# Patient Record
Sex: MALE | Race: WHITE | NOT HISPANIC OR LATINO | Employment: OTHER | URBAN - METROPOLITAN AREA
[De-identification: names, ages, dates, MRNs, and addresses within clinical notes are randomized per-mention and may not be internally consistent; named-entity substitution may affect disease eponyms.]

---

## 2022-01-01 ENCOUNTER — HOSPITAL ENCOUNTER (OUTPATIENT)
Dept: CT IMAGING | Facility: HOSPITAL | Age: 72
Discharge: HOME | End: 2022-06-23
Admitting: UROLOGY

## 2022-01-01 ENCOUNTER — NURSE NAVIGATOR (OUTPATIENT)
Dept: ONCOLOGY | Facility: CLINIC | Age: 72
End: 2022-01-01

## 2022-01-01 ENCOUNTER — CONSULT (OUTPATIENT)
Dept: ONCOLOGY | Facility: CLINIC | Age: 72
End: 2022-01-01

## 2022-01-01 ENCOUNTER — APPOINTMENT (OUTPATIENT)
Dept: GENERAL RADIOLOGY | Facility: HOSPITAL | Age: 72
End: 2022-01-01

## 2022-01-01 ENCOUNTER — PROCEDURE VISIT (OUTPATIENT)
Dept: UROLOGY | Facility: CLINIC | Age: 72
End: 2022-01-01

## 2022-01-01 ENCOUNTER — APPOINTMENT (OUTPATIENT)
Dept: ONCOLOGY | Facility: HOSPITAL | Age: 72
End: 2022-01-01

## 2022-01-01 ENCOUNTER — OUTSIDE FACILITY SERVICE (OUTPATIENT)
Dept: GASTROENTEROLOGY | Facility: CLINIC | Age: 72
End: 2022-01-01

## 2022-01-01 ENCOUNTER — APPOINTMENT (OUTPATIENT)
Dept: NEPHROLOGY | Facility: HOSPITAL | Age: 72
End: 2022-01-01

## 2022-01-01 ENCOUNTER — TELEPHONE (OUTPATIENT)
Dept: INTERNAL MEDICINE | Facility: CLINIC | Age: 72
End: 2022-01-01

## 2022-01-01 ENCOUNTER — OFFICE VISIT (OUTPATIENT)
Dept: INTERNAL MEDICINE | Facility: CLINIC | Age: 72
End: 2022-01-01

## 2022-01-01 ENCOUNTER — APPOINTMENT (OUTPATIENT)
Dept: CARDIOLOGY | Facility: HOSPITAL | Age: 72
End: 2022-01-01

## 2022-01-01 ENCOUNTER — APPOINTMENT (OUTPATIENT)
Dept: LAB | Facility: HOSPITAL | Age: 72
End: 2022-01-01

## 2022-01-01 ENCOUNTER — TRANSITIONAL CARE MANAGEMENT TELEPHONE ENCOUNTER (OUTPATIENT)
Dept: CALL CENTER | Facility: HOSPITAL | Age: 72
End: 2022-01-01

## 2022-01-01 ENCOUNTER — HOSPITAL ENCOUNTER (OUTPATIENT)
Dept: ONCOLOGY | Facility: HOSPITAL | Age: 72
Setting detail: INFUSION SERIES
Discharge: HOME | End: 2022-07-07

## 2022-01-01 ENCOUNTER — APPOINTMENT (OUTPATIENT)
Dept: CT IMAGING | Facility: HOSPITAL | Age: 72
End: 2022-01-01

## 2022-01-01 ENCOUNTER — OFFICE VISIT (OUTPATIENT)
Dept: CARDIAC SURGERY | Facility: CLINIC | Age: 72
End: 2022-01-01

## 2022-01-01 ENCOUNTER — EDUCATION (OUTPATIENT)
Dept: ONCOLOGY | Facility: HOSPITAL | Age: 72
End: 2022-01-01

## 2022-01-01 ENCOUNTER — NURSE TRIAGE (OUTPATIENT)
Dept: CALL CENTER | Facility: HOSPITAL | Age: 72
End: 2022-01-01

## 2022-01-01 ENCOUNTER — OFFICE VISIT (OUTPATIENT)
Dept: PULMONOLOGY | Facility: CLINIC | Age: 72
End: 2022-01-01

## 2022-01-01 ENCOUNTER — ANESTHESIA (OUTPATIENT)
Dept: GASTROENTEROLOGY | Facility: HOSPITAL | Age: 72
End: 2022-01-01

## 2022-01-01 ENCOUNTER — HOSPITAL ENCOUNTER (INPATIENT)
Facility: HOSPITAL | Age: 72
LOS: 7 days | Discharge: HOSPICE | End: 2022-10-27
Attending: EMERGENCY MEDICINE | Admitting: INTERNAL MEDICINE

## 2022-01-01 ENCOUNTER — PRE-ADMISSION TESTING (OUTPATIENT)
Dept: PREADMISSION TESTING | Facility: HOSPITAL | Age: 72
End: 2022-01-01

## 2022-01-01 ENCOUNTER — TELEPHONE (OUTPATIENT)
Dept: ONCOLOGY | Facility: CLINIC | Age: 72
End: 2022-01-01

## 2022-01-01 ENCOUNTER — TELEPHONE (OUTPATIENT)
Dept: GASTROENTEROLOGY | Facility: CLINIC | Age: 72
End: 2022-01-01

## 2022-01-01 ENCOUNTER — HOSPITAL ENCOUNTER (OUTPATIENT)
Dept: ONCOLOGY | Facility: HOSPITAL | Age: 72
Setting detail: INFUSION SERIES
Discharge: HOME | End: 2022-07-06

## 2022-01-01 ENCOUNTER — OFFICE VISIT (OUTPATIENT)
Dept: UROLOGY | Facility: CLINIC | Age: 72
End: 2022-01-01

## 2022-01-01 ENCOUNTER — READMISSION MANAGEMENT (OUTPATIENT)
Dept: CALL CENTER | Facility: HOSPITAL | Age: 72
End: 2022-01-01

## 2022-01-01 ENCOUNTER — HOSPITAL ENCOUNTER (EMERGENCY)
Facility: HOSPITAL | Age: 72
Discharge: LEFT BEFORE DISCHARGE | End: 2022-10-02
Attending: EMERGENCY MEDICINE | Admitting: EMERGENCY MEDICINE

## 2022-01-01 ENCOUNTER — HOSPITAL ENCOUNTER (OUTPATIENT)
Dept: ONCOLOGY | Facility: HOSPITAL | Age: 72
Discharge: HOME | End: 2022-07-06

## 2022-01-01 ENCOUNTER — HOSPITAL ENCOUNTER (OUTPATIENT)
Dept: RADIATION ONCOLOGY | Facility: HOSPITAL | Age: 72
Setting detail: RADIATION/ONCOLOGY SERIES
Discharge: HOME | End: 2022-09-22

## 2022-01-01 ENCOUNTER — HOSPITAL ENCOUNTER (OUTPATIENT)
Dept: PET IMAGING | Facility: HOSPITAL | Age: 72
Discharge: HOME | End: 2022-06-24

## 2022-01-01 ENCOUNTER — DOCUMENTATION (OUTPATIENT)
Dept: PULMONOLOGY | Facility: CLINIC | Age: 72
End: 2022-01-01

## 2022-01-01 ENCOUNTER — HOSPITAL ENCOUNTER (INPATIENT)
Facility: HOSPITAL | Age: 72
LOS: 28 days | Discharge: HOME | End: 2022-08-09
Attending: EMERGENCY MEDICINE | Admitting: HOSPITALIST

## 2022-01-01 ENCOUNTER — LAB (OUTPATIENT)
Dept: LAB | Facility: HOSPITAL | Age: 72
End: 2022-01-01

## 2022-01-01 ENCOUNTER — HOSPITAL ENCOUNTER (OUTPATIENT)
Dept: MRI IMAGING | Facility: HOSPITAL | Age: 72
Discharge: HOME | End: 2022-06-28
Admitting: INTERNAL MEDICINE

## 2022-01-01 ENCOUNTER — HOSPITAL ENCOUNTER (OUTPATIENT)
Dept: CT IMAGING | Facility: HOSPITAL | Age: 72
Discharge: HOME | End: 2022-05-31
Admitting: STUDENT IN AN ORGANIZED HEALTH CARE EDUCATION/TRAINING PROGRAM

## 2022-01-01 ENCOUNTER — PREP FOR SURGERY (OUTPATIENT)
Dept: OTHER | Facility: HOSPITAL | Age: 72
End: 2022-01-01

## 2022-01-01 ENCOUNTER — HOSPITAL ENCOUNTER (OUTPATIENT)
Facility: HOSPITAL | Age: 72
Setting detail: HOSPITAL OUTPATIENT SURGERY
Discharge: HOME | End: 2022-06-15
Attending: INTERNAL MEDICINE | Admitting: INTERNAL MEDICINE

## 2022-01-01 ENCOUNTER — ANESTHESIA EVENT (OUTPATIENT)
Dept: GASTROENTEROLOGY | Facility: HOSPITAL | Age: 72
End: 2022-01-01

## 2022-01-01 ENCOUNTER — TELEPHONE (OUTPATIENT)
Dept: CARDIAC SURGERY | Facility: CLINIC | Age: 72
End: 2022-01-01

## 2022-01-01 ENCOUNTER — OFFICE VISIT (OUTPATIENT)
Dept: ONCOLOGY | Facility: CLINIC | Age: 72
End: 2022-01-01

## 2022-01-01 ENCOUNTER — DOCUMENTATION (OUTPATIENT)
Dept: NUTRITION | Facility: HOSPITAL | Age: 72
End: 2022-01-01

## 2022-01-01 ENCOUNTER — TELEPHONE (OUTPATIENT)
Dept: UROLOGY | Facility: CLINIC | Age: 72
End: 2022-01-01

## 2022-01-01 ENCOUNTER — LAB REQUISITION (OUTPATIENT)
Dept: LAB | Facility: HOSPITAL | Age: 72
End: 2022-01-01

## 2022-01-01 ENCOUNTER — HOSPITAL ENCOUNTER (OUTPATIENT)
Dept: PET IMAGING | Facility: HOSPITAL | Age: 72
Discharge: HOME | End: 2022-09-06

## 2022-01-01 ENCOUNTER — TELEPHONE (OUTPATIENT)
Dept: PULMONOLOGY | Facility: CLINIC | Age: 72
End: 2022-01-01

## 2022-01-01 ENCOUNTER — DOCUMENTATION (OUTPATIENT)
Dept: ONCOLOGY | Facility: CLINIC | Age: 72
End: 2022-01-01

## 2022-01-01 ENCOUNTER — PATIENT ROUNDING (BHMG ONLY) (OUTPATIENT)
Dept: ONCOLOGY | Facility: CLINIC | Age: 72
End: 2022-01-01

## 2022-01-01 ENCOUNTER — PATIENT OUTREACH (OUTPATIENT)
Dept: CASE MANAGEMENT | Facility: OTHER | Age: 72
End: 2022-01-01

## 2022-01-01 VITALS
TEMPERATURE: 96.8 F | HEIGHT: 67 IN | OXYGEN SATURATION: 97 % | BODY MASS INDEX: 26.4 KG/M2 | WEIGHT: 168.2 LBS | DIASTOLIC BLOOD PRESSURE: 94 MMHG | HEART RATE: 94 BPM | RESPIRATION RATE: 18 BRPM | SYSTOLIC BLOOD PRESSURE: 167 MMHG

## 2022-01-01 VITALS
SYSTOLIC BLOOD PRESSURE: 136 MMHG | TEMPERATURE: 97.5 F | WEIGHT: 155 LBS | RESPIRATION RATE: 18 BRPM | BODY MASS INDEX: 23.49 KG/M2 | HEIGHT: 68 IN | OXYGEN SATURATION: 91 % | DIASTOLIC BLOOD PRESSURE: 60 MMHG | HEART RATE: 86 BPM

## 2022-01-01 VITALS
HEART RATE: 104 BPM | DIASTOLIC BLOOD PRESSURE: 73 MMHG | OXYGEN SATURATION: 92 % | TEMPERATURE: 97.7 F | WEIGHT: 155 LBS | RESPIRATION RATE: 20 BRPM | HEIGHT: 67 IN | SYSTOLIC BLOOD PRESSURE: 142 MMHG | BODY MASS INDEX: 24.33 KG/M2

## 2022-01-01 VITALS
SYSTOLIC BLOOD PRESSURE: 132 MMHG | WEIGHT: 154 LBS | BODY MASS INDEX: 22.81 KG/M2 | TEMPERATURE: 97.8 F | DIASTOLIC BLOOD PRESSURE: 62 MMHG | HEIGHT: 69 IN | OXYGEN SATURATION: 95 % | HEART RATE: 76 BPM

## 2022-01-01 VITALS
HEART RATE: 100 BPM | HEIGHT: 67 IN | TEMPERATURE: 98.4 F | SYSTOLIC BLOOD PRESSURE: 136 MMHG | BODY MASS INDEX: 24.17 KG/M2 | WEIGHT: 154 LBS | DIASTOLIC BLOOD PRESSURE: 80 MMHG

## 2022-01-01 VITALS — BODY MASS INDEX: 23.64 KG/M2 | HEIGHT: 68 IN | WEIGHT: 156 LBS

## 2022-01-01 VITALS
OXYGEN SATURATION: 93 % | DIASTOLIC BLOOD PRESSURE: 70 MMHG | TEMPERATURE: 99.5 F | HEIGHT: 67 IN | SYSTOLIC BLOOD PRESSURE: 140 MMHG | BODY MASS INDEX: 24.33 KG/M2 | HEART RATE: 97 BPM | RESPIRATION RATE: 18 BRPM | WEIGHT: 155 LBS

## 2022-01-01 VITALS
OXYGEN SATURATION: 94 % | TEMPERATURE: 97 F | HEIGHT: 68 IN | BODY MASS INDEX: 23.72 KG/M2 | HEART RATE: 80 BPM | WEIGHT: 156.53 LBS | DIASTOLIC BLOOD PRESSURE: 78 MMHG | SYSTOLIC BLOOD PRESSURE: 146 MMHG | RESPIRATION RATE: 18 BRPM

## 2022-01-01 VITALS
DIASTOLIC BLOOD PRESSURE: 80 MMHG | SYSTOLIC BLOOD PRESSURE: 140 MMHG | HEIGHT: 64 IN | TEMPERATURE: 97.7 F | BODY MASS INDEX: 25.78 KG/M2 | WEIGHT: 151 LBS | OXYGEN SATURATION: 96 % | HEART RATE: 90 BPM

## 2022-01-01 VITALS
HEIGHT: 68 IN | SYSTOLIC BLOOD PRESSURE: 128 MMHG | BODY MASS INDEX: 22.19 KG/M2 | WEIGHT: 146.4 LBS | DIASTOLIC BLOOD PRESSURE: 62 MMHG | HEART RATE: 72 BPM | TEMPERATURE: 98 F

## 2022-01-01 VITALS
BODY MASS INDEX: 24.33 KG/M2 | SYSTOLIC BLOOD PRESSURE: 154 MMHG | OXYGEN SATURATION: 93 % | TEMPERATURE: 98.6 F | HEIGHT: 67 IN | DIASTOLIC BLOOD PRESSURE: 82 MMHG | RESPIRATION RATE: 24 BRPM | HEART RATE: 98 BPM | WEIGHT: 155 LBS

## 2022-01-01 VITALS
SYSTOLIC BLOOD PRESSURE: 110 MMHG | BODY MASS INDEX: 25.46 KG/M2 | TEMPERATURE: 98.2 F | WEIGHT: 162.2 LBS | HEART RATE: 88 BPM | DIASTOLIC BLOOD PRESSURE: 70 MMHG | HEIGHT: 67 IN

## 2022-01-01 VITALS
BODY MASS INDEX: 24.05 KG/M2 | WEIGHT: 153.25 LBS | DIASTOLIC BLOOD PRESSURE: 70 MMHG | TEMPERATURE: 98 F | SYSTOLIC BLOOD PRESSURE: 148 MMHG | OXYGEN SATURATION: 95 % | HEIGHT: 67 IN | HEART RATE: 105 BPM | RESPIRATION RATE: 22 BRPM

## 2022-01-01 VITALS
DIASTOLIC BLOOD PRESSURE: 57 MMHG | RESPIRATION RATE: 18 BRPM | BODY MASS INDEX: 23.49 KG/M2 | HEIGHT: 68 IN | HEART RATE: 91 BPM | OXYGEN SATURATION: 93 % | SYSTOLIC BLOOD PRESSURE: 121 MMHG | WEIGHT: 155 LBS | TEMPERATURE: 97.3 F

## 2022-01-01 VITALS
SYSTOLIC BLOOD PRESSURE: 106 MMHG | HEIGHT: 69 IN | TEMPERATURE: 97.8 F | BODY MASS INDEX: 23.08 KG/M2 | HEART RATE: 64 BPM | DIASTOLIC BLOOD PRESSURE: 60 MMHG | WEIGHT: 155.8 LBS

## 2022-01-01 VITALS — WEIGHT: 155 LBS | BODY MASS INDEX: 22.96 KG/M2 | HEIGHT: 69 IN

## 2022-01-01 VITALS
RESPIRATION RATE: 18 BRPM | HEIGHT: 67 IN | SYSTOLIC BLOOD PRESSURE: 149 MMHG | DIASTOLIC BLOOD PRESSURE: 83 MMHG | HEART RATE: 102 BPM | WEIGHT: 152.7 LBS | BODY MASS INDEX: 23.97 KG/M2 | OXYGEN SATURATION: 91 % | TEMPERATURE: 98.3 F

## 2022-01-01 VITALS — BODY MASS INDEX: 27.14 KG/M2 | WEIGHT: 159 LBS | HEIGHT: 64 IN

## 2022-01-01 VITALS
SYSTOLIC BLOOD PRESSURE: 152 MMHG | HEART RATE: 81 BPM | WEIGHT: 157 LBS | TEMPERATURE: 97.9 F | RESPIRATION RATE: 16 BRPM | DIASTOLIC BLOOD PRESSURE: 68 MMHG | BODY MASS INDEX: 23.79 KG/M2 | HEIGHT: 68 IN

## 2022-01-01 VITALS — BODY MASS INDEX: 23.81 KG/M2 | HEIGHT: 68 IN | WEIGHT: 157.08 LBS

## 2022-01-01 DIAGNOSIS — E78.2 MIXED HYPERLIPIDEMIA: Chronic | ICD-10-CM

## 2022-01-01 DIAGNOSIS — Z86.79 HISTORY OF CORONARY ARTERY DISEASE: ICD-10-CM

## 2022-01-01 DIAGNOSIS — C67.9 MALIGNANT NEOPLASM OF URINARY BLADDER, UNSPECIFIED SITE: Chronic | ICD-10-CM

## 2022-01-01 DIAGNOSIS — C67.9 MALIGNANT NEOPLASM OF URINARY BLADDER, UNSPECIFIED SITE: Primary | Chronic | ICD-10-CM

## 2022-01-01 DIAGNOSIS — R30.9 PAINFUL URINATION: ICD-10-CM

## 2022-01-01 DIAGNOSIS — J90 BILATERAL PLEURAL EFFUSION: ICD-10-CM

## 2022-01-01 DIAGNOSIS — R60.0 LOWER EXTREMITY EDEMA: ICD-10-CM

## 2022-01-01 DIAGNOSIS — C34.92 SQUAMOUS CELL LUNG CANCER, LEFT: ICD-10-CM

## 2022-01-01 DIAGNOSIS — Z86.39 HISTORY OF HYPERLIPIDEMIA: ICD-10-CM

## 2022-01-01 DIAGNOSIS — C34.12 CANCER OF UPPER LOBE OF LEFT LUNG: Primary | ICD-10-CM

## 2022-01-01 DIAGNOSIS — C34.92 SQUAMOUS CELL LUNG CANCER, LEFT: Primary | ICD-10-CM

## 2022-01-01 DIAGNOSIS — K21.9 GASTROESOPHAGEAL REFLUX DISEASE WITHOUT ESOPHAGITIS: ICD-10-CM

## 2022-01-01 DIAGNOSIS — H10.9 CONJUNCTIVITIS OF LEFT EYE, UNSPECIFIED CONJUNCTIVITIS TYPE: ICD-10-CM

## 2022-01-01 DIAGNOSIS — D12.2 BENIGN NEOPLASM OF ASCENDING COLON: ICD-10-CM

## 2022-01-01 DIAGNOSIS — Z51.81 ENCOUNTER FOR THERAPEUTIC DRUG MONITORING: ICD-10-CM

## 2022-01-01 DIAGNOSIS — Z12.11 SCREEN FOR COLON CANCER: ICD-10-CM

## 2022-01-01 DIAGNOSIS — J96.01 ACUTE RESPIRATORY FAILURE WITH HYPOXIA: ICD-10-CM

## 2022-01-01 DIAGNOSIS — K56.7 ILEUS: ICD-10-CM

## 2022-01-01 DIAGNOSIS — Z01.818 PRE-OP TESTING: Primary | ICD-10-CM

## 2022-01-01 DIAGNOSIS — J84.114 ACUTE INTERSTITIAL PNEUMONITIS: ICD-10-CM

## 2022-01-01 DIAGNOSIS — K21.9 GASTROESOPHAGEAL REFLUX DISEASE WITHOUT ESOPHAGITIS: Chronic | ICD-10-CM

## 2022-01-01 DIAGNOSIS — J96.01 ACUTE RESPIRATORY FAILURE WITH HYPOXIA AND HYPERCAPNIA: Primary | ICD-10-CM

## 2022-01-01 DIAGNOSIS — E53.8 VITAMIN B12 DEFICIENCY: Primary | ICD-10-CM

## 2022-01-01 DIAGNOSIS — R91.8 LUNG MASS: Primary | ICD-10-CM

## 2022-01-01 DIAGNOSIS — C34.92 PRIMARY LUNG CANCER, LEFT: ICD-10-CM

## 2022-01-01 DIAGNOSIS — D72.829 LEUKOCYTOSIS, UNSPECIFIED TYPE: ICD-10-CM

## 2022-01-01 DIAGNOSIS — R91.8 MASS OF UPPER LOBE OF LEFT LUNG: ICD-10-CM

## 2022-01-01 DIAGNOSIS — J96.10 CHRONIC RESPIRATORY FAILURE, UNSPECIFIED WHETHER WITH HYPOXIA OR HYPERCAPNIA: ICD-10-CM

## 2022-01-01 DIAGNOSIS — J44.9 CHRONIC OBSTRUCTIVE PULMONARY DISEASE, UNSPECIFIED COPD TYPE: Chronic | ICD-10-CM

## 2022-01-01 DIAGNOSIS — R59.0 MEDIASTINAL ADENOPATHY: ICD-10-CM

## 2022-01-01 DIAGNOSIS — J81.0 ACUTE PULMONARY EDEMA: Primary | ICD-10-CM

## 2022-01-01 DIAGNOSIS — J96.02 ACUTE RESPIRATORY FAILURE WITH HYPOXIA AND HYPERCAPNIA: Primary | ICD-10-CM

## 2022-01-01 DIAGNOSIS — L40.9 PSORIASIS: ICD-10-CM

## 2022-01-01 DIAGNOSIS — J90 RECURRENT LEFT PLEURAL EFFUSION: Primary | ICD-10-CM

## 2022-01-01 DIAGNOSIS — N17.9 AKI (ACUTE KIDNEY INJURY): Primary | ICD-10-CM

## 2022-01-01 DIAGNOSIS — F17.210 NICOTINE DEPENDENCE, CIGARETTES, UNCOMPLICATED: ICD-10-CM

## 2022-01-01 DIAGNOSIS — R91.8 MASS OF UPPER LOBE OF LEFT LUNG: Primary | ICD-10-CM

## 2022-01-01 DIAGNOSIS — Z12.5 SCREENING PSA (PROSTATE SPECIFIC ANTIGEN): ICD-10-CM

## 2022-01-01 DIAGNOSIS — C34.12 CANCER OF UPPER LOBE OF LEFT LUNG: ICD-10-CM

## 2022-01-01 DIAGNOSIS — Z13.21 ENCOUNTER FOR VITAMIN DEFICIENCY SCREENING: ICD-10-CM

## 2022-01-01 DIAGNOSIS — Z86.010 PERSONAL HISTORY OF COLONIC POLYPS: ICD-10-CM

## 2022-01-01 DIAGNOSIS — R91.8 LEFT LOWER LOBE PULMONARY INFILTRATE: ICD-10-CM

## 2022-01-01 DIAGNOSIS — Z12.2 ENCOUNTER FOR SCREENING FOR LUNG CANCER: ICD-10-CM

## 2022-01-01 DIAGNOSIS — N28.89 RIGHT KIDNEY MASS: ICD-10-CM

## 2022-01-01 DIAGNOSIS — K64.8 OTHER HEMORRHOIDS: ICD-10-CM

## 2022-01-01 DIAGNOSIS — F41.9 ANXIETY DISORDER, UNSPECIFIED TYPE: ICD-10-CM

## 2022-01-01 DIAGNOSIS — J44.1 COPD WITH ACUTE EXACERBATION: ICD-10-CM

## 2022-01-01 DIAGNOSIS — G62.9 NEUROPATHY: ICD-10-CM

## 2022-01-01 DIAGNOSIS — Z00.00 ENCOUNTER FOR SUBSEQUENT ANNUAL WELLNESS VISIT (AWV) IN MEDICARE PATIENT: Primary | ICD-10-CM

## 2022-01-01 DIAGNOSIS — J96.21 ACUTE ON CHRONIC RESPIRATORY FAILURE WITH HYPOXIA: ICD-10-CM

## 2022-01-01 DIAGNOSIS — G62.9 NEUROPATHY: Primary | ICD-10-CM

## 2022-01-01 DIAGNOSIS — I25.810 CORONARY ARTERY DISEASE INVOLVING CORONARY BYPASS GRAFT OF NATIVE HEART WITHOUT ANGINA PECTORIS: ICD-10-CM

## 2022-01-01 DIAGNOSIS — Z86.79 HISTORY OF HYPERTENSION: ICD-10-CM

## 2022-01-01 DIAGNOSIS — I10 ESSENTIAL HYPERTENSION: Chronic | ICD-10-CM

## 2022-01-01 DIAGNOSIS — Z85.118 HISTORY OF LUNG CANCER: ICD-10-CM

## 2022-01-01 DIAGNOSIS — J44.9 CHRONIC OBSTRUCTIVE PULMONARY DISEASE, UNSPECIFIED COPD TYPE: ICD-10-CM

## 2022-01-01 DIAGNOSIS — Z87.2 HISTORY OF PSORIASIS: ICD-10-CM

## 2022-01-01 DIAGNOSIS — C34.12 MALIGNANT NEOPLASM OF UPPER LOBE, LEFT BRONCHUS OR LUNG: ICD-10-CM

## 2022-01-01 DIAGNOSIS — D12.5 BENIGN NEOPLASM OF SIGMOID COLON: ICD-10-CM

## 2022-01-01 DIAGNOSIS — R59.0 PARATRACHEAL LYMPHADENOPATHY: ICD-10-CM

## 2022-01-01 DIAGNOSIS — D12.4 BENIGN NEOPLASM OF DESCENDING COLON: ICD-10-CM

## 2022-01-01 DIAGNOSIS — R39.9 LOWER URINARY TRACT SYMPTOMS (LUTS): ICD-10-CM

## 2022-01-01 DIAGNOSIS — F17.200 TOBACCO DEPENDENCE: ICD-10-CM

## 2022-01-01 DIAGNOSIS — C67.9 MALIGNANT NEOPLASM OF URINARY BLADDER, UNSPECIFIED SITE: Primary | ICD-10-CM

## 2022-01-01 DIAGNOSIS — R39.9 LOWER URINARY TRACT SYMPTOMS (LUTS): Primary | ICD-10-CM

## 2022-01-01 DIAGNOSIS — R11.2 NAUSEA, VOMITING, AND DIARRHEA: ICD-10-CM

## 2022-01-01 DIAGNOSIS — R06.02 SHORTNESS OF BREATH: ICD-10-CM

## 2022-01-01 DIAGNOSIS — E53.8 VITAMIN B12 DEFICIENCY: Chronic | ICD-10-CM

## 2022-01-01 DIAGNOSIS — D12.2 ADENOMATOUS POLYP OF ASCENDING COLON: ICD-10-CM

## 2022-01-01 DIAGNOSIS — F17.210 CIGARETTE SMOKER: ICD-10-CM

## 2022-01-01 DIAGNOSIS — I73.9 PAD (PERIPHERAL ARTERY DISEASE): ICD-10-CM

## 2022-01-01 DIAGNOSIS — C34.92 PRIMARY LUNG CANCER, LEFT: Primary | ICD-10-CM

## 2022-01-01 DIAGNOSIS — I73.9 PAD (PERIPHERAL ARTERY DISEASE): Chronic | ICD-10-CM

## 2022-01-01 DIAGNOSIS — Z12.11 ENCOUNTER FOR SCREENING FOR MALIGNANT NEOPLASM OF COLON: ICD-10-CM

## 2022-01-01 DIAGNOSIS — I25.810 CORONARY ARTERY DISEASE INVOLVING CORONARY BYPASS GRAFT OF NATIVE HEART WITHOUT ANGINA PECTORIS: Chronic | ICD-10-CM

## 2022-01-01 DIAGNOSIS — J44.1 COPD EXACERBATION: ICD-10-CM

## 2022-01-01 DIAGNOSIS — Z11.59 NEED FOR HEPATITIS C SCREENING TEST: ICD-10-CM

## 2022-01-01 DIAGNOSIS — R06.2 WHEEZING: ICD-10-CM

## 2022-01-01 DIAGNOSIS — C34.12 MALIGNANT NEOPLASM OF UPPER LOBE OF LEFT LUNG: ICD-10-CM

## 2022-01-01 DIAGNOSIS — J96.10 CHRONIC RESPIRATORY FAILURE, UNSPECIFIED WHETHER WITH HYPOXIA OR HYPERCAPNIA: Chronic | ICD-10-CM

## 2022-01-01 DIAGNOSIS — K20.90 ESOPHAGITIS: ICD-10-CM

## 2022-01-01 DIAGNOSIS — I73.9 PAD (PERIPHERAL ARTERY DISEASE): Primary | ICD-10-CM

## 2022-01-01 DIAGNOSIS — R19.7 NAUSEA, VOMITING, AND DIARRHEA: ICD-10-CM

## 2022-01-01 DIAGNOSIS — F17.200 CURRENT SMOKER: ICD-10-CM

## 2022-01-01 DIAGNOSIS — J44.9 CHRONIC OBSTRUCTIVE PULMONARY DISEASE, UNSPECIFIED COPD TYPE: Primary | ICD-10-CM

## 2022-01-01 DIAGNOSIS — Z86.010 HISTORY OF COLON POLYPS: ICD-10-CM

## 2022-01-01 DIAGNOSIS — D12.0 BENIGN NEOPLASM OF CECUM: ICD-10-CM

## 2022-01-01 DIAGNOSIS — Z12.11 SCREENING FOR COLON CANCER: Primary | ICD-10-CM

## 2022-01-01 DIAGNOSIS — I50.9 ACUTE ON CHRONIC CONGESTIVE HEART FAILURE, UNSPECIFIED HEART FAILURE TYPE: ICD-10-CM

## 2022-01-01 DIAGNOSIS — J43.2 CENTRILOBULAR EMPHYSEMA: ICD-10-CM

## 2022-01-01 DIAGNOSIS — Z23 NEED FOR INFLUENZA VACCINATION: ICD-10-CM

## 2022-01-01 DIAGNOSIS — C67.9 MALIGNANT NEOPLASM OF URINARY BLADDER, UNSPECIFIED SITE: ICD-10-CM

## 2022-01-01 LAB
ABO GROUP BLD: NORMAL
ALBUMIN SERPL-MCNC: 2.6 G/DL (ref 3.5–5.2)
ALBUMIN SERPL-MCNC: 2.6 G/DL (ref 3.5–5.2)
ALBUMIN SERPL-MCNC: 2.7 G/DL (ref 3.5–5.2)
ALBUMIN SERPL-MCNC: 2.7 G/DL (ref 3.5–5.2)
ALBUMIN SERPL-MCNC: 2.8 G/DL (ref 3.5–5.2)
ALBUMIN SERPL-MCNC: 2.8 G/DL (ref 3.5–5.2)
ALBUMIN SERPL-MCNC: 2.9 G/DL (ref 3.5–5.2)
ALBUMIN SERPL-MCNC: 3 G/DL (ref 3.5–5.2)
ALBUMIN SERPL-MCNC: 3 G/DL (ref 3.5–5.2)
ALBUMIN SERPL-MCNC: 3.1 G/DL (ref 3.5–5.2)
ALBUMIN SERPL-MCNC: 3.1 G/DL (ref 3.5–5.2)
ALBUMIN SERPL-MCNC: 3.3 G/DL (ref 3.5–5.2)
ALBUMIN SERPL-MCNC: 3.4 G/DL (ref 3.5–5.2)
ALBUMIN SERPL-MCNC: 3.5 G/DL (ref 3.5–5.2)
ALBUMIN SERPL-MCNC: 3.6 G/DL (ref 3.5–5.2)
ALBUMIN SERPL-MCNC: 3.6 G/DL (ref 3.5–5.2)
ALBUMIN SERPL-MCNC: 3.8 G/DL (ref 3.5–5.2)
ALBUMIN SERPL-MCNC: 4.7 G/DL (ref 3.5–5.2)
ALBUMIN/GLOB SERPL: 0.8 G/DL
ALBUMIN/GLOB SERPL: 0.9 G/DL
ALBUMIN/GLOB SERPL: 1 G/DL
ALBUMIN/GLOB SERPL: 1.1 G/DL
ALBUMIN/GLOB SERPL: 1.3 G/DL
ALBUMIN/GLOB SERPL: 1.3 G/DL
ALBUMIN/GLOB SERPL: 1.4 G/DL
ALBUMIN/GLOB SERPL: 1.5 G/DL
ALP SERPL-CCNC: 107 U/L (ref 39–117)
ALP SERPL-CCNC: 113 U/L (ref 39–117)
ALP SERPL-CCNC: 114 U/L (ref 39–117)
ALP SERPL-CCNC: 117 U/L (ref 39–117)
ALP SERPL-CCNC: 129 U/L (ref 39–117)
ALP SERPL-CCNC: 132 U/L (ref 39–117)
ALP SERPL-CCNC: 132 U/L (ref 39–117)
ALP SERPL-CCNC: 133 U/L (ref 39–117)
ALP SERPL-CCNC: 140 U/L (ref 39–117)
ALP SERPL-CCNC: 144 U/L (ref 39–117)
ALP SERPL-CCNC: 146 U/L (ref 39–117)
ALP SERPL-CCNC: 157 U/L (ref 39–117)
ALP SERPL-CCNC: 161 U/L (ref 39–117)
ALP SERPL-CCNC: 45 U/L (ref 39–117)
ALP SERPL-CCNC: 76 U/L (ref 39–117)
ALP SERPL-CCNC: 89 U/L (ref 39–117)
ALT SERPL W P-5'-P-CCNC: 12 U/L (ref 1–41)
ALT SERPL W P-5'-P-CCNC: 12 U/L (ref 1–41)
ALT SERPL W P-5'-P-CCNC: 13 U/L (ref 1–41)
ALT SERPL W P-5'-P-CCNC: 13 U/L (ref 1–41)
ALT SERPL W P-5'-P-CCNC: 15 U/L (ref 1–41)
ALT SERPL W P-5'-P-CCNC: 19 U/L (ref 1–41)
ALT SERPL W P-5'-P-CCNC: 19 U/L (ref 1–41)
ALT SERPL W P-5'-P-CCNC: 22 U/L (ref 1–41)
ALT SERPL W P-5'-P-CCNC: 22 U/L (ref 1–41)
ALT SERPL W P-5'-P-CCNC: 23 U/L (ref 1–41)
ALT SERPL W P-5'-P-CCNC: 23 U/L (ref 1–41)
ALT SERPL W P-5'-P-CCNC: 24 U/L (ref 1–41)
ALT SERPL W P-5'-P-CCNC: 32 U/L (ref 1–41)
ALT SERPL W P-5'-P-CCNC: 59 U/L (ref 1–41)
ALT SERPL W P-5'-P-CCNC: 9 U/L (ref 1–41)
ALT SERPL W P-5'-P-CCNC: 9 U/L (ref 1–41)
ANION GAP SERPL CALCULATED.3IONS-SCNC: 10 MMOL/L (ref 5–15)
ANION GAP SERPL CALCULATED.3IONS-SCNC: 11 MMOL/L (ref 5–15)
ANION GAP SERPL CALCULATED.3IONS-SCNC: 12 MMOL/L (ref 5–15)
ANION GAP SERPL CALCULATED.3IONS-SCNC: 13 MMOL/L (ref 5–15)
ANION GAP SERPL CALCULATED.3IONS-SCNC: 13.4 MMOL/L (ref 5–15)
ANION GAP SERPL CALCULATED.3IONS-SCNC: 13.6 MMOL/L (ref 5–15)
ANION GAP SERPL CALCULATED.3IONS-SCNC: 14 MMOL/L (ref 5–15)
ANION GAP SERPL CALCULATED.3IONS-SCNC: 15 MMOL/L (ref 5–15)
ANION GAP SERPL CALCULATED.3IONS-SCNC: 15 MMOL/L (ref 5–15)
ANION GAP SERPL CALCULATED.3IONS-SCNC: 17 MMOL/L (ref 5–15)
ANION GAP SERPL CALCULATED.3IONS-SCNC: 18 MMOL/L (ref 5–15)
ANION GAP SERPL CALCULATED.3IONS-SCNC: 18 MMOL/L (ref 5–15)
ANION GAP SERPL CALCULATED.3IONS-SCNC: 20 MMOL/L (ref 5–15)
ANION GAP SERPL CALCULATED.3IONS-SCNC: 20 MMOL/L (ref 5–15)
ANION GAP SERPL CALCULATED.3IONS-SCNC: 23 MMOL/L (ref 5–15)
ANION GAP SERPL CALCULATED.3IONS-SCNC: 6 MMOL/L (ref 5–15)
ANION GAP SERPL CALCULATED.3IONS-SCNC: 6 MMOL/L (ref 5–15)
ANION GAP SERPL CALCULATED.3IONS-SCNC: 7 MMOL/L (ref 5–15)
ANION GAP SERPL CALCULATED.3IONS-SCNC: 8 MMOL/L (ref 5–15)
ANION GAP SERPL CALCULATED.3IONS-SCNC: 9 MMOL/L (ref 5–15)
APTT PPP: 30.7 SECONDS (ref 22–39)
ARTERIAL PATENCY WRIST A: ABNORMAL
AST SERPL-CCNC: 10 U/L (ref 1–40)
AST SERPL-CCNC: 11 U/L (ref 1–40)
AST SERPL-CCNC: 13 U/L (ref 1–40)
AST SERPL-CCNC: 16 U/L (ref 1–40)
AST SERPL-CCNC: 16 U/L (ref 1–40)
AST SERPL-CCNC: 17 U/L (ref 1–40)
AST SERPL-CCNC: 17 U/L (ref 1–40)
AST SERPL-CCNC: 18 U/L (ref 1–40)
AST SERPL-CCNC: 18 U/L (ref 1–40)
AST SERPL-CCNC: 19 U/L (ref 1–40)
AST SERPL-CCNC: 20 U/L (ref 1–40)
AST SERPL-CCNC: 30 U/L (ref 1–40)
AST SERPL-CCNC: 37 U/L (ref 1–40)
AST SERPL-CCNC: 38 U/L (ref 1–40)
AST SERPL-CCNC: 38 U/L (ref 1–40)
AST SERPL-CCNC: 9 U/L (ref 1–40)
ATMOSPHERIC PRESS: ABNORMAL MM[HG]
B PARAPERT DNA SPEC QL NAA+PROBE: NOT DETECTED
B PARAPERT DNA SPEC QL NAA+PROBE: NOT DETECTED
B PERT DNA SPEC QL NAA+PROBE: NOT DETECTED
B PERT DNA SPEC QL NAA+PROBE: NOT DETECTED
BACTERIA SPEC AEROBE CULT: ABNORMAL
BACTERIA SPEC AEROBE CULT: ABNORMAL
BACTERIA SPEC AEROBE CULT: NORMAL
BACTERIA SPEC RESP CULT: NORMAL
BACTERIA UR QL AUTO: ABNORMAL /HPF
BASE EXCESS BLDA CALC-SCNC: -1.2 MMOL/L (ref 0–2)
BASE EXCESS BLDA CALC-SCNC: -10.7 MMOL/L (ref 0–2)
BASE EXCESS BLDA CALC-SCNC: 8.4 MMOL/L (ref 0–2)
BASE EXCESS BLDA CALC-SCNC: 9.8 MMOL/L (ref 0–2)
BASOPHILS # BLD AUTO: 0 10*3/MM3 (ref 0–0.2)
BASOPHILS # BLD AUTO: 0.01 10*3/MM3 (ref 0–0.2)
BASOPHILS # BLD AUTO: 0.02 10*3/MM3 (ref 0–0.2)
BASOPHILS # BLD AUTO: 0.03 10*3/MM3 (ref 0–0.2)
BASOPHILS # BLD AUTO: 0.04 10*3/MM3 (ref 0–0.2)
BASOPHILS # BLD AUTO: 0.05 10*3/MM3 (ref 0–0.2)
BASOPHILS # BLD AUTO: 0.05 10*3/MM3 (ref 0–0.2)
BASOPHILS # BLD AUTO: 0.06 10*3/MM3 (ref 0–0.2)
BASOPHILS # BLD AUTO: 0.07 10*3/MM3 (ref 0–0.2)
BASOPHILS # BLD AUTO: 0.08 10*3/MM3 (ref 0–0.2)
BASOPHILS # BLD AUTO: 0.09 10*3/MM3 (ref 0–0.2)
BASOPHILS # BLD MANUAL: 0 10*3/MM3 (ref 0–0.2)
BASOPHILS NFR BLD AUTO: 0 % (ref 0–1.5)
BASOPHILS NFR BLD AUTO: 0.1 % (ref 0–1.5)
BASOPHILS NFR BLD AUTO: 0.2 % (ref 0–1.5)
BASOPHILS NFR BLD AUTO: 0.3 % (ref 0–1.5)
BASOPHILS NFR BLD AUTO: 0.5 % (ref 0–1.5)
BASOPHILS NFR BLD AUTO: 0.6 % (ref 0–1.5)
BASOPHILS NFR BLD AUTO: 0.6 % (ref 0–1.5)
BASOPHILS NFR BLD AUTO: 0.9 % (ref 0–1.5)
BASOPHILS NFR BLD AUTO: 0.9 % (ref 0–1.5)
BASOPHILS NFR BLD AUTO: 1 % (ref 0–1.5)
BASOPHILS NFR BLD MANUAL: 0 % (ref 0–1.5)
BDY SITE: ABNORMAL
BH BB BLOOD EXPIRATION DATE: NORMAL
BH BB BLOOD TYPE BARCODE: 5100
BH BB DISPENSE STATUS: NORMAL
BH BB PRODUCT CODE: NORMAL
BH BB UNIT NUMBER: NORMAL
BH CV ECHO MEAS - AO MAX PG: 5.6 MMHG
BH CV ECHO MEAS - AO MAX PG: 7.5 MMHG
BH CV ECHO MEAS - AO MEAN PG: 3.1 MMHG
BH CV ECHO MEAS - AO MEAN PG: 4.2 MMHG
BH CV ECHO MEAS - AO ROOT DIAM: 2.9 CM
BH CV ECHO MEAS - AO ROOT DIAM: 3.4 CM
BH CV ECHO MEAS - AO V2 MAX: 118.5 CM/SEC
BH CV ECHO MEAS - AO V2 MAX: 137.1 CM/SEC
BH CV ECHO MEAS - AO V2 VTI: 22.5 CM
BH CV ECHO MEAS - AO V2 VTI: 27.8 CM
BH CV ECHO MEAS - AVA(I,D): 2.7 CM2
BH CV ECHO MEAS - EDV(CUBED): 41.9 ML
BH CV ECHO MEAS - EDV(CUBED): 78.4 ML
BH CV ECHO MEAS - EDV(MOD-SP2): 85.4 ML
BH CV ECHO MEAS - EDV(MOD-SP4): 68.9 ML
BH CV ECHO MEAS - EDV(MOD-SP4): 88.4 ML
BH CV ECHO MEAS - EF(MOD-BP): 58 %
BH CV ECHO MEAS - EF(MOD-SP2): 50.5 %
BH CV ECHO MEAS - EF(MOD-SP4): 45 %
BH CV ECHO MEAS - EF(MOD-SP4): 58.8 %
BH CV ECHO MEAS - ESV(CUBED): 10.8 ML
BH CV ECHO MEAS - ESV(CUBED): 48.7 ML
BH CV ECHO MEAS - ESV(MOD-SP2): 42.3 ML
BH CV ECHO MEAS - ESV(MOD-SP4): 36.4 ML
BH CV ECHO MEAS - ESV(MOD-SP4): 37.9 ML
BH CV ECHO MEAS - FS: 14.7 %
BH CV ECHO MEAS - FS: 36.4 %
BH CV ECHO MEAS - IVS/LVPW: 0.94 CM
BH CV ECHO MEAS - IVS/LVPW: 1.02 CM
BH CV ECHO MEAS - IVSD: 1.31 CM
BH CV ECHO MEAS - IVSD: 1.46 CM
BH CV ECHO MEAS - LA DIMENSION: 3.8 CM
BH CV ECHO MEAS - LA DIMENSION: 4.3 CM
BH CV ECHO MEAS - LAT PEAK E' VEL: 8.7 CM/SEC
BH CV ECHO MEAS - LV MASS(C)D: 194 GRAMS
BH CV ECHO MEAS - LV MASS(C)D: 205.8 GRAMS
BH CV ECHO MEAS - LV MAX PG: 3.1 MMHG
BH CV ECHO MEAS - LV MEAN PG: 1.54 MMHG
BH CV ECHO MEAS - LV V1 MAX: 88.4 CM/SEC
BH CV ECHO MEAS - LV V1 VTI: 16.4 CM
BH CV ECHO MEAS - LVIDD: 3.5 CM
BH CV ECHO MEAS - LVIDD: 4.3 CM
BH CV ECHO MEAS - LVIDS: 2.21 CM
BH CV ECHO MEAS - LVIDS: 3.7 CM
BH CV ECHO MEAS - LVOT AREA: 3.3 CM2
BH CV ECHO MEAS - LVOT AREA: 3.6 CM2
BH CV ECHO MEAS - LVOT DIAM: 2.06 CM
BH CV ECHO MEAS - LVOT DIAM: 2.15 CM
BH CV ECHO MEAS - LVPWD: 1.28 CM
BH CV ECHO MEAS - LVPWD: 1.56 CM
BH CV ECHO MEAS - MED PEAK E' VEL: 7.1 CM/SEC
BH CV ECHO MEAS - MR MAX PG: 55.8 MMHG
BH CV ECHO MEAS - MR MAX VEL: 373.4 CM/SEC
BH CV ECHO MEAS - MV DEC SLOPE: 1071 CM/SEC2
BH CV ECHO MEAS - MV DEC TIME: 0.18 MSEC
BH CV ECHO MEAS - MV E MAX VEL: 102 CM/SEC
BH CV ECHO MEAS - MV MAX PG: 6.6 MMHG
BH CV ECHO MEAS - MV MEAN PG: 1.74 MMHG
BH CV ECHO MEAS - MV P1/2T: 37.9 MSEC
BH CV ECHO MEAS - MV V2 VTI: 28.1 CM
BH CV ECHO MEAS - MVA(P1/2T): 5.8 CM2
BH CV ECHO MEAS - PA ACC TIME: 0.12 SEC
BH CV ECHO MEAS - PA PR(ACCEL): 26.7 MMHG
BH CV ECHO MEAS - PA V2 MAX: 88.2 CM/SEC
BH CV ECHO MEAS - PI END-D VEL: 101.8 CM/SEC
BH CV ECHO MEAS - RAP SYSTOLE: 8 MMHG
BH CV ECHO MEAS - RAP SYSTOLE: 8 MMHG
BH CV ECHO MEAS - RVSP: 33 MMHG
BH CV ECHO MEAS - RVSP: 49 MMHG
BH CV ECHO MEAS - SV(LVOT): 59.8 ML
BH CV ECHO MEAS - SV(MOD-SP2): 43.1 ML
BH CV ECHO MEAS - SV(MOD-SP4): 31 ML
BH CV ECHO MEAS - SV(MOD-SP4): 52 ML
BH CV ECHO MEAS - TAPSE (>1.6): 1.17 CM
BH CV ECHO MEAS - TR MAX PG: 24.8 MMHG
BH CV ECHO MEAS - TR MAX PG: 41 MMHG
BH CV ECHO MEAS - TR MAX VEL: 248.9 CM/SEC
BH CV ECHO MEAS - TR MAX VEL: 290.7 CM/SEC
BH CV ECHO MEASUREMENTS AVERAGE E/E' RATIO: 12.91
BH CV XLRA - RV BASE: 4.4 CM
BH CV XLRA - RV LENGTH: 7.9 CM
BH CV XLRA - RV MID: 3.4 CM
BH CV XLRA - TDI S': 5.7 CM/SEC
BH CV XLRA - TDI S': 7.7 CM/SEC
BILIRUB SERPL-MCNC: 0.2 MG/DL (ref 0–1.2)
BILIRUB SERPL-MCNC: 0.2 MG/DL (ref 0–1.2)
BILIRUB SERPL-MCNC: 0.3 MG/DL (ref 0–1.2)
BILIRUB SERPL-MCNC: 0.4 MG/DL (ref 0–1.2)
BILIRUB SERPL-MCNC: 0.4 MG/DL (ref 0–1.2)
BILIRUB SERPL-MCNC: 0.5 MG/DL (ref 0–1.2)
BILIRUB SERPL-MCNC: 0.5 MG/DL (ref 0–1.2)
BILIRUB SERPL-MCNC: 0.7 MG/DL (ref 0–1.2)
BILIRUB SERPL-MCNC: 0.7 MG/DL (ref 0–1.2)
BILIRUB SERPL-MCNC: 0.9 MG/DL (ref 0–1.2)
BILIRUB SERPL-MCNC: 1 MG/DL (ref 0–1.2)
BILIRUB UR QL STRIP: NEGATIVE
BLD GP AB SCN SERPL QL: NEGATIVE
BLD GP AB SCN SERPL QL: NEGATIVE
BODY TEMPERATURE: 37 C
BUN SERPL-MCNC: 103 MG/DL (ref 8–23)
BUN SERPL-MCNC: 103 MG/DL (ref 8–23)
BUN SERPL-MCNC: 13 MG/DL (ref 8–23)
BUN SERPL-MCNC: 15 MG/DL (ref 8–23)
BUN SERPL-MCNC: 16 MG/DL (ref 8–23)
BUN SERPL-MCNC: 21 MG/DL (ref 8–23)
BUN SERPL-MCNC: 23 MG/DL (ref 8–23)
BUN SERPL-MCNC: 25 MG/DL (ref 8–23)
BUN SERPL-MCNC: 26 MG/DL (ref 8–23)
BUN SERPL-MCNC: 28 MG/DL (ref 8–23)
BUN SERPL-MCNC: 31 MG/DL (ref 8–23)
BUN SERPL-MCNC: 32 MG/DL (ref 8–23)
BUN SERPL-MCNC: 35 MG/DL (ref 8–23)
BUN SERPL-MCNC: 35 MG/DL (ref 8–23)
BUN SERPL-MCNC: 37 MG/DL (ref 8–23)
BUN SERPL-MCNC: 39 MG/DL (ref 8–23)
BUN SERPL-MCNC: 40 MG/DL (ref 8–23)
BUN SERPL-MCNC: 41 MG/DL (ref 8–23)
BUN SERPL-MCNC: 42 MG/DL (ref 8–23)
BUN SERPL-MCNC: 43 MG/DL (ref 8–23)
BUN SERPL-MCNC: 44 MG/DL (ref 8–23)
BUN SERPL-MCNC: 45 MG/DL (ref 8–23)
BUN SERPL-MCNC: 46 MG/DL (ref 8–23)
BUN SERPL-MCNC: 46 MG/DL (ref 8–23)
BUN SERPL-MCNC: 52 MG/DL (ref 8–23)
BUN SERPL-MCNC: 53 MG/DL (ref 8–23)
BUN SERPL-MCNC: 55 MG/DL (ref 8–23)
BUN SERPL-MCNC: 56 MG/DL (ref 8–23)
BUN SERPL-MCNC: 57 MG/DL (ref 8–23)
BUN SERPL-MCNC: 60 MG/DL (ref 8–23)
BUN SERPL-MCNC: 60 MG/DL (ref 8–23)
BUN SERPL-MCNC: 62 MG/DL (ref 8–23)
BUN SERPL-MCNC: 63 MG/DL (ref 8–23)
BUN SERPL-MCNC: 65 MG/DL (ref 8–23)
BUN SERPL-MCNC: 67 MG/DL (ref 8–23)
BUN SERPL-MCNC: 67 MG/DL (ref 8–23)
BUN SERPL-MCNC: 68 MG/DL (ref 8–23)
BUN SERPL-MCNC: 70 MG/DL (ref 8–23)
BUN SERPL-MCNC: 73 MG/DL (ref 8–23)
BUN SERPL-MCNC: 76 MG/DL (ref 8–23)
BUN SERPL-MCNC: 76 MG/DL (ref 8–23)
BUN SERPL-MCNC: 80 MG/DL (ref 8–23)
BUN SERPL-MCNC: 82 MG/DL (ref 8–23)
BUN SERPL-MCNC: 96 MG/DL (ref 8–23)
BUN/CREAT SERPL: 11.1 (ref 7–25)
BUN/CREAT SERPL: 11.1 (ref 7–25)
BUN/CREAT SERPL: 11.2 (ref 7–25)
BUN/CREAT SERPL: 11.2 (ref 7–25)
BUN/CREAT SERPL: 12.9 (ref 7–25)
BUN/CREAT SERPL: 13.1 (ref 7–25)
BUN/CREAT SERPL: 13.3 (ref 7–25)
BUN/CREAT SERPL: 13.3 (ref 7–25)
BUN/CREAT SERPL: 13.6 (ref 7–25)
BUN/CREAT SERPL: 13.7 (ref 7–25)
BUN/CREAT SERPL: 13.9 (ref 7–25)
BUN/CREAT SERPL: 14.3 (ref 7–25)
BUN/CREAT SERPL: 14.4 (ref 7–25)
BUN/CREAT SERPL: 14.4 (ref 7–25)
BUN/CREAT SERPL: 14.6 (ref 7–25)
BUN/CREAT SERPL: 14.7 (ref 7–25)
BUN/CREAT SERPL: 15.4 (ref 7–25)
BUN/CREAT SERPL: 16 (ref 7–25)
BUN/CREAT SERPL: 16.1 (ref 7–25)
BUN/CREAT SERPL: 16.6 (ref 7–25)
BUN/CREAT SERPL: 16.6 (ref 7–25)
BUN/CREAT SERPL: 16.7 (ref 7–25)
BUN/CREAT SERPL: 16.9 (ref 7–25)
BUN/CREAT SERPL: 17.3 (ref 7–25)
BUN/CREAT SERPL: 17.5 (ref 7–25)
BUN/CREAT SERPL: 18.2 (ref 7–25)
BUN/CREAT SERPL: 18.7 (ref 7–25)
BUN/CREAT SERPL: 19.2 (ref 7–25)
BUN/CREAT SERPL: 19.5 (ref 7–25)
BUN/CREAT SERPL: 20.4 (ref 7–25)
BUN/CREAT SERPL: 20.5 (ref 7–25)
BUN/CREAT SERPL: 21 (ref 7–25)
BUN/CREAT SERPL: 21.1 (ref 7–25)
BUN/CREAT SERPL: 22.7 (ref 7–25)
BUN/CREAT SERPL: 24.6 (ref 7–25)
BUN/CREAT SERPL: 26 (ref 7–25)
BUN/CREAT SERPL: 26.3 (ref 7–25)
BUN/CREAT SERPL: 29.1 (ref 7–25)
BUN/CREAT SERPL: 33.9 (ref 7–25)
BUN/CREAT SERPL: 37.2 (ref 7–25)
BUN/CREAT SERPL: 9.7 (ref 7–25)
BURR CELLS BLD QL SMEAR: ABNORMAL
BURR CELLS BLD QL SMEAR: ABNORMAL
C DIFF TOX GENS STL QL NAA+PROBE: NOT DETECTED
C PNEUM DNA NPH QL NAA+NON-PROBE: NOT DETECTED
C PNEUM DNA NPH QL NAA+NON-PROBE: NOT DETECTED
C3 SERPL-MCNC: 88 MG/DL (ref 82–167)
C4 SERPL-MCNC: 16 MG/DL (ref 14–44)
CA-I SERPL ISE-MCNC: 1.12 MMOL/L (ref 1.12–1.32)
CA-I SERPL ISE-MCNC: 1.19 MMOL/L (ref 1.12–1.32)
CALCIUM SPEC-SCNC: 6.7 MG/DL (ref 8.6–10.5)
CALCIUM SPEC-SCNC: 6.7 MG/DL (ref 8.6–10.5)
CALCIUM SPEC-SCNC: 7.1 MG/DL (ref 8.6–10.5)
CALCIUM SPEC-SCNC: 7.2 MG/DL (ref 8.6–10.5)
CALCIUM SPEC-SCNC: 7.2 MG/DL (ref 8.6–10.5)
CALCIUM SPEC-SCNC: 7.3 MG/DL (ref 8.6–10.5)
CALCIUM SPEC-SCNC: 7.6 MG/DL (ref 8.6–10.5)
CALCIUM SPEC-SCNC: 7.7 MG/DL (ref 8.6–10.5)
CALCIUM SPEC-SCNC: 7.7 MG/DL (ref 8.6–10.5)
CALCIUM SPEC-SCNC: 7.8 MG/DL (ref 8.6–10.5)
CALCIUM SPEC-SCNC: 7.8 MG/DL (ref 8.6–10.5)
CALCIUM SPEC-SCNC: 7.9 MG/DL (ref 8.6–10.5)
CALCIUM SPEC-SCNC: 8 MG/DL (ref 8.6–10.5)
CALCIUM SPEC-SCNC: 8.1 MG/DL (ref 8.6–10.5)
CALCIUM SPEC-SCNC: 8.2 MG/DL (ref 8.6–10.5)
CALCIUM SPEC-SCNC: 8.3 MG/DL (ref 8.6–10.5)
CALCIUM SPEC-SCNC: 8.4 MG/DL (ref 8.6–10.5)
CALCIUM SPEC-SCNC: 8.5 MG/DL (ref 8.6–10.5)
CALCIUM SPEC-SCNC: 8.6 MG/DL (ref 8.6–10.5)
CALCIUM SPEC-SCNC: 8.7 MG/DL (ref 8.6–10.5)
CALCIUM SPEC-SCNC: 8.9 MG/DL (ref 8.6–10.5)
CALCIUM SPEC-SCNC: 9.6 MG/DL (ref 8.6–10.5)
CHLORIDE SERPL-SCNC: 100 MMOL/L (ref 98–107)
CHLORIDE SERPL-SCNC: 101 MMOL/L (ref 98–107)
CHLORIDE SERPL-SCNC: 102 MMOL/L (ref 98–107)
CHLORIDE SERPL-SCNC: 103 MMOL/L (ref 98–107)
CHLORIDE SERPL-SCNC: 103 MMOL/L (ref 98–107)
CHLORIDE SERPL-SCNC: 104 MMOL/L (ref 98–107)
CHLORIDE SERPL-SCNC: 104 MMOL/L (ref 98–107)
CHLORIDE SERPL-SCNC: 91 MMOL/L (ref 98–107)
CHLORIDE SERPL-SCNC: 92 MMOL/L (ref 98–107)
CHLORIDE SERPL-SCNC: 93 MMOL/L (ref 98–107)
CHLORIDE SERPL-SCNC: 93 MMOL/L (ref 98–107)
CHLORIDE SERPL-SCNC: 94 MMOL/L (ref 98–107)
CHLORIDE SERPL-SCNC: 95 MMOL/L (ref 98–107)
CHLORIDE SERPL-SCNC: 96 MMOL/L (ref 98–107)
CHLORIDE SERPL-SCNC: 97 MMOL/L (ref 98–107)
CHLORIDE SERPL-SCNC: 98 MMOL/L (ref 98–107)
CHLORIDE SERPL-SCNC: 99 MMOL/L (ref 98–107)
CHOLEST SERPL-MCNC: 158 MG/DL (ref 0–200)
CHOLEST SERPL-MCNC: 53 MG/DL (ref 0–200)
CHOLEST SERPL-MCNC: 63 MG/DL (ref 0–200)
CHOLEST SERPL-MCNC: 75 MG/DL (ref 0–200)
CK SERPL-CCNC: 43 U/L (ref 20–200)
CLARITY UR: ABNORMAL
CO2 BLDA-SCNC: 17.3 MMOL/L (ref 22–33)
CO2 BLDA-SCNC: 27 MMOL/L (ref 22–33)
CO2 BLDA-SCNC: 38.9 MMOL/L (ref 22–33)
CO2 BLDA-SCNC: 39.5 MMOL/L (ref 22–33)
CO2 SERPL-SCNC: 16 MMOL/L (ref 22–29)
CO2 SERPL-SCNC: 17 MMOL/L (ref 22–29)
CO2 SERPL-SCNC: 17 MMOL/L (ref 22–29)
CO2 SERPL-SCNC: 18 MMOL/L (ref 22–29)
CO2 SERPL-SCNC: 19 MMOL/L (ref 22–29)
CO2 SERPL-SCNC: 19 MMOL/L (ref 22–29)
CO2 SERPL-SCNC: 20 MMOL/L (ref 22–29)
CO2 SERPL-SCNC: 21 MMOL/L (ref 22–29)
CO2 SERPL-SCNC: 22 MMOL/L (ref 22–29)
CO2 SERPL-SCNC: 22 MMOL/L (ref 22–29)
CO2 SERPL-SCNC: 23 MMOL/L (ref 22–29)
CO2 SERPL-SCNC: 24 MMOL/L (ref 22–29)
CO2 SERPL-SCNC: 25 MMOL/L (ref 22–29)
CO2 SERPL-SCNC: 26 MMOL/L (ref 22–29)
CO2 SERPL-SCNC: 26 MMOL/L (ref 22–29)
CO2 SERPL-SCNC: 26.4 MMOL/L (ref 22–29)
CO2 SERPL-SCNC: 26.6 MMOL/L (ref 22–29)
CO2 SERPL-SCNC: 27 MMOL/L (ref 22–29)
CO2 SERPL-SCNC: 29 MMOL/L (ref 22–29)
CO2 SERPL-SCNC: 30 MMOL/L (ref 22–29)
CO2 SERPL-SCNC: 31 MMOL/L (ref 22–29)
CO2 SERPL-SCNC: 32 MMOL/L (ref 22–29)
CO2 SERPL-SCNC: 32 MMOL/L (ref 22–29)
CO2 SERPL-SCNC: 33 MMOL/L (ref 22–29)
CO2 SERPL-SCNC: 34 MMOL/L (ref 22–29)
CO2 SERPL-SCNC: 34 MMOL/L (ref 22–29)
COHGB MFR BLD: 0.7 % (ref 0–2)
COHGB MFR BLD: 0.8 % (ref 0–2)
COHGB MFR BLD: 1 % (ref 0–2)
COHGB MFR BLD: 3.2 % (ref 0–2)
COLOR UR: ABNORMAL
COLOR UR: YELLOW
COLOR UR: YELLOW
CORTIS SERPL-MCNC: 16.84 MCG/DL
CREAT SERPL-MCNC: 0.93 MG/DL (ref 0.76–1.27)
CREAT SERPL-MCNC: 0.94 MG/DL (ref 0.76–1.27)
CREAT SERPL-MCNC: 0.98 MG/DL (ref 0.76–1.27)
CREAT SERPL-MCNC: 1.11 MG/DL (ref 0.76–1.27)
CREAT SERPL-MCNC: 1.15 MG/DL (ref 0.76–1.27)
CREAT SERPL-MCNC: 1.2 MG/DL (ref 0.76–1.27)
CREAT SERPL-MCNC: 1.22 MG/DL (ref 0.76–1.27)
CREAT SERPL-MCNC: 1.23 MG/DL (ref 0.76–1.27)
CREAT SERPL-MCNC: 1.26 MG/DL (ref 0.76–1.27)
CREAT SERPL-MCNC: 1.26 MG/DL (ref 0.76–1.27)
CREAT SERPL-MCNC: 1.41 MG/DL (ref 0.76–1.27)
CREAT SERPL-MCNC: 1.6 MG/DL (ref 0.76–1.27)
CREAT SERPL-MCNC: 1.76 MG/DL (ref 0.76–1.27)
CREAT SERPL-MCNC: 2.26 MG/DL (ref 0.76–1.27)
CREAT SERPL-MCNC: 2.35 MG/DL (ref 0.76–1.27)
CREAT SERPL-MCNC: 2.5 MG/DL (ref 0.76–1.27)
CREAT SERPL-MCNC: 2.91 MG/DL (ref 0.76–1.27)
CREAT SERPL-MCNC: 3.09 MG/DL (ref 0.76–1.27)
CREAT SERPL-MCNC: 3.1 MG/DL (ref 0.76–1.27)
CREAT SERPL-MCNC: 3.15 MG/DL (ref 0.76–1.27)
CREAT SERPL-MCNC: 3.23 MG/DL (ref 0.76–1.27)
CREAT SERPL-MCNC: 3.31 MG/DL (ref 0.76–1.27)
CREAT SERPL-MCNC: 3.33 MG/DL (ref 0.76–1.27)
CREAT SERPL-MCNC: 3.43 MG/DL (ref 0.76–1.27)
CREAT SERPL-MCNC: 3.48 MG/DL (ref 0.76–1.27)
CREAT SERPL-MCNC: 3.58 MG/DL (ref 0.76–1.27)
CREAT SERPL-MCNC: 3.59 MG/DL (ref 0.76–1.27)
CREAT SERPL-MCNC: 3.62 MG/DL (ref 0.76–1.27)
CREAT SERPL-MCNC: 3.79 MG/DL (ref 0.76–1.27)
CREAT SERPL-MCNC: 4.07 MG/DL (ref 0.76–1.27)
CREAT SERPL-MCNC: 4.08 MG/DL (ref 0.76–1.27)
CREAT SERPL-MCNC: 4.1 MG/DL (ref 0.76–1.27)
CREAT SERPL-MCNC: 4.19 MG/DL (ref 0.76–1.27)
CREAT SERPL-MCNC: 4.22 MG/DL (ref 0.76–1.27)
CREAT SERPL-MCNC: 4.41 MG/DL (ref 0.76–1.27)
CREAT SERPL-MCNC: 4.59 MG/DL (ref 0.76–1.27)
CREAT SERPL-MCNC: 4.74 MG/DL (ref 0.76–1.27)
CREAT SERPL-MCNC: 5.1 MG/DL (ref 0.76–1.27)
CREAT SERPL-MCNC: 5.14 MG/DL (ref 0.76–1.27)
CREAT SERPL-MCNC: 5.34 MG/DL (ref 0.76–1.27)
CREAT SERPL-MCNC: 5.67 MG/DL (ref 0.76–1.27)
CREAT SERPL-MCNC: 5.82 MG/DL (ref 0.76–1.27)
CREAT SERPL-MCNC: 7.42 MG/DL (ref 0.76–1.27)
CREAT SERPL-MCNC: 7.56 MG/DL (ref 0.76–1.27)
CREAT UR-MCNC: 160.3 MG/DL
CROSSMATCH INTERPRETATION: NORMAL
CRP SERPL-MCNC: 0.6 MG/DL (ref 0–0.5)
CRP SERPL-MCNC: 10.18 MG/DL (ref 0–0.5)
CRP SERPL-MCNC: 4.23 MG/DL (ref 0–0.5)
CYTO UR: NORMAL
CYTOLOGIST CVX/VAG CYTO: NORMAL
D DIMER PPP FEU-MCNC: 3.14 MCGFEU/ML (ref 0.01–0.5)
D-LACTATE SERPL-SCNC: 0.7 MMOL/L (ref 0.5–2)
D-LACTATE SERPL-SCNC: 0.9 MMOL/L (ref 0.5–2)
D-LACTATE SERPL-SCNC: 1 MMOL/L (ref 0.5–2)
D-LACTATE SERPL-SCNC: 1.1 MMOL/L (ref 0.5–2)
D-LACTATE SERPL-SCNC: 1.3 MMOL/L (ref 0.5–2)
D-LACTATE SERPL-SCNC: 1.4 MMOL/L (ref 0.5–2)
D-LACTATE SERPL-SCNC: 1.5 MMOL/L (ref 0.5–2)
D-LACTATE SERPL-SCNC: 1.9 MMOL/L (ref 0.5–2)
D-LACTATE SERPL-SCNC: 2.1 MMOL/L (ref 0.5–2)
D-LACTATE SERPL-SCNC: 2.2 MMOL/L (ref 0.5–2)
DEPRECATED RDW RBC AUTO: 42.5 FL (ref 37–54)
DEPRECATED RDW RBC AUTO: 45.9 FL (ref 37–54)
DEPRECATED RDW RBC AUTO: 46.6 FL (ref 37–54)
DEPRECATED RDW RBC AUTO: 46.9 FL (ref 37–54)
DEPRECATED RDW RBC AUTO: 47.3 FL (ref 37–54)
DEPRECATED RDW RBC AUTO: 47.5 FL (ref 37–54)
DEPRECATED RDW RBC AUTO: 47.5 FL (ref 37–54)
DEPRECATED RDW RBC AUTO: 47.7 FL (ref 37–54)
DEPRECATED RDW RBC AUTO: 47.8 FL (ref 37–54)
DEPRECATED RDW RBC AUTO: 48.3 FL (ref 37–54)
DEPRECATED RDW RBC AUTO: 48.4 FL (ref 37–54)
DEPRECATED RDW RBC AUTO: 48.5 FL (ref 37–54)
DEPRECATED RDW RBC AUTO: 48.5 FL (ref 37–54)
DEPRECATED RDW RBC AUTO: 48.7 FL (ref 37–54)
DEPRECATED RDW RBC AUTO: 48.8 FL (ref 37–54)
DEPRECATED RDW RBC AUTO: 49.3 FL (ref 37–54)
DEPRECATED RDW RBC AUTO: 49.4 FL (ref 37–54)
DEPRECATED RDW RBC AUTO: 49.6 FL (ref 37–54)
DEPRECATED RDW RBC AUTO: 49.6 FL (ref 37–54)
DEPRECATED RDW RBC AUTO: 49.9 FL (ref 37–54)
DEPRECATED RDW RBC AUTO: 50.1 FL (ref 37–54)
DEPRECATED RDW RBC AUTO: 50.6 FL (ref 37–54)
DEPRECATED RDW RBC AUTO: 50.7 FL (ref 37–54)
DEPRECATED RDW RBC AUTO: 50.8 FL (ref 37–54)
DEPRECATED RDW RBC AUTO: 51 FL (ref 37–54)
DEPRECATED RDW RBC AUTO: 51.1 FL (ref 37–54)
DEPRECATED RDW RBC AUTO: 51.3 FL (ref 37–54)
DEPRECATED RDW RBC AUTO: 51.4 FL (ref 37–54)
DEPRECATED RDW RBC AUTO: 51.8 FL (ref 37–54)
DEPRECATED RDW RBC AUTO: 52.8 FL (ref 37–54)
DEPRECATED RDW RBC AUTO: 53 FL (ref 37–54)
DEPRECATED RDW RBC AUTO: 53.3 FL (ref 37–54)
DEPRECATED RDW RBC AUTO: 53.5 FL (ref 37–54)
DEPRECATED RDW RBC AUTO: 54.4 FL (ref 37–54)
DEPRECATED RDW RBC AUTO: 54.4 FL (ref 37–54)
DEPRECATED RDW RBC AUTO: 54.7 FL (ref 37–54)
DEPRECATED RDW RBC AUTO: 55.3 FL (ref 37–54)
DEPRECATED RDW RBC AUTO: 55.6 FL (ref 37–54)
DEPRECATED RDW RBC AUTO: 56.9 FL (ref 37–54)
DEPRECATED RDW RBC AUTO: 57 FL (ref 37–54)
DEPRECATED RDW RBC AUTO: 57 FL (ref 37–54)
DEPRECATED RDW RBC AUTO: 57.7 FL (ref 37–54)
EGFRCR SERPLBLD CKD-EPI 2021: 10 ML/MIN/1.73
EGFRCR SERPLBLD CKD-EPI 2021: 10.8 ML/MIN/1.73
EGFRCR SERPLBLD CKD-EPI 2021: 11.4 ML/MIN/1.73
EGFRCR SERPLBLD CKD-EPI 2021: 12.4 ML/MIN/1.73
EGFRCR SERPLBLD CKD-EPI 2021: 12.9 ML/MIN/1.73
EGFRCR SERPLBLD CKD-EPI 2021: 13.6 ML/MIN/1.73
EGFRCR SERPLBLD CKD-EPI 2021: 14.3 ML/MIN/1.73
EGFRCR SERPLBLD CKD-EPI 2021: 14.4 ML/MIN/1.73
EGFRCR SERPLBLD CKD-EPI 2021: 14.8 ML/MIN/1.73
EGFRCR SERPLBLD CKD-EPI 2021: 14.9 ML/MIN/1.73
EGFRCR SERPLBLD CKD-EPI 2021: 14.9 ML/MIN/1.73
EGFRCR SERPLBLD CKD-EPI 2021: 16.3 ML/MIN/1.73
EGFRCR SERPLBLD CKD-EPI 2021: 17.2 ML/MIN/1.73
EGFRCR SERPLBLD CKD-EPI 2021: 17.4 ML/MIN/1.73
EGFRCR SERPLBLD CKD-EPI 2021: 17.4 ML/MIN/1.73
EGFRCR SERPLBLD CKD-EPI 2021: 18 ML/MIN/1.73
EGFRCR SERPLBLD CKD-EPI 2021: 18.3 ML/MIN/1.73
EGFRCR SERPLBLD CKD-EPI 2021: 19 ML/MIN/1.73
EGFRCR SERPLBLD CKD-EPI 2021: 19.1 ML/MIN/1.73
EGFRCR SERPLBLD CKD-EPI 2021: 19.7 ML/MIN/1.73
EGFRCR SERPLBLD CKD-EPI 2021: 20.3 ML/MIN/1.73
EGFRCR SERPLBLD CKD-EPI 2021: 20.7 ML/MIN/1.73
EGFRCR SERPLBLD CKD-EPI 2021: 20.8 ML/MIN/1.73
EGFRCR SERPLBLD CKD-EPI 2021: 22.3 ML/MIN/1.73
EGFRCR SERPLBLD CKD-EPI 2021: 26.8 ML/MIN/1.73
EGFRCR SERPLBLD CKD-EPI 2021: 28.9 ML/MIN/1.73
EGFRCR SERPLBLD CKD-EPI 2021: 30.2 ML/MIN/1.73
EGFRCR SERPLBLD CKD-EPI 2021: 40.8 ML/MIN/1.73
EGFRCR SERPLBLD CKD-EPI 2021: 45.8 ML/MIN/1.73
EGFRCR SERPLBLD CKD-EPI 2021: 53.3 ML/MIN/1.73
EGFRCR SERPLBLD CKD-EPI 2021: 61 ML/MIN/1.73
EGFRCR SERPLBLD CKD-EPI 2021: 61.4 ML/MIN/1.73
EGFRCR SERPLBLD CKD-EPI 2021: 62.8 ML/MIN/1.73
EGFRCR SERPLBLD CKD-EPI 2021: 63.4 ML/MIN/1.73
EGFRCR SERPLBLD CKD-EPI 2021: 64.7 ML/MIN/1.73
EGFRCR SERPLBLD CKD-EPI 2021: 68 ML/MIN/1.73
EGFRCR SERPLBLD CKD-EPI 2021: 7.1 ML/MIN/1.73
EGFRCR SERPLBLD CKD-EPI 2021: 7.3 ML/MIN/1.73
EGFRCR SERPLBLD CKD-EPI 2021: 71 ML/MIN/1.73
EGFRCR SERPLBLD CKD-EPI 2021: 82.4 ML/MIN/1.73
EGFRCR SERPLBLD CKD-EPI 2021: 86.7 ML/MIN/1.73
EGFRCR SERPLBLD CKD-EPI 2021: 87.8 ML/MIN/1.73
EGFRCR SERPLBLD CKD-EPI 2021: 9.7 ML/MIN/1.73
EOSINOPHIL # BLD AUTO: 0 10*3/MM3 (ref 0–0.4)
EOSINOPHIL # BLD AUTO: 0.01 10*3/MM3 (ref 0–0.4)
EOSINOPHIL # BLD AUTO: 0.02 10*3/MM3 (ref 0–0.4)
EOSINOPHIL # BLD AUTO: 0.02 10*3/MM3 (ref 0–0.4)
EOSINOPHIL # BLD AUTO: 0.03 10*3/MM3 (ref 0–0.4)
EOSINOPHIL # BLD AUTO: 0.04 10*3/MM3 (ref 0–0.4)
EOSINOPHIL # BLD AUTO: 0.04 10*3/MM3 (ref 0–0.4)
EOSINOPHIL # BLD AUTO: 0.15 10*3/MM3 (ref 0–0.4)
EOSINOPHIL # BLD AUTO: 0.2 10*3/MM3 (ref 0–0.4)
EOSINOPHIL # BLD AUTO: 0.24 10*3/MM3 (ref 0–0.4)
EOSINOPHIL # BLD AUTO: 0.25 10*3/MM3 (ref 0–0.4)
EOSINOPHIL # BLD MANUAL: 0 10*3/MM3 (ref 0–0.4)
EOSINOPHIL # BLD MANUAL: 0.01 10*3/MM3 (ref 0–0.4)
EOSINOPHIL # BLD MANUAL: 0.02 10*3/MM3 (ref 0–0.4)
EOSINOPHIL # BLD MANUAL: 0.04 10*3/MM3 (ref 0–0.4)
EOSINOPHIL NFR BLD AUTO: 0 % (ref 0.3–6.2)
EOSINOPHIL NFR BLD AUTO: 0.1 % (ref 0.3–6.2)
EOSINOPHIL NFR BLD AUTO: 0.2 % (ref 0.3–6.2)
EOSINOPHIL NFR BLD AUTO: 0.3 % (ref 0.3–6.2)
EOSINOPHIL NFR BLD AUTO: 0.4 % (ref 0.3–6.2)
EOSINOPHIL NFR BLD AUTO: 1.5 % (ref 0.3–6.2)
EOSINOPHIL NFR BLD AUTO: 1.5 % (ref 0.3–6.2)
EOSINOPHIL NFR BLD AUTO: 1.7 % (ref 0.3–6.2)
EOSINOPHIL NFR BLD AUTO: 2.1 % (ref 0.3–6.2)
EOSINOPHIL NFR BLD AUTO: 2.5 % (ref 0.3–6.2)
EOSINOPHIL NFR BLD MANUAL: 0 % (ref 0.3–6.2)
EOSINOPHIL NFR BLD MANUAL: 1 % (ref 0.3–6.2)
EOSINOPHIL NFR BLD MANUAL: 1 % (ref 0.3–6.2)
EOSINOPHIL NFR BLD MANUAL: 2 % (ref 0.3–6.2)
EOSINOPHIL SPEC QL MICRO: 0 % EOS/100 CELLS (ref 0–0)
EPAP: 0
ERYTHROCYTE [DISTWIDTH] IN BLOOD BY AUTOMATED COUNT: 14.6 % (ref 12.3–15.4)
ERYTHROCYTE [DISTWIDTH] IN BLOOD BY AUTOMATED COUNT: 14.8 % (ref 12.3–15.4)
ERYTHROCYTE [DISTWIDTH] IN BLOOD BY AUTOMATED COUNT: 15.4 % (ref 12.3–15.4)
ERYTHROCYTE [DISTWIDTH] IN BLOOD BY AUTOMATED COUNT: 15.5 % (ref 12.3–15.4)
ERYTHROCYTE [DISTWIDTH] IN BLOOD BY AUTOMATED COUNT: 15.6 % (ref 12.3–15.4)
ERYTHROCYTE [DISTWIDTH] IN BLOOD BY AUTOMATED COUNT: 15.7 % (ref 12.3–15.4)
ERYTHROCYTE [DISTWIDTH] IN BLOOD BY AUTOMATED COUNT: 15.7 % (ref 12.3–15.4)
ERYTHROCYTE [DISTWIDTH] IN BLOOD BY AUTOMATED COUNT: 15.8 % (ref 12.3–15.4)
ERYTHROCYTE [DISTWIDTH] IN BLOOD BY AUTOMATED COUNT: 15.8 % (ref 12.3–15.4)
ERYTHROCYTE [DISTWIDTH] IN BLOOD BY AUTOMATED COUNT: 16 % (ref 12.3–15.4)
ERYTHROCYTE [DISTWIDTH] IN BLOOD BY AUTOMATED COUNT: 16 % (ref 12.3–15.4)
ERYTHROCYTE [DISTWIDTH] IN BLOOD BY AUTOMATED COUNT: 16.1 % (ref 12.3–15.4)
ERYTHROCYTE [DISTWIDTH] IN BLOOD BY AUTOMATED COUNT: 16.2 % (ref 12.3–15.4)
ERYTHROCYTE [DISTWIDTH] IN BLOOD BY AUTOMATED COUNT: 16.3 % (ref 12.3–15.4)
ERYTHROCYTE [DISTWIDTH] IN BLOOD BY AUTOMATED COUNT: 16.4 % (ref 12.3–15.4)
ERYTHROCYTE [DISTWIDTH] IN BLOOD BY AUTOMATED COUNT: 16.5 % (ref 12.3–15.4)
ERYTHROCYTE [DISTWIDTH] IN BLOOD BY AUTOMATED COUNT: 16.5 % (ref 12.3–15.4)
ERYTHROCYTE [DISTWIDTH] IN BLOOD BY AUTOMATED COUNT: 16.6 % (ref 12.3–15.4)
ERYTHROCYTE [DISTWIDTH] IN BLOOD BY AUTOMATED COUNT: 16.6 % (ref 12.3–15.4)
ERYTHROCYTE [DISTWIDTH] IN BLOOD BY AUTOMATED COUNT: 16.7 % (ref 12.3–15.4)
ERYTHROCYTE [DISTWIDTH] IN BLOOD BY AUTOMATED COUNT: 16.7 % (ref 12.3–15.4)
ERYTHROCYTE [DISTWIDTH] IN BLOOD BY AUTOMATED COUNT: 16.8 % (ref 12.3–15.4)
ERYTHROCYTE [DISTWIDTH] IN BLOOD BY AUTOMATED COUNT: 16.8 % (ref 12.3–15.4)
ERYTHROCYTE [DISTWIDTH] IN BLOOD BY AUTOMATED COUNT: 17.2 % (ref 12.3–15.4)
ERYTHROCYTE [DISTWIDTH] IN BLOOD BY AUTOMATED COUNT: 17.3 % (ref 12.3–15.4)
ERYTHROCYTE [DISTWIDTH] IN BLOOD BY AUTOMATED COUNT: 17.5 % (ref 12.3–15.4)
ERYTHROCYTE [DISTWIDTH] IN BLOOD BY AUTOMATED COUNT: 17.6 % (ref 12.3–15.4)
ERYTHROCYTE [DISTWIDTH] IN BLOOD BY AUTOMATED COUNT: 17.6 % (ref 12.3–15.4)
ERYTHROCYTE [DISTWIDTH] IN BLOOD BY AUTOMATED COUNT: 17.7 % (ref 12.3–15.4)
ERYTHROCYTE [DISTWIDTH] IN BLOOD BY AUTOMATED COUNT: 17.8 % (ref 12.3–15.4)
ERYTHROCYTE [DISTWIDTH] IN BLOOD BY AUTOMATED COUNT: 17.9 % (ref 12.3–15.4)
ERYTHROCYTE [DISTWIDTH] IN BLOOD BY AUTOMATED COUNT: 18 % (ref 12.3–15.4)
ERYTHROCYTE [DISTWIDTH] IN BLOOD BY AUTOMATED COUNT: 18.1 % (ref 12.3–15.4)
ERYTHROCYTE [DISTWIDTH] IN BLOOD BY AUTOMATED COUNT: 18.1 % (ref 12.3–15.4)
ERYTHROCYTE [DISTWIDTH] IN BLOOD BY AUTOMATED COUNT: 18.2 % (ref 12.3–15.4)
FINE GRAN CASTS URNS QL MICRO: ABNORMAL /LPF
FLUAV RNA RESP QL NAA+PROBE: NOT DETECTED
FLUAV SUBTYP SPEC NAA+PROBE: NOT DETECTED
FLUBV RNA ISLT QL NAA+PROBE: NOT DETECTED
FLUBV RNA RESP QL NAA+PROBE: NOT DETECTED
FUNGUS WND CULT: NORMAL
GIE STN SPEC: NORMAL
GLOBULIN UR ELPH-MCNC: 2.3 GM/DL
GLOBULIN UR ELPH-MCNC: 2.3 GM/DL
GLOBULIN UR ELPH-MCNC: 2.4 GM/DL
GLOBULIN UR ELPH-MCNC: 2.4 GM/DL
GLOBULIN UR ELPH-MCNC: 2.6 GM/DL
GLOBULIN UR ELPH-MCNC: 2.7 GM/DL
GLOBULIN UR ELPH-MCNC: 3.1 GM/DL
GLOBULIN UR ELPH-MCNC: 3.2 GM/DL
GLOBULIN UR ELPH-MCNC: 3.3 GM/DL
GLOBULIN UR ELPH-MCNC: 3.5 GM/DL
GLOBULIN UR ELPH-MCNC: 3.5 GM/DL
GLOBULIN UR ELPH-MCNC: 3.6 GM/DL
GLOBULIN UR ELPH-MCNC: 4.7 GM/DL
GLUCOSE BLDC GLUCOMTR-MCNC: 101 MG/DL (ref 70–130)
GLUCOSE BLDC GLUCOMTR-MCNC: 103 MG/DL (ref 70–130)
GLUCOSE BLDC GLUCOMTR-MCNC: 104 MG/DL (ref 70–130)
GLUCOSE BLDC GLUCOMTR-MCNC: 104 MG/DL (ref 70–130)
GLUCOSE BLDC GLUCOMTR-MCNC: 109 MG/DL (ref 70–130)
GLUCOSE BLDC GLUCOMTR-MCNC: 110 MG/DL (ref 70–130)
GLUCOSE BLDC GLUCOMTR-MCNC: 111 MG/DL (ref 70–130)
GLUCOSE BLDC GLUCOMTR-MCNC: 113 MG/DL (ref 70–130)
GLUCOSE BLDC GLUCOMTR-MCNC: 117 MG/DL (ref 70–130)
GLUCOSE BLDC GLUCOMTR-MCNC: 118 MG/DL (ref 70–130)
GLUCOSE BLDC GLUCOMTR-MCNC: 122 MG/DL (ref 70–130)
GLUCOSE BLDC GLUCOMTR-MCNC: 122 MG/DL (ref 70–130)
GLUCOSE BLDC GLUCOMTR-MCNC: 123 MG/DL (ref 70–130)
GLUCOSE BLDC GLUCOMTR-MCNC: 124 MG/DL (ref 70–130)
GLUCOSE BLDC GLUCOMTR-MCNC: 127 MG/DL (ref 70–130)
GLUCOSE BLDC GLUCOMTR-MCNC: 127 MG/DL (ref 70–130)
GLUCOSE BLDC GLUCOMTR-MCNC: 132 MG/DL (ref 70–130)
GLUCOSE BLDC GLUCOMTR-MCNC: 134 MG/DL (ref 70–130)
GLUCOSE BLDC GLUCOMTR-MCNC: 149 MG/DL (ref 70–130)
GLUCOSE BLDC GLUCOMTR-MCNC: 152 MG/DL (ref 70–130)
GLUCOSE BLDC GLUCOMTR-MCNC: 154 MG/DL (ref 70–130)
GLUCOSE BLDC GLUCOMTR-MCNC: 155 MG/DL (ref 70–130)
GLUCOSE BLDC GLUCOMTR-MCNC: 155 MG/DL (ref 70–130)
GLUCOSE BLDC GLUCOMTR-MCNC: 156 MG/DL (ref 70–130)
GLUCOSE BLDC GLUCOMTR-MCNC: 160 MG/DL (ref 70–130)
GLUCOSE BLDC GLUCOMTR-MCNC: 161 MG/DL (ref 70–130)
GLUCOSE BLDC GLUCOMTR-MCNC: 181 MG/DL (ref 70–130)
GLUCOSE BLDC GLUCOMTR-MCNC: 182 MG/DL (ref 70–130)
GLUCOSE BLDC GLUCOMTR-MCNC: 184 MG/DL (ref 70–130)
GLUCOSE BLDC GLUCOMTR-MCNC: 185 MG/DL (ref 70–130)
GLUCOSE BLDC GLUCOMTR-MCNC: 191 MG/DL (ref 70–130)
GLUCOSE BLDC GLUCOMTR-MCNC: 196 MG/DL (ref 70–130)
GLUCOSE BLDC GLUCOMTR-MCNC: 197 MG/DL (ref 70–130)
GLUCOSE BLDC GLUCOMTR-MCNC: 197 MG/DL (ref 70–130)
GLUCOSE BLDC GLUCOMTR-MCNC: 200 MG/DL (ref 70–130)
GLUCOSE BLDC GLUCOMTR-MCNC: 206 MG/DL (ref 70–130)
GLUCOSE BLDC GLUCOMTR-MCNC: 208 MG/DL (ref 70–130)
GLUCOSE BLDC GLUCOMTR-MCNC: 209 MG/DL (ref 70–130)
GLUCOSE BLDC GLUCOMTR-MCNC: 210 MG/DL (ref 70–130)
GLUCOSE BLDC GLUCOMTR-MCNC: 212 MG/DL (ref 70–130)
GLUCOSE BLDC GLUCOMTR-MCNC: 221 MG/DL (ref 70–130)
GLUCOSE BLDC GLUCOMTR-MCNC: 225 MG/DL (ref 70–130)
GLUCOSE BLDC GLUCOMTR-MCNC: 238 MG/DL (ref 70–130)
GLUCOSE BLDC GLUCOMTR-MCNC: 249 MG/DL (ref 70–130)
GLUCOSE BLDC GLUCOMTR-MCNC: 80 MG/DL (ref 70–130)
GLUCOSE BLDC GLUCOMTR-MCNC: 80 MG/DL (ref 70–130)
GLUCOSE BLDC GLUCOMTR-MCNC: 81 MG/DL (ref 70–130)
GLUCOSE BLDC GLUCOMTR-MCNC: 81 MG/DL (ref 70–130)
GLUCOSE BLDC GLUCOMTR-MCNC: 86 MG/DL (ref 70–130)
GLUCOSE BLDC GLUCOMTR-MCNC: 94 MG/DL (ref 70–130)
GLUCOSE BLDC GLUCOMTR-MCNC: 96 MG/DL (ref 70–130)
GLUCOSE BLDC GLUCOMTR-MCNC: 98 MG/DL (ref 70–130)
GLUCOSE SERPL-MCNC: 100 MG/DL (ref 65–99)
GLUCOSE SERPL-MCNC: 105 MG/DL (ref 65–99)
GLUCOSE SERPL-MCNC: 107 MG/DL (ref 65–99)
GLUCOSE SERPL-MCNC: 108 MG/DL (ref 65–99)
GLUCOSE SERPL-MCNC: 109 MG/DL (ref 65–99)
GLUCOSE SERPL-MCNC: 110 MG/DL (ref 65–99)
GLUCOSE SERPL-MCNC: 111 MG/DL (ref 65–99)
GLUCOSE SERPL-MCNC: 112 MG/DL (ref 65–99)
GLUCOSE SERPL-MCNC: 115 MG/DL (ref 65–99)
GLUCOSE SERPL-MCNC: 115 MG/DL (ref 65–99)
GLUCOSE SERPL-MCNC: 116 MG/DL (ref 65–99)
GLUCOSE SERPL-MCNC: 117 MG/DL (ref 65–99)
GLUCOSE SERPL-MCNC: 119 MG/DL (ref 65–99)
GLUCOSE SERPL-MCNC: 120 MG/DL (ref 65–99)
GLUCOSE SERPL-MCNC: 121 MG/DL (ref 65–99)
GLUCOSE SERPL-MCNC: 123 MG/DL (ref 65–99)
GLUCOSE SERPL-MCNC: 127 MG/DL (ref 65–99)
GLUCOSE SERPL-MCNC: 130 MG/DL (ref 65–99)
GLUCOSE SERPL-MCNC: 133 MG/DL (ref 65–99)
GLUCOSE SERPL-MCNC: 134 MG/DL (ref 65–99)
GLUCOSE SERPL-MCNC: 137 MG/DL (ref 65–99)
GLUCOSE SERPL-MCNC: 141 MG/DL (ref 65–99)
GLUCOSE SERPL-MCNC: 146 MG/DL (ref 65–99)
GLUCOSE SERPL-MCNC: 152 MG/DL (ref 65–99)
GLUCOSE SERPL-MCNC: 169 MG/DL (ref 65–99)
GLUCOSE SERPL-MCNC: 181 MG/DL (ref 65–99)
GLUCOSE SERPL-MCNC: 185 MG/DL (ref 65–99)
GLUCOSE SERPL-MCNC: 216 MG/DL (ref 65–99)
GLUCOSE SERPL-MCNC: 217 MG/DL (ref 65–99)
GLUCOSE SERPL-MCNC: 219 MG/DL (ref 65–99)
GLUCOSE SERPL-MCNC: 235 MG/DL (ref 65–99)
GLUCOSE SERPL-MCNC: 75 MG/DL (ref 65–99)
GLUCOSE SERPL-MCNC: 75 MG/DL (ref 65–99)
GLUCOSE SERPL-MCNC: 77 MG/DL (ref 65–99)
GLUCOSE SERPL-MCNC: 78 MG/DL (ref 65–99)
GLUCOSE SERPL-MCNC: 79 MG/DL (ref 65–99)
GLUCOSE SERPL-MCNC: 83 MG/DL (ref 65–99)
GLUCOSE SERPL-MCNC: 83 MG/DL (ref 65–99)
GLUCOSE SERPL-MCNC: 84 MG/DL (ref 65–99)
GLUCOSE SERPL-MCNC: 93 MG/DL (ref 65–99)
GLUCOSE SERPL-MCNC: 93 MG/DL (ref 65–99)
GLUCOSE SERPL-MCNC: 97 MG/DL (ref 65–99)
GLUCOSE UR STRIP-MCNC: ABNORMAL MG/DL
GLUCOSE UR STRIP-MCNC: NEGATIVE MG/DL
GLUCOSE UR STRIP-MCNC: NEGATIVE MG/DL
GRAM STN SPEC: NORMAL
GRAN CASTS URNS QL MICRO: ABNORMAL /LPF
HADV DNA SPEC NAA+PROBE: NOT DETECTED
HADV DNA SPEC NAA+PROBE: NOT DETECTED
HAV IGM SERPL QL IA: NORMAL
HBA1C MFR BLD: 5.8 % (ref 4.8–5.6)
HBV CORE IGM SERPL QL IA: NORMAL
HBV SURFACE AG SERPL QL IA: NORMAL
HCO3 BLDA-SCNC: 16.1 MMOL/L (ref 20–26)
HCO3 BLDA-SCNC: 25.5 MMOL/L (ref 20–26)
HCO3 BLDA-SCNC: 36.9 MMOL/L (ref 20–26)
HCO3 BLDA-SCNC: 37.1 MMOL/L (ref 20–26)
HCOV 229E RNA SPEC QL NAA+PROBE: NOT DETECTED
HCOV 229E RNA SPEC QL NAA+PROBE: NOT DETECTED
HCOV HKU1 RNA SPEC QL NAA+PROBE: NOT DETECTED
HCOV HKU1 RNA SPEC QL NAA+PROBE: NOT DETECTED
HCOV NL63 RNA SPEC QL NAA+PROBE: NOT DETECTED
HCOV NL63 RNA SPEC QL NAA+PROBE: NOT DETECTED
HCOV OC43 RNA SPEC QL NAA+PROBE: NOT DETECTED
HCOV OC43 RNA SPEC QL NAA+PROBE: NOT DETECTED
HCT VFR BLD AUTO: 19.2 % (ref 37.5–51)
HCT VFR BLD AUTO: 19.5 % (ref 37.5–51)
HCT VFR BLD AUTO: 20.9 % (ref 37.5–51)
HCT VFR BLD AUTO: 21 % (ref 37.5–51)
HCT VFR BLD AUTO: 21.1 % (ref 37.5–51)
HCT VFR BLD AUTO: 21.1 % (ref 37.5–51)
HCT VFR BLD AUTO: 21.3 % (ref 37.5–51)
HCT VFR BLD AUTO: 21.7 % (ref 37.5–51)
HCT VFR BLD AUTO: 21.9 % (ref 37.5–51)
HCT VFR BLD AUTO: 22.4 % (ref 37.5–51)
HCT VFR BLD AUTO: 22.8 % (ref 37.5–51)
HCT VFR BLD AUTO: 23.1 % (ref 37.5–51)
HCT VFR BLD AUTO: 23.8 % (ref 37.5–51)
HCT VFR BLD AUTO: 24.1 % (ref 37.5–51)
HCT VFR BLD AUTO: 24.2 % (ref 37.5–51)
HCT VFR BLD AUTO: 25.2 % (ref 37.5–51)
HCT VFR BLD AUTO: 25.2 % (ref 37.5–51)
HCT VFR BLD AUTO: 25.3 % (ref 37.5–51)
HCT VFR BLD AUTO: 25.4 % (ref 37.5–51)
HCT VFR BLD AUTO: 25.6 % (ref 37.5–51)
HCT VFR BLD AUTO: 25.6 % (ref 37.5–51)
HCT VFR BLD AUTO: 25.8 % (ref 37.5–51)
HCT VFR BLD AUTO: 26 % (ref 37.5–51)
HCT VFR BLD AUTO: 26 % (ref 37.5–51)
HCT VFR BLD AUTO: 26.2 % (ref 37.5–51)
HCT VFR BLD AUTO: 26.3 % (ref 37.5–51)
HCT VFR BLD AUTO: 26.5 % (ref 37.5–51)
HCT VFR BLD AUTO: 26.7 % (ref 37.5–51)
HCT VFR BLD AUTO: 27 % (ref 37.5–51)
HCT VFR BLD AUTO: 27.4 % (ref 37.5–51)
HCT VFR BLD AUTO: 27.4 % (ref 37.5–51)
HCT VFR BLD AUTO: 27.5 % (ref 37.5–51)
HCT VFR BLD AUTO: 27.5 % (ref 37.5–51)
HCT VFR BLD AUTO: 27.6 % (ref 37.5–51)
HCT VFR BLD AUTO: 28.3 % (ref 37.5–51)
HCT VFR BLD AUTO: 28.3 % (ref 37.5–51)
HCT VFR BLD AUTO: 29.7 % (ref 37.5–51)
HCT VFR BLD AUTO: 30.3 % (ref 37.5–51)
HCT VFR BLD AUTO: 31.2 % (ref 37.5–51)
HCT VFR BLD AUTO: 31.9 % (ref 37.5–51)
HCT VFR BLD AUTO: 32.3 % (ref 37.5–51)
HCT VFR BLD AUTO: 33.2 % (ref 37.5–51)
HCT VFR BLD AUTO: 33.6 % (ref 37.5–51)
HCT VFR BLD AUTO: 34.6 % (ref 37.5–51)
HCT VFR BLD AUTO: 34.7 % (ref 37.5–51)
HCT VFR BLD AUTO: 36.9 % (ref 37.5–51)
HCT VFR BLD AUTO: 38 % (ref 37.5–51)
HCT VFR BLD AUTO: 38.8 % (ref 37.5–51)
HCT VFR BLD AUTO: 48.3 % (ref 37.5–51)
HCT VFR BLD CALC: 24.3 % (ref 38–51)
HCT VFR BLD CALC: 25.6 % (ref 38–51)
HCT VFR BLD CALC: 31 % (ref 38–51)
HCT VFR BLD CALC: 32.6 % (ref 38–51)
HCV AB SER DONR QL: NORMAL
HCV AB SER DONR QL: NORMAL
HDLC SERPL-MCNC: 51 MG/DL (ref 40–60)
HGB BLD-MCNC: 10 G/DL (ref 13–17.7)
HGB BLD-MCNC: 10.1 G/DL (ref 13–17.7)
HGB BLD-MCNC: 10.3 G/DL (ref 13–17.7)
HGB BLD-MCNC: 10.9 G/DL (ref 13–17.7)
HGB BLD-MCNC: 11.1 G/DL (ref 13–17.7)
HGB BLD-MCNC: 11.6 G/DL (ref 13–17.7)
HGB BLD-MCNC: 12.9 G/DL (ref 13–17.7)
HGB BLD-MCNC: 13 G/DL (ref 13–17.7)
HGB BLD-MCNC: 15.4 G/DL (ref 13–17.7)
HGB BLD-MCNC: 6.6 G/DL (ref 13–17.7)
HGB BLD-MCNC: 6.7 G/DL (ref 13–17.7)
HGB BLD-MCNC: 7 G/DL (ref 13–17.7)
HGB BLD-MCNC: 7.1 G/DL (ref 13–17.7)
HGB BLD-MCNC: 7.2 G/DL (ref 13–17.7)
HGB BLD-MCNC: 7.2 G/DL (ref 13–17.7)
HGB BLD-MCNC: 7.3 G/DL (ref 13–17.7)
HGB BLD-MCNC: 7.6 G/DL (ref 13–17.7)
HGB BLD-MCNC: 7.7 G/DL (ref 13–17.7)
HGB BLD-MCNC: 7.7 G/DL (ref 13–17.7)
HGB BLD-MCNC: 8 G/DL (ref 13–17.7)
HGB BLD-MCNC: 8.2 G/DL (ref 13–17.7)
HGB BLD-MCNC: 8.2 G/DL (ref 13–17.7)
HGB BLD-MCNC: 8.3 G/DL (ref 13–17.7)
HGB BLD-MCNC: 8.5 G/DL (ref 13–17.7)
HGB BLD-MCNC: 8.6 G/DL (ref 13–17.7)
HGB BLD-MCNC: 8.7 G/DL (ref 13–17.7)
HGB BLD-MCNC: 8.8 G/DL (ref 13–17.7)
HGB BLD-MCNC: 8.9 G/DL (ref 13–17.7)
HGB BLD-MCNC: 9 G/DL (ref 13–17.7)
HGB BLD-MCNC: 9.1 G/DL (ref 13–17.7)
HGB BLD-MCNC: 9.2 G/DL (ref 13–17.7)
HGB BLD-MCNC: 9.2 G/DL (ref 13–17.7)
HGB BLD-MCNC: 9.3 G/DL (ref 13–17.7)
HGB BLD-MCNC: 9.6 G/DL (ref 13–17.7)
HGB BLD-MCNC: 9.6 G/DL (ref 13–17.7)
HGB BLD-MCNC: 9.7 G/DL (ref 13–17.7)
HGB BLD-MCNC: 9.8 G/DL (ref 13–17.7)
HGB BLD-MCNC: 9.8 G/DL (ref 13–17.7)
HGB BLD-MCNC: 9.9 G/DL (ref 13–17.7)
HGB BLDA-MCNC: 10.1 G/DL (ref 13.5–17.5)
HGB BLDA-MCNC: 10.6 G/DL (ref 13.5–17.5)
HGB BLDA-MCNC: 7.9 G/DL (ref 13.5–17.5)
HGB BLDA-MCNC: 8.4 G/DL (ref 13.5–17.5)
HGB UR QL STRIP.AUTO: ABNORMAL
HGB UR QL STRIP.AUTO: NEGATIVE
HGB UR QL STRIP.AUTO: NEGATIVE
HMPV RNA NPH QL NAA+NON-PROBE: NOT DETECTED
HMPV RNA NPH QL NAA+NON-PROBE: NOT DETECTED
HOLD SPECIMEN: NORMAL
HPIV1 RNA ISLT QL NAA+PROBE: NOT DETECTED
HPIV1 RNA ISLT QL NAA+PROBE: NOT DETECTED
HPIV2 RNA SPEC QL NAA+PROBE: NOT DETECTED
HPIV2 RNA SPEC QL NAA+PROBE: NOT DETECTED
HPIV3 RNA NPH QL NAA+PROBE: NOT DETECTED
HPIV3 RNA NPH QL NAA+PROBE: NOT DETECTED
HPIV4 P GENE NPH QL NAA+PROBE: NOT DETECTED
HPIV4 P GENE NPH QL NAA+PROBE: NOT DETECTED
HYALINE CASTS UR QL AUTO: ABNORMAL /LPF
HYPOCHROMIA BLD QL: ABNORMAL
HYPOCHROMIA BLD QL: NORMAL
IMM GRANULOCYTES # BLD AUTO: 0 10*3/MM3 (ref 0–0.05)
IMM GRANULOCYTES # BLD AUTO: 0 10*3/MM3 (ref 0–0.05)
IMM GRANULOCYTES # BLD AUTO: 0.03 10*3/MM3 (ref 0–0.05)
IMM GRANULOCYTES # BLD AUTO: 0.04 10*3/MM3 (ref 0–0.05)
IMM GRANULOCYTES # BLD AUTO: 0.05 10*3/MM3 (ref 0–0.05)
IMM GRANULOCYTES # BLD AUTO: 0.06 10*3/MM3 (ref 0–0.05)
IMM GRANULOCYTES # BLD AUTO: 0.07 10*3/MM3 (ref 0–0.05)
IMM GRANULOCYTES # BLD AUTO: 0.08 10*3/MM3 (ref 0–0.05)
IMM GRANULOCYTES # BLD AUTO: 0.09 10*3/MM3 (ref 0–0.05)
IMM GRANULOCYTES # BLD AUTO: 0.1 10*3/MM3 (ref 0–0.05)
IMM GRANULOCYTES # BLD AUTO: 0.11 10*3/MM3 (ref 0–0.05)
IMM GRANULOCYTES # BLD AUTO: 0.11 10*3/MM3 (ref 0–0.05)
IMM GRANULOCYTES # BLD AUTO: 0.12 10*3/MM3 (ref 0–0.05)
IMM GRANULOCYTES # BLD AUTO: 0.13 10*3/MM3 (ref 0–0.05)
IMM GRANULOCYTES # BLD AUTO: 0.13 10*3/MM3 (ref 0–0.05)
IMM GRANULOCYTES # BLD AUTO: 0.15 10*3/MM3 (ref 0–0.05)
IMM GRANULOCYTES # BLD AUTO: 0.16 10*3/MM3 (ref 0–0.05)
IMM GRANULOCYTES # BLD AUTO: 0.18 10*3/MM3 (ref 0–0.05)
IMM GRANULOCYTES # BLD AUTO: 0.2 10*3/MM3 (ref 0–0.05)
IMM GRANULOCYTES # BLD AUTO: 0.22 10*3/MM3 (ref 0–0.05)
IMM GRANULOCYTES # BLD AUTO: 0.31 10*3/MM3 (ref 0–0.05)
IMM GRANULOCYTES # BLD AUTO: 0.33 10*3/MM3 (ref 0–0.05)
IMM GRANULOCYTES # BLD AUTO: 0.45 10*3/MM3 (ref 0–0.05)
IMM GRANULOCYTES # BLD AUTO: 0.46 10*3/MM3 (ref 0–0.05)
IMM GRANULOCYTES # BLD AUTO: 0.59 10*3/MM3 (ref 0–0.05)
IMM GRANULOCYTES # BLD AUTO: 0.7 10*3/MM3 (ref 0–0.05)
IMM GRANULOCYTES # BLD AUTO: 0.79 10*3/MM3 (ref 0–0.05)
IMM GRANULOCYTES # BLD AUTO: 1.47 10*3/MM3 (ref 0–0.05)
IMM GRANULOCYTES NFR BLD AUTO: 0 % (ref 0–0.5)
IMM GRANULOCYTES NFR BLD AUTO: 0 % (ref 0–0.5)
IMM GRANULOCYTES NFR BLD AUTO: 0.4 % (ref 0–0.5)
IMM GRANULOCYTES NFR BLD AUTO: 0.5 % (ref 0–0.5)
IMM GRANULOCYTES NFR BLD AUTO: 0.5 % (ref 0–0.5)
IMM GRANULOCYTES NFR BLD AUTO: 0.7 % (ref 0–0.5)
IMM GRANULOCYTES NFR BLD AUTO: 0.7 % (ref 0–0.5)
IMM GRANULOCYTES NFR BLD AUTO: 0.8 % (ref 0–0.5)
IMM GRANULOCYTES NFR BLD AUTO: 0.8 % (ref 0–0.5)
IMM GRANULOCYTES NFR BLD AUTO: 0.9 % (ref 0–0.5)
IMM GRANULOCYTES NFR BLD AUTO: 1 % (ref 0–0.5)
IMM GRANULOCYTES NFR BLD AUTO: 1.1 % (ref 0–0.5)
IMM GRANULOCYTES NFR BLD AUTO: 1.2 % (ref 0–0.5)
IMM GRANULOCYTES NFR BLD AUTO: 1.2 % (ref 0–0.5)
IMM GRANULOCYTES NFR BLD AUTO: 1.3 % (ref 0–0.5)
IMM GRANULOCYTES NFR BLD AUTO: 1.6 % (ref 0–0.5)
IMM GRANULOCYTES NFR BLD AUTO: 1.6 % (ref 0–0.5)
IMM GRANULOCYTES NFR BLD AUTO: 10.3 % (ref 0–0.5)
IMM GRANULOCYTES NFR BLD AUTO: 2.4 % (ref 0–0.5)
IMM GRANULOCYTES NFR BLD AUTO: 2.5 % (ref 0–0.5)
IMM GRANULOCYTES NFR BLD AUTO: 3.9 % (ref 0–0.5)
IMM GRANULOCYTES NFR BLD AUTO: 4.1 % (ref 0–0.5)
IMM GRANULOCYTES NFR BLD AUTO: 5.1 % (ref 0–0.5)
IMM GRANULOCYTES NFR BLD AUTO: 5.3 % (ref 0–0.5)
IMM GRANULOCYTES NFR BLD AUTO: 5.8 % (ref 0–0.5)
IMM GRANULOCYTES NFR BLD AUTO: 6.5 % (ref 0–0.5)
INHALED O2 CONCENTRATION: 100 %
INHALED O2 CONCENTRATION: 38 %
INHALED O2 CONCENTRATION: 40 %
INHALED O2 CONCENTRATION: 60 %
INR PPP: 0.92 (ref 0.84–1.13)
INR PPP: 0.95 (ref 0.84–1.13)
IPAP: 0
KETONES UR QL STRIP: ABNORMAL
KETONES UR QL STRIP: NEGATIVE
KETONES UR QL STRIP: NEGATIVE
L PNEUMO1 AG UR QL IA: NEGATIVE
LAB AP CASE REPORT: NORMAL
LAB AP CLINICAL INFORMATION: NORMAL
LAB AP DIAGNOSIS COMMENT: NORMAL
LAB AP DIAGNOSIS COMMENT: NORMAL
LAB AP SPECIAL STAINS: NORMAL
LDH SERPL-CCNC: 137 U/L (ref 135–225)
LDH SERPL-CCNC: 220 U/L (ref 135–225)
LDLC SERPL CALC-MCNC: 77 MG/DL (ref 0–100)
LDLC/HDLC SERPL: 1.4 {RATIO}
LEFT ATRIUM VOLUME INDEX: 37.8 ML/M2
LEUKOCYTE ESTERASE UR QL STRIP.AUTO: ABNORMAL
LEUKOCYTE ESTERASE UR QL STRIP.AUTO: ABNORMAL
LEUKOCYTE ESTERASE UR QL STRIP.AUTO: NEGATIVE
LIPASE SERPL-CCNC: 6 U/L (ref 13–60)
LYMPHOCYTES # BLD AUTO: 0.36 10*3/MM3 (ref 0.7–3.1)
LYMPHOCYTES # BLD AUTO: 0.44 10*3/MM3 (ref 0.7–3.1)
LYMPHOCYTES # BLD AUTO: 0.51 10*3/MM3 (ref 0.7–3.1)
LYMPHOCYTES # BLD AUTO: 0.63 10*3/MM3 (ref 0.7–3.1)
LYMPHOCYTES # BLD AUTO: 0.68 10*3/MM3 (ref 0.7–3.1)
LYMPHOCYTES # BLD AUTO: 0.73 10*3/MM3 (ref 0.7–3.1)
LYMPHOCYTES # BLD AUTO: 0.76 10*3/MM3 (ref 0.7–3.1)
LYMPHOCYTES # BLD AUTO: 0.92 10*3/MM3 (ref 0.7–3.1)
LYMPHOCYTES # BLD AUTO: 0.98 10*3/MM3 (ref 0.7–3.1)
LYMPHOCYTES # BLD AUTO: 1.2 10*3/MM3 (ref 0.7–3.1)
LYMPHOCYTES # BLD AUTO: 1.24 10*3/MM3 (ref 0.7–3.1)
LYMPHOCYTES # BLD AUTO: 1.26 10*3/MM3 (ref 0.7–3.1)
LYMPHOCYTES # BLD AUTO: 1.35 10*3/MM3 (ref 0.7–3.1)
LYMPHOCYTES # BLD AUTO: 1.38 10*3/MM3 (ref 0.7–3.1)
LYMPHOCYTES # BLD AUTO: 1.4 10*3/MM3 (ref 0.7–3.1)
LYMPHOCYTES # BLD AUTO: 1.45 10*3/MM3 (ref 0.7–3.1)
LYMPHOCYTES # BLD AUTO: 1.46 10*3/MM3 (ref 0.7–3.1)
LYMPHOCYTES # BLD AUTO: 1.49 10*3/MM3 (ref 0.7–3.1)
LYMPHOCYTES # BLD AUTO: 1.6 10*3/MM3 (ref 0.7–3.1)
LYMPHOCYTES # BLD AUTO: 1.6 10*3/MM3 (ref 0.7–3.1)
LYMPHOCYTES # BLD AUTO: 1.7 10*3/MM3 (ref 0.7–3.1)
LYMPHOCYTES # BLD AUTO: 1.77 10*3/MM3 (ref 0.7–3.1)
LYMPHOCYTES # BLD AUTO: 1.8 10*3/MM3 (ref 0.7–3.1)
LYMPHOCYTES # BLD AUTO: 1.84 10*3/MM3 (ref 0.7–3.1)
LYMPHOCYTES # BLD AUTO: 1.92 10*3/MM3 (ref 0.7–3.1)
LYMPHOCYTES # BLD AUTO: 2.08 10*3/MM3 (ref 0.7–3.1)
LYMPHOCYTES # BLD AUTO: 2.12 10*3/MM3 (ref 0.7–3.1)
LYMPHOCYTES # BLD AUTO: 2.34 10*3/MM3 (ref 0.7–3.1)
LYMPHOCYTES # BLD AUTO: 2.35 10*3/MM3 (ref 0.7–3.1)
LYMPHOCYTES # BLD AUTO: 2.42 10*3/MM3 (ref 0.7–3.1)
LYMPHOCYTES # BLD AUTO: 2.76 10*3/MM3 (ref 0.7–3.1)
LYMPHOCYTES # BLD AUTO: 2.89 10*3/MM3 (ref 0.7–3.1)
LYMPHOCYTES # BLD MANUAL: 0.44 10*3/MM3 (ref 0.7–3.1)
LYMPHOCYTES # BLD MANUAL: 0.46 10*3/MM3 (ref 0.7–3.1)
LYMPHOCYTES # BLD MANUAL: 0.74 10*3/MM3 (ref 0.7–3.1)
LYMPHOCYTES # BLD MANUAL: 1.23 10*3/MM3 (ref 0.7–3.1)
LYMPHOCYTES NFR BLD AUTO: 10 % (ref 19.6–45.3)
LYMPHOCYTES NFR BLD AUTO: 10.1 % (ref 19.6–45.3)
LYMPHOCYTES NFR BLD AUTO: 10.8 % (ref 19.6–45.3)
LYMPHOCYTES NFR BLD AUTO: 11.4 % (ref 19.6–45.3)
LYMPHOCYTES NFR BLD AUTO: 12.2 % (ref 19.6–45.3)
LYMPHOCYTES NFR BLD AUTO: 13.1 % (ref 19.6–45.3)
LYMPHOCYTES NFR BLD AUTO: 14.5 % (ref 19.6–45.3)
LYMPHOCYTES NFR BLD AUTO: 14.7 % (ref 19.6–45.3)
LYMPHOCYTES NFR BLD AUTO: 14.9 % (ref 19.6–45.3)
LYMPHOCYTES NFR BLD AUTO: 15.9 % (ref 19.6–45.3)
LYMPHOCYTES NFR BLD AUTO: 16 % (ref 19.6–45.3)
LYMPHOCYTES NFR BLD AUTO: 16.7 % (ref 19.6–45.3)
LYMPHOCYTES NFR BLD AUTO: 17.1 % (ref 19.6–45.3)
LYMPHOCYTES NFR BLD AUTO: 17.3 % (ref 19.6–45.3)
LYMPHOCYTES NFR BLD AUTO: 18.9 % (ref 19.6–45.3)
LYMPHOCYTES NFR BLD AUTO: 19.1 % (ref 19.6–45.3)
LYMPHOCYTES NFR BLD AUTO: 19.3 % (ref 19.6–45.3)
LYMPHOCYTES NFR BLD AUTO: 19.8 % (ref 19.6–45.3)
LYMPHOCYTES NFR BLD AUTO: 19.9 % (ref 19.6–45.3)
LYMPHOCYTES NFR BLD AUTO: 19.9 % (ref 19.6–45.3)
LYMPHOCYTES NFR BLD AUTO: 20 % (ref 19.6–45.3)
LYMPHOCYTES NFR BLD AUTO: 28.9 % (ref 19.6–45.3)
LYMPHOCYTES NFR BLD AUTO: 29.5 % (ref 19.6–45.3)
LYMPHOCYTES NFR BLD AUTO: 38.4 % (ref 19.6–45.3)
LYMPHOCYTES NFR BLD AUTO: 4.6 % (ref 19.6–45.3)
LYMPHOCYTES NFR BLD AUTO: 4.9 % (ref 19.6–45.3)
LYMPHOCYTES NFR BLD AUTO: 43.4 % (ref 19.6–45.3)
LYMPHOCYTES NFR BLD AUTO: 6 % (ref 19.6–45.3)
LYMPHOCYTES NFR BLD AUTO: 8.3 % (ref 19.6–45.3)
LYMPHOCYTES NFR BLD AUTO: 9 % (ref 19.6–45.3)
LYMPHOCYTES NFR BLD AUTO: 9.6 % (ref 19.6–45.3)
LYMPHOCYTES NFR BLD AUTO: 9.6 % (ref 19.6–45.3)
LYMPHOCYTES NFR BLD MANUAL: 15 % (ref 5–12)
LYMPHOCYTES NFR BLD MANUAL: 3 % (ref 5–12)
LYMPHOCYTES NFR BLD MANUAL: 4 % (ref 5–12)
LYMPHOCYTES NFR BLD MANUAL: 5 % (ref 5–12)
Lab: ABNORMAL
M PNEUMO IGG SER IA-ACNC: NOT DETECTED
M PNEUMO IGG SER IA-ACNC: NOT DETECTED
MAGNESIUM SERPL-MCNC: 1.8 MG/DL (ref 1.6–2.4)
MAGNESIUM SERPL-MCNC: 1.9 MG/DL (ref 1.6–2.4)
MAGNESIUM SERPL-MCNC: 2 MG/DL (ref 1.6–2.4)
MAGNESIUM SERPL-MCNC: 2.1 MG/DL (ref 1.6–2.4)
MAGNESIUM SERPL-MCNC: 2.1 MG/DL (ref 1.6–2.4)
MAGNESIUM SERPL-MCNC: 2.3 MG/DL (ref 1.6–2.4)
MAGNESIUM SERPL-MCNC: 2.3 MG/DL (ref 1.6–2.4)
MAXIMAL PREDICTED HEART RATE: 149 BPM
MAXIMAL PREDICTED HEART RATE: 149 BPM
MCH RBC QN AUTO: 24.8 PG (ref 26.6–33)
MCH RBC QN AUTO: 25.1 PG (ref 26.6–33)
MCH RBC QN AUTO: 25.3 PG (ref 26.6–33)
MCH RBC QN AUTO: 25.6 PG (ref 26.6–33)
MCH RBC QN AUTO: 26 PG (ref 26.6–33)
MCH RBC QN AUTO: 26.7 PG (ref 26.6–33)
MCH RBC QN AUTO: 27.1 PG (ref 26.6–33)
MCH RBC QN AUTO: 27.8 PG (ref 26.6–33)
MCH RBC QN AUTO: 27.8 PG (ref 26.6–33)
MCH RBC QN AUTO: 27.9 PG (ref 26.6–33)
MCH RBC QN AUTO: 28 PG (ref 26.6–33)
MCH RBC QN AUTO: 28.1 PG (ref 26.6–33)
MCH RBC QN AUTO: 28.2 PG (ref 26.6–33)
MCH RBC QN AUTO: 28.2 PG (ref 26.6–33)
MCH RBC QN AUTO: 28.3 PG (ref 26.6–33)
MCH RBC QN AUTO: 28.3 PG (ref 26.6–33)
MCH RBC QN AUTO: 28.4 PG (ref 26.6–33)
MCH RBC QN AUTO: 28.5 PG (ref 26.6–33)
MCH RBC QN AUTO: 28.6 PG (ref 26.6–33)
MCH RBC QN AUTO: 28.7 PG (ref 26.6–33)
MCH RBC QN AUTO: 28.8 PG (ref 26.6–33)
MCH RBC QN AUTO: 29.1 PG (ref 26.6–33)
MCH RBC QN AUTO: 29.2 PG (ref 26.6–33)
MCH RBC QN AUTO: 29.2 PG (ref 26.6–33)
MCH RBC QN AUTO: 29.3 PG (ref 26.6–33)
MCHC RBC AUTO-ENTMCNC: 28.9 G/DL (ref 31.5–35.7)
MCHC RBC AUTO-ENTMCNC: 29.2 G/DL (ref 31.5–35.7)
MCHC RBC AUTO-ENTMCNC: 30.1 G/DL (ref 31.5–35.7)
MCHC RBC AUTO-ENTMCNC: 30.3 G/DL (ref 31.5–35.7)
MCHC RBC AUTO-ENTMCNC: 31 G/DL (ref 31.5–35.7)
MCHC RBC AUTO-ENTMCNC: 31.4 G/DL (ref 31.5–35.7)
MCHC RBC AUTO-ENTMCNC: 31.9 G/DL (ref 31.5–35.7)
MCHC RBC AUTO-ENTMCNC: 32.4 G/DL (ref 31.5–35.7)
MCHC RBC AUTO-ENTMCNC: 32.5 G/DL (ref 31.5–35.7)
MCHC RBC AUTO-ENTMCNC: 32.7 G/DL (ref 31.5–35.7)
MCHC RBC AUTO-ENTMCNC: 32.9 G/DL (ref 31.5–35.7)
MCHC RBC AUTO-ENTMCNC: 33 G/DL (ref 31.5–35.7)
MCHC RBC AUTO-ENTMCNC: 33 G/DL (ref 31.5–35.7)
MCHC RBC AUTO-ENTMCNC: 33.1 G/DL (ref 31.5–35.7)
MCHC RBC AUTO-ENTMCNC: 33.2 G/DL (ref 31.5–35.7)
MCHC RBC AUTO-ENTMCNC: 33.3 G/DL (ref 31.5–35.7)
MCHC RBC AUTO-ENTMCNC: 33.5 G/DL (ref 31.5–35.7)
MCHC RBC AUTO-ENTMCNC: 33.5 G/DL (ref 31.5–35.7)
MCHC RBC AUTO-ENTMCNC: 33.6 G/DL (ref 31.5–35.7)
MCHC RBC AUTO-ENTMCNC: 33.7 G/DL (ref 31.5–35.7)
MCHC RBC AUTO-ENTMCNC: 33.8 G/DL (ref 31.5–35.7)
MCHC RBC AUTO-ENTMCNC: 33.9 G/DL (ref 31.5–35.7)
MCHC RBC AUTO-ENTMCNC: 33.9 G/DL (ref 31.5–35.7)
MCHC RBC AUTO-ENTMCNC: 34 G/DL (ref 31.5–35.7)
MCHC RBC AUTO-ENTMCNC: 34 G/DL (ref 31.5–35.7)
MCHC RBC AUTO-ENTMCNC: 34.1 G/DL (ref 31.5–35.7)
MCHC RBC AUTO-ENTMCNC: 34.2 G/DL (ref 31.5–35.7)
MCHC RBC AUTO-ENTMCNC: 34.3 G/DL (ref 31.5–35.7)
MCHC RBC AUTO-ENTMCNC: 34.3 G/DL (ref 31.5–35.7)
MCHC RBC AUTO-ENTMCNC: 34.4 G/DL (ref 31.5–35.7)
MCHC RBC AUTO-ENTMCNC: 34.4 G/DL (ref 31.5–35.7)
MCHC RBC AUTO-ENTMCNC: 34.6 G/DL (ref 31.5–35.7)
MCHC RBC AUTO-ENTMCNC: 35.2 G/DL (ref 31.5–35.7)
MCHC RBC AUTO-ENTMCNC: 36.2 G/DL (ref 31.5–35.7)
MCV RBC AUTO: 79.5 FL (ref 79–97)
MCV RBC AUTO: 81.2 FL (ref 79–97)
MCV RBC AUTO: 81.7 FL (ref 79–97)
MCV RBC AUTO: 82 FL (ref 79–97)
MCV RBC AUTO: 82.4 FL (ref 79–97)
MCV RBC AUTO: 82.6 FL (ref 79–97)
MCV RBC AUTO: 82.7 FL (ref 79–97)
MCV RBC AUTO: 82.8 FL (ref 79–97)
MCV RBC AUTO: 83.1 FL (ref 79–97)
MCV RBC AUTO: 83.1 FL (ref 79–97)
MCV RBC AUTO: 83.2 FL (ref 79–97)
MCV RBC AUTO: 83.2 FL (ref 79–97)
MCV RBC AUTO: 83.3 FL (ref 79–97)
MCV RBC AUTO: 83.5 FL (ref 79–97)
MCV RBC AUTO: 83.6 FL (ref 79–97)
MCV RBC AUTO: 83.7 FL (ref 79–97)
MCV RBC AUTO: 83.8 FL (ref 79–97)
MCV RBC AUTO: 83.8 FL (ref 79–97)
MCV RBC AUTO: 83.9 FL (ref 79–97)
MCV RBC AUTO: 84.2 FL (ref 79–97)
MCV RBC AUTO: 84.4 FL (ref 79–97)
MCV RBC AUTO: 84.5 FL (ref 79–97)
MCV RBC AUTO: 84.6 FL (ref 79–97)
MCV RBC AUTO: 84.9 FL (ref 79–97)
MCV RBC AUTO: 84.9 FL (ref 79–97)
MCV RBC AUTO: 85.4 FL (ref 79–97)
MCV RBC AUTO: 85.4 FL (ref 79–97)
MCV RBC AUTO: 85.6 FL (ref 79–97)
MCV RBC AUTO: 85.8 FL (ref 79–97)
MCV RBC AUTO: 86.1 FL (ref 79–97)
MCV RBC AUTO: 86.5 FL (ref 79–97)
MCV RBC AUTO: 86.8 FL (ref 79–97)
MCV RBC AUTO: 86.8 FL (ref 79–97)
MCV RBC AUTO: 87 FL (ref 79–97)
MCV RBC AUTO: 87.4 FL (ref 79–97)
MCV RBC AUTO: 87.5 FL (ref 79–97)
MCV RBC AUTO: 88.2 FL (ref 79–97)
MCV RBC AUTO: 88.5 FL (ref 79–97)
MCV RBC AUTO: 88.9 FL (ref 79–97)
MCV RBC AUTO: 90.2 FL (ref 79–97)
METAMYELOCYTES NFR BLD MANUAL: 1 % (ref 0–0)
METAMYELOCYTES NFR BLD MANUAL: 2 % (ref 0–0)
METHGB BLD QL: 0.4 % (ref 0–1.5)
METHGB BLD QL: 0.5 % (ref 0–1.5)
METHGB BLD QL: 0.7 % (ref 0–1.5)
METHGB BLD QL: 0.9 % (ref 0–1.5)
MODALITY: ABNORMAL
MONOCYTES # BLD AUTO: 0.16 10*3/MM3 (ref 0.1–0.9)
MONOCYTES # BLD AUTO: 0.21 10*3/MM3 (ref 0.1–0.9)
MONOCYTES # BLD AUTO: 0.28 10*3/MM3 (ref 0.1–0.9)
MONOCYTES # BLD AUTO: 0.39 10*3/MM3 (ref 0.1–0.9)
MONOCYTES # BLD AUTO: 0.51 10*3/MM3 (ref 0.1–0.9)
MONOCYTES # BLD AUTO: 0.66 10*3/MM3 (ref 0.1–0.9)
MONOCYTES # BLD AUTO: 0.72 10*3/MM3 (ref 0.1–0.9)
MONOCYTES # BLD AUTO: 0.79 10*3/MM3 (ref 0.1–0.9)
MONOCYTES # BLD AUTO: 0.82 10*3/MM3 (ref 0.1–0.9)
MONOCYTES # BLD AUTO: 0.84 10*3/MM3 (ref 0.1–0.9)
MONOCYTES # BLD AUTO: 0.92 10*3/MM3 (ref 0.1–0.9)
MONOCYTES # BLD AUTO: 0.93 10*3/MM3 (ref 0.1–0.9)
MONOCYTES # BLD AUTO: 0.97 10*3/MM3 (ref 0.1–0.9)
MONOCYTES # BLD AUTO: 0.99 10*3/MM3 (ref 0.1–0.9)
MONOCYTES # BLD AUTO: 1.05 10*3/MM3 (ref 0.1–0.9)
MONOCYTES # BLD AUTO: 1.14 10*3/MM3 (ref 0.1–0.9)
MONOCYTES # BLD AUTO: 1.22 10*3/MM3 (ref 0.1–0.9)
MONOCYTES # BLD AUTO: 1.27 10*3/MM3 (ref 0.1–0.9)
MONOCYTES # BLD AUTO: 1.28 10*3/MM3 (ref 0.1–0.9)
MONOCYTES # BLD AUTO: 1.28 10*3/MM3 (ref 0.1–0.9)
MONOCYTES # BLD AUTO: 1.53 10*3/MM3 (ref 0.1–0.9)
MONOCYTES # BLD AUTO: 1.55 10*3/MM3 (ref 0.1–0.9)
MONOCYTES # BLD AUTO: 1.62 10*3/MM3 (ref 0.1–0.9)
MONOCYTES # BLD AUTO: 1.64 10*3/MM3 (ref 0.1–0.9)
MONOCYTES # BLD AUTO: 1.66 10*3/MM3 (ref 0.1–0.9)
MONOCYTES # BLD AUTO: 1.71 10*3/MM3 (ref 0.1–0.9)
MONOCYTES # BLD AUTO: 1.72 10*3/MM3 (ref 0.1–0.9)
MONOCYTES # BLD AUTO: 1.76 10*3/MM3 (ref 0.1–0.9)
MONOCYTES # BLD AUTO: 1.88 10*3/MM3 (ref 0.1–0.9)
MONOCYTES # BLD AUTO: 2.09 10*3/MM3 (ref 0.1–0.9)
MONOCYTES # BLD AUTO: 2.19 10*3/MM3 (ref 0.1–0.9)
MONOCYTES # BLD AUTO: 2.2 10*3/MM3 (ref 0.1–0.9)
MONOCYTES # BLD: 0.02 10*3/MM3 (ref 0.1–0.9)
MONOCYTES # BLD: 0.04 10*3/MM3 (ref 0.1–0.9)
MONOCYTES # BLD: 0.41 10*3/MM3 (ref 0.1–0.9)
MONOCYTES # BLD: 0.55 10*3/MM3 (ref 0.1–0.9)
MONOCYTES NFR BLD AUTO: 10 % (ref 5–12)
MONOCYTES NFR BLD AUTO: 10.3 % (ref 5–12)
MONOCYTES NFR BLD AUTO: 11.4 % (ref 5–12)
MONOCYTES NFR BLD AUTO: 12 % (ref 5–12)
MONOCYTES NFR BLD AUTO: 12.3 % (ref 5–12)
MONOCYTES NFR BLD AUTO: 13.2 % (ref 5–12)
MONOCYTES NFR BLD AUTO: 13.7 % (ref 5–12)
MONOCYTES NFR BLD AUTO: 14 % (ref 5–12)
MONOCYTES NFR BLD AUTO: 14.8 % (ref 5–12)
MONOCYTES NFR BLD AUTO: 15.2 % (ref 5–12)
MONOCYTES NFR BLD AUTO: 15.7 % (ref 5–12)
MONOCYTES NFR BLD AUTO: 16.2 % (ref 5–12)
MONOCYTES NFR BLD AUTO: 17.3 % (ref 5–12)
MONOCYTES NFR BLD AUTO: 17.3 % (ref 5–12)
MONOCYTES NFR BLD AUTO: 18.1 % (ref 5–12)
MONOCYTES NFR BLD AUTO: 18.1 % (ref 5–12)
MONOCYTES NFR BLD AUTO: 18.5 % (ref 5–12)
MONOCYTES NFR BLD AUTO: 19.7 % (ref 5–12)
MONOCYTES NFR BLD AUTO: 2.5 % (ref 5–12)
MONOCYTES NFR BLD AUTO: 21.5 % (ref 5–12)
MONOCYTES NFR BLD AUTO: 3.3 % (ref 5–12)
MONOCYTES NFR BLD AUTO: 33.7 % (ref 5–12)
MONOCYTES NFR BLD AUTO: 5.2 % (ref 5–12)
MONOCYTES NFR BLD AUTO: 5.3 % (ref 5–12)
MONOCYTES NFR BLD AUTO: 6.4 % (ref 5–12)
MONOCYTES NFR BLD AUTO: 6.5 % (ref 5–12)
MONOCYTES NFR BLD AUTO: 7.7 % (ref 5–12)
MONOCYTES NFR BLD AUTO: 7.9 % (ref 5–12)
MONOCYTES NFR BLD AUTO: 7.9 % (ref 5–12)
MONOCYTES NFR BLD AUTO: 8.4 % (ref 5–12)
MONOCYTES NFR BLD AUTO: 9.2 % (ref 5–12)
MONOCYTES NFR BLD AUTO: 9.6 % (ref 5–12)
MRSA DNA SPEC QL NAA+PROBE: NEGATIVE
MRSA DNA SPEC QL NAA+PROBE: NEGATIVE
MYCOBACTERIUM SPEC CULT: NORMAL
MYELOCYTES NFR BLD MANUAL: 1 % (ref 0–0)
MYELOCYTES NFR BLD MANUAL: 3 % (ref 0–0)
NEUTROPHILS # BLD AUTO: 0.16 10*3/MM3 (ref 1.7–7)
NEUTROPHILS # BLD AUTO: 0.21 10*3/MM3 (ref 1.7–7)
NEUTROPHILS # BLD AUTO: 2.36 10*3/MM3 (ref 1.7–7)
NEUTROPHILS # BLD AUTO: 8.42 10*3/MM3 (ref 1.7–7)
NEUTROPHILS NFR BLD AUTO: 0.19 10*3/MM3 (ref 1.7–7)
NEUTROPHILS NFR BLD AUTO: 0.78 10*3/MM3 (ref 1.7–7)
NEUTROPHILS NFR BLD AUTO: 10.32 10*3/MM3 (ref 1.7–7)
NEUTROPHILS NFR BLD AUTO: 10.45 10*3/MM3 (ref 1.7–7)
NEUTROPHILS NFR BLD AUTO: 10.6 10*3/MM3 (ref 1.7–7)
NEUTROPHILS NFR BLD AUTO: 11.73 10*3/MM3 (ref 1.7–7)
NEUTROPHILS NFR BLD AUTO: 17.78 10*3/MM3 (ref 1.7–7)
NEUTROPHILS NFR BLD AUTO: 2.09 10*3/MM3 (ref 1.7–7)
NEUTROPHILS NFR BLD AUTO: 22.9 % (ref 42.7–76)
NEUTROPHILS NFR BLD AUTO: 39.4 % (ref 42.7–76)
NEUTROPHILS NFR BLD AUTO: 4.98 10*3/MM3 (ref 1.7–7)
NEUTROPHILS NFR BLD AUTO: 5.32 10*3/MM3 (ref 1.7–7)
NEUTROPHILS NFR BLD AUTO: 5.32 10*3/MM3 (ref 1.7–7)
NEUTROPHILS NFR BLD AUTO: 5.35 10*3/MM3 (ref 1.7–7)
NEUTROPHILS NFR BLD AUTO: 5.69 10*3/MM3 (ref 1.7–7)
NEUTROPHILS NFR BLD AUTO: 5.73 10*3/MM3 (ref 1.7–7)
NEUTROPHILS NFR BLD AUTO: 5.91 10*3/MM3 (ref 1.7–7)
NEUTROPHILS NFR BLD AUTO: 53.3 % (ref 42.7–76)
NEUTROPHILS NFR BLD AUTO: 56.9 % (ref 42.7–76)
NEUTROPHILS NFR BLD AUTO: 57 % (ref 42.7–76)
NEUTROPHILS NFR BLD AUTO: 57.2 % (ref 42.7–76)
NEUTROPHILS NFR BLD AUTO: 6.32 10*3/MM3 (ref 1.7–7)
NEUTROPHILS NFR BLD AUTO: 6.34 10*3/MM3 (ref 1.7–7)
NEUTROPHILS NFR BLD AUTO: 6.55 10*3/MM3 (ref 1.7–7)
NEUTROPHILS NFR BLD AUTO: 6.75 10*3/MM3 (ref 1.7–7)
NEUTROPHILS NFR BLD AUTO: 6.83 10*3/MM3 (ref 1.7–7)
NEUTROPHILS NFR BLD AUTO: 60.8 % (ref 42.7–76)
NEUTROPHILS NFR BLD AUTO: 60.8 % (ref 42.7–76)
NEUTROPHILS NFR BLD AUTO: 60.9 % (ref 42.7–76)
NEUTROPHILS NFR BLD AUTO: 62 % (ref 42.7–76)
NEUTROPHILS NFR BLD AUTO: 65.2 % (ref 42.7–76)
NEUTROPHILS NFR BLD AUTO: 66 % (ref 42.7–76)
NEUTROPHILS NFR BLD AUTO: 66.3 % (ref 42.7–76)
NEUTROPHILS NFR BLD AUTO: 67.7 % (ref 42.7–76)
NEUTROPHILS NFR BLD AUTO: 68 % (ref 42.7–76)
NEUTROPHILS NFR BLD AUTO: 68 % (ref 42.7–76)
NEUTROPHILS NFR BLD AUTO: 69.4 % (ref 42.7–76)
NEUTROPHILS NFR BLD AUTO: 69.9 % (ref 42.7–76)
NEUTROPHILS NFR BLD AUTO: 7.22 10*3/MM3 (ref 1.7–7)
NEUTROPHILS NFR BLD AUTO: 7.3 10*3/MM3 (ref 1.7–7)
NEUTROPHILS NFR BLD AUTO: 7.38 10*3/MM3 (ref 1.7–7)
NEUTROPHILS NFR BLD AUTO: 7.39 10*3/MM3 (ref 1.7–7)
NEUTROPHILS NFR BLD AUTO: 7.73 10*3/MM3 (ref 1.7–7)
NEUTROPHILS NFR BLD AUTO: 71.5 % (ref 42.7–76)
NEUTROPHILS NFR BLD AUTO: 71.7 % (ref 42.7–76)
NEUTROPHILS NFR BLD AUTO: 73 % (ref 42.7–76)
NEUTROPHILS NFR BLD AUTO: 73.1 % (ref 42.7–76)
NEUTROPHILS NFR BLD AUTO: 76.5 % (ref 42.7–76)
NEUTROPHILS NFR BLD AUTO: 77.2 % (ref 42.7–76)
NEUTROPHILS NFR BLD AUTO: 78.5 % (ref 42.7–76)
NEUTROPHILS NFR BLD AUTO: 8.01 10*3/MM3 (ref 1.7–7)
NEUTROPHILS NFR BLD AUTO: 8.03 10*3/MM3 (ref 1.7–7)
NEUTROPHILS NFR BLD AUTO: 8.46 10*3/MM3 (ref 1.7–7)
NEUTROPHILS NFR BLD AUTO: 8.8 10*3/MM3 (ref 1.7–7)
NEUTROPHILS NFR BLD AUTO: 8.93 10*3/MM3 (ref 1.7–7)
NEUTROPHILS NFR BLD AUTO: 80.4 % (ref 42.7–76)
NEUTROPHILS NFR BLD AUTO: 80.7 % (ref 42.7–76)
NEUTROPHILS NFR BLD AUTO: 84 % (ref 42.7–76)
NEUTROPHILS NFR BLD AUTO: 84.8 % (ref 42.7–76)
NEUTROPHILS NFR BLD AUTO: 85.2 % (ref 42.7–76)
NEUTROPHILS NFR BLD AUTO: 88.6 % (ref 42.7–76)
NEUTROPHILS NFR BLD AUTO: 88.8 % (ref 42.7–76)
NEUTROPHILS NFR BLD AUTO: 9.54 10*3/MM3 (ref 1.7–7)
NEUTROPHILS NFR BLD AUTO: 9.76 10*3/MM3 (ref 1.7–7)
NEUTROPHILS NFR BLD MANUAL: 17 % (ref 42.7–76)
NEUTROPHILS NFR BLD MANUAL: 22 % (ref 42.7–76)
NEUTROPHILS NFR BLD MANUAL: 32 % (ref 42.7–76)
NEUTROPHILS NFR BLD MANUAL: 77 % (ref 42.7–76)
NEUTS BAND NFR BLD MANUAL: 11 % (ref 0–5)
NEUTS BAND NFR BLD MANUAL: 3 % (ref 0–5)
NEUTS BAND NFR BLD MANUAL: 32 % (ref 0–5)
NEUTS BAND NFR BLD MANUAL: 5 % (ref 0–5)
NIGHT BLUE STAIN TISS: NORMAL
NITRITE UR QL STRIP: NEGATIVE
NOTE: ABNORMAL
NOTIFIED BY: ABNORMAL
NOTIFIED WHO: ABNORMAL
NRBC BLD AUTO-RTO: 0 /100 WBC (ref 0–0.2)
NRBC BLD AUTO-RTO: 0.1 /100 WBC (ref 0–0.2)
NRBC BLD AUTO-RTO: 0.2 /100 WBC (ref 0–0.2)
NRBC BLD AUTO-RTO: 0.2 /100 WBC (ref 0–0.2)
NRBC BLD AUTO-RTO: 0.3 /100 WBC (ref 0–0.2)
NRBC SPEC MANUAL: 0 /100 WBC (ref 0–0.2)
NT-PROBNP SERPL-MCNC: 1215 PG/ML (ref 0–900)
NT-PROBNP SERPL-MCNC: 1964 PG/ML (ref 0–900)
NT-PROBNP SERPL-MCNC: 3700 PG/ML (ref 0–900)
OXYHGB MFR BLDV: 89.2 % (ref 94–99)
OXYHGB MFR BLDV: 89.9 % (ref 94–99)
OXYHGB MFR BLDV: 92.8 % (ref 94–99)
OXYHGB MFR BLDV: 97.3 % (ref 94–99)
PATH INTERP BLD-IMP: NORMAL
PATH REPORT.ADDENDUM SPEC: NORMAL
PATH REPORT.FINAL DX SPEC: NORMAL
PATH REPORT.GROSS SPEC: NORMAL
PAW @ PEAK INSP FLOW SETTING VENT: 0 CMH2O
PCO2 BLDA: 38.9 MM HG (ref 35–45)
PCO2 BLDA: 51.9 MM HG (ref 35–45)
PCO2 BLDA: 63.5 MM HG (ref 35–45)
PCO2 BLDA: 77.9 MM HG (ref 35–45)
PCO2 TEMP ADJ BLD: 38.9 MM HG (ref 35–48)
PCO2 TEMP ADJ BLD: 51.9 MM HG (ref 35–48)
PCO2 TEMP ADJ BLD: 63.5 MM HG (ref 35–48)
PCO2 TEMP ADJ BLD: 77.9 MM HG (ref 35–48)
PH BLDA: 7.23 PH UNITS (ref 7.35–7.45)
PH BLDA: 7.29 PH UNITS (ref 7.35–7.45)
PH BLDA: 7.3 PH UNITS (ref 7.35–7.45)
PH BLDA: 7.37 PH UNITS (ref 7.35–7.45)
PH UR STRIP.AUTO: 5.5 [PH] (ref 5–8)
PH UR STRIP.AUTO: <=5 [PH] (ref 5–8)
PH UR STRIP.AUTO: <=5 [PH] (ref 5–8)
PH, TEMP CORRECTED: 7.23 PH UNITS
PH, TEMP CORRECTED: 7.29 PH UNITS
PH, TEMP CORRECTED: 7.3 PH UNITS
PH, TEMP CORRECTED: 7.37 PH UNITS
PHOSPHATE SERPL-MCNC: 2.3 MG/DL (ref 2.5–4.5)
PHOSPHATE SERPL-MCNC: 2.5 MG/DL (ref 2.5–4.5)
PHOSPHATE SERPL-MCNC: 2.8 MG/DL (ref 2.5–4.5)
PHOSPHATE SERPL-MCNC: 2.8 MG/DL (ref 2.5–4.5)
PHOSPHATE SERPL-MCNC: 3 MG/DL (ref 2.5–4.5)
PHOSPHATE SERPL-MCNC: 3.5 MG/DL (ref 2.5–4.5)
PHOSPHATE SERPL-MCNC: 3.8 MG/DL (ref 2.5–4.5)
PHOSPHATE SERPL-MCNC: 4 MG/DL (ref 2.5–4.5)
PHOSPHATE SERPL-MCNC: 4.5 MG/DL (ref 2.5–4.5)
PHOSPHATE SERPL-MCNC: 4.5 MG/DL (ref 2.5–4.5)
PHOSPHATE SERPL-MCNC: 4.9 MG/DL (ref 2.5–4.5)
PHOSPHATE SERPL-MCNC: 6.2 MG/DL (ref 2.5–4.5)
PHOSPHATE SERPL-MCNC: 7 MG/DL (ref 2.5–4.5)
PLAT MORPH BLD: NORMAL
PLATELET # BLD AUTO: 102 10*3/MM3 (ref 140–450)
PLATELET # BLD AUTO: 102 10*3/MM3 (ref 140–450)
PLATELET # BLD AUTO: 124 10*3/MM3 (ref 140–450)
PLATELET # BLD AUTO: 135 10*3/MM3 (ref 140–450)
PLATELET # BLD AUTO: 135 10*3/MM3 (ref 140–450)
PLATELET # BLD AUTO: 140 10*3/MM3 (ref 140–450)
PLATELET # BLD AUTO: 142 10*3/MM3 (ref 140–450)
PLATELET # BLD AUTO: 147 10*3/MM3 (ref 140–450)
PLATELET # BLD AUTO: 148 10*3/MM3 (ref 140–450)
PLATELET # BLD AUTO: 151 10*3/MM3 (ref 140–450)
PLATELET # BLD AUTO: 176 10*3/MM3 (ref 140–450)
PLATELET # BLD AUTO: 177 10*3/MM3 (ref 140–450)
PLATELET # BLD AUTO: 182 10*3/MM3 (ref 140–450)
PLATELET # BLD AUTO: 185 10*3/MM3 (ref 140–450)
PLATELET # BLD AUTO: 189 10*3/MM3 (ref 140–450)
PLATELET # BLD AUTO: 193 10*3/MM3 (ref 140–450)
PLATELET # BLD AUTO: 200 10*3/MM3 (ref 140–450)
PLATELET # BLD AUTO: 206 10*3/MM3 (ref 140–450)
PLATELET # BLD AUTO: 211 10*3/MM3 (ref 140–450)
PLATELET # BLD AUTO: 216 10*3/MM3 (ref 140–450)
PLATELET # BLD AUTO: 226 10*3/MM3 (ref 140–450)
PLATELET # BLD AUTO: 226 10*3/MM3 (ref 140–450)
PLATELET # BLD AUTO: 227 10*3/MM3 (ref 140–450)
PLATELET # BLD AUTO: 228 10*3/MM3 (ref 140–450)
PLATELET # BLD AUTO: 248 10*3/MM3 (ref 140–450)
PLATELET # BLD AUTO: 296 10*3/MM3 (ref 140–450)
PLATELET # BLD AUTO: 316 10*3/MM3 (ref 140–450)
PLATELET # BLD AUTO: 339 10*3/MM3 (ref 140–450)
PLATELET # BLD AUTO: 38 10*3/MM3 (ref 140–450)
PLATELET # BLD AUTO: 40 10*3/MM3 (ref 140–450)
PLATELET # BLD AUTO: 426 10*3/MM3 (ref 140–450)
PLATELET # BLD AUTO: 47 10*3/MM3 (ref 140–450)
PLATELET # BLD AUTO: 49 10*3/MM3 (ref 140–450)
PLATELET # BLD AUTO: 53 10*3/MM3 (ref 140–450)
PLATELET # BLD AUTO: 55 10*3/MM3 (ref 140–450)
PLATELET # BLD AUTO: 63 10*3/MM3 (ref 140–450)
PLATELET # BLD AUTO: 64 10*3/MM3 (ref 140–450)
PLATELET # BLD AUTO: 69 10*3/MM3 (ref 140–450)
PLATELET # BLD AUTO: 69 10*3/MM3 (ref 140–450)
PLATELET # BLD AUTO: 78 10*3/MM3 (ref 140–450)
PLATELET # BLD AUTO: 92 10*3/MM3 (ref 140–450)
PLATELET # BLD AUTO: 98 10*3/MM3 (ref 140–450)
PMV BLD AUTO: 10 FL (ref 6–12)
PMV BLD AUTO: 10.1 FL (ref 6–12)
PMV BLD AUTO: 10.1 FL (ref 6–12)
PMV BLD AUTO: 10.4 FL (ref 6–12)
PMV BLD AUTO: 10.5 FL (ref 6–12)
PMV BLD AUTO: 10.6 FL (ref 6–12)
PMV BLD AUTO: 10.7 FL (ref 6–12)
PMV BLD AUTO: 10.8 FL (ref 6–12)
PMV BLD AUTO: 10.9 FL (ref 6–12)
PMV BLD AUTO: 11.1 FL (ref 6–12)
PMV BLD AUTO: 11.3 FL (ref 6–12)
PMV BLD AUTO: 11.3 FL (ref 6–12)
PMV BLD AUTO: 11.4 FL (ref 6–12)
PMV BLD AUTO: 11.4 FL (ref 6–12)
PMV BLD AUTO: 11.6 FL (ref 6–12)
PMV BLD AUTO: 12 FL (ref 6–12)
PMV BLD AUTO: 12.1 FL (ref 6–12)
PMV BLD AUTO: 12.5 FL (ref 6–12)
PMV BLD AUTO: 12.6 FL (ref 6–12)
PMV BLD AUTO: 12.7 FL (ref 6–12)
PMV BLD AUTO: 13.2 FL (ref 6–12)
PMV BLD AUTO: 9.1 FL (ref 6–12)
PMV BLD AUTO: 9.1 FL (ref 6–12)
PMV BLD AUTO: 9.2 FL (ref 6–12)
PMV BLD AUTO: 9.6 FL (ref 6–12)
PMV BLD AUTO: 9.7 FL (ref 6–12)
PMV BLD AUTO: 9.7 FL (ref 6–12)
PMV BLD AUTO: 9.8 FL (ref 6–12)
PMV BLD AUTO: 9.9 FL (ref 6–12)
PO2 BLDA: 132 MM HG (ref 83–108)
PO2 BLDA: 65.7 MM HG (ref 83–108)
PO2 BLDA: 70.4 MM HG (ref 83–108)
PO2 BLDA: 81.5 MM HG (ref 83–108)
PO2 TEMP ADJ BLD: 132 MM HG (ref 83–108)
PO2 TEMP ADJ BLD: 65.7 MM HG (ref 83–108)
PO2 TEMP ADJ BLD: 70.4 MM HG (ref 83–108)
PO2 TEMP ADJ BLD: 81.5 MM HG (ref 83–108)
POTASSIUM SERPL-SCNC: 3 MMOL/L (ref 3.5–5.2)
POTASSIUM SERPL-SCNC: 3.2 MMOL/L (ref 3.5–5.2)
POTASSIUM SERPL-SCNC: 3.5 MMOL/L (ref 3.5–5.2)
POTASSIUM SERPL-SCNC: 3.6 MMOL/L (ref 3.5–5.2)
POTASSIUM SERPL-SCNC: 3.7 MMOL/L (ref 3.5–5.2)
POTASSIUM SERPL-SCNC: 3.7 MMOL/L (ref 3.5–5.2)
POTASSIUM SERPL-SCNC: 3.8 MMOL/L (ref 3.5–5.2)
POTASSIUM SERPL-SCNC: 3.9 MMOL/L (ref 3.5–5.2)
POTASSIUM SERPL-SCNC: 3.9 MMOL/L (ref 3.5–5.2)
POTASSIUM SERPL-SCNC: 4 MMOL/L (ref 3.5–5.2)
POTASSIUM SERPL-SCNC: 4.1 MMOL/L (ref 3.5–5.2)
POTASSIUM SERPL-SCNC: 4.1 MMOL/L (ref 3.5–5.2)
POTASSIUM SERPL-SCNC: 4.2 MMOL/L (ref 3.5–5.2)
POTASSIUM SERPL-SCNC: 4.3 MMOL/L (ref 3.5–5.2)
POTASSIUM SERPL-SCNC: 4.3 MMOL/L (ref 3.5–5.2)
POTASSIUM SERPL-SCNC: 4.4 MMOL/L (ref 3.5–5.2)
POTASSIUM SERPL-SCNC: 4.5 MMOL/L (ref 3.5–5.2)
POTASSIUM SERPL-SCNC: 4.5 MMOL/L (ref 3.5–5.2)
POTASSIUM SERPL-SCNC: 4.6 MMOL/L (ref 3.5–5.2)
POTASSIUM SERPL-SCNC: 4.6 MMOL/L (ref 3.5–5.2)
POTASSIUM SERPL-SCNC: 4.8 MMOL/L (ref 3.5–5.2)
POTASSIUM SERPL-SCNC: 4.8 MMOL/L (ref 3.5–5.2)
POTASSIUM SERPL-SCNC: 4.9 MMOL/L (ref 3.5–5.2)
POTASSIUM SERPL-SCNC: 5 MMOL/L (ref 3.5–5.2)
POTASSIUM SERPL-SCNC: 5 MMOL/L (ref 3.5–5.2)
POTASSIUM SERPL-SCNC: 5.2 MMOL/L (ref 3.5–5.2)
POTASSIUM SERPL-SCNC: 5.2 MMOL/L (ref 3.5–5.2)
PREALB SERPL-MCNC: 16.9 MG/DL (ref 20–40)
PREALB SERPL-MCNC: 8.1 MG/DL (ref 20–40)
PROCALCITONIN SERPL-MCNC: 0.06 NG/ML (ref 0–0.25)
PROCALCITONIN SERPL-MCNC: 0.08 NG/ML (ref 0–0.25)
PROCALCITONIN SERPL-MCNC: 0.09 NG/ML (ref 0–0.25)
PROCALCITONIN SERPL-MCNC: 0.19 NG/ML (ref 0–0.25)
PROCALCITONIN SERPL-MCNC: 0.7 NG/ML (ref 0–0.25)
PROCALCITONIN SERPL-MCNC: 1.28 NG/ML (ref 0–0.25)
PROCALCITONIN SERPL-MCNC: 1.6 NG/ML (ref 0–0.25)
PROCALCITONIN SERPL-MCNC: 13.56 NG/ML (ref 0–0.25)
PROCALCITONIN SERPL-MCNC: 17.54 NG/ML (ref 0–0.25)
PROCALCITONIN SERPL-MCNC: 3.19 NG/ML (ref 0–0.25)
PROT ?TM UR-MCNC: 104.6 MG/DL
PROT SERPL-MCNC: 4.9 G/DL (ref 6–8.5)
PROT SERPL-MCNC: 5.3 G/DL (ref 6–8.5)
PROT SERPL-MCNC: 5.4 G/DL (ref 6–8.5)
PROT SERPL-MCNC: 5.4 G/DL (ref 6–8.5)
PROT SERPL-MCNC: 5.6 G/DL (ref 6–8.5)
PROT SERPL-MCNC: 6 G/DL (ref 6–8.5)
PROT SERPL-MCNC: 6.2 G/DL (ref 6–8.5)
PROT SERPL-MCNC: 6.2 G/DL (ref 6–8.5)
PROT SERPL-MCNC: 6.4 G/DL (ref 6–8.5)
PROT SERPL-MCNC: 6.4 G/DL (ref 6–8.5)
PROT SERPL-MCNC: 6.6 G/DL (ref 6–8.5)
PROT SERPL-MCNC: 7 G/DL (ref 6–8.5)
PROT SERPL-MCNC: 7.3 G/DL (ref 6–8.5)
PROT SERPL-MCNC: 9.4 G/DL (ref 6–8.5)
PROT UR QL STRIP: ABNORMAL
PROTHROMBIN TIME: 12.3 SECONDS (ref 11.4–14.4)
PROTHROMBIN TIME: 12.6 SECONDS (ref 11.4–14.4)
PSA SERPL-MCNC: 2.31 NG/ML (ref 0–4)
QT INTERVAL: 298 MS
QT INTERVAL: 300 MS
QT INTERVAL: 336 MS
QT INTERVAL: 350 MS
QT INTERVAL: 392 MS
QT INTERVAL: 518 MS
QTC INTERVAL: 391 MS
QTC INTERVAL: 421 MS
QTC INTERVAL: 431 MS
QTC INTERVAL: 448 MS
QTC INTERVAL: 473 MS
QTC INTERVAL: 586 MS
RBC # BLD AUTO: 2.29 10*6/MM3 (ref 4.14–5.8)
RBC # BLD AUTO: 2.47 10*6/MM3 (ref 4.14–5.8)
RBC # BLD AUTO: 2.5 10*6/MM3 (ref 4.14–5.8)
RBC # BLD AUTO: 2.55 10*6/MM3 (ref 4.14–5.8)
RBC # BLD AUTO: 2.56 10*6/MM3 (ref 4.14–5.8)
RBC # BLD AUTO: 2.57 10*6/MM3 (ref 4.14–5.8)
RBC # BLD AUTO: 2.61 10*6/MM3 (ref 4.14–5.8)
RBC # BLD AUTO: 2.64 10*6/MM3 (ref 4.14–5.8)
RBC # BLD AUTO: 2.65 10*6/MM3 (ref 4.14–5.8)
RBC # BLD AUTO: 2.68 10*6/MM3 (ref 4.14–5.8)
RBC # BLD AUTO: 2.69 10*6/MM3 (ref 4.14–5.8)
RBC # BLD AUTO: 2.78 10*6/MM3 (ref 4.14–5.8)
RBC # BLD AUTO: 2.87 10*6/MM3 (ref 4.14–5.8)
RBC # BLD AUTO: 2.88 10*6/MM3 (ref 4.14–5.8)
RBC # BLD AUTO: 2.95 10*6/MM3 (ref 4.14–5.8)
RBC # BLD AUTO: 2.95 10*6/MM3 (ref 4.14–5.8)
RBC # BLD AUTO: 2.98 10*6/MM3 (ref 4.14–5.8)
RBC # BLD AUTO: 3.02 10*6/MM3 (ref 4.14–5.8)
RBC # BLD AUTO: 3.03 10*6/MM3 (ref 4.14–5.8)
RBC # BLD AUTO: 3.06 10*6/MM3 (ref 4.14–5.8)
RBC # BLD AUTO: 3.07 10*6/MM3 (ref 4.14–5.8)
RBC # BLD AUTO: 3.08 10*6/MM3 (ref 4.14–5.8)
RBC # BLD AUTO: 3.12 10*6/MM3 (ref 4.14–5.8)
RBC # BLD AUTO: 3.18 10*6/MM3 (ref 4.14–5.8)
RBC # BLD AUTO: 3.18 10*6/MM3 (ref 4.14–5.8)
RBC # BLD AUTO: 3.26 10*6/MM3 (ref 4.14–5.8)
RBC # BLD AUTO: 3.27 10*6/MM3 (ref 4.14–5.8)
RBC # BLD AUTO: 3.28 10*6/MM3 (ref 4.14–5.8)
RBC # BLD AUTO: 3.46 10*6/MM3 (ref 4.14–5.8)
RBC # BLD AUTO: 3.54 10*6/MM3 (ref 4.14–5.8)
RBC # BLD AUTO: 3.87 10*6/MM3 (ref 4.14–5.8)
RBC # BLD AUTO: 3.9 10*6/MM3 (ref 4.14–5.8)
RBC # BLD AUTO: 4.13 10*6/MM3 (ref 4.14–5.8)
RBC # BLD AUTO: 4.15 10*6/MM3 (ref 4.14–5.8)
RBC # BLD AUTO: 4.2 10*6/MM3 (ref 4.14–5.8)
RBC # BLD AUTO: 4.61 10*6/MM3 (ref 4.14–5.8)
RBC # BLD AUTO: 4.68 10*6/MM3 (ref 4.14–5.8)
RBC # BLD AUTO: 5.69 10*6/MM3 (ref 4.14–5.8)
RBC # UR STRIP: ABNORMAL /HPF
RBC MORPH BLD: NORMAL
REF LAB TEST METHOD: ABNORMAL
REF LAB TEST METHOD: NORMAL
RETICS # AUTO: <0.01 10*6/MM3 (ref 0.02–0.13)
RETICS/RBC NFR AUTO: 0.23 % (ref 0.7–1.9)
RH BLD: POSITIVE
RHINOVIRUS RNA SPEC NAA+PROBE: NOT DETECTED
RHINOVIRUS RNA SPEC NAA+PROBE: NOT DETECTED
RSV RNA NPH QL NAA+NON-PROBE: NOT DETECTED
RSV RNA NPH QL NAA+NON-PROBE: NOT DETECTED
S PNEUM AG SPEC QL LA: NEGATIVE
SARS-COV-2 RNA NPH QL NAA+NON-PROBE: NOT DETECTED
SARS-COV-2 RNA NPH QL NAA+NON-PROBE: NOT DETECTED
SARS-COV-2 RNA PNL SPEC NAA+PROBE: NOT DETECTED
SARS-COV-2 RNA PNL SPEC NAA+PROBE: NOT DETECTED
SARS-COV-2 RNA RESP QL NAA+PROBE: NOT DETECTED
SMALL PLATELETS BLD QL SMEAR: ABNORMAL
SMALL PLATELETS BLD QL SMEAR: NORMAL
SODIUM SERPL-SCNC: 130 MMOL/L (ref 136–145)
SODIUM SERPL-SCNC: 131 MMOL/L (ref 136–145)
SODIUM SERPL-SCNC: 131 MMOL/L (ref 136–145)
SODIUM SERPL-SCNC: 132 MMOL/L (ref 136–145)
SODIUM SERPL-SCNC: 133 MMOL/L (ref 136–145)
SODIUM SERPL-SCNC: 134 MMOL/L (ref 136–145)
SODIUM SERPL-SCNC: 135 MMOL/L (ref 136–145)
SODIUM SERPL-SCNC: 136 MMOL/L (ref 136–145)
SODIUM SERPL-SCNC: 137 MMOL/L (ref 136–145)
SODIUM SERPL-SCNC: 138 MMOL/L (ref 136–145)
SODIUM SERPL-SCNC: 141 MMOL/L (ref 136–145)
SODIUM SERPL-SCNC: 142 MMOL/L (ref 136–145)
SODIUM SERPL-SCNC: 142 MMOL/L (ref 136–145)
SODIUM SERPL-SCNC: 143 MMOL/L (ref 136–145)
SODIUM UR-SCNC: <20 MMOL/L
SP GR UR STRIP: 1.01 (ref 1–1.03)
SP GR UR STRIP: 1.02 (ref 1–1.03)
SP GR UR STRIP: 1.03 (ref 1–1.03)
SQUAMOUS #/AREA URNS HPF: ABNORMAL /HPF
STRESS TARGET HR: 127 BPM
STRESS TARGET HR: 127 BPM
T&S EXPIRATION DATE: NORMAL
T&S EXPIRATION DATE: NORMAL
T4 FREE SERPL-MCNC: 1.24 NG/DL (ref 0.93–1.7)
TOTAL RATE: 0 BREATHS/MINUTE
TOXIC GRANULATION: NORMAL
TOXIC GRANULATION: NORMAL
TRIGL SERPL-MCNC: 148 MG/DL (ref 0–150)
TRIGL SERPL-MCNC: 163 MG/DL (ref 0–150)
TRIGL SERPL-MCNC: 179 MG/DL (ref 0–150)
TRIGL SERPL-MCNC: 232 MG/DL (ref 0–150)
TROPONIN T SERPL-MCNC: <0.01 NG/ML (ref 0–0.03)
TSH SERPL DL<=0.05 MIU/L-ACNC: 1.73 UIU/ML (ref 0.27–4.2)
UNIT  ABO: NORMAL
UNIT  RH: NORMAL
UROBILINOGEN UR QL STRIP: ABNORMAL
VANCOMYCIN SERPL-MCNC: 10.7 MCG/ML (ref 5–40)
VANCOMYCIN SERPL-MCNC: 12.8 MCG/ML (ref 5–40)
VANCOMYCIN SERPL-MCNC: 15.3 MCG/ML (ref 5–40)
VARIANT LYMPHS NFR BLD MANUAL: 1 % (ref 0–5)
VARIANT LYMPHS NFR BLD MANUAL: 12 % (ref 19.6–45.3)
VARIANT LYMPHS NFR BLD MANUAL: 19 % (ref 19.6–45.3)
VARIANT LYMPHS NFR BLD MANUAL: 6 % (ref 0–5)
VARIANT LYMPHS NFR BLD MANUAL: 61 % (ref 19.6–45.3)
VARIANT LYMPHS NFR BLD MANUAL: 64 % (ref 19.6–45.3)
VIT B12 BLD-MCNC: 193 PG/ML (ref 211–946)
VLDLC SERPL-MCNC: 30 MG/DL (ref 5–40)
WBC # UR STRIP: ABNORMAL /HPF
WBC MORPH BLD: NORMAL
WBC NRBC COR # BLD: 0.63 10*3/MM3 (ref 3.4–10.8)
WBC NRBC COR # BLD: 0.76 10*3/MM3 (ref 3.4–10.8)
WBC NRBC COR # BLD: 0.83 10*3/MM3 (ref 3.4–10.8)
WBC NRBC COR # BLD: 1.98 10*3/MM3 (ref 3.4–10.8)
WBC NRBC COR # BLD: 10 10*3/MM3 (ref 3.4–10.8)
WBC NRBC COR # BLD: 10.03 10*3/MM3 (ref 3.4–10.8)
WBC NRBC COR # BLD: 10.19 10*3/MM3 (ref 3.4–10.8)
WBC NRBC COR # BLD: 10.21 10*3/MM3 (ref 3.4–10.8)
WBC NRBC COR # BLD: 10.27 10*3/MM3 (ref 3.4–10.8)
WBC NRBC COR # BLD: 10.41 10*3/MM3 (ref 3.4–10.8)
WBC NRBC COR # BLD: 10.47 10*3/MM3 (ref 3.4–10.8)
WBC NRBC COR # BLD: 10.91 10*3/MM3 (ref 3.4–10.8)
WBC NRBC COR # BLD: 11.2 10*3/MM3 (ref 3.4–10.8)
WBC NRBC COR # BLD: 11.56 10*3/MM3 (ref 3.4–10.8)
WBC NRBC COR # BLD: 11.56 10*3/MM3 (ref 3.4–10.8)
WBC NRBC COR # BLD: 11.81 10*3/MM3 (ref 3.4–10.8)
WBC NRBC COR # BLD: 12.08 10*3/MM3 (ref 3.4–10.8)
WBC NRBC COR # BLD: 12.22 10*3/MM3 (ref 3.4–10.8)
WBC NRBC COR # BLD: 12.26 10*3/MM3 (ref 3.4–10.8)
WBC NRBC COR # BLD: 12.44 10*3/MM3 (ref 3.4–10.8)
WBC NRBC COR # BLD: 12.7 10*3/MM3 (ref 3.4–10.8)
WBC NRBC COR # BLD: 12.83 10*3/MM3 (ref 3.4–10.8)
WBC NRBC COR # BLD: 13.43 10*3/MM3 (ref 3.4–10.8)
WBC NRBC COR # BLD: 13.66 10*3/MM3 (ref 3.4–10.8)
WBC NRBC COR # BLD: 13.84 10*3/MM3 (ref 3.4–10.8)
WBC NRBC COR # BLD: 14.32 10*3/MM3 (ref 3.4–10.8)
WBC NRBC COR # BLD: 14.53 10*3/MM3 (ref 3.4–10.8)
WBC NRBC COR # BLD: 20.09 10*3/MM3 (ref 3.4–10.8)
WBC NRBC COR # BLD: 3.67 10*3/MM3 (ref 3.4–10.8)
WBC NRBC COR # BLD: 3.68 10*3/MM3 (ref 3.4–10.8)
WBC NRBC COR # BLD: 3.84 10*3/MM3 (ref 3.4–10.8)
WBC NRBC COR # BLD: 6.28 10*3/MM3 (ref 3.4–10.8)
WBC NRBC COR # BLD: 6.33 10*3/MM3 (ref 3.4–10.8)
WBC NRBC COR # BLD: 8.07 10*3/MM3 (ref 3.4–10.8)
WBC NRBC COR # BLD: 8.43 10*3/MM3 (ref 3.4–10.8)
WBC NRBC COR # BLD: 8.52 10*3/MM3 (ref 3.4–10.8)
WBC NRBC COR # BLD: 8.59 10*3/MM3 (ref 3.4–10.8)
WBC NRBC COR # BLD: 9.16 10*3/MM3 (ref 3.4–10.8)
WBC NRBC COR # BLD: 9.35 10*3/MM3 (ref 3.4–10.8)
WBC NRBC COR # BLD: 9.54 10*3/MM3 (ref 3.4–10.8)
WBC NRBC COR # BLD: 9.57 10*3/MM3 (ref 3.4–10.8)
WBC NRBC COR # BLD: 9.72 10*3/MM3 (ref 3.4–10.8)
WHOLE BLOOD HOLD COAG: NORMAL
WHOLE BLOOD HOLD SPECIMEN: NORMAL
YEAST URNS QL MICRO: ABNORMAL /HPF
YEAST URNS QL MICRO: ABNORMAL /HPF

## 2022-01-01 PROCEDURE — 25010000002 HYDROMORPHONE PER 4 MG: Performed by: INTERNAL MEDICINE

## 2022-01-01 PROCEDURE — 82805 BLOOD GASES W/O2 SATURATION: CPT

## 2022-01-01 PROCEDURE — 25010000002 METHYLPREDNISOLONE PER 125 MG: Performed by: INTERNAL MEDICINE

## 2022-01-01 PROCEDURE — 99233 SBSQ HOSP IP/OBS HIGH 50: CPT | Performed by: HOSPITALIST

## 2022-01-01 PROCEDURE — 80074 ACUTE HEPATITIS PANEL: CPT | Performed by: INTERNAL MEDICINE

## 2022-01-01 PROCEDURE — 80048 BASIC METABOLIC PNL TOTAL CA: CPT | Performed by: INTERNAL MEDICINE

## 2022-01-01 PROCEDURE — 94799 UNLISTED PULMONARY SVC/PX: CPT

## 2022-01-01 PROCEDURE — 96415 CHEMO IV INFUSION ADDL HR: CPT

## 2022-01-01 PROCEDURE — 85025 COMPLETE CBC W/AUTO DIFF WBC: CPT | Performed by: NURSE PRACTITIONER

## 2022-01-01 PROCEDURE — 25010000002 HEPARIN (PORCINE) PER 1000 UNITS: Performed by: INTERNAL MEDICINE

## 2022-01-01 PROCEDURE — 86160 COMPLEMENT ANTIGEN: CPT | Performed by: INTERNAL MEDICINE

## 2022-01-01 PROCEDURE — 87205 SMEAR GRAM STAIN: CPT | Performed by: INTERNAL MEDICINE

## 2022-01-01 PROCEDURE — 99284 EMERGENCY DEPT VISIT MOD MDM: CPT

## 2022-01-01 PROCEDURE — 25010000002 METHYLPREDNISOLONE PER 125 MG: Performed by: HOSPITALIST

## 2022-01-01 PROCEDURE — 83605 ASSAY OF LACTIC ACID: CPT | Performed by: INTERNAL MEDICINE

## 2022-01-01 PROCEDURE — 94664 DEMO&/EVAL PT USE INHALER: CPT

## 2022-01-01 PROCEDURE — 36556 INSERT NON-TUNNEL CV CATH: CPT | Performed by: NURSE PRACTITIONER

## 2022-01-01 PROCEDURE — 25010000002 PIPERACILLIN SOD-TAZOBACTAM PER 1 G: Performed by: INTERNAL MEDICINE

## 2022-01-01 PROCEDURE — 90662 IIV NO PRSV INCREASED AG IM: CPT | Performed by: STUDENT IN AN ORGANIZED HEALTH CARE EDUCATION/TRAINING PROGRAM

## 2022-01-01 PROCEDURE — G0008 ADMIN INFLUENZA VIRUS VAC: HCPCS | Performed by: STUDENT IN AN ORGANIZED HEALTH CARE EDUCATION/TRAINING PROGRAM

## 2022-01-01 PROCEDURE — 25010000002 PACLITAXEL PER 1 MG: Performed by: INTERNAL MEDICINE

## 2022-01-01 PROCEDURE — 85014 HEMATOCRIT: CPT | Performed by: INTERNAL MEDICINE

## 2022-01-01 PROCEDURE — 83735 ASSAY OF MAGNESIUM: CPT | Performed by: INTERNAL MEDICINE

## 2022-01-01 PROCEDURE — 83690 ASSAY OF LIPASE: CPT | Performed by: EMERGENCY MEDICINE

## 2022-01-01 PROCEDURE — 99205 OFFICE O/P NEW HI 60 MIN: CPT | Performed by: INTERNAL MEDICINE

## 2022-01-01 PROCEDURE — 25010000002 DIPHENHYDRAMINE PER 50 MG: Performed by: INTERNAL MEDICINE

## 2022-01-01 PROCEDURE — 80053 COMPREHEN METABOLIC PANEL: CPT | Performed by: EMERGENCY MEDICINE

## 2022-01-01 PROCEDURE — 83605 ASSAY OF LACTIC ACID: CPT | Performed by: FAMILY MEDICINE

## 2022-01-01 PROCEDURE — 25010000002 MAGNESIUM SULFATE PER 500 MG OF MAGNESIUM

## 2022-01-01 PROCEDURE — 85025 COMPLETE CBC W/AUTO DIFF WBC: CPT | Performed by: INTERNAL MEDICINE

## 2022-01-01 PROCEDURE — 80053 COMPREHEN METABOLIC PANEL: CPT

## 2022-01-01 PROCEDURE — 63710000001 PREDNISONE PER 1 MG: Performed by: INTERNAL MEDICINE

## 2022-01-01 PROCEDURE — 88342 IMHCHEM/IMCYTCHM 1ST ANTB: CPT | Performed by: INTERNAL MEDICINE

## 2022-01-01 PROCEDURE — 36415 COLL VENOUS BLD VENIPUNCTURE: CPT

## 2022-01-01 PROCEDURE — 99223 1ST HOSP IP/OBS HIGH 75: CPT | Performed by: INTERNAL MEDICINE

## 2022-01-01 PROCEDURE — 85007 BL SMEAR W/DIFF WBC COUNT: CPT | Performed by: INTERNAL MEDICINE

## 2022-01-01 PROCEDURE — 97166 OT EVAL MOD COMPLEX 45 MIN: CPT

## 2022-01-01 PROCEDURE — 25010000002 ONDANSETRON PER 1 MG: Performed by: INTERNAL MEDICINE

## 2022-01-01 PROCEDURE — 25010000002 PIPERACILLIN SOD-TAZOBACTAM PER 1 G

## 2022-01-01 PROCEDURE — 84100 ASSAY OF PHOSPHORUS: CPT | Performed by: INTERNAL MEDICINE

## 2022-01-01 PROCEDURE — 83036 HEMOGLOBIN GLYCOSYLATED A1C: CPT

## 2022-01-01 PROCEDURE — 82962 GLUCOSE BLOOD TEST: CPT

## 2022-01-01 PROCEDURE — 25010000002 FOSAPREPITANT PER 1 MG: Performed by: INTERNAL MEDICINE

## 2022-01-01 PROCEDURE — 85018 HEMOGLOBIN: CPT | Performed by: INTERNAL MEDICINE

## 2022-01-01 PROCEDURE — 25010000002 METOCLOPRAMIDE PER 10 MG: Performed by: INTERNAL MEDICINE

## 2022-01-01 PROCEDURE — 74018 RADEX ABDOMEN 1 VIEW: CPT

## 2022-01-01 PROCEDURE — 94761 N-INVAS EAR/PLS OXIMETRY MLT: CPT

## 2022-01-01 PROCEDURE — 71045 X-RAY EXAM CHEST 1 VIEW: CPT

## 2022-01-01 PROCEDURE — 99213 OFFICE O/P EST LOW 20 MIN: CPT | Performed by: INTERNAL MEDICINE

## 2022-01-01 PROCEDURE — 85025 COMPLETE CBC W/AUTO DIFF WBC: CPT | Performed by: EMERGENCY MEDICINE

## 2022-01-01 PROCEDURE — 84478 ASSAY OF TRIGLYCERIDES: CPT

## 2022-01-01 PROCEDURE — 87086 URINE CULTURE/COLONY COUNT: CPT | Performed by: HOSPITALIST

## 2022-01-01 PROCEDURE — 86923 COMPATIBILITY TEST ELECTRIC: CPT

## 2022-01-01 PROCEDURE — 86140 C-REACTIVE PROTEIN: CPT | Performed by: INTERNAL MEDICINE

## 2022-01-01 PROCEDURE — 97116 GAIT TRAINING THERAPY: CPT | Performed by: PHYSICAL THERAPIST

## 2022-01-01 PROCEDURE — 83615 LACTATE (LD) (LDH) ENZYME: CPT | Performed by: INTERNAL MEDICINE

## 2022-01-01 PROCEDURE — 25010000002 PROPOFOL 10 MG/ML EMULSION: Performed by: NURSE ANESTHETIST, CERTIFIED REGISTERED

## 2022-01-01 PROCEDURE — 25010000002 DIAZEPAM PER 5 MG: Performed by: NURSE PRACTITIONER

## 2022-01-01 PROCEDURE — 99291 CRITICAL CARE FIRST HOUR: CPT | Performed by: INTERNAL MEDICINE

## 2022-01-01 PROCEDURE — 74176 CT ABD & PELVIS W/O CONTRAST: CPT

## 2022-01-01 PROCEDURE — 78815 PET IMAGE W/CT SKULL-THIGH: CPT

## 2022-01-01 PROCEDURE — 99232 SBSQ HOSP IP/OBS MODERATE 35: CPT | Performed by: INTERNAL MEDICINE

## 2022-01-01 PROCEDURE — 31653 BRONCH EBUS SAMPLNG 3/> NODE: CPT | Performed by: INTERNAL MEDICINE

## 2022-01-01 PROCEDURE — C9803 HOPD COVID-19 SPEC COLLECT: HCPCS

## 2022-01-01 PROCEDURE — 25010000002 DROPERIDOL PER 5 MG: Performed by: EMERGENCY MEDICINE

## 2022-01-01 PROCEDURE — 80053 COMPREHEN METABOLIC PANEL: CPT | Performed by: HOSPITALIST

## 2022-01-01 PROCEDURE — 45385 COLONOSCOPY W/LESION REMOVAL: CPT | Performed by: INTERNAL MEDICINE

## 2022-01-01 PROCEDURE — 87040 BLOOD CULTURE FOR BACTERIA: CPT | Performed by: FAMILY MEDICINE

## 2022-01-01 PROCEDURE — 99214 OFFICE O/P EST MOD 30 MIN: CPT | Performed by: NURSE PRACTITIONER

## 2022-01-01 PROCEDURE — 83605 ASSAY OF LACTIC ACID: CPT | Performed by: PHYSICIAN ASSISTANT

## 2022-01-01 PROCEDURE — 96375 TX/PRO/DX INJ NEW DRUG ADDON: CPT

## 2022-01-01 PROCEDURE — 25010000002 MICAFUNGIN SODIUM 100 MG RECONSTITUTED SOLUTION: Performed by: PHYSICIAN ASSISTANT

## 2022-01-01 PROCEDURE — 25010000002 CALCIUM GLUCONATE PER 10 ML: Performed by: INTERNAL MEDICINE

## 2022-01-01 PROCEDURE — 96417 CHEMO IV INFUS EACH ADDL SEQ: CPT

## 2022-01-01 PROCEDURE — 94640 AIRWAY INHALATION TREATMENT: CPT

## 2022-01-01 PROCEDURE — 99239 HOSP IP/OBS DSCHRG MGMT >30: CPT | Performed by: HOSPITALIST

## 2022-01-01 PROCEDURE — 25010000002 LEVOFLOXACIN PER 250 MG: Performed by: NURSE PRACTITIONER

## 2022-01-01 PROCEDURE — 25010000002 DEXAMETHASONE SODIUM PHOSPHATE 100 MG/10ML SOLUTION: Performed by: INTERNAL MEDICINE

## 2022-01-01 PROCEDURE — 86900 BLOOD TYPING SEROLOGIC ABO: CPT | Performed by: NURSE PRACTITIONER

## 2022-01-01 PROCEDURE — 82465 ASSAY BLD/SERUM CHOLESTEROL: CPT

## 2022-01-01 PROCEDURE — 25010000002 FENTANYL CITRATE (PF) 50 MCG/ML SOLUTION: Performed by: INTERNAL MEDICINE

## 2022-01-01 PROCEDURE — 25010000002 LEVOFLOXACIN PER 250 MG: Performed by: INTERNAL MEDICINE

## 2022-01-01 PROCEDURE — 80069 RENAL FUNCTION PANEL: CPT | Performed by: INTERNAL MEDICINE

## 2022-01-01 PROCEDURE — 85027 COMPLETE CBC AUTOMATED: CPT | Performed by: INTERNAL MEDICINE

## 2022-01-01 PROCEDURE — 25010000002 PIPERACILLIN SOD-TAZOBACTAM PER 1 G: Performed by: HOSPITALIST

## 2022-01-01 PROCEDURE — U0004 COV-19 TEST NON-CDC HGH THRU: HCPCS

## 2022-01-01 PROCEDURE — 84145 PROCALCITONIN (PCT): CPT | Performed by: INTERNAL MEDICINE

## 2022-01-01 PROCEDURE — 0 LIDOCAINE 1 % SOLUTION: Performed by: NURSE ANESTHETIST, CERTIFIED REGISTERED

## 2022-01-01 PROCEDURE — 25010000002 DEXAMETHASONE PER 1 MG: Performed by: NURSE ANESTHETIST, CERTIFIED REGISTERED

## 2022-01-01 PROCEDURE — 25010000002 MAGNESIUM SULFATE 2 GM/50ML SOLUTION: Performed by: PHYSICIAN ASSISTANT

## 2022-01-01 PROCEDURE — 99202 OFFICE O/P NEW SF 15 MIN: CPT | Performed by: THORACIC SURGERY (CARDIOTHORACIC VASCULAR SURGERY)

## 2022-01-01 PROCEDURE — 25010000002 ALBUMIN HUMAN 25% PER 50 ML: Performed by: INTERNAL MEDICINE

## 2022-01-01 PROCEDURE — 99233 SBSQ HOSP IP/OBS HIGH 50: CPT | Performed by: INTERNAL MEDICINE

## 2022-01-01 PROCEDURE — 25010000002 MORPHINE PER 10 MG: Performed by: EMERGENCY MEDICINE

## 2022-01-01 PROCEDURE — 80061 LIPID PANEL: CPT

## 2022-01-01 PROCEDURE — 31625 BRONCHOSCOPY W/BIOPSY(S): CPT | Performed by: INTERNAL MEDICINE

## 2022-01-01 PROCEDURE — 85007 BL SMEAR W/DIFF WBC COUNT: CPT | Performed by: FAMILY MEDICINE

## 2022-01-01 PROCEDURE — 63710000001 INSULIN REGULAR HUMAN PER 5 UNITS: Performed by: INTERNAL MEDICINE

## 2022-01-01 PROCEDURE — P9047 ALBUMIN (HUMAN), 25%, 50ML: HCPCS | Performed by: INTERNAL MEDICINE

## 2022-01-01 PROCEDURE — 99232 SBSQ HOSP IP/OBS MODERATE 35: CPT | Performed by: HOSPITALIST

## 2022-01-01 PROCEDURE — 93005 ELECTROCARDIOGRAM TRACING: CPT | Performed by: PHYSICIAN ASSISTANT

## 2022-01-01 PROCEDURE — 84484 ASSAY OF TROPONIN QUANT: CPT | Performed by: PHYSICIAN ASSISTANT

## 2022-01-01 PROCEDURE — 0 IOPAMIDOL PER 1 ML: Performed by: EMERGENCY MEDICINE

## 2022-01-01 PROCEDURE — 81001 URINALYSIS AUTO W/SCOPE: CPT | Performed by: HOSPITALIST

## 2022-01-01 PROCEDURE — 76937 US GUIDE VASCULAR ACCESS: CPT | Performed by: NURSE PRACTITIONER

## 2022-01-01 PROCEDURE — 84145 PROCALCITONIN (PCT): CPT | Performed by: PHYSICIAN ASSISTANT

## 2022-01-01 PROCEDURE — 97163 PT EVAL HIGH COMPLEX 45 MIN: CPT

## 2022-01-01 PROCEDURE — 87040 BLOOD CULTURE FOR BACTERIA: CPT | Performed by: PHYSICIAN ASSISTANT

## 2022-01-01 PROCEDURE — 80053 COMPREHEN METABOLIC PANEL: CPT | Performed by: INTERNAL MEDICINE

## 2022-01-01 PROCEDURE — 86900 BLOOD TYPING SEROLOGIC ABO: CPT

## 2022-01-01 PROCEDURE — 82607 VITAMIN B-12: CPT

## 2022-01-01 PROCEDURE — 86901 BLOOD TYPING SEROLOGIC RH(D): CPT

## 2022-01-01 PROCEDURE — 82375 ASSAY CARBOXYHB QUANT: CPT

## 2022-01-01 PROCEDURE — 83615 LACTATE (LD) (LDH) ENZYME: CPT | Performed by: PHYSICIAN ASSISTANT

## 2022-01-01 PROCEDURE — 25010000002 NIVOLUMAB 240 MG/24ML SOLUTION 24 ML VIAL: Performed by: INTERNAL MEDICINE

## 2022-01-01 PROCEDURE — 87449 NOS EACH ORGANISM AG IA: CPT | Performed by: NURSE PRACTITIONER

## 2022-01-01 PROCEDURE — 85025 COMPLETE CBC W/AUTO DIFF WBC: CPT

## 2022-01-01 PROCEDURE — 87040 BLOOD CULTURE FOR BACTERIA: CPT | Performed by: EMERGENCY MEDICINE

## 2022-01-01 PROCEDURE — 86140 C-REACTIVE PROTEIN: CPT

## 2022-01-01 PROCEDURE — 63710000001 INSULIN REGULAR HUMAN PER 5 UNITS

## 2022-01-01 PROCEDURE — 25010000002 PROPOFOL 10 MG/ML EMULSION

## 2022-01-01 PROCEDURE — 97530 THERAPEUTIC ACTIVITIES: CPT

## 2022-01-01 PROCEDURE — 36600 WITHDRAWAL OF ARTERIAL BLOOD: CPT

## 2022-01-01 PROCEDURE — 25010000002 POTASSIUM CHLORIDE PER 2 MEQ OF POTASSIUM

## 2022-01-01 PROCEDURE — 85730 THROMBOPLASTIN TIME PARTIAL: CPT

## 2022-01-01 PROCEDURE — 0202U NFCT DS 22 TRGT SARS-COV-2: CPT | Performed by: PHYSICIAN ASSISTANT

## 2022-01-01 PROCEDURE — 87040 BLOOD CULTURE FOR BACTERIA: CPT | Performed by: INTERNAL MEDICINE

## 2022-01-01 PROCEDURE — P9016 RBC LEUKOCYTES REDUCED: HCPCS

## 2022-01-01 PROCEDURE — 84300 ASSAY OF URINE SODIUM: CPT | Performed by: INTERNAL MEDICINE

## 2022-01-01 PROCEDURE — 25010000002 CARBOPLATIN PER 50 MG: Performed by: INTERNAL MEDICINE

## 2022-01-01 PROCEDURE — 63710000001 PREDNISONE PER 1 MG: Performed by: HOSPITALIST

## 2022-01-01 PROCEDURE — 85027 COMPLETE CBC AUTOMATED: CPT | Performed by: FAMILY MEDICINE

## 2022-01-01 PROCEDURE — 82330 ASSAY OF CALCIUM: CPT

## 2022-01-01 PROCEDURE — 84439 ASSAY OF FREE THYROXINE: CPT | Performed by: INTERNAL MEDICINE

## 2022-01-01 PROCEDURE — 25010000002 CALCIUM GLUCONATE PER 10 ML

## 2022-01-01 PROCEDURE — 1170F FXNL STATUS ASSESSED: CPT | Performed by: STUDENT IN AN ORGANIZED HEALTH CARE EDUCATION/TRAINING PROGRAM

## 2022-01-01 PROCEDURE — 86901 BLOOD TYPING SEROLOGIC RH(D): CPT | Performed by: HOSPITALIST

## 2022-01-01 PROCEDURE — 83880 ASSAY OF NATRIURETIC PEPTIDE: CPT | Performed by: NURSE PRACTITIONER

## 2022-01-01 PROCEDURE — 83735 ASSAY OF MAGNESIUM: CPT

## 2022-01-01 PROCEDURE — 97110 THERAPEUTIC EXERCISES: CPT

## 2022-01-01 PROCEDURE — 70553 MRI BRAIN STEM W/O & W/DYE: CPT

## 2022-01-01 PROCEDURE — 25010000002 MAGNESIUM SULFATE PER 500 MG OF MAGNESIUM: Performed by: INTERNAL MEDICINE

## 2022-01-01 PROCEDURE — 85610 PROTHROMBIN TIME: CPT

## 2022-01-01 PROCEDURE — 93010 ELECTROCARDIOGRAM REPORT: CPT | Performed by: INTERNAL MEDICINE

## 2022-01-01 PROCEDURE — 85610 PROTHROMBIN TIME: CPT | Performed by: EMERGENCY MEDICINE

## 2022-01-01 PROCEDURE — 93005 ELECTROCARDIOGRAM TRACING: CPT

## 2022-01-01 PROCEDURE — 25010000002 LORAZEPAM PER 2 MG: Performed by: FAMILY MEDICINE

## 2022-01-01 PROCEDURE — 99223 1ST HOSP IP/OBS HIGH 75: CPT | Performed by: NURSE PRACTITIONER

## 2022-01-01 PROCEDURE — 25010000002 ONDANSETRON PER 1 MG: Performed by: NURSE ANESTHETIST, CERTIFIED REGISTERED

## 2022-01-01 PROCEDURE — 87040 BLOOD CULTURE FOR BACTERIA: CPT | Performed by: HOSPITALIST

## 2022-01-01 PROCEDURE — 83605 ASSAY OF LACTIC ACID: CPT | Performed by: EMERGENCY MEDICINE

## 2022-01-01 PROCEDURE — 88305 TISSUE EXAM BY PATHOLOGIST: CPT | Performed by: INTERNAL MEDICINE

## 2022-01-01 PROCEDURE — 88341 IMHCHEM/IMCYTCHM EA ADD ANTB: CPT | Performed by: INTERNAL MEDICINE

## 2022-01-01 PROCEDURE — 93306 TTE W/DOPPLER COMPLETE: CPT | Performed by: INTERNAL MEDICINE

## 2022-01-01 PROCEDURE — 25010000002 NIVOLUMAB 40 MG/4ML SOLUTION 4 ML VIAL: Performed by: INTERNAL MEDICINE

## 2022-01-01 PROCEDURE — 99285 EMERGENCY DEPT VISIT HI MDM: CPT

## 2022-01-01 PROCEDURE — 99205 OFFICE O/P NEW HI 60 MIN: CPT | Performed by: UROLOGY

## 2022-01-01 PROCEDURE — 94660 CPAP INITIATION&MGMT: CPT

## 2022-01-01 PROCEDURE — G0439 PPPS, SUBSEQ VISIT: HCPCS | Performed by: STUDENT IN AN ORGANIZED HEALTH CARE EDUCATION/TRAINING PROGRAM

## 2022-01-01 PROCEDURE — 83050 HGB METHEMOGLOBIN QUAN: CPT

## 2022-01-01 PROCEDURE — 25010000002 ENOXAPARIN PER 10 MG: Performed by: INTERNAL MEDICINE

## 2022-01-01 PROCEDURE — 87636 SARSCOV2 & INF A&B AMP PRB: CPT | Performed by: INTERNAL MEDICINE

## 2022-01-01 PROCEDURE — 99232 SBSQ HOSP IP/OBS MODERATE 35: CPT | Performed by: FAMILY MEDICINE

## 2022-01-01 PROCEDURE — 84156 ASSAY OF PROTEIN URINE: CPT | Performed by: INTERNAL MEDICINE

## 2022-01-01 PROCEDURE — 87206 SMEAR FLUORESCENT/ACID STAI: CPT | Performed by: INTERNAL MEDICINE

## 2022-01-01 PROCEDURE — 84134 ASSAY OF PREALBUMIN: CPT | Performed by: INTERNAL MEDICINE

## 2022-01-01 PROCEDURE — 83735 ASSAY OF MAGNESIUM: CPT | Performed by: HOSPITALIST

## 2022-01-01 PROCEDURE — 86901 BLOOD TYPING SEROLOGIC RH(D): CPT | Performed by: NURSE PRACTITIONER

## 2022-01-01 PROCEDURE — 84484 ASSAY OF TROPONIN QUANT: CPT

## 2022-01-01 PROCEDURE — 83605 ASSAY OF LACTIC ACID: CPT | Performed by: NURSE PRACTITIONER

## 2022-01-01 PROCEDURE — 0 IOPAMIDOL PER 1 ML: Performed by: INTERNAL MEDICINE

## 2022-01-01 PROCEDURE — 87641 MR-STAPH DNA AMP PROBE: CPT | Performed by: INTERNAL MEDICINE

## 2022-01-01 PROCEDURE — 25010000002 MAGNESIUM SULFATE 2 GM/50ML SOLUTION: Performed by: HOSPITALIST

## 2022-01-01 PROCEDURE — 85025 COMPLETE CBC W/AUTO DIFF WBC: CPT | Performed by: FAMILY MEDICINE

## 2022-01-01 PROCEDURE — 99233 SBSQ HOSP IP/OBS HIGH 50: CPT | Performed by: FAMILY MEDICINE

## 2022-01-01 PROCEDURE — 86850 RBC ANTIBODY SCREEN: CPT | Performed by: NURSE PRACTITIONER

## 2022-01-01 PROCEDURE — 36430 TRANSFUSION BLD/BLD COMPNT: CPT

## 2022-01-01 PROCEDURE — 87186 SC STD MICRODIL/AGAR DIL: CPT | Performed by: HOSPITALIST

## 2022-01-01 PROCEDURE — 84145 PROCALCITONIN (PCT): CPT | Performed by: EMERGENCY MEDICINE

## 2022-01-01 PROCEDURE — 97165 OT EVAL LOW COMPLEX 30 MIN: CPT

## 2022-01-01 PROCEDURE — 99214 OFFICE O/P EST MOD 30 MIN: CPT | Performed by: INTERNAL MEDICINE

## 2022-01-01 PROCEDURE — 99215 OFFICE O/P EST HI 40 MIN: CPT | Performed by: NURSE PRACTITIONER

## 2022-01-01 PROCEDURE — 71271 CT THORAX LUNG CANCER SCR C-: CPT

## 2022-01-01 PROCEDURE — 63710000001 PREDNISONE PER 5 MG: Performed by: INTERNAL MEDICINE

## 2022-01-01 PROCEDURE — 97161 PT EVAL LOW COMPLEX 20 MIN: CPT | Performed by: PHYSICAL THERAPIST

## 2022-01-01 PROCEDURE — C1726 CATH, BAL DIL, NON-VASCULAR: HCPCS | Performed by: INTERNAL MEDICINE

## 2022-01-01 PROCEDURE — 86140 C-REACTIVE PROTEIN: CPT | Performed by: PHYSICIAN ASSISTANT

## 2022-01-01 PROCEDURE — 86900 BLOOD TYPING SEROLOGIC ABO: CPT | Performed by: HOSPITALIST

## 2022-01-01 PROCEDURE — 84484 ASSAY OF TROPONIN QUANT: CPT | Performed by: NURSE PRACTITIONER

## 2022-01-01 PROCEDURE — 81001 URINALYSIS AUTO W/SCOPE: CPT | Performed by: EMERGENCY MEDICINE

## 2022-01-01 PROCEDURE — 82570 ASSAY OF URINE CREATININE: CPT | Performed by: INTERNAL MEDICINE

## 2022-01-01 PROCEDURE — 99231 SBSQ HOSP IP/OBS SF/LOW 25: CPT | Performed by: HOSPITALIST

## 2022-01-01 PROCEDURE — A9552 F18 FDG: HCPCS | Performed by: INTERNAL MEDICINE

## 2022-01-01 PROCEDURE — 45388 COLONOSCOPY W/ABLATION: CPT | Performed by: INTERNAL MEDICINE

## 2022-01-01 PROCEDURE — 1159F MED LIST DOCD IN RCRD: CPT | Performed by: STUDENT IN AN ORGANIZED HEALTH CARE EDUCATION/TRAINING PROGRAM

## 2022-01-01 PROCEDURE — 80202 ASSAY OF VANCOMYCIN: CPT

## 2022-01-01 PROCEDURE — 43239 EGD BIOPSY SINGLE/MULTIPLE: CPT | Performed by: INTERNAL MEDICINE

## 2022-01-01 PROCEDURE — 93005 ELECTROCARDIOGRAM TRACING: CPT | Performed by: INTERNAL MEDICINE

## 2022-01-01 PROCEDURE — 80053 COMPREHEN METABOLIC PANEL: CPT | Performed by: NURSE PRACTITIONER

## 2022-01-01 PROCEDURE — 87899 AGENT NOS ASSAY W/OPTIC: CPT | Performed by: NURSE PRACTITIONER

## 2022-01-01 PROCEDURE — 80048 BASIC METABOLIC PNL TOTAL CA: CPT | Performed by: HOSPITALIST

## 2022-01-01 PROCEDURE — 84443 ASSAY THYROID STIM HORMONE: CPT | Performed by: INTERNAL MEDICINE

## 2022-01-01 PROCEDURE — 25010000002 METHYLPREDNISOLONE PER 125 MG: Performed by: NURSE PRACTITIONER

## 2022-01-01 PROCEDURE — 87106 FUNGI IDENTIFICATION YEAST: CPT | Performed by: HOSPITALIST

## 2022-01-01 PROCEDURE — 1111F DSCHRG MED/CURRENT MED MERGE: CPT | Performed by: STUDENT IN AN ORGANIZED HEALTH CARE EDUCATION/TRAINING PROGRAM

## 2022-01-01 PROCEDURE — 5A1D70Z PERFORMANCE OF URINARY FILTRATION, INTERMITTENT, LESS THAN 6 HOURS PER DAY: ICD-10-PCS | Performed by: INTERNAL MEDICINE

## 2022-01-01 PROCEDURE — 97535 SELF CARE MNGMENT TRAINING: CPT

## 2022-01-01 PROCEDURE — 84145 PROCALCITONIN (PCT): CPT | Performed by: NURSE PRACTITIONER

## 2022-01-01 PROCEDURE — 0 FLUDEOXYGLUCOSE F18 SOLUTION: Performed by: INTERNAL MEDICINE

## 2022-01-01 PROCEDURE — 96366 THER/PROPH/DIAG IV INF ADDON: CPT

## 2022-01-01 PROCEDURE — A9577 INJ MULTIHANCE: HCPCS | Performed by: INTERNAL MEDICINE

## 2022-01-01 PROCEDURE — 82533 TOTAL CORTISOL: CPT | Performed by: NURSE PRACTITIONER

## 2022-01-01 PROCEDURE — 85018 HEMOGLOBIN: CPT | Performed by: NURSE PRACTITIONER

## 2022-01-01 PROCEDURE — 25010000002 METHYLPREDNISOLONE PER 40 MG: Performed by: PHYSICIAN ASSISTANT

## 2022-01-01 PROCEDURE — 99214 OFFICE O/P EST MOD 30 MIN: CPT | Performed by: STUDENT IN AN ORGANIZED HEALTH CARE EDUCATION/TRAINING PROGRAM

## 2022-01-01 PROCEDURE — C1751 CATH, INF, PER/CENT/MIDLINE: HCPCS

## 2022-01-01 PROCEDURE — 25010000002 PIPERACILLIN SOD-TAZOBACTAM PER 1 G: Performed by: EMERGENCY MEDICINE

## 2022-01-01 PROCEDURE — 52000 CYSTOURETHROSCOPY: CPT | Performed by: UROLOGY

## 2022-01-01 PROCEDURE — 83880 ASSAY OF NATRIURETIC PEPTIDE: CPT | Performed by: EMERGENCY MEDICINE

## 2022-01-01 PROCEDURE — 0 GADOBENATE DIMEGLUMINE 529 MG/ML SOLUTION: Performed by: INTERNAL MEDICINE

## 2022-01-01 PROCEDURE — G0103 PSA SCREENING: HCPCS

## 2022-01-01 PROCEDURE — 85060 BLOOD SMEAR INTERPRETATION: CPT | Performed by: INTERNAL MEDICINE

## 2022-01-01 PROCEDURE — 85007 BL SMEAR W/DIFF WBC COUNT: CPT | Performed by: EMERGENCY MEDICINE

## 2022-01-01 PROCEDURE — 25010000002 ONDANSETRON PER 1 MG: Performed by: EMERGENCY MEDICINE

## 2022-01-01 PROCEDURE — 25010000002 PALONOSETRON 0.25 MG/5ML SOLUTION PREFILLED SYRINGE: Performed by: INTERNAL MEDICINE

## 2022-01-01 PROCEDURE — 25010000002 MORPHINE PER 10 MG: Performed by: FAMILY MEDICINE

## 2022-01-01 PROCEDURE — 84484 ASSAY OF TROPONIN QUANT: CPT | Performed by: EMERGENCY MEDICINE

## 2022-01-01 PROCEDURE — 02HV33Z INSERTION OF INFUSION DEVICE INTO SUPERIOR VENA CAVA, PERCUTANEOUS APPROACH: ICD-10-PCS | Performed by: HOSPITALIST

## 2022-01-01 PROCEDURE — 74178 CT ABD&PLV WO CNTR FLWD CNTR: CPT

## 2022-01-01 PROCEDURE — 84145 PROCALCITONIN (PCT): CPT | Performed by: FAMILY MEDICINE

## 2022-01-01 PROCEDURE — 0DB58ZX EXCISION OF ESOPHAGUS, VIA NATURAL OR ARTIFICIAL OPENING ENDOSCOPIC, DIAGNOSTIC: ICD-10-PCS | Performed by: INTERNAL MEDICINE

## 2022-01-01 PROCEDURE — 99495 TRANSJ CARE MGMT MOD F2F 14D: CPT | Performed by: STUDENT IN AN ORGANIZED HEALTH CARE EDUCATION/TRAINING PROGRAM

## 2022-01-01 PROCEDURE — 85379 FIBRIN DEGRADATION QUANT: CPT | Performed by: EMERGENCY MEDICINE

## 2022-01-01 PROCEDURE — 71275 CT ANGIOGRAPHY CHEST: CPT

## 2022-01-01 PROCEDURE — 0 IOPAMIDOL PER 1 ML: Performed by: UROLOGY

## 2022-01-01 PROCEDURE — 87116 MYCOBACTERIA CULTURE: CPT | Performed by: INTERNAL MEDICINE

## 2022-01-01 PROCEDURE — 93306 TTE W/DOPPLER COMPLETE: CPT

## 2022-01-01 PROCEDURE — 86803 HEPATITIS C AB TEST: CPT

## 2022-01-01 PROCEDURE — 99214 OFFICE O/P EST MOD 30 MIN: CPT | Performed by: UROLOGY

## 2022-01-01 PROCEDURE — 84132 ASSAY OF SERUM POTASSIUM: CPT | Performed by: INTERNAL MEDICINE

## 2022-01-01 PROCEDURE — 71250 CT THORAX DX C-: CPT

## 2022-01-01 PROCEDURE — 96413 CHEMO IV INFUSION 1 HR: CPT

## 2022-01-01 PROCEDURE — 85007 BL SMEAR W/DIFF WBC COUNT: CPT

## 2022-01-01 PROCEDURE — 83880 ASSAY OF NATRIURETIC PEPTIDE: CPT

## 2022-01-01 PROCEDURE — 31623 DX BRONCHOSCOPE/BRUSH: CPT | Performed by: INTERNAL MEDICINE

## 2022-01-01 PROCEDURE — 87086 URINE CULTURE/COLONY COUNT: CPT | Performed by: INTERNAL MEDICINE

## 2022-01-01 PROCEDURE — 99204 OFFICE O/P NEW MOD 45 MIN: CPT | Performed by: STUDENT IN AN ORGANIZED HEALTH CARE EDUCATION/TRAINING PROGRAM

## 2022-01-01 PROCEDURE — 96365 THER/PROPH/DIAG IV INF INIT: CPT

## 2022-01-01 PROCEDURE — 93005 ELECTROCARDIOGRAM TRACING: CPT | Performed by: EMERGENCY MEDICINE

## 2022-01-01 PROCEDURE — 88360 TUMOR IMMUNOHISTOCHEM/MANUAL: CPT

## 2022-01-01 PROCEDURE — 87493 C DIFF AMPLIFIED PROBE: CPT | Performed by: INTERNAL MEDICINE

## 2022-01-01 PROCEDURE — 85045 AUTOMATED RETICULOCYTE COUNT: CPT | Performed by: INTERNAL MEDICINE

## 2022-01-01 PROCEDURE — 85027 COMPLETE CBC AUTOMATED: CPT

## 2022-01-01 PROCEDURE — 87102 FUNGUS ISOLATION CULTURE: CPT | Performed by: INTERNAL MEDICINE

## 2022-01-01 PROCEDURE — C1752 CATH,HEMODIALYSIS,SHORT-TERM: HCPCS

## 2022-01-01 PROCEDURE — 87070 CULTURE OTHR SPECIMN AEROBIC: CPT | Performed by: INTERNAL MEDICINE

## 2022-01-01 PROCEDURE — 82550 ASSAY OF CK (CPK): CPT | Performed by: INTERNAL MEDICINE

## 2022-01-01 PROCEDURE — 99231 SBSQ HOSP IP/OBS SF/LOW 25: CPT | Performed by: PHYSICIAN ASSISTANT

## 2022-01-01 PROCEDURE — 84100 ASSAY OF PHOSPHORUS: CPT

## 2022-01-01 PROCEDURE — 25010000002 FUROSEMIDE PER 20 MG: Performed by: PHYSICIAN ASSISTANT

## 2022-01-01 PROCEDURE — 25010000002 VANCOMYCIN 10 G RECONSTITUTED SOLUTION: Performed by: EMERGENCY MEDICINE

## 2022-01-01 PROCEDURE — 87636 SARSCOV2 & INF A&B AMP PRB: CPT | Performed by: EMERGENCY MEDICINE

## 2022-01-01 PROCEDURE — 25010000002 METHYLPREDNISOLONE PER 40 MG: Performed by: INTERNAL MEDICINE

## 2022-01-01 PROCEDURE — 96367 TX/PROPH/DG ADDL SEQ IV INF: CPT

## 2022-01-01 PROCEDURE — 85014 HEMATOCRIT: CPT | Performed by: NURSE PRACTITIONER

## 2022-01-01 PROCEDURE — 84134 ASSAY OF PREALBUMIN: CPT

## 2022-01-01 PROCEDURE — 86850 RBC ANTIBODY SCREEN: CPT | Performed by: HOSPITALIST

## 2022-01-01 PROCEDURE — 99222 1ST HOSP IP/OBS MODERATE 55: CPT | Performed by: UROLOGY

## 2022-01-01 PROCEDURE — 0202U NFCT DS 22 TRGT SARS-COV-2: CPT | Performed by: FAMILY MEDICINE

## 2022-01-01 PROCEDURE — 83735 ASSAY OF MAGNESIUM: CPT | Performed by: NURSE PRACTITIONER

## 2022-01-01 RX ORDER — LOSARTAN POTASSIUM 100 MG/1
100 TABLET ORAL DAILY
COMMUNITY
End: 2022-01-01 | Stop reason: SDUPTHER

## 2022-01-01 RX ORDER — IPRATROPIUM BROMIDE AND ALBUTEROL SULFATE 2.5; .5 MG/3ML; MG/3ML
3 SOLUTION RESPIRATORY (INHALATION) ONCE AS NEEDED
Status: COMPLETED | OUTPATIENT
Start: 2022-01-01 | End: 2022-01-01

## 2022-01-01 RX ORDER — ALBUTEROL SULFATE 2.5 MG/3ML
2.5 SOLUTION RESPIRATORY (INHALATION) EVERY 4 HOURS PRN
Qty: 300 ML | Refills: 1 | Status: ON HOLD | OUTPATIENT
Start: 2022-01-01 | End: 2022-01-01

## 2022-01-01 RX ORDER — PREDNISONE 10 MG/1
10 TABLET ORAL
Status: DISCONTINUED | OUTPATIENT
Start: 2022-01-01 | End: 2022-01-01

## 2022-01-01 RX ORDER — MAGNESIUM SULFATE HEPTAHYDRATE 40 MG/ML
2 INJECTION, SOLUTION INTRAVENOUS ONCE
Status: COMPLETED | OUTPATIENT
Start: 2022-01-01 | End: 2022-01-01

## 2022-01-01 RX ORDER — PREDNISONE 20 MG/1
20 TABLET ORAL DAILY
Status: DISCONTINUED | OUTPATIENT
Start: 2022-01-01 | End: 2022-01-01

## 2022-01-01 RX ORDER — LIDOCAINE HYDROCHLORIDE 20 MG/ML
JELLY TOPICAL AS NEEDED
Status: DISCONTINUED | OUTPATIENT
Start: 2022-01-01 | End: 2022-01-01 | Stop reason: SURG

## 2022-01-01 RX ORDER — METOPROLOL TARTRATE 100 MG/1
100 TABLET ORAL EVERY 12 HOURS SCHEDULED
Status: DISCONTINUED | OUTPATIENT
Start: 2022-01-01 | End: 2022-01-01

## 2022-01-01 RX ORDER — NICOTINE 21 MG/24HR
1 PATCH, TRANSDERMAL 24 HOURS TRANSDERMAL EVERY 24 HOURS
Status: DISCONTINUED | OUTPATIENT
Start: 2022-01-01 | End: 2022-01-01 | Stop reason: HOSPADM

## 2022-01-01 RX ORDER — METHYLPREDNISOLONE SODIUM SUCCINATE 40 MG/ML
80 INJECTION, POWDER, LYOPHILIZED, FOR SOLUTION INTRAMUSCULAR; INTRAVENOUS ONCE
Status: COMPLETED | OUTPATIENT
Start: 2022-01-01 | End: 2022-01-01

## 2022-01-01 RX ORDER — OXYCODONE HYDROCHLORIDE 5 MG/1
5 TABLET ORAL EVERY 4 HOURS PRN
Status: DISCONTINUED | OUTPATIENT
Start: 2022-01-01 | End: 2022-01-01 | Stop reason: HOSPADM

## 2022-01-01 RX ORDER — DIPHENHYDRAMINE HYDROCHLORIDE 50 MG/ML
50 INJECTION INTRAMUSCULAR; INTRAVENOUS AS NEEDED
Status: DISCONTINUED | OUTPATIENT
Start: 2022-01-01 | End: 2022-01-01 | Stop reason: HOSPADM

## 2022-01-01 RX ORDER — ALBUMIN (HUMAN) 12.5 G/50ML
12.5 SOLUTION INTRAVENOUS AS NEEDED
Status: ACTIVE | OUTPATIENT
Start: 2022-01-01 | End: 2022-01-01

## 2022-01-01 RX ORDER — NITROGLYCERIN 0.4 MG/1
0.4 TABLET SUBLINGUAL
Qty: 30 TABLET | Refills: 1 | Status: SHIPPED | OUTPATIENT
Start: 2022-01-01

## 2022-01-01 RX ORDER — IPRATROPIUM BROMIDE AND ALBUTEROL SULFATE 2.5; .5 MG/3ML; MG/3ML
3 SOLUTION RESPIRATORY (INHALATION) EVERY 4 HOURS PRN
Status: DISCONTINUED | OUTPATIENT
Start: 2022-01-01 | End: 2022-01-01

## 2022-01-01 RX ORDER — AMLODIPINE BESYLATE 10 MG/1
10 TABLET ORAL
Status: DISCONTINUED | OUTPATIENT
Start: 2022-01-01 | End: 2022-01-01

## 2022-01-01 RX ORDER — TAMSULOSIN HYDROCHLORIDE 0.4 MG/1
0.4 CAPSULE ORAL DAILY
Status: DISCONTINUED | OUTPATIENT
Start: 2022-01-01 | End: 2022-01-01

## 2022-01-01 RX ORDER — LOSARTAN POTASSIUM 100 MG/1
100 TABLET ORAL DAILY
Qty: 90 TABLET | Refills: 1 | Status: SHIPPED | OUTPATIENT
Start: 2022-01-01 | End: 2022-01-01 | Stop reason: HOSPADM

## 2022-01-01 RX ORDER — PREDNISONE 20 MG/1
40 TABLET ORAL
Status: COMPLETED | OUTPATIENT
Start: 2022-01-01 | End: 2022-01-01

## 2022-01-01 RX ORDER — LEVOFLOXACIN 5 MG/ML
750 INJECTION, SOLUTION INTRAVENOUS ONCE
Status: COMPLETED | OUTPATIENT
Start: 2022-01-01 | End: 2022-01-01

## 2022-01-01 RX ORDER — DOXYCYCLINE 100 MG/1
100 CAPSULE ORAL 2 TIMES DAILY
Qty: 20 CAPSULE | Refills: 0 | Status: ON HOLD | OUTPATIENT
Start: 2022-01-01 | End: 2022-01-01

## 2022-01-01 RX ORDER — POLYETHYLENE GLYCOL 3350 17 G/17G
17 POWDER, FOR SOLUTION ORAL DAILY PRN
Status: DISCONTINUED | OUTPATIENT
Start: 2022-01-01 | End: 2022-01-01 | Stop reason: HOSPADM

## 2022-01-01 RX ORDER — ACETAMINOPHEN 325 MG/1
650 TABLET ORAL EVERY 4 HOURS PRN
Status: DISCONTINUED | OUTPATIENT
Start: 2022-01-01 | End: 2022-01-01 | Stop reason: HOSPADM

## 2022-01-01 RX ORDER — DIPHENHYDRAMINE HYDROCHLORIDE 50 MG/ML
50 INJECTION INTRAMUSCULAR; INTRAVENOUS AS NEEDED
Status: CANCELLED | OUTPATIENT
Start: 2022-01-01

## 2022-01-01 RX ORDER — SODIUM CHLORIDE 0.9 % (FLUSH) 0.9 %
3 SYRINGE (ML) INJECTION EVERY 12 HOURS SCHEDULED
Status: DISCONTINUED | OUTPATIENT
Start: 2022-01-01 | End: 2022-01-01 | Stop reason: HOSPADM

## 2022-01-01 RX ORDER — ATORVASTATIN CALCIUM 40 MG/1
80 TABLET, FILM COATED ORAL DAILY
Status: DISCONTINUED | OUTPATIENT
Start: 2022-01-01 | End: 2022-01-01 | Stop reason: HOSPADM

## 2022-01-01 RX ORDER — SODIUM CHLORIDE 0.9 % (FLUSH) 0.9 %
10 SYRINGE (ML) INJECTION AS NEEDED
Status: DISCONTINUED | OUTPATIENT
Start: 2022-01-01 | End: 2022-01-01 | Stop reason: HOSPADM

## 2022-01-01 RX ORDER — AMOXICILLIN 250 MG
2 CAPSULE ORAL 2 TIMES DAILY
Status: DISCONTINUED | OUTPATIENT
Start: 2022-01-01 | End: 2022-01-01 | Stop reason: HOSPADM

## 2022-01-01 RX ORDER — PANTOPRAZOLE SODIUM 40 MG/10ML
40 INJECTION, POWDER, LYOPHILIZED, FOR SOLUTION INTRAVENOUS
Status: DISCONTINUED | OUTPATIENT
Start: 2022-01-01 | End: 2022-01-01

## 2022-01-01 RX ORDER — GABAPENTIN 300 MG/1
300 CAPSULE ORAL 3 TIMES DAILY
Qty: 90 CAPSULE | Refills: 0 | Status: ON HOLD | OUTPATIENT
Start: 2022-01-01 | End: 2022-01-01 | Stop reason: SDUPTHER

## 2022-01-01 RX ORDER — MAGNESIUM SULFATE HEPTAHYDRATE 40 MG/ML
4 INJECTION, SOLUTION INTRAVENOUS AS NEEDED
Status: DISCONTINUED | OUTPATIENT
Start: 2022-01-01 | End: 2022-01-01 | Stop reason: HOSPADM

## 2022-01-01 RX ORDER — BUDESONIDE AND FORMOTEROL FUMARATE DIHYDRATE 80; 4.5 UG/1; UG/1
2 AEROSOL RESPIRATORY (INHALATION)
Qty: 30.6 G | Refills: 1 | Status: SHIPPED | OUTPATIENT
Start: 2022-01-01 | End: 2022-01-01 | Stop reason: ALTCHOICE

## 2022-01-01 RX ORDER — OLANZAPINE 5 MG/1
5 TABLET ORAL NIGHTLY
Status: DISCONTINUED | OUTPATIENT
Start: 2022-01-01 | End: 2022-01-01

## 2022-01-01 RX ORDER — POTASSIUM CHLORIDE 1.5 G/1.77G
40 POWDER, FOR SOLUTION ORAL AS NEEDED
Status: DISCONTINUED | OUTPATIENT
Start: 2022-01-01 | End: 2022-01-01 | Stop reason: HOSPADM

## 2022-01-01 RX ORDER — BUDESONIDE 0.5 MG/2ML
0.5 INHALANT ORAL
Status: DISCONTINUED | OUTPATIENT
Start: 2022-01-01 | End: 2022-01-01

## 2022-01-01 RX ORDER — FAMOTIDINE 20 MG/1
20 TABLET, FILM COATED ORAL ONCE
Status: DISCONTINUED | OUTPATIENT
Start: 2022-01-01 | End: 2022-01-01 | Stop reason: HOSPADM

## 2022-01-01 RX ORDER — BISACODYL 10 MG
10 SUPPOSITORY, RECTAL RECTAL DAILY PRN
Status: DISCONTINUED | OUTPATIENT
Start: 2022-01-01 | End: 2022-01-01 | Stop reason: HOSPADM

## 2022-01-01 RX ORDER — GABAPENTIN 300 MG/1
300 CAPSULE ORAL EVERY 8 HOURS SCHEDULED
Status: DISCONTINUED | OUTPATIENT
Start: 2022-01-01 | End: 2022-01-01

## 2022-01-01 RX ORDER — SODIUM CHLORIDE 9 MG/ML
INJECTION, SOLUTION INTRAVENOUS CONTINUOUS PRN
Status: DISCONTINUED | OUTPATIENT
Start: 2022-01-01 | End: 2022-01-01 | Stop reason: SURG

## 2022-01-01 RX ORDER — METOPROLOL TARTRATE 5 MG/5ML
5 INJECTION INTRAVENOUS EVERY 6 HOURS
Status: DISCONTINUED | OUTPATIENT
Start: 2022-01-01 | End: 2022-01-01

## 2022-01-01 RX ORDER — ALBUMIN (HUMAN) 12.5 G/50ML
25 SOLUTION INTRAVENOUS 3 TIMES DAILY
Status: DISCONTINUED | OUTPATIENT
Start: 2022-01-01 | End: 2022-01-01

## 2022-01-01 RX ORDER — MONTELUKAST SODIUM 10 MG/1
10 TABLET ORAL NIGHTLY
Qty: 90 TABLET | Refills: 1 | Status: SHIPPED | OUTPATIENT
Start: 2022-01-01

## 2022-01-01 RX ORDER — TAMSULOSIN HYDROCHLORIDE 0.4 MG/1
0.8 CAPSULE ORAL DAILY
Status: DISCONTINUED | OUTPATIENT
Start: 2022-01-01 | End: 2022-01-01 | Stop reason: HOSPADM

## 2022-01-01 RX ORDER — CLOPIDOGREL BISULFATE 75 MG/1
75 TABLET ORAL DAILY
Status: DISCONTINUED | OUTPATIENT
Start: 2022-01-01 | End: 2022-01-01

## 2022-01-01 RX ORDER — OLANZAPINE 5 MG/1
5 TABLET ORAL ONCE
Status: CANCELLED | OUTPATIENT
Start: 2022-01-01 | End: 2022-01-01

## 2022-01-01 RX ORDER — SODIUM CHLORIDE 0.9 % (FLUSH) 0.9 %
10 SYRINGE (ML) INJECTION AS NEEDED
Status: CANCELLED | OUTPATIENT
Start: 2022-01-01

## 2022-01-01 RX ORDER — FAMOTIDINE 10 MG/ML
20 INJECTION, SOLUTION INTRAVENOUS ONCE
Status: CANCELLED | OUTPATIENT
Start: 2022-01-01

## 2022-01-01 RX ORDER — POTASSIUM CHLORIDE 1.5 G/1.77G
40 POWDER, FOR SOLUTION ORAL ONCE
Status: COMPLETED | OUTPATIENT
Start: 2022-01-01 | End: 2022-01-01

## 2022-01-01 RX ORDER — LABETALOL HYDROCHLORIDE 5 MG/ML
20 INJECTION, SOLUTION INTRAVENOUS EVERY 4 HOURS PRN
Status: DISCONTINUED | OUTPATIENT
Start: 2022-01-01 | End: 2022-01-01 | Stop reason: HOSPADM

## 2022-01-01 RX ORDER — FAMOTIDINE 10 MG/ML
20 INJECTION, SOLUTION INTRAVENOUS AS NEEDED
Status: CANCELLED | OUTPATIENT
Start: 2022-01-01

## 2022-01-01 RX ORDER — MIDODRINE HYDROCHLORIDE 5 MG/1
5 TABLET ORAL ONCE
Status: COMPLETED | OUTPATIENT
Start: 2022-01-01 | End: 2022-01-01

## 2022-01-01 RX ORDER — IPRATROPIUM BROMIDE AND ALBUTEROL SULFATE 2.5; .5 MG/3ML; MG/3ML
3 SOLUTION RESPIRATORY (INHALATION)
Status: DISCONTINUED | OUTPATIENT
Start: 2022-01-01 | End: 2022-01-01

## 2022-01-01 RX ORDER — ALBUTEROL SULFATE 2.5 MG/3ML
2.5 SOLUTION RESPIRATORY (INHALATION) EVERY 4 HOURS PRN
Status: DISCONTINUED | OUTPATIENT
Start: 2022-01-01 | End: 2022-01-01 | Stop reason: HOSPADM

## 2022-01-01 RX ORDER — PROPOFOL 10 MG/ML
VIAL (ML) INTRAVENOUS AS NEEDED
Status: DISCONTINUED | OUTPATIENT
Start: 2022-01-01 | End: 2022-01-01 | Stop reason: SURG

## 2022-01-01 RX ORDER — PREDNISONE 20 MG/1
40 TABLET ORAL DAILY
Status: DISCONTINUED | OUTPATIENT
Start: 2022-01-01 | End: 2022-01-01

## 2022-01-01 RX ORDER — ONDANSETRON HYDROCHLORIDE 8 MG/1
8 TABLET, FILM COATED ORAL 3 TIMES DAILY PRN
Qty: 30 TABLET | Refills: 2 | Status: SHIPPED | OUTPATIENT
Start: 2022-01-01 | End: 2022-01-01 | Stop reason: SDUPTHER

## 2022-01-01 RX ORDER — OMEPRAZOLE 40 MG/1
40 CAPSULE, DELAYED RELEASE ORAL DAILY
Qty: 30 CAPSULE | Refills: 5 | Status: SHIPPED | OUTPATIENT
Start: 2022-01-01

## 2022-01-01 RX ORDER — ATORVASTATIN CALCIUM 80 MG/1
80 TABLET, FILM COATED ORAL DAILY
COMMUNITY
End: 2022-01-01 | Stop reason: SDUPTHER

## 2022-01-01 RX ORDER — METHYLPREDNISOLONE SODIUM SUCCINATE 125 MG/2ML
125 INJECTION, POWDER, LYOPHILIZED, FOR SOLUTION INTRAMUSCULAR; INTRAVENOUS ONCE
Status: COMPLETED | OUTPATIENT
Start: 2022-01-01 | End: 2022-01-01

## 2022-01-01 RX ORDER — LIDOCAINE HYDROCHLORIDE 40 MG/ML
4 INJECTION, SOLUTION RETROBULBAR; TOPICAL ONCE
Status: DISCONTINUED | OUTPATIENT
Start: 2022-01-01 | End: 2022-01-01 | Stop reason: HOSPADM

## 2022-01-01 RX ORDER — FUROSEMIDE 10 MG/ML
40 INJECTION INTRAMUSCULAR; INTRAVENOUS ONCE
Status: COMPLETED | OUTPATIENT
Start: 2022-01-01 | End: 2022-01-01

## 2022-01-01 RX ORDER — POTASSIUM CHLORIDE 7.45 MG/ML
10 INJECTION INTRAVENOUS
Status: DISCONTINUED | OUTPATIENT
Start: 2022-01-01 | End: 2022-01-01 | Stop reason: HOSPADM

## 2022-01-01 RX ORDER — ACETAMINOPHEN 650 MG/1
650 SUPPOSITORY RECTAL EVERY 4 HOURS PRN
Status: DISCONTINUED | OUTPATIENT
Start: 2022-01-01 | End: 2022-01-01

## 2022-01-01 RX ORDER — HYDRALAZINE HYDROCHLORIDE 25 MG/1
25 TABLET, FILM COATED ORAL 3 TIMES DAILY
COMMUNITY
End: 2022-01-01 | Stop reason: SDUPTHER

## 2022-01-01 RX ORDER — BUDESONIDE 0.5 MG/2ML
0.5 INHALANT ORAL
Status: DISCONTINUED | OUTPATIENT
Start: 2022-01-01 | End: 2022-01-01 | Stop reason: HOSPADM

## 2022-01-01 RX ORDER — ATORVASTATIN CALCIUM 80 MG/1
80 TABLET, FILM COATED ORAL DAILY
Qty: 90 TABLET | Refills: 1 | Status: SHIPPED | OUTPATIENT
Start: 2022-01-01

## 2022-01-01 RX ORDER — AMLODIPINE BESYLATE 10 MG/1
10 TABLET ORAL
Qty: 30 TABLET | Refills: 0 | Status: SHIPPED | OUTPATIENT
Start: 2022-01-01 | End: 2022-01-01

## 2022-01-01 RX ORDER — HYDRALAZINE HYDROCHLORIDE 50 MG/1
50 TABLET, FILM COATED ORAL EVERY 8 HOURS SCHEDULED
Status: DISCONTINUED | OUTPATIENT
Start: 2022-01-01 | End: 2022-01-01

## 2022-01-01 RX ORDER — FUROSEMIDE 20 MG/1
20 TABLET ORAL DAILY
COMMUNITY
End: 2022-01-01 | Stop reason: HOSPADM

## 2022-01-01 RX ORDER — ZINC GLUCONATE 50 MG
1 TABLET ORAL DAILY
COMMUNITY

## 2022-01-01 RX ORDER — LABETALOL HYDROCHLORIDE 5 MG/ML
20 INJECTION, SOLUTION INTRAVENOUS ONCE
Status: COMPLETED | OUTPATIENT
Start: 2022-01-01 | End: 2022-01-01

## 2022-01-01 RX ORDER — BUDESONIDE AND FORMOTEROL FUMARATE DIHYDRATE 160; 4.5 UG/1; UG/1
2 AEROSOL RESPIRATORY (INHALATION)
Status: DISCONTINUED | OUTPATIENT
Start: 2022-01-01 | End: 2022-01-01

## 2022-01-01 RX ORDER — PREDNISONE 10 MG/1
TABLET ORAL
Qty: 31 TABLET | Refills: 0 | Status: ON HOLD | OUTPATIENT
Start: 2022-01-01 | End: 2022-01-01

## 2022-01-01 RX ORDER — METOPROLOL TARTRATE 100 MG/1
TABLET ORAL
Qty: 135 TABLET | Refills: 1 | Status: ON HOLD | OUTPATIENT
Start: 2022-01-01 | End: 2022-01-01 | Stop reason: SDUPTHER

## 2022-01-01 RX ORDER — ONDANSETRON 2 MG/ML
4 INJECTION INTRAMUSCULAR; INTRAVENOUS EVERY 6 HOURS PRN
Status: DISCONTINUED | OUTPATIENT
Start: 2022-01-01 | End: 2022-01-01 | Stop reason: HOSPADM

## 2022-01-01 RX ORDER — NIFEDIPINE 60 MG/1
60 TABLET, EXTENDED RELEASE ORAL DAILY
Qty: 30 TABLET | Refills: 3 | Status: SHIPPED | OUTPATIENT
Start: 2022-01-01

## 2022-01-01 RX ORDER — DILTIAZEM HCL IN NACL,ISO-OSM 125 MG/125
5-15 PLASTIC BAG, INJECTION (ML) INTRAVENOUS
Status: DISCONTINUED | OUTPATIENT
Start: 2022-01-01 | End: 2022-01-01

## 2022-01-01 RX ORDER — OLANZAPINE 5 MG/1
5 TABLET ORAL NIGHTLY
Qty: 3 TABLET | Refills: 2 | Status: SHIPPED | OUTPATIENT
Start: 2022-01-01 | End: 2022-01-01 | Stop reason: ALTCHOICE

## 2022-01-01 RX ORDER — SACCHAROMYCES BOULARDII 250 MG
250 CAPSULE ORAL DAILY
Qty: 8 CAPSULE | Refills: 0 | Status: SHIPPED | OUTPATIENT
Start: 2022-01-01 | End: 2022-01-01

## 2022-01-01 RX ORDER — PENTOXIFYLLINE 400 MG/1
400 TABLET, EXTENDED RELEASE ORAL DAILY
Qty: 60 TABLET | Refills: 2 | Status: SHIPPED | OUTPATIENT
Start: 2022-01-01 | End: 2022-01-01 | Stop reason: HOSPADM

## 2022-01-01 RX ORDER — SODIUM CHLORIDE 9 MG/ML
10 INJECTION INTRAVENOUS AS NEEDED
Status: DISCONTINUED | OUTPATIENT
Start: 2022-01-01 | End: 2022-01-01

## 2022-01-01 RX ORDER — CLONIDINE 0.1 MG/24H
1 PATCH, EXTENDED RELEASE TRANSDERMAL WEEKLY
Qty: 4 PATCH | Refills: 0 | Status: SHIPPED | OUTPATIENT
Start: 2022-01-01 | End: 2022-01-01

## 2022-01-01 RX ORDER — METOPROLOL TARTRATE 5 MG/5ML
5 INJECTION INTRAVENOUS ONCE
Status: COMPLETED | OUTPATIENT
Start: 2022-01-01 | End: 2022-01-01

## 2022-01-01 RX ORDER — CLONIDINE HYDROCHLORIDE 0.1 MG/1
0.1 TABLET ORAL EVERY 8 HOURS PRN
Status: DISCONTINUED | OUTPATIENT
Start: 2022-01-01 | End: 2022-01-01 | Stop reason: HOSPADM

## 2022-01-01 RX ORDER — METOPROLOL TARTRATE 100 MG/1
100 TABLET ORAL DAILY
Status: DISCONTINUED | OUTPATIENT
Start: 2022-01-01 | End: 2022-01-01

## 2022-01-01 RX ORDER — BUMETANIDE 0.25 MG/ML
0.5 INJECTION INTRAMUSCULAR; INTRAVENOUS ONCE
Status: COMPLETED | OUTPATIENT
Start: 2022-01-01 | End: 2022-01-01

## 2022-01-01 RX ORDER — NIFEDIPINE 60 MG/1
60 TABLET, FILM COATED, EXTENDED RELEASE ORAL NIGHTLY
COMMUNITY
End: 2022-01-01 | Stop reason: DRUGHIGH

## 2022-01-01 RX ORDER — METOPROLOL TARTRATE 50 MG/1
50 TABLET, FILM COATED ORAL NIGHTLY
Status: DISCONTINUED | OUTPATIENT
Start: 2022-01-01 | End: 2022-01-01

## 2022-01-01 RX ORDER — BUMETANIDE 0.25 MG/ML
2 INJECTION INTRAMUSCULAR; INTRAVENOUS DAILY
Status: DISCONTINUED | OUTPATIENT
Start: 2022-01-01 | End: 2022-01-01

## 2022-01-01 RX ORDER — ALPRAZOLAM 0.5 MG/1
0.5 TABLET ORAL 3 TIMES DAILY PRN
Status: DISPENSED | OUTPATIENT
Start: 2022-01-01 | End: 2022-01-01

## 2022-01-01 RX ORDER — ONDANSETRON HYDROCHLORIDE 8 MG/1
8 TABLET, FILM COATED ORAL 3 TIMES DAILY PRN
Qty: 30 TABLET | Refills: 2 | Status: SHIPPED | OUTPATIENT
Start: 2022-01-01 | End: 2022-01-01

## 2022-01-01 RX ORDER — NITROGLYCERIN 0.4 MG/1
0.4 TABLET SUBLINGUAL
Qty: 30 TABLET | Refills: 1 | Status: SHIPPED | OUTPATIENT
Start: 2022-01-01 | End: 2022-01-01 | Stop reason: SDUPTHER

## 2022-01-01 RX ORDER — FENTANYL CITRATE 50 UG/ML
25 INJECTION, SOLUTION INTRAMUSCULAR; INTRAVENOUS
Status: DISCONTINUED | OUTPATIENT
Start: 2022-01-01 | End: 2022-01-01

## 2022-01-01 RX ORDER — AMLODIPINE BESYLATE 10 MG/1
10 TABLET ORAL
Status: DISCONTINUED | OUTPATIENT
Start: 2022-01-01 | End: 2022-01-01 | Stop reason: HOSPADM

## 2022-01-01 RX ORDER — ENOXAPARIN SODIUM 100 MG/ML
40 INJECTION SUBCUTANEOUS DAILY
Status: DISCONTINUED | OUTPATIENT
Start: 2022-01-01 | End: 2022-01-01

## 2022-01-01 RX ORDER — FLUCONAZOLE 100 MG/1
100 TABLET ORAL EVERY 24 HOURS
Status: DISCONTINUED | OUTPATIENT
Start: 2022-01-01 | End: 2022-01-01 | Stop reason: HOSPADM

## 2022-01-01 RX ORDER — BISACODYL 5 MG/1
5 TABLET, DELAYED RELEASE ORAL DAILY PRN
Status: DISCONTINUED | OUTPATIENT
Start: 2022-01-01 | End: 2022-01-01 | Stop reason: HOSPADM

## 2022-01-01 RX ORDER — CLONIDINE 0.1 MG/24H
1 PATCH, EXTENDED RELEASE TRANSDERMAL WEEKLY
Status: DISCONTINUED | OUTPATIENT
Start: 2022-01-01 | End: 2022-01-01

## 2022-01-01 RX ORDER — OMEPRAZOLE 40 MG/1
40 CAPSULE, DELAYED RELEASE ORAL DAILY
Qty: 90 CAPSULE | Refills: 1 | Status: SHIPPED | OUTPATIENT
Start: 2022-01-01 | End: 2022-01-01

## 2022-01-01 RX ORDER — METHYLPREDNISOLONE SODIUM SUCCINATE 40 MG/ML
40 INJECTION, POWDER, LYOPHILIZED, FOR SOLUTION INTRAMUSCULAR; INTRAVENOUS DAILY
Status: DISCONTINUED | OUTPATIENT
Start: 2022-01-01 | End: 2022-01-01

## 2022-01-01 RX ORDER — ALBUTEROL SULFATE 90 UG/1
2 AEROSOL, METERED RESPIRATORY (INHALATION) ONCE
Status: COMPLETED | OUTPATIENT
Start: 2022-01-01 | End: 2022-01-01

## 2022-01-01 RX ORDER — FLUCONAZOLE 100 MG/1
100 TABLET ORAL EVERY 24 HOURS
Qty: 8 TABLET | Refills: 0 | Status: SHIPPED | OUTPATIENT
Start: 2022-01-01 | End: 2022-01-01

## 2022-01-01 RX ORDER — ONDANSETRON 2 MG/ML
INJECTION INTRAMUSCULAR; INTRAVENOUS AS NEEDED
Status: DISCONTINUED | OUTPATIENT
Start: 2022-01-01 | End: 2022-01-01 | Stop reason: SURG

## 2022-01-01 RX ORDER — IPRATROPIUM BROMIDE AND ALBUTEROL SULFATE 2.5; .5 MG/3ML; MG/3ML
3 SOLUTION RESPIRATORY (INHALATION)
Status: DISCONTINUED | OUTPATIENT
Start: 2022-01-01 | End: 2022-01-01 | Stop reason: HOSPADM

## 2022-01-01 RX ORDER — METOPROLOL TARTRATE 100 MG/1
100 TABLET ORAL 2 TIMES DAILY
COMMUNITY
End: 2022-01-01 | Stop reason: SDUPTHER

## 2022-01-01 RX ORDER — GABAPENTIN 300 MG/1
300 CAPSULE ORAL 3 TIMES DAILY
Status: DISCONTINUED | OUTPATIENT
Start: 2022-01-01 | End: 2022-01-01 | Stop reason: HOSPADM

## 2022-01-01 RX ORDER — BUDESONIDE AND FORMOTEROL FUMARATE DIHYDRATE 80; 4.5 UG/1; UG/1
2 AEROSOL RESPIRATORY (INHALATION)
COMMUNITY
End: 2022-01-01 | Stop reason: SDUPTHER

## 2022-01-01 RX ORDER — CASTOR OIL AND BALSAM, PERU 788; 87 MG/G; MG/G
1 OINTMENT TOPICAL EVERY 12 HOURS SCHEDULED
Status: DISCONTINUED | OUTPATIENT
Start: 2022-01-01 | End: 2022-01-01 | Stop reason: HOSPADM

## 2022-01-01 RX ORDER — ACETAMINOPHEN 325 MG/1
650 TABLET ORAL EVERY 4 HOURS PRN
Status: DISCONTINUED | OUTPATIENT
Start: 2022-01-01 | End: 2022-01-01

## 2022-01-01 RX ORDER — NALOXONE HCL 0.4 MG/ML
0.4 VIAL (ML) INJECTION
Status: DISCONTINUED | OUTPATIENT
Start: 2022-01-01 | End: 2022-01-01

## 2022-01-01 RX ORDER — ONDANSETRON 2 MG/ML
4 INJECTION INTRAMUSCULAR; INTRAVENOUS ONCE
Status: COMPLETED | OUTPATIENT
Start: 2022-01-01 | End: 2022-01-01

## 2022-01-01 RX ORDER — MONTELUKAST SODIUM 10 MG/1
10 TABLET ORAL NIGHTLY
Status: DISCONTINUED | OUTPATIENT
Start: 2022-01-01 | End: 2022-01-01 | Stop reason: HOSPADM

## 2022-01-01 RX ORDER — ASPIRIN 81 MG/1
81 TABLET ORAL DAILY
Qty: 90 TABLET | Refills: 1 | Status: SHIPPED | OUTPATIENT
Start: 2022-01-01

## 2022-01-01 RX ORDER — LORAZEPAM 1 MG/1
1 TABLET ORAL EVERY 6 HOURS PRN
Qty: 20 TABLET | Refills: 0 | Status: SHIPPED | OUTPATIENT
Start: 2022-01-01

## 2022-01-01 RX ORDER — POTASSIUM CHLORIDE 750 MG/1
40 CAPSULE, EXTENDED RELEASE ORAL AS NEEDED
Status: DISCONTINUED | OUTPATIENT
Start: 2022-01-01 | End: 2022-01-01 | Stop reason: HOSPADM

## 2022-01-01 RX ORDER — NIFEDIPINE 30 MG/1
60 TABLET, EXTENDED RELEASE ORAL DAILY
Status: DISCONTINUED | OUTPATIENT
Start: 2022-01-01 | End: 2022-01-01 | Stop reason: HOSPADM

## 2022-01-01 RX ORDER — METOPROLOL TARTRATE 50 MG/1
50 TABLET, FILM COATED ORAL EVERY 12 HOURS SCHEDULED
Status: DISCONTINUED | OUTPATIENT
Start: 2022-01-01 | End: 2022-01-01

## 2022-01-01 RX ORDER — ATORVASTATIN CALCIUM 40 MG/1
80 TABLET, FILM COATED ORAL DAILY
Status: DISCONTINUED | OUTPATIENT
Start: 2022-01-01 | End: 2022-01-01

## 2022-01-01 RX ORDER — AMLODIPINE BESYLATE 5 MG/1
5 TABLET ORAL DAILY
COMMUNITY
End: 2022-01-01

## 2022-01-01 RX ORDER — PREDNISONE 20 MG/1
40 TABLET ORAL
Status: DISCONTINUED | OUTPATIENT
Start: 2022-01-01 | End: 2022-01-01 | Stop reason: HOSPADM

## 2022-01-01 RX ORDER — GABAPENTIN 300 MG/1
300 CAPSULE ORAL 3 TIMES DAILY
Qty: 90 CAPSULE | Refills: 0 | Status: SHIPPED | OUTPATIENT
Start: 2022-01-01 | End: 2022-01-01 | Stop reason: SDUPTHER

## 2022-01-01 RX ORDER — PREDNISONE 10 MG/1
TABLET ORAL
Qty: 30 TABLET | Refills: 0 | Status: SHIPPED | OUTPATIENT
Start: 2022-01-01 | End: 2022-01-01 | Stop reason: SDUPTHER

## 2022-01-01 RX ORDER — ALPRAZOLAM 0.25 MG/1
0.25 TABLET ORAL 2 TIMES DAILY PRN
Qty: 6 TABLET | Refills: 0 | Status: SHIPPED | OUTPATIENT
Start: 2022-01-01 | End: 2022-01-01

## 2022-01-01 RX ORDER — NYSTATIN 100000 [USP'U]/G
POWDER TOPICAL EVERY 12 HOURS SCHEDULED
Status: DISCONTINUED | OUTPATIENT
Start: 2022-01-01 | End: 2022-01-01 | Stop reason: HOSPADM

## 2022-01-01 RX ORDER — MONTELUKAST SODIUM 10 MG/1
10 TABLET ORAL NIGHTLY
Status: DISCONTINUED | OUTPATIENT
Start: 2022-01-01 | End: 2022-01-01

## 2022-01-01 RX ORDER — DOCUSATE SODIUM 100 MG/1
100 CAPSULE, LIQUID FILLED ORAL NIGHTLY
Status: DISCONTINUED | OUTPATIENT
Start: 2022-01-01 | End: 2022-01-01 | Stop reason: HOSPADM

## 2022-01-01 RX ORDER — ALBUTEROL SULFATE 90 UG/1
2 AEROSOL, METERED RESPIRATORY (INHALATION) EVERY 4 HOURS PRN
Qty: 54 G | Refills: 1 | Status: SHIPPED | OUTPATIENT
Start: 2022-01-01 | End: 2022-01-01 | Stop reason: SDUPTHER

## 2022-01-01 RX ORDER — CLONIDINE HYDROCHLORIDE 0.2 MG/1
0.2 TABLET ORAL 2 TIMES DAILY
COMMUNITY
End: 2022-01-01 | Stop reason: HOSPADM

## 2022-01-01 RX ORDER — NIFEDIPINE 60 MG/1
60 TABLET, FILM COATED, EXTENDED RELEASE ORAL NIGHTLY
Status: CANCELLED | OUTPATIENT
Start: 2022-01-01

## 2022-01-01 RX ORDER — SODIUM CHLORIDE 9 MG/ML
100 INJECTION, SOLUTION INTRAVENOUS CONTINUOUS
Status: DISCONTINUED | OUTPATIENT
Start: 2022-01-01 | End: 2022-01-01

## 2022-01-01 RX ORDER — ROCURONIUM BROMIDE 10 MG/ML
INJECTION, SOLUTION INTRAVENOUS AS NEEDED
Status: DISCONTINUED | OUTPATIENT
Start: 2022-01-01 | End: 2022-01-01 | Stop reason: SURG

## 2022-01-01 RX ORDER — LORAZEPAM 1 MG/1
1 TABLET ORAL 2 TIMES DAILY PRN
Status: DISCONTINUED | OUTPATIENT
Start: 2022-01-01 | End: 2022-01-01

## 2022-01-01 RX ORDER — ONDANSETRON 4 MG/1
4 TABLET, FILM COATED ORAL EVERY 6 HOURS PRN
Status: DISCONTINUED | OUTPATIENT
Start: 2022-01-01 | End: 2022-01-01

## 2022-01-01 RX ORDER — SODIUM CHLORIDE 9 MG/ML
80 INJECTION, SOLUTION INTRAVENOUS CONTINUOUS
Status: ACTIVE | OUTPATIENT
Start: 2022-01-01 | End: 2022-01-01

## 2022-01-01 RX ORDER — OLANZAPINE 5 MG/1
5 TABLET ORAL NIGHTLY
Qty: 3 TABLET | Refills: 2 | Status: SHIPPED | OUTPATIENT
Start: 2022-01-01 | End: 2022-01-01

## 2022-01-01 RX ORDER — IPRATROPIUM BROMIDE AND ALBUTEROL SULFATE 2.5; .5 MG/3ML; MG/3ML
3 SOLUTION RESPIRATORY (INHALATION) ONCE
Status: COMPLETED | OUTPATIENT
Start: 2022-01-01 | End: 2022-01-01

## 2022-01-01 RX ORDER — AMLODIPINE BESYLATE 5 MG/1
5 TABLET ORAL
Status: DISCONTINUED | OUTPATIENT
Start: 2022-01-01 | End: 2022-01-01

## 2022-01-01 RX ORDER — FAMOTIDINE 10 MG/ML
20 INJECTION, SOLUTION INTRAVENOUS ONCE
Status: COMPLETED | OUTPATIENT
Start: 2022-01-01 | End: 2022-01-01

## 2022-01-01 RX ORDER — METHYLPREDNISOLONE SODIUM SUCCINATE 125 MG/2ML
80 INJECTION, POWDER, LYOPHILIZED, FOR SOLUTION INTRAMUSCULAR; INTRAVENOUS ONCE
Status: COMPLETED | OUTPATIENT
Start: 2022-01-01 | End: 2022-01-01

## 2022-01-01 RX ORDER — ACETAMINOPHEN 160 MG/5ML
650 SOLUTION ORAL EVERY 4 HOURS PRN
Status: DISCONTINUED | OUTPATIENT
Start: 2022-01-01 | End: 2022-01-01

## 2022-01-01 RX ORDER — METOCLOPRAMIDE HYDROCHLORIDE 5 MG/ML
5 INJECTION INTRAMUSCULAR; INTRAVENOUS EVERY 8 HOURS
Status: DISCONTINUED | OUTPATIENT
Start: 2022-01-01 | End: 2022-01-01

## 2022-01-01 RX ORDER — METHYLPREDNISOLONE SODIUM SUCCINATE 125 MG/2ML
125 INJECTION, POWDER, LYOPHILIZED, FOR SOLUTION INTRAMUSCULAR; INTRAVENOUS DAILY
Status: DISCONTINUED | OUTPATIENT
Start: 2022-01-01 | End: 2022-01-01

## 2022-01-01 RX ORDER — METHYLPREDNISOLONE SODIUM SUCCINATE 125 MG/2ML
60 INJECTION, POWDER, LYOPHILIZED, FOR SOLUTION INTRAMUSCULAR; INTRAVENOUS EVERY 8 HOURS
Status: DISCONTINUED | OUTPATIENT
Start: 2022-01-01 | End: 2022-01-01

## 2022-01-01 RX ORDER — POTASSIUM CHLORIDE 1.5 G/1.77G
20 POWDER, FOR SOLUTION ORAL ONCE
Status: COMPLETED | OUTPATIENT
Start: 2022-01-01 | End: 2022-01-01

## 2022-01-01 RX ORDER — CLONIDINE 0.1 MG/24H
1 PATCH, EXTENDED RELEASE TRANSDERMAL WEEKLY
Status: DISCONTINUED | OUTPATIENT
Start: 2022-01-01 | End: 2022-01-01 | Stop reason: HOSPADM

## 2022-01-01 RX ORDER — CLOPIDOGREL BISULFATE 75 MG/1
75 TABLET ORAL DAILY
COMMUNITY
End: 2022-01-01 | Stop reason: SDUPTHER

## 2022-01-01 RX ORDER — LIDOCAINE HYDROCHLORIDE 10 MG/ML
INJECTION, SOLUTION INFILTRATION; PERINEURAL AS NEEDED
Status: DISCONTINUED | OUTPATIENT
Start: 2022-01-01 | End: 2022-01-01 | Stop reason: SURG

## 2022-01-01 RX ORDER — MIDODRINE HYDROCHLORIDE 10 MG/1
10 TABLET ORAL ONCE
Status: COMPLETED | OUTPATIENT
Start: 2022-01-01 | End: 2022-01-01

## 2022-01-01 RX ORDER — MONTELUKAST SODIUM 10 MG/1
10 TABLET ORAL NIGHTLY
Qty: 90 TABLET | Refills: 1 | Status: SHIPPED | OUTPATIENT
Start: 2022-01-01 | End: 2022-01-01

## 2022-01-01 RX ORDER — AMLODIPINE BESYLATE 10 MG/1
10 TABLET ORAL DAILY
Qty: 30 TABLET | Refills: 2 | Status: SHIPPED | OUTPATIENT
Start: 2022-01-01

## 2022-01-01 RX ORDER — METOPROLOL TARTRATE 100 MG/1
100 TABLET ORAL EVERY 12 HOURS SCHEDULED
Status: DISCONTINUED | OUTPATIENT
Start: 2022-01-01 | End: 2022-01-01 | Stop reason: HOSPADM

## 2022-01-01 RX ORDER — FUROSEMIDE 20 MG/1
20 TABLET ORAL 2 TIMES DAILY
COMMUNITY
End: 2022-01-01 | Stop reason: SDUPTHER

## 2022-01-01 RX ORDER — PALONOSETRON 0.05 MG/ML
0.25 INJECTION, SOLUTION INTRAVENOUS ONCE
Status: COMPLETED | OUTPATIENT
Start: 2022-01-01 | End: 2022-01-01

## 2022-01-01 RX ORDER — LORAZEPAM 1 MG/1
TABLET ORAL
Qty: 60 TABLET | Refills: 0 | Status: ON HOLD | OUTPATIENT
Start: 2022-01-01 | End: 2022-01-01 | Stop reason: SDUPTHER

## 2022-01-01 RX ORDER — ASPIRIN 81 MG/1
81 TABLET ORAL DAILY
Status: DISCONTINUED | OUTPATIENT
Start: 2022-01-01 | End: 2022-01-01

## 2022-01-01 RX ORDER — PANTOPRAZOLE SODIUM 40 MG/10ML
40 INJECTION, POWDER, LYOPHILIZED, FOR SOLUTION INTRAVENOUS 2 TIMES DAILY
Status: DISCONTINUED | OUTPATIENT
Start: 2022-01-01 | End: 2022-01-01

## 2022-01-01 RX ORDER — MONTELUKAST SODIUM 10 MG/1
10 TABLET ORAL NIGHTLY
COMMUNITY
End: 2022-01-01 | Stop reason: SDUPTHER

## 2022-01-01 RX ORDER — ASPIRIN 81 MG/1
81 TABLET, CHEWABLE ORAL DAILY
Status: DISCONTINUED | OUTPATIENT
Start: 2022-01-01 | End: 2022-01-01

## 2022-01-01 RX ORDER — METOPROLOL TARTRATE 100 MG/1
100 TABLET ORAL 2 TIMES DAILY
Qty: 135 TABLET | Refills: 1
Start: 2022-01-01 | End: 2022-01-01

## 2022-01-01 RX ORDER — ALBUMIN (HUMAN) 12.5 G/50ML
12.5 SOLUTION INTRAVENOUS 2 TIMES DAILY
Status: COMPLETED | OUTPATIENT
Start: 2022-01-01 | End: 2022-01-01

## 2022-01-01 RX ORDER — NIFEDIPINE 90 MG/1
90 TABLET, EXTENDED RELEASE ORAL DAILY
COMMUNITY
Start: 2022-01-01 | End: 2022-01-01 | Stop reason: SDUPTHER

## 2022-01-01 RX ORDER — DEXAMETHASONE SODIUM PHOSPHATE 4 MG/ML
INJECTION, SOLUTION INTRA-ARTICULAR; INTRALESIONAL; INTRAMUSCULAR; INTRAVENOUS; SOFT TISSUE AS NEEDED
Status: DISCONTINUED | OUTPATIENT
Start: 2022-01-01 | End: 2022-01-01 | Stop reason: SURG

## 2022-01-01 RX ORDER — TAMSULOSIN HYDROCHLORIDE 0.4 MG/1
0.8 CAPSULE ORAL DAILY
Qty: 60 CAPSULE | Refills: 0 | Status: SHIPPED | OUTPATIENT
Start: 2022-01-01 | End: 2022-01-01

## 2022-01-01 RX ORDER — FUROSEMIDE 20 MG/1
20 TABLET ORAL 2 TIMES DAILY
Qty: 60 TABLET | Refills: 3 | Status: SHIPPED | OUTPATIENT
Start: 2022-01-01

## 2022-01-01 RX ORDER — FAMOTIDINE 10 MG/ML
20 INJECTION, SOLUTION INTRAVENOUS ONCE
Status: DISCONTINUED | OUTPATIENT
Start: 2022-01-01 | End: 2022-01-01 | Stop reason: HOSPADM

## 2022-01-01 RX ORDER — METHYLPREDNISOLONE SODIUM SUCCINATE 125 MG/2ML
60 INJECTION, POWDER, LYOPHILIZED, FOR SOLUTION INTRAMUSCULAR; INTRAVENOUS EVERY 24 HOURS
Status: DISCONTINUED | OUTPATIENT
Start: 2022-01-01 | End: 2022-01-01

## 2022-01-01 RX ORDER — DOXYCYCLINE 100 MG/1
100 CAPSULE ORAL EVERY 12 HOURS SCHEDULED
Status: COMPLETED | OUTPATIENT
Start: 2022-01-01 | End: 2022-01-01

## 2022-01-01 RX ORDER — IPRATROPIUM BROMIDE AND ALBUTEROL SULFATE 2.5; .5 MG/3ML; MG/3ML
3 SOLUTION RESPIRATORY (INHALATION) 4 TIMES DAILY PRN
Qty: 360 ML | Refills: 3 | Status: SHIPPED | OUTPATIENT
Start: 2022-01-01

## 2022-01-01 RX ORDER — HYDRALAZINE HYDROCHLORIDE 25 MG/1
25 TABLET, FILM COATED ORAL 2 TIMES DAILY
Status: DISCONTINUED | OUTPATIENT
Start: 2022-01-01 | End: 2022-01-01 | Stop reason: HOSPADM

## 2022-01-01 RX ORDER — ALBUMIN (HUMAN) 12.5 G/50ML
50 SOLUTION INTRAVENOUS ONCE
Status: DISCONTINUED | OUTPATIENT
Start: 2022-01-01 | End: 2022-01-01

## 2022-01-01 RX ORDER — DROPERIDOL 2.5 MG/ML
2.5 INJECTION, SOLUTION INTRAMUSCULAR; INTRAVENOUS ONCE
Status: COMPLETED | OUTPATIENT
Start: 2022-01-01 | End: 2022-01-01

## 2022-01-01 RX ORDER — HYDRALAZINE HYDROCHLORIDE 25 MG/1
25 TABLET, FILM COATED ORAL EVERY 8 HOURS SCHEDULED
Status: DISCONTINUED | OUTPATIENT
Start: 2022-01-01 | End: 2022-01-01

## 2022-01-01 RX ORDER — DEXMEDETOMIDINE HYDROCHLORIDE 4 UG/ML
.2-1.5 INJECTION, SOLUTION INTRAVENOUS
Status: DISCONTINUED | OUTPATIENT
Start: 2022-01-01 | End: 2022-01-01

## 2022-01-01 RX ORDER — ASPIRIN 81 MG/1
81 TABLET ORAL DAILY
COMMUNITY
End: 2022-01-01 | Stop reason: SDUPTHER

## 2022-01-01 RX ORDER — ALBUTEROL SULFATE 2.5 MG/3ML
2.5 SOLUTION RESPIRATORY (INHALATION) EVERY 6 HOURS PRN
Status: DISCONTINUED | OUTPATIENT
Start: 2022-01-01 | End: 2022-01-01 | Stop reason: SDUPTHER

## 2022-01-01 RX ORDER — ALBUTEROL SULFATE 90 UG/1
2 AEROSOL, METERED RESPIRATORY (INHALATION) EVERY 4 HOURS PRN
COMMUNITY
End: 2022-01-01 | Stop reason: SDUPTHER

## 2022-01-01 RX ORDER — MIDAZOLAM HYDROCHLORIDE 1 MG/ML
0.5 INJECTION INTRAMUSCULAR; INTRAVENOUS
Status: DISCONTINUED | OUTPATIENT
Start: 2022-01-01 | End: 2022-01-01 | Stop reason: HOSPADM

## 2022-01-01 RX ORDER — PENTOXIFYLLINE 400 MG/1
400 TABLET, EXTENDED RELEASE ORAL DAILY
Status: DISCONTINUED | OUTPATIENT
Start: 2022-01-01 | End: 2022-01-01 | Stop reason: HOSPADM

## 2022-01-01 RX ORDER — IPRATROPIUM BROMIDE AND ALBUTEROL SULFATE 2.5; .5 MG/3ML; MG/3ML
3 SOLUTION RESPIRATORY (INHALATION) EVERY 4 HOURS PRN
Status: DISCONTINUED | OUTPATIENT
Start: 2022-01-01 | End: 2022-01-01 | Stop reason: HOSPADM

## 2022-01-01 RX ORDER — CLONIDINE HYDROCHLORIDE 0.1 MG/1
0.1 TABLET ORAL ONCE
Status: COMPLETED | OUTPATIENT
Start: 2022-01-01 | End: 2022-01-01

## 2022-01-01 RX ORDER — PALONOSETRON 0.05 MG/ML
0.25 INJECTION, SOLUTION INTRAVENOUS ONCE
Status: CANCELLED | OUTPATIENT
Start: 2022-01-01

## 2022-01-01 RX ORDER — GABAPENTIN 100 MG/1
100 CAPSULE ORAL 3 TIMES DAILY
Qty: 90 CAPSULE | Refills: 0 | Status: SHIPPED | OUTPATIENT
Start: 2022-01-01 | End: 2022-01-01

## 2022-01-01 RX ORDER — AMLODIPINE BESYLATE 10 MG/1
10 TABLET ORAL DAILY
Status: ON HOLD | COMMUNITY
End: 2022-01-01

## 2022-01-01 RX ORDER — PANTOPRAZOLE SODIUM 40 MG/1
40 TABLET, DELAYED RELEASE ORAL 2 TIMES DAILY
Status: DISCONTINUED | OUTPATIENT
Start: 2022-01-01 | End: 2022-01-01 | Stop reason: HOSPADM

## 2022-01-01 RX ORDER — METOPROLOL TARTRATE 5 MG/5ML
5 INJECTION INTRAVENOUS
Status: COMPLETED | OUTPATIENT
Start: 2022-01-01 | End: 2022-01-01

## 2022-01-01 RX ORDER — SODIUM CHLORIDE 9 MG/ML
250 INJECTION, SOLUTION INTRAVENOUS ONCE
Status: CANCELLED | OUTPATIENT
Start: 2022-01-01

## 2022-01-01 RX ORDER — OLANZAPINE 5 MG/1
5 TABLET ORAL ONCE
Status: COMPLETED | OUTPATIENT
Start: 2022-01-01 | End: 2022-01-01

## 2022-01-01 RX ORDER — HYDRALAZINE HYDROCHLORIDE 25 MG/1
25 TABLET, FILM COATED ORAL EVERY 8 HOURS SCHEDULED
Status: DISCONTINUED | OUTPATIENT
Start: 2022-01-01 | End: 2022-01-01 | Stop reason: HOSPADM

## 2022-01-01 RX ORDER — TAMSULOSIN HYDROCHLORIDE 0.4 MG/1
0.8 CAPSULE ORAL DAILY
Qty: 30 CAPSULE | Refills: 0 | Status: SHIPPED | OUTPATIENT
Start: 2022-01-01 | End: 2022-01-01 | Stop reason: SDUPTHER

## 2022-01-01 RX ORDER — BUDESONIDE, GLYCOPYRROLATE, AND FORMOTEROL FUMARATE 160; 9; 4.8 UG/1; UG/1; UG/1
AEROSOL, METERED RESPIRATORY (INHALATION)
Qty: 10.7 G | Refills: 2 | Status: SHIPPED | OUTPATIENT
Start: 2022-01-01 | End: 2022-01-01 | Stop reason: HOSPADM

## 2022-01-01 RX ORDER — ALBUTEROL SULFATE 2.5 MG/3ML
2.5 SOLUTION RESPIRATORY (INHALATION) EVERY 4 HOURS PRN
COMMUNITY
End: 2022-01-01 | Stop reason: SDUPTHER

## 2022-01-01 RX ORDER — OLANZAPINE 5 MG/1
5 TABLET ORAL NIGHTLY
Qty: 3 TABLET | Refills: 2 | OUTPATIENT
Start: 2022-01-01

## 2022-01-01 RX ORDER — PREDNISONE 10 MG/1
TABLET ORAL
Qty: 50 TABLET | Refills: 0 | Status: SHIPPED | OUTPATIENT
Start: 2022-01-01 | End: 2022-12-03

## 2022-01-01 RX ORDER — SODIUM CHLORIDE, SODIUM LACTATE, POTASSIUM CHLORIDE, CALCIUM CHLORIDE 600; 310; 30; 20 MG/100ML; MG/100ML; MG/100ML; MG/100ML
9 INJECTION, SOLUTION INTRAVENOUS CONTINUOUS
Status: DISCONTINUED | OUTPATIENT
Start: 2022-01-01 | End: 2022-01-01 | Stop reason: HOSPADM

## 2022-01-01 RX ORDER — ALBUTEROL SULFATE 90 UG/1
2 AEROSOL, METERED RESPIRATORY (INHALATION) EVERY 4 HOURS PRN
Qty: 54 G | Refills: 1 | OUTPATIENT
Start: 2022-01-01 | End: 2022-01-01

## 2022-01-01 RX ORDER — MAGNESIUM SULFATE HEPTAHYDRATE 40 MG/ML
2 INJECTION, SOLUTION INTRAVENOUS AS NEEDED
Status: DISCONTINUED | OUTPATIENT
Start: 2022-01-01 | End: 2022-01-01 | Stop reason: HOSPADM

## 2022-01-01 RX ORDER — LIDOCAINE HYDROCHLORIDE 10 MG/ML
INJECTION, SOLUTION EPIDURAL; INFILTRATION; INTRACAUDAL; PERINEURAL AS NEEDED
Status: DISCONTINUED | OUTPATIENT
Start: 2022-01-01 | End: 2022-01-01 | Stop reason: SURG

## 2022-01-01 RX ORDER — NITROGLYCERIN 0.4 MG/1
0.4 TABLET SUBLINGUAL
Status: DISCONTINUED | OUTPATIENT
Start: 2022-01-01 | End: 2022-01-01 | Stop reason: HOSPADM

## 2022-01-01 RX ORDER — ALBUTEROL SULFATE 2.5 MG/3ML
10 SOLUTION RESPIRATORY (INHALATION)
Status: DISPENSED | OUTPATIENT
Start: 2022-01-01 | End: 2022-01-01

## 2022-01-01 RX ORDER — GABAPENTIN 100 MG/1
100 CAPSULE ORAL 3 TIMES DAILY
COMMUNITY
End: 2022-01-01 | Stop reason: SDUPTHER

## 2022-01-01 RX ORDER — LORAZEPAM 1 MG/1
TABLET ORAL
Qty: 60 TABLET | Refills: 0 | Status: SHIPPED | OUTPATIENT
Start: 2022-01-01 | End: 2022-01-01

## 2022-01-01 RX ORDER — BUDESONIDE 0.5 MG/2ML
0.5 INHALANT ORAL
Qty: 120 ML | Refills: 2 | Status: SHIPPED | OUTPATIENT
Start: 2022-01-01

## 2022-01-01 RX ORDER — SODIUM CHLORIDE 0.9 % (FLUSH) 0.9 %
10 SYRINGE (ML) INJECTION EVERY 12 HOURS SCHEDULED
Status: DISCONTINUED | OUTPATIENT
Start: 2022-01-01 | End: 2022-01-01 | Stop reason: HOSPADM

## 2022-01-01 RX ORDER — HYDRALAZINE HYDROCHLORIDE 25 MG/1
25 TABLET, FILM COATED ORAL 2 TIMES DAILY
Qty: 60 TABLET | Refills: 3 | Status: SHIPPED | OUTPATIENT
Start: 2022-01-01

## 2022-01-01 RX ORDER — CLOPIDOGREL BISULFATE 75 MG/1
75 TABLET ORAL DAILY
COMMUNITY
Start: 2022-01-01 | End: 2022-01-01 | Stop reason: HOSPADM

## 2022-01-01 RX ORDER — ENALAPRILAT 2.5 MG/2ML
1.25 INJECTION INTRAVENOUS ONCE
Status: COMPLETED | OUTPATIENT
Start: 2022-01-01 | End: 2022-01-01

## 2022-01-01 RX ORDER — SODIUM CHLORIDE 9 MG/ML
125 INJECTION, SOLUTION INTRAVENOUS CONTINUOUS
Status: DISCONTINUED | OUTPATIENT
Start: 2022-01-01 | End: 2022-01-01 | Stop reason: HOSPADM

## 2022-01-01 RX ORDER — METOPROLOL TARTRATE 5 MG/5ML
5 INJECTION INTRAVENOUS EVERY 6 HOURS
Status: COMPLETED | OUTPATIENT
Start: 2022-01-01 | End: 2022-01-01

## 2022-01-01 RX ORDER — GABAPENTIN 300 MG/1
300 CAPSULE ORAL 3 TIMES DAILY
COMMUNITY
End: 2022-01-01 | Stop reason: SDUPTHER

## 2022-01-01 RX ORDER — MORPHINE SULFATE 2 MG/ML
1 INJECTION, SOLUTION INTRAMUSCULAR; INTRAVENOUS EVERY 4 HOURS PRN
Status: DISCONTINUED | OUTPATIENT
Start: 2022-01-01 | End: 2022-01-01

## 2022-01-01 RX ORDER — LIDOCAINE HYDROCHLORIDE 10 MG/ML
0.5 INJECTION, SOLUTION EPIDURAL; INFILTRATION; INTRACAUDAL; PERINEURAL ONCE AS NEEDED
Status: DISCONTINUED | OUTPATIENT
Start: 2022-01-01 | End: 2022-01-01 | Stop reason: HOSPADM

## 2022-01-01 RX ORDER — GABAPENTIN 300 MG/1
300 CAPSULE ORAL 2 TIMES DAILY
Start: 2022-01-01

## 2022-01-01 RX ORDER — FLUCONAZOLE 100 MG/1
150 TABLET ORAL EVERY 24 HOURS
Status: DISCONTINUED | OUTPATIENT
Start: 2022-01-01 | End: 2022-01-01

## 2022-01-01 RX ORDER — FAMOTIDINE 10 MG/ML
20 INJECTION, SOLUTION INTRAVENOUS AS NEEDED
Status: DISCONTINUED | OUTPATIENT
Start: 2022-01-01 | End: 2022-01-01 | Stop reason: HOSPADM

## 2022-01-01 RX ORDER — POTASSIUM CHLORIDE 750 MG/1
40 CAPSULE, EXTENDED RELEASE ORAL ONCE
Status: COMPLETED | OUTPATIENT
Start: 2022-01-01 | End: 2022-01-01

## 2022-01-01 RX ORDER — BUDESONIDE, GLYCOPYRROLATE, AND FORMOTEROL FUMARATE 160; 9; 4.8 UG/1; UG/1; UG/1
AEROSOL, METERED RESPIRATORY (INHALATION)
Qty: 10.7 G | OUTPATIENT
Start: 2022-01-01

## 2022-01-01 RX ORDER — MORPHINE SULFATE 2 MG/ML
2 INJECTION, SOLUTION INTRAMUSCULAR; INTRAVENOUS EVERY 4 HOURS PRN
Status: DISCONTINUED | OUTPATIENT
Start: 2022-01-01 | End: 2022-01-01 | Stop reason: HOSPADM

## 2022-01-01 RX ORDER — PENTOXIFYLLINE 400 MG/1
400 TABLET, EXTENDED RELEASE ORAL DAILY
Qty: 30 TABLET | Refills: 2 | Status: SHIPPED | OUTPATIENT
Start: 2022-01-01

## 2022-01-01 RX ORDER — BUPIVACAINE HCL/0.9 % NACL/PF 0.125 %
PLASTIC BAG, INJECTION (ML) EPIDURAL AS NEEDED
Status: DISCONTINUED | OUTPATIENT
Start: 2022-01-01 | End: 2022-01-01 | Stop reason: SURG

## 2022-01-01 RX ORDER — OXYCODONE HYDROCHLORIDE 5 MG/1
5 TABLET ORAL ONCE
Status: COMPLETED | OUTPATIENT
Start: 2022-01-01 | End: 2022-01-01

## 2022-01-01 RX ORDER — ATORVASTATIN CALCIUM 80 MG/1
80 TABLET, FILM COATED ORAL DAILY
Qty: 90 TABLET | Refills: 1 | Status: ON HOLD | OUTPATIENT
Start: 2022-01-01 | End: 2022-01-01

## 2022-01-01 RX ORDER — LANOLIN ALCOHOL/MO/W.PET/CERES
1000 CREAM (GRAM) TOPICAL DAILY
Qty: 90 TABLET | Refills: 3 | Status: SHIPPED | OUTPATIENT
Start: 2022-01-01

## 2022-01-01 RX ORDER — GUAIFENESIN 600 MG/1
600 TABLET, EXTENDED RELEASE ORAL EVERY 12 HOURS SCHEDULED
Status: DISCONTINUED | OUTPATIENT
Start: 2022-01-01 | End: 2022-01-01 | Stop reason: HOSPADM

## 2022-01-01 RX ORDER — DEXTROSE MONOHYDRATE 25 G/50ML
INJECTION, SOLUTION INTRAVENOUS
Status: DISPENSED
Start: 2022-01-01 | End: 2022-01-01

## 2022-01-01 RX ORDER — MORPHINE SULFATE 4 MG/ML
4 INJECTION, SOLUTION INTRAMUSCULAR; INTRAVENOUS ONCE
Status: COMPLETED | OUTPATIENT
Start: 2022-01-01 | End: 2022-01-01

## 2022-01-01 RX ORDER — DIAZEPAM 5 MG/ML
2.5 INJECTION, SOLUTION INTRAMUSCULAR; INTRAVENOUS ONCE
Status: COMPLETED | OUTPATIENT
Start: 2022-01-01 | End: 2022-01-01

## 2022-01-01 RX ORDER — SODIUM CHLORIDE 0.9 % (FLUSH) 0.9 %
3 SYRINGE (ML) INJECTION EVERY 12 HOURS SCHEDULED
Status: CANCELLED | OUTPATIENT
Start: 2022-01-01

## 2022-01-01 RX ORDER — OMEPRAZOLE 40 MG/1
40 CAPSULE, DELAYED RELEASE ORAL DAILY
Qty: 30 CAPSULE | Refills: 5 | Status: SHIPPED | OUTPATIENT
Start: 2022-01-01 | End: 2022-01-01 | Stop reason: SDUPTHER

## 2022-01-01 RX ORDER — SACCHAROMYCES BOULARDII 250 MG
250 CAPSULE ORAL DAILY
Status: DISCONTINUED | OUTPATIENT
Start: 2022-01-01 | End: 2022-01-01 | Stop reason: HOSPADM

## 2022-01-01 RX ORDER — ONDANSETRON 2 MG/ML
4 INJECTION INTRAMUSCULAR; INTRAVENOUS EVERY 6 HOURS PRN
Status: DISCONTINUED | OUTPATIENT
Start: 2022-01-01 | End: 2022-01-01

## 2022-01-01 RX ORDER — HEPARIN SODIUM 5000 [USP'U]/ML
5000 INJECTION, SOLUTION INTRAVENOUS; SUBCUTANEOUS EVERY 8 HOURS SCHEDULED
Status: DISCONTINUED | OUTPATIENT
Start: 2022-01-01 | End: 2022-01-01 | Stop reason: HOSPADM

## 2022-01-01 RX ORDER — OMEPRAZOLE 40 MG/1
40 CAPSULE, DELAYED RELEASE ORAL DAILY
COMMUNITY
End: 2022-01-01 | Stop reason: SDUPTHER

## 2022-01-01 RX ORDER — LANOLIN ALCOHOL/MO/W.PET/CERES
1000 CREAM (GRAM) TOPICAL DAILY
Status: DISCONTINUED | OUTPATIENT
Start: 2022-01-01 | End: 2022-01-01 | Stop reason: HOSPADM

## 2022-01-01 RX ORDER — GABAPENTIN 300 MG/1
CAPSULE ORAL
Qty: 90 CAPSULE | OUTPATIENT
Start: 2022-01-01

## 2022-01-01 RX ORDER — SODIUM CHLORIDE 9 MG/ML
250 INJECTION, SOLUTION INTRAVENOUS ONCE
Status: COMPLETED | OUTPATIENT
Start: 2022-01-01 | End: 2022-01-01

## 2022-01-01 RX ORDER — METHYLPREDNISOLONE SODIUM SUCCINATE 125 MG/2ML
60 INJECTION, POWDER, LYOPHILIZED, FOR SOLUTION INTRAMUSCULAR; INTRAVENOUS EVERY 24 HOURS
Status: COMPLETED | OUTPATIENT
Start: 2022-01-01 | End: 2022-01-01

## 2022-01-01 RX ORDER — PREDNISONE 10 MG/1
10 TABLET ORAL DAILY
Qty: 30 TABLET | Refills: 0 | Status: SHIPPED | OUTPATIENT
Start: 2022-01-01 | End: 2022-01-01

## 2022-01-01 RX ORDER — LIDOCAINE HYDROCHLORIDE 40 MG/ML
4 INJECTION, SOLUTION RETROBULBAR; TOPICAL ONCE
Status: CANCELLED | OUTPATIENT
Start: 2022-01-01 | End: 2022-01-01

## 2022-01-01 RX ORDER — BUMETANIDE 0.25 MG/ML
2 INJECTION INTRAMUSCULAR; INTRAVENOUS ONCE
Status: COMPLETED | OUTPATIENT
Start: 2022-01-01 | End: 2022-01-01

## 2022-01-01 RX ORDER — NOREPINEPHRINE BIT/0.9 % NACL 8 MG/250ML
.02-.3 INFUSION BOTTLE (ML) INTRAVENOUS
Status: DISCONTINUED | OUTPATIENT
Start: 2022-01-01 | End: 2022-01-01

## 2022-01-01 RX ORDER — METOPROLOL TARTRATE 100 MG/1
TABLET ORAL
Qty: 135 TABLET | Refills: 1 | Status: SHIPPED | OUTPATIENT
Start: 2022-01-01 | End: 2022-01-01 | Stop reason: HOSPADM

## 2022-01-01 RX ORDER — BUDESONIDE, GLYCOPYRROLATE, AND FORMOTEROL FUMARATE 160; 9; 4.8 UG/1; UG/1; UG/1
2 AEROSOL, METERED RESPIRATORY (INHALATION) 2 TIMES DAILY
Qty: 1 EACH | Refills: 1 | Status: SHIPPED | OUTPATIENT
Start: 2022-01-01 | End: 2022-01-01

## 2022-01-01 RX ORDER — POLYMYXIN B SULFATE AND TRIMETHOPRIM 1; 10000 MG/ML; [USP'U]/ML
1 SOLUTION OPHTHALMIC EVERY 4 HOURS
Qty: 10 ML | Refills: 0 | Status: ON HOLD | OUTPATIENT
Start: 2022-01-01 | End: 2022-01-01

## 2022-01-01 RX ORDER — LEVOFLOXACIN 5 MG/ML
500 INJECTION, SOLUTION INTRAVENOUS
Status: COMPLETED | OUTPATIENT
Start: 2022-01-01 | End: 2022-01-01

## 2022-01-01 RX ORDER — CLOPIDOGREL BISULFATE 75 MG/1
75 TABLET ORAL DAILY
Qty: 90 TABLET | Refills: 1 | Status: SHIPPED | OUTPATIENT
Start: 2022-01-01 | End: 2022-01-01 | Stop reason: HOSPADM

## 2022-01-01 RX ORDER — ASPIRIN 81 MG/1
81 TABLET ORAL DAILY
Status: DISCONTINUED | OUTPATIENT
Start: 2022-01-01 | End: 2022-01-01 | Stop reason: HOSPADM

## 2022-01-01 RX ORDER — HYDROMORPHONE HYDROCHLORIDE 1 MG/ML
0.25 INJECTION, SOLUTION INTRAMUSCULAR; INTRAVENOUS; SUBCUTANEOUS
Status: DISCONTINUED | OUTPATIENT
Start: 2022-01-01 | End: 2022-01-01

## 2022-01-01 RX ORDER — BUMETANIDE 0.25 MG/ML
1 INJECTION INTRAMUSCULAR; INTRAVENOUS ONCE
Status: COMPLETED | OUTPATIENT
Start: 2022-01-01 | End: 2022-01-01

## 2022-01-01 RX ORDER — PENTOXIFYLLINE 400 MG/1
400 TABLET, EXTENDED RELEASE ORAL DAILY
Qty: 30 TABLET | Refills: 0 | Status: ON HOLD | OUTPATIENT
Start: 2022-01-01 | End: 2022-01-01 | Stop reason: SDUPTHER

## 2022-01-01 RX ORDER — SODIUM CHLORIDE 9 MG/ML
125 INJECTION, SOLUTION INTRAVENOUS CONTINUOUS
Status: CANCELLED | OUTPATIENT
Start: 2022-01-01

## 2022-01-01 RX ORDER — NITROGLYCERIN 0.4 MG/1
0.4 TABLET SUBLINGUAL
COMMUNITY
End: 2022-01-01 | Stop reason: SDUPTHER

## 2022-01-01 RX ORDER — PANTOPRAZOLE SODIUM 40 MG/1
40 TABLET, DELAYED RELEASE ORAL EVERY MORNING
Status: DISCONTINUED | OUTPATIENT
Start: 2022-01-01 | End: 2022-01-01 | Stop reason: HOSPADM

## 2022-01-01 RX ORDER — BUDESONIDE, GLYCOPYRROLATE, AND FORMOTEROL FUMARATE 160; 9; 4.8 UG/1; UG/1; UG/1
2 AEROSOL, METERED RESPIRATORY (INHALATION) 2 TIMES DAILY
COMMUNITY
End: 2022-01-01 | Stop reason: SDUPTHER

## 2022-01-01 RX ORDER — LORAZEPAM 2 MG/ML
1 INJECTION INTRAMUSCULAR EVERY 4 HOURS PRN
Status: DISCONTINUED | OUTPATIENT
Start: 2022-01-01 | End: 2022-01-01 | Stop reason: HOSPADM

## 2022-01-01 RX ORDER — NIFEDIPINE 90 MG/1
90 TABLET, EXTENDED RELEASE ORAL DAILY
Qty: 90 TABLET | Refills: 1 | Status: SHIPPED | OUTPATIENT
Start: 2022-01-01 | End: 2022-01-01 | Stop reason: HOSPADM

## 2022-01-01 RX ORDER — AMLODIPINE BESYLATE 10 MG/1
10 TABLET ORAL DAILY
Status: DISCONTINUED | OUTPATIENT
Start: 2022-01-01 | End: 2022-01-01 | Stop reason: HOSPADM

## 2022-01-01 RX ORDER — METHYLPREDNISOLONE SODIUM SUCCINATE 125 MG/2ML
80 INJECTION, POWDER, LYOPHILIZED, FOR SOLUTION INTRAMUSCULAR; INTRAVENOUS EVERY 12 HOURS SCHEDULED
Status: DISCONTINUED | OUTPATIENT
Start: 2022-01-01 | End: 2022-01-01 | Stop reason: HOSPADM

## 2022-01-01 RX ORDER — TAMSULOSIN HYDROCHLORIDE 0.4 MG/1
0.8 CAPSULE ORAL DAILY
Qty: 180 CAPSULE | Refills: 1 | Status: SHIPPED | OUTPATIENT
Start: 2022-01-01

## 2022-01-01 RX ORDER — LORAZEPAM 1 MG/1
1 TABLET ORAL 2 TIMES DAILY PRN
Qty: 60 TABLET | Refills: 0 | Status: SHIPPED | OUTPATIENT
Start: 2022-01-01 | End: 2022-01-01

## 2022-01-01 RX ORDER — AMLODIPINE BESYLATE 5 MG/1
5 TABLET ORAL DAILY
Status: CANCELLED | OUTPATIENT
Start: 2022-01-01

## 2022-01-01 RX ORDER — METHYLPREDNISOLONE SODIUM SUCCINATE 125 MG/2ML
80 INJECTION, POWDER, LYOPHILIZED, FOR SOLUTION INTRAMUSCULAR; INTRAVENOUS DAILY
Status: COMPLETED | OUTPATIENT
Start: 2022-01-01 | End: 2022-01-01

## 2022-01-01 RX ORDER — OXYCODONE HYDROCHLORIDE 5 MG/1
5 TABLET ORAL EVERY 4 HOURS PRN
Qty: 30 TABLET | Refills: 0 | Status: SHIPPED | OUTPATIENT
Start: 2022-01-01 | End: 2022-10-31

## 2022-01-01 RX ORDER — ALPRAZOLAM 0.25 MG/1
0.25 TABLET ORAL 3 TIMES DAILY PRN
Status: DISCONTINUED | OUTPATIENT
Start: 2022-01-01 | End: 2022-01-01

## 2022-01-01 RX ORDER — HYDRALAZINE HYDROCHLORIDE 25 MG/1
25 TABLET, FILM COATED ORAL EVERY 8 HOURS SCHEDULED
Qty: 90 TABLET | Refills: 0 | Status: SHIPPED | OUTPATIENT
Start: 2022-01-01 | End: 2022-01-01

## 2022-01-01 RX ORDER — ALPRAZOLAM 0.5 MG/1
0.5 TABLET ORAL 3 TIMES DAILY PRN
Status: DISCONTINUED | OUTPATIENT
Start: 2022-01-01 | End: 2022-01-01

## 2022-01-01 RX ORDER — GABAPENTIN 100 MG/1
100 CAPSULE ORAL EVERY 8 HOURS SCHEDULED
Status: DISCONTINUED | OUTPATIENT
Start: 2022-01-01 | End: 2022-01-01 | Stop reason: HOSPADM

## 2022-01-01 RX ADMIN — TAZOBACTAM SODIUM AND PIPERACILLIN SODIUM 3.38 G: 375; 3 INJECTION, SOLUTION INTRAVENOUS at 05:46

## 2022-01-01 RX ADMIN — SENNOSIDES AND DOCUSATE SODIUM 2 TABLET: 50; 8.6 TABLET ORAL at 08:14

## 2022-01-01 RX ADMIN — INSULIN HUMAN 2 UNITS: 100 INJECTION, SOLUTION PARENTERAL at 18:12

## 2022-01-01 RX ADMIN — NIFEDIPINE 60 MG: 30 TABLET, FILM COATED, EXTENDED RELEASE ORAL at 08:16

## 2022-01-01 RX ADMIN — IPRATROPIUM BROMIDE AND ALBUTEROL SULFATE 3 ML: .5; 3 SOLUTION RESPIRATORY (INHALATION) at 20:18

## 2022-01-01 RX ADMIN — OXYCODONE 5 MG: 5 TABLET ORAL at 10:57

## 2022-01-01 RX ADMIN — IPRATROPIUM BROMIDE AND ALBUTEROL SULFATE 3 ML: .5; 3 SOLUTION RESPIRATORY (INHALATION) at 15:41

## 2022-01-01 RX ADMIN — SENNOSIDES AND DOCUSATE SODIUM 2 TABLET: 50; 8.6 TABLET ORAL at 22:11

## 2022-01-01 RX ADMIN — HEPARIN SODIUM 5000 UNITS: 5000 INJECTION INTRAVENOUS; SUBCUTANEOUS at 05:07

## 2022-01-01 RX ADMIN — CASTOR OIL AND BALSAM, PERU 1 APPLICATION: 788; 87 OINTMENT TOPICAL at 20:16

## 2022-01-01 RX ADMIN — BUDESONIDE 0.5 MG: 0.5 SUSPENSION RESPIRATORY (INHALATION) at 06:49

## 2022-01-01 RX ADMIN — TAZOBACTAM SODIUM AND PIPERACILLIN SODIUM 3.38 G: 375; 3 INJECTION, SOLUTION INTRAVENOUS at 14:40

## 2022-01-01 RX ADMIN — NYSTATIN: 100000 POWDER TOPICAL at 08:12

## 2022-01-01 RX ADMIN — SMOFLIPID 200 ML: 6; 6; 5; 3 INJECTION, EMULSION INTRAVENOUS at 18:07

## 2022-01-01 RX ADMIN — NICOTINE 1 PATCH: 14 PATCH, EXTENDED RELEASE TRANSDERMAL at 20:55

## 2022-01-01 RX ADMIN — DEXMEDETOMIDINE HYDROCHLORIDE 1.2 MCG/KG/HR: 4 INJECTION, SOLUTION INTRAVENOUS at 13:57

## 2022-01-01 RX ADMIN — APIXABAN 5 MG: 5 TABLET, FILM COATED ORAL at 21:10

## 2022-01-01 RX ADMIN — METHYLPREDNISOLONE SODIUM SUCCINATE 60 MG: 125 INJECTION, POWDER, FOR SOLUTION INTRAMUSCULAR; INTRAVENOUS at 08:59

## 2022-01-01 RX ADMIN — NYSTATIN 1 APPLICATION: 100000 POWDER TOPICAL at 21:18

## 2022-01-01 RX ADMIN — PANTOPRAZOLE SODIUM 40 MG: 40 INJECTION, POWDER, FOR SOLUTION INTRAVENOUS at 08:10

## 2022-01-01 RX ADMIN — LORAZEPAM 1 MG: 2 INJECTION INTRAMUSCULAR; INTRAVENOUS at 18:13

## 2022-01-01 RX ADMIN — MONTELUKAST 10 MG: 10 TABLET, FILM COATED ORAL at 22:14

## 2022-01-01 RX ADMIN — NYSTATIN: 100000 POWDER TOPICAL at 20:44

## 2022-01-01 RX ADMIN — METHYLPREDNISOLONE SODIUM SUCCINATE 125 MG: 125 INJECTION, POWDER, FOR SOLUTION INTRAMUSCULAR; INTRAVENOUS at 06:14

## 2022-01-01 RX ADMIN — IPRATROPIUM BROMIDE AND ALBUTEROL SULFATE 3 ML: .5; 3 SOLUTION RESPIRATORY (INHALATION) at 07:23

## 2022-01-01 RX ADMIN — CLONIDINE 1 PATCH: 0.1 PATCH, EXTENDED RELEASE TRANSDERMAL at 12:36

## 2022-01-01 RX ADMIN — GABAPENTIN 100 MG: 100 CAPSULE ORAL at 20:35

## 2022-01-01 RX ADMIN — IPRATROPIUM BROMIDE AND ALBUTEROL SULFATE 3 ML: .5; 3 SOLUTION RESPIRATORY (INHALATION) at 06:51

## 2022-01-01 RX ADMIN — HYDRALAZINE HYDROCHLORIDE 25 MG: 25 TABLET, FILM COATED ORAL at 08:01

## 2022-01-01 RX ADMIN — GABAPENTIN 100 MG: 100 CAPSULE ORAL at 16:24

## 2022-01-01 RX ADMIN — METOPROLOL TARTRATE 100 MG: 100 TABLET, FILM COATED ORAL at 20:44

## 2022-01-01 RX ADMIN — IPRATROPIUM BROMIDE AND ALBUTEROL SULFATE 3 ML: .5; 3 SOLUTION RESPIRATORY (INHALATION) at 14:11

## 2022-01-01 RX ADMIN — TAMSULOSIN HYDROCHLORIDE 0.8 MG: 0.4 CAPSULE ORAL at 16:04

## 2022-01-01 RX ADMIN — Medication 10 ML: at 21:03

## 2022-01-01 RX ADMIN — IPRATROPIUM BROMIDE AND ALBUTEROL SULFATE 3 ML: .5; 3 SOLUTION RESPIRATORY (INHALATION) at 15:36

## 2022-01-01 RX ADMIN — IPRATROPIUM BROMIDE AND ALBUTEROL SULFATE 3 ML: .5; 3 SOLUTION RESPIRATORY (INHALATION) at 20:01

## 2022-01-01 RX ADMIN — LEVOFLOXACIN 750 MG: 750 INJECTION, SOLUTION INTRAVENOUS at 03:28

## 2022-01-01 RX ADMIN — METOPROLOL TARTRATE 100 MG: 100 TABLET, FILM COATED ORAL at 08:29

## 2022-01-01 RX ADMIN — IOPAMIDOL 75 ML: 755 INJECTION, SOLUTION INTRAVENOUS at 16:14

## 2022-01-01 RX ADMIN — IPRATROPIUM BROMIDE AND ALBUTEROL SULFATE 3 ML: .5; 3 SOLUTION RESPIRATORY (INHALATION) at 14:36

## 2022-01-01 RX ADMIN — CYANOCOBALAMIN TAB 1000 MCG 1000 MCG: 1000 TAB at 08:00

## 2022-01-01 RX ADMIN — Medication 10 ML: at 12:03

## 2022-01-01 RX ADMIN — IPRATROPIUM BROMIDE AND ALBUTEROL SULFATE 3 ML: .5; 3 SOLUTION RESPIRATORY (INHALATION) at 12:17

## 2022-01-01 RX ADMIN — GABAPENTIN 100 MG: 100 CAPSULE ORAL at 05:09

## 2022-01-01 RX ADMIN — GABAPENTIN 300 MG: 300 CAPSULE ORAL at 08:02

## 2022-01-01 RX ADMIN — DOXYCYCLINE 100 MG: 100 CAPSULE ORAL at 08:58

## 2022-01-01 RX ADMIN — GABAPENTIN 300 MG: 300 CAPSULE ORAL at 15:16

## 2022-01-01 RX ADMIN — HYDRALAZINE HYDROCHLORIDE 75 MG: 50 TABLET, FILM COATED ORAL at 05:09

## 2022-01-01 RX ADMIN — PREDNISONE 40 MG: 20 TABLET ORAL at 11:09

## 2022-01-01 RX ADMIN — APIXABAN 5 MG: 5 TABLET, FILM COATED ORAL at 08:50

## 2022-01-01 RX ADMIN — METOPROLOL TARTRATE 100 MG: 100 TABLET, FILM COATED ORAL at 21:39

## 2022-01-01 RX ADMIN — CASTOR OIL AND BALSAM, PERU 1 APPLICATION: 788; 87 OINTMENT TOPICAL at 08:01

## 2022-01-01 RX ADMIN — METOPROLOL TARTRATE 100 MG: 100 TABLET, FILM COATED ORAL at 08:25

## 2022-01-01 RX ADMIN — ENOXAPARIN SODIUM 40 MG: 40 INJECTION SUBCUTANEOUS at 08:02

## 2022-01-01 RX ADMIN — PANTOPRAZOLE SODIUM 40 MG: 40 TABLET, DELAYED RELEASE ORAL at 21:56

## 2022-01-01 RX ADMIN — Medication 250 MG: at 09:07

## 2022-01-01 RX ADMIN — HYDRALAZINE HYDROCHLORIDE 75 MG: 50 TABLET, FILM COATED ORAL at 05:26

## 2022-01-01 RX ADMIN — HYDRALAZINE HYDROCHLORIDE 25 MG: 25 TABLET, FILM COATED ORAL at 21:29

## 2022-01-01 RX ADMIN — TAZOBACTAM SODIUM AND PIPERACILLIN SODIUM 3.38 G: 375; 3 INJECTION, SOLUTION INTRAVENOUS at 05:55

## 2022-01-01 RX ADMIN — METHYLPREDNISOLONE SODIUM SUCCINATE 60 MG: 125 INJECTION, POWDER, FOR SOLUTION INTRAMUSCULAR; INTRAVENOUS at 08:14

## 2022-01-01 RX ADMIN — GABAPENTIN 100 MG: 100 CAPSULE ORAL at 14:59

## 2022-01-01 RX ADMIN — PENTOXIFYLLINE 400 MG: 400 TABLET, EXTENDED RELEASE ORAL at 08:50

## 2022-01-01 RX ADMIN — HEPARIN SODIUM 5000 UNITS: 5000 INJECTION INTRAVENOUS; SUBCUTANEOUS at 22:11

## 2022-01-01 RX ADMIN — IPRATROPIUM BROMIDE AND ALBUTEROL SULFATE 3 ML: .5; 3 SOLUTION RESPIRATORY (INHALATION) at 16:00

## 2022-01-01 RX ADMIN — AMLODIPINE BESYLATE 10 MG: 10 TABLET ORAL at 08:13

## 2022-01-01 RX ADMIN — IOPAMIDOL 85 ML: 755 INJECTION, SOLUTION INTRAVENOUS at 12:41

## 2022-01-01 RX ADMIN — IPRATROPIUM BROMIDE AND ALBUTEROL SULFATE 3 ML: .5; 3 SOLUTION RESPIRATORY (INHALATION) at 15:30

## 2022-01-01 RX ADMIN — METOPROLOL TARTRATE 100 MG: 100 TABLET, FILM COATED ORAL at 08:55

## 2022-01-01 RX ADMIN — BUDESONIDE 0.5 MG: 0.5 SUSPENSION RESPIRATORY (INHALATION) at 07:24

## 2022-01-01 RX ADMIN — HYDRALAZINE HYDROCHLORIDE 75 MG: 50 TABLET, FILM COATED ORAL at 21:11

## 2022-01-01 RX ADMIN — DOXYCYCLINE 100 MG: 100 INJECTION, POWDER, LYOPHILIZED, FOR SOLUTION INTRAVENOUS at 13:57

## 2022-01-01 RX ADMIN — ACETAMINOPHEN 325MG 650 MG: 325 TABLET ORAL at 21:19

## 2022-01-01 RX ADMIN — GABAPENTIN 300 MG: 300 CAPSULE ORAL at 08:14

## 2022-01-01 RX ADMIN — BUDESONIDE 0.5 MG: 0.5 SUSPENSION RESPIRATORY (INHALATION) at 08:36

## 2022-01-01 RX ADMIN — CASTOR OIL AND BALSAM, PERU 1 APPLICATION: 788; 87 OINTMENT TOPICAL at 20:21

## 2022-01-01 RX ADMIN — METOPROLOL TARTRATE 25 MG: 25 TABLET, FILM COATED ORAL at 08:00

## 2022-01-01 RX ADMIN — HEPARIN SODIUM 5000 UNITS: 5000 INJECTION INTRAVENOUS; SUBCUTANEOUS at 05:47

## 2022-01-01 RX ADMIN — ALBUTEROL SULFATE 2.5 MG: 2.5 SOLUTION RESPIRATORY (INHALATION) at 05:54

## 2022-01-01 RX ADMIN — PENTOXIFYLLINE 400 MG: 400 TABLET, EXTENDED RELEASE ORAL at 08:06

## 2022-01-01 RX ADMIN — GABAPENTIN 300 MG: 300 CAPSULE ORAL at 20:45

## 2022-01-01 RX ADMIN — MONTELUKAST 10 MG: 10 TABLET, FILM COATED ORAL at 21:17

## 2022-01-01 RX ADMIN — NYSTATIN: 100000 POWDER TOPICAL at 22:08

## 2022-01-01 RX ADMIN — NYSTATIN: 100000 POWDER TOPICAL at 21:32

## 2022-01-01 RX ADMIN — PANTOPRAZOLE SODIUM 40 MG: 40 TABLET, DELAYED RELEASE ORAL at 08:56

## 2022-01-01 RX ADMIN — HEPARIN SODIUM 5000 UNITS: 5000 INJECTION INTRAVENOUS; SUBCUTANEOUS at 13:38

## 2022-01-01 RX ADMIN — HYDRALAZINE HYDROCHLORIDE 25 MG: 25 TABLET, FILM COATED ORAL at 08:49

## 2022-01-01 RX ADMIN — TAZOBACTAM SODIUM AND PIPERACILLIN SODIUM 3.38 G: 375; 3 INJECTION, SOLUTION INTRAVENOUS at 11:22

## 2022-01-01 RX ADMIN — MONTELUKAST 10 MG: 10 TABLET, FILM COATED ORAL at 20:28

## 2022-01-01 RX ADMIN — GABAPENTIN 100 MG: 100 CAPSULE ORAL at 13:30

## 2022-01-01 RX ADMIN — Medication 10 ML: at 09:16

## 2022-01-01 RX ADMIN — DOXYCYCLINE 100 MG: 100 CAPSULE ORAL at 20:04

## 2022-01-01 RX ADMIN — IPRATROPIUM BROMIDE AND ALBUTEROL SULFATE 3 ML: .5; 3 SOLUTION RESPIRATORY (INHALATION) at 10:39

## 2022-01-01 RX ADMIN — DEXMEDETOMIDINE HYDROCHLORIDE 0.8 MCG/KG/HR: 4 INJECTION, SOLUTION INTRAVENOUS at 08:01

## 2022-01-01 RX ADMIN — HEPARIN SODIUM 5000 UNITS: 5000 INJECTION INTRAVENOUS; SUBCUTANEOUS at 20:29

## 2022-01-01 RX ADMIN — CYANOCOBALAMIN TAB 1000 MCG 1000 MCG: 1000 TAB at 08:23

## 2022-01-01 RX ADMIN — ALPRAZOLAM 0.5 MG: 0.5 TABLET ORAL at 20:20

## 2022-01-01 RX ADMIN — AMLODIPINE BESYLATE 5 MG: 5 TABLET ORAL at 12:03

## 2022-01-01 RX ADMIN — BUMETANIDE 2 MG: 0.25 INJECTION, SOLUTION INTRAMUSCULAR; INTRAVENOUS at 08:11

## 2022-01-01 RX ADMIN — METOPROLOL TARTRATE 25 MG: 25 TABLET, FILM COATED ORAL at 20:03

## 2022-01-01 RX ADMIN — ACETAMINOPHEN 649.6 MG: 160 SOLUTION ORAL at 19:13

## 2022-01-01 RX ADMIN — TAZOBACTAM SODIUM AND PIPERACILLIN SODIUM 3.38 G: 375; 3 INJECTION, SOLUTION INTRAVENOUS at 05:38

## 2022-01-01 RX ADMIN — HEPARIN SODIUM 5000 UNITS: 5000 INJECTION INTRAVENOUS; SUBCUTANEOUS at 21:17

## 2022-01-01 RX ADMIN — TAMSULOSIN HYDROCHLORIDE 0.8 MG: 0.4 CAPSULE ORAL at 08:23

## 2022-01-01 RX ADMIN — BUDESONIDE 0.5 MG: 0.5 SUSPENSION RESPIRATORY (INHALATION) at 19:08

## 2022-01-01 RX ADMIN — DEXMEDETOMIDINE HYDROCHLORIDE 1 MCG/KG/HR: 4 INJECTION, SOLUTION INTRAVENOUS at 20:22

## 2022-01-01 RX ADMIN — METOPROLOL TARTRATE 50 MG: 50 TABLET, FILM COATED ORAL at 20:28

## 2022-01-01 RX ADMIN — NYSTATIN 500000 UNITS: 100000 SUSPENSION ORAL at 08:01

## 2022-01-01 RX ADMIN — BUDESONIDE 0.5 MG: 0.5 SUSPENSION RESPIRATORY (INHALATION) at 19:11

## 2022-01-01 RX ADMIN — METHYLPREDNISOLONE SODIUM SUCCINATE 60 MG: 125 INJECTION, POWDER, FOR SOLUTION INTRAMUSCULAR; INTRAVENOUS at 05:38

## 2022-01-01 RX ADMIN — TAZOBACTAM SODIUM AND PIPERACILLIN SODIUM 3.38 G: 375; 3 INJECTION, SOLUTION INTRAVENOUS at 11:30

## 2022-01-01 RX ADMIN — HEPARIN SODIUM 5000 UNITS: 5000 INJECTION INTRAVENOUS; SUBCUTANEOUS at 14:08

## 2022-01-01 RX ADMIN — CLONIDINE HYDROCHLORIDE 0.1 MG: 0.1 TABLET ORAL at 15:53

## 2022-01-01 RX ADMIN — ASPIRIN 81 MG: 81 TABLET, COATED ORAL at 08:22

## 2022-01-01 RX ADMIN — IPRATROPIUM BROMIDE AND ALBUTEROL SULFATE 3 ML: .5; 3 SOLUTION RESPIRATORY (INHALATION) at 12:40

## 2022-01-01 RX ADMIN — DOXYCYCLINE 100 MG: 100 INJECTION, POWDER, LYOPHILIZED, FOR SOLUTION INTRAVENOUS at 18:18

## 2022-01-01 RX ADMIN — ATORVASTATIN CALCIUM 80 MG: 40 TABLET, FILM COATED ORAL at 08:49

## 2022-01-01 RX ADMIN — PENTOXIFYLLINE 400 MG: 400 TABLET, EXTENDED RELEASE ORAL at 08:14

## 2022-01-01 RX ADMIN — IPRATROPIUM BROMIDE AND ALBUTEROL SULFATE 3 ML: .5; 3 SOLUTION RESPIRATORY (INHALATION) at 20:31

## 2022-01-01 RX ADMIN — LABETALOL HYDROCHLORIDE 20 MG: 5 INJECTION, SOLUTION INTRAVENOUS at 06:45

## 2022-01-01 RX ADMIN — INSULIN HUMAN 2 UNITS: 100 INJECTION, SOLUTION PARENTERAL at 18:18

## 2022-01-01 RX ADMIN — MIDODRINE HYDROCHLORIDE 5 MG: 5 TABLET ORAL at 11:33

## 2022-01-01 RX ADMIN — SODIUM ACETATE: 164 INJECTION, SOLUTION, CONCENTRATE INTRAVENOUS at 18:07

## 2022-01-01 RX ADMIN — NICOTINE 1 PATCH: 14 PATCH, EXTENDED RELEASE TRANSDERMAL at 21:55

## 2022-01-01 RX ADMIN — GABAPENTIN 100 MG: 100 CAPSULE ORAL at 06:38

## 2022-01-01 RX ADMIN — DIAZEPAM 2.5 MG: 5 INJECTION, SOLUTION INTRAMUSCULAR; INTRAVENOUS at 22:32

## 2022-01-01 RX ADMIN — FENTANYL CITRATE 25 MCG: 50 INJECTION, SOLUTION INTRAMUSCULAR; INTRAVENOUS at 17:37

## 2022-01-01 RX ADMIN — NIFEDIPINE 60 MG: 30 TABLET, FILM COATED, EXTENDED RELEASE ORAL at 08:29

## 2022-01-01 RX ADMIN — GABAPENTIN 300 MG: 300 CAPSULE ORAL at 08:49

## 2022-01-01 RX ADMIN — Medication 10 ML: at 21:58

## 2022-01-01 RX ADMIN — HYDROMORPHONE HYDROCHLORIDE 0.25 MG: 1 INJECTION, SOLUTION INTRAMUSCULAR; INTRAVENOUS; SUBCUTANEOUS at 00:52

## 2022-01-01 RX ADMIN — HYDRALAZINE HYDROCHLORIDE 75 MG: 50 TABLET, FILM COATED ORAL at 22:10

## 2022-01-01 RX ADMIN — HEPARIN SODIUM 5000 UNITS: 5000 INJECTION INTRAVENOUS; SUBCUTANEOUS at 21:06

## 2022-01-01 RX ADMIN — POTASSIUM CHLORIDE 20 MEQ: 1.5 POWDER, FOR SOLUTION ORAL at 09:08

## 2022-01-01 RX ADMIN — Medication 250 MG: at 08:23

## 2022-01-01 RX ADMIN — PANTOPRAZOLE SODIUM 40 MG: 40 TABLET, DELAYED RELEASE ORAL at 20:06

## 2022-01-01 RX ADMIN — HYDRALAZINE HYDROCHLORIDE 75 MG: 50 TABLET, FILM COATED ORAL at 05:30

## 2022-01-01 RX ADMIN — IPRATROPIUM BROMIDE AND ALBUTEROL SULFATE 3 ML: .5; 3 SOLUTION RESPIRATORY (INHALATION) at 07:28

## 2022-01-01 RX ADMIN — Medication 10 ML: at 08:02

## 2022-01-01 RX ADMIN — HEPARIN SODIUM 5000 UNITS: 5000 INJECTION INTRAVENOUS; SUBCUTANEOUS at 05:32

## 2022-01-01 RX ADMIN — PANTOPRAZOLE SODIUM 40 MG: 40 TABLET, DELAYED RELEASE ORAL at 08:49

## 2022-01-01 RX ADMIN — IPRATROPIUM BROMIDE AND ALBUTEROL SULFATE 3 ML: .5; 3 SOLUTION RESPIRATORY (INHALATION) at 07:55

## 2022-01-01 RX ADMIN — Medication 250 MG: at 14:59

## 2022-01-01 RX ADMIN — IPRATROPIUM BROMIDE AND ALBUTEROL SULFATE 3 ML: 2.5; .5 SOLUTION RESPIRATORY (INHALATION) at 15:48

## 2022-01-01 RX ADMIN — PANTOPRAZOLE SODIUM 40 MG: 40 TABLET, DELAYED RELEASE ORAL at 21:26

## 2022-01-01 RX ADMIN — Medication 10 ML: at 08:23

## 2022-01-01 RX ADMIN — MONTELUKAST 10 MG: 10 TABLET, FILM COATED ORAL at 20:33

## 2022-01-01 RX ADMIN — INSULIN HUMAN 4 UNITS: 100 INJECTION, SOLUTION PARENTERAL at 23:36

## 2022-01-01 RX ADMIN — Medication 10 ML: at 21:37

## 2022-01-01 RX ADMIN — IPRATROPIUM BROMIDE AND ALBUTEROL SULFATE 3 ML: 2.5; .5 SOLUTION RESPIRATORY (INHALATION) at 16:27

## 2022-01-01 RX ADMIN — AMLODIPINE BESYLATE 10 MG: 10 TABLET ORAL at 09:07

## 2022-01-01 RX ADMIN — CASTOR OIL AND BALSAM, PERU 1 APPLICATION: 788; 87 OINTMENT TOPICAL at 20:30

## 2022-01-01 RX ADMIN — METOCLOPRAMIDE 5 MG: 5 INJECTION, SOLUTION INTRAMUSCULAR; INTRAVENOUS at 12:03

## 2022-01-01 RX ADMIN — IPRATROPIUM BROMIDE AND ALBUTEROL SULFATE 3 ML: .5; 3 SOLUTION RESPIRATORY (INHALATION) at 10:45

## 2022-01-01 RX ADMIN — IPRATROPIUM BROMIDE AND ALBUTEROL SULFATE 3 ML: .5; 3 SOLUTION RESPIRATORY (INHALATION) at 14:16

## 2022-01-01 RX ADMIN — METOPROLOL TARTRATE 100 MG: 100 TABLET, FILM COATED ORAL at 09:53

## 2022-01-01 RX ADMIN — NICOTINE 1 PATCH: 14 PATCH, EXTENDED RELEASE TRANSDERMAL at 21:11

## 2022-01-01 RX ADMIN — Medication 250 MG: at 08:42

## 2022-01-01 RX ADMIN — LABETALOL HYDROCHLORIDE 20 MG: 5 INJECTION, SOLUTION INTRAVENOUS at 00:52

## 2022-01-01 RX ADMIN — METOPROLOL TARTRATE 100 MG: 100 TABLET, FILM COATED ORAL at 21:26

## 2022-01-01 RX ADMIN — PANTOPRAZOLE SODIUM 40 MG: 40 TABLET, DELAYED RELEASE ORAL at 08:00

## 2022-01-01 RX ADMIN — ATORVASTATIN CALCIUM 80 MG: 40 TABLET, FILM COATED ORAL at 08:23

## 2022-01-01 RX ADMIN — PANTOPRAZOLE SODIUM 40 MG: 40 TABLET, DELAYED RELEASE ORAL at 14:58

## 2022-01-01 RX ADMIN — HYDRALAZINE HYDROCHLORIDE 75 MG: 50 TABLET, FILM COATED ORAL at 21:56

## 2022-01-01 RX ADMIN — HEPARIN SODIUM 5000 UNITS: 5000 INJECTION INTRAVENOUS; SUBCUTANEOUS at 20:51

## 2022-01-01 RX ADMIN — SMOFLIPID 200 ML: 6; 6; 5; 3 INJECTION, EMULSION INTRAVENOUS at 17:55

## 2022-01-01 RX ADMIN — APIXABAN 5 MG: 5 TABLET, FILM COATED ORAL at 08:00

## 2022-01-01 RX ADMIN — PANTOPRAZOLE SODIUM 40 MG: 40 TABLET, DELAYED RELEASE ORAL at 22:08

## 2022-01-01 RX ADMIN — IPRATROPIUM BROMIDE AND ALBUTEROL SULFATE 3 ML: .5; 3 SOLUTION RESPIRATORY (INHALATION) at 19:50

## 2022-01-01 RX ADMIN — METOPROLOL TARTRATE 25 MG: 25 TABLET, FILM COATED ORAL at 08:16

## 2022-01-01 RX ADMIN — FAMOTIDINE 20 MG: 10 INJECTION, SOLUTION INTRAVENOUS at 10:18

## 2022-01-01 RX ADMIN — CASTOR OIL AND BALSAM, PERU 1 APPLICATION: 788; 87 OINTMENT TOPICAL at 08:21

## 2022-01-01 RX ADMIN — HYDRALAZINE HYDROCHLORIDE 25 MG: 25 TABLET, FILM COATED ORAL at 06:38

## 2022-01-01 RX ADMIN — BUDESONIDE 0.5 MG: 0.5 SUSPENSION RESPIRATORY (INHALATION) at 07:25

## 2022-01-01 RX ADMIN — Medication 10 ML: at 14:41

## 2022-01-01 RX ADMIN — METHYLPREDNISOLONE SODIUM SUCCINATE 125 MG: 125 INJECTION, POWDER, FOR SOLUTION INTRAMUSCULAR; INTRAVENOUS at 05:34

## 2022-01-01 RX ADMIN — METOPROLOL TARTRATE 100 MG: 100 TABLET, FILM COATED ORAL at 08:12

## 2022-01-01 RX ADMIN — AMLODIPINE BESYLATE 5 MG: 5 TABLET ORAL at 09:06

## 2022-01-01 RX ADMIN — APIXABAN 5 MG: 5 TABLET, FILM COATED ORAL at 08:08

## 2022-01-01 RX ADMIN — Medication 10 ML: at 20:25

## 2022-01-01 RX ADMIN — ALPRAZOLAM 0.5 MG: 0.5 TABLET ORAL at 12:06

## 2022-01-01 RX ADMIN — HEPARIN SODIUM 5000 UNITS: 5000 INJECTION INTRAVENOUS; SUBCUTANEOUS at 22:56

## 2022-01-01 RX ADMIN — TAMSULOSIN HYDROCHLORIDE 0.8 MG: 0.4 CAPSULE ORAL at 08:00

## 2022-01-01 RX ADMIN — DOXYCYCLINE 100 MG: 100 INJECTION, POWDER, LYOPHILIZED, FOR SOLUTION INTRAVENOUS at 21:20

## 2022-01-01 RX ADMIN — Medication 10 ML: at 08:21

## 2022-01-01 RX ADMIN — Medication 10 ML: at 12:59

## 2022-01-01 RX ADMIN — METOPROLOL TARTRATE 100 MG: 100 TABLET, FILM COATED ORAL at 14:59

## 2022-01-01 RX ADMIN — HYDRALAZINE HYDROCHLORIDE 25 MG: 25 TABLET, FILM COATED ORAL at 20:26

## 2022-01-01 RX ADMIN — BUMETANIDE 1 MG: 0.25 INJECTION INTRAMUSCULAR; INTRAVENOUS at 11:04

## 2022-01-01 RX ADMIN — SENNOSIDES AND DOCUSATE SODIUM 2 TABLET: 50; 8.6 TABLET ORAL at 08:07

## 2022-01-01 RX ADMIN — HYDROMORPHONE HYDROCHLORIDE 0.25 MG: 1 INJECTION, SOLUTION INTRAMUSCULAR; INTRAVENOUS; SUBCUTANEOUS at 14:03

## 2022-01-01 RX ADMIN — INSULIN HUMAN 4 UNITS: 100 INJECTION, SOLUTION PARENTERAL at 17:55

## 2022-01-01 RX ADMIN — LABETALOL HYDROCHLORIDE 20 MG: 5 INJECTION, SOLUTION INTRAVENOUS at 18:28

## 2022-01-01 RX ADMIN — SENNOSIDES AND DOCUSATE SODIUM 2 TABLET: 50; 8.6 TABLET ORAL at 08:42

## 2022-01-01 RX ADMIN — ONDANSETRON 4 MG: 2 INJECTION INTRAMUSCULAR; INTRAVENOUS at 02:31

## 2022-01-01 RX ADMIN — IPRATROPIUM BROMIDE AND ALBUTEROL SULFATE 3 ML: .5; 3 SOLUTION RESPIRATORY (INHALATION) at 07:42

## 2022-01-01 RX ADMIN — FENTANYL CITRATE 25 MCG: 50 INJECTION, SOLUTION INTRAMUSCULAR; INTRAVENOUS at 12:32

## 2022-01-01 RX ADMIN — GUAIFENESIN 600 MG: 600 TABLET, EXTENDED RELEASE ORAL at 20:45

## 2022-01-01 RX ADMIN — HYDRALAZINE HYDROCHLORIDE 75 MG: 50 TABLET, FILM COATED ORAL at 16:49

## 2022-01-01 RX ADMIN — SODIUM CHLORIDE 100 ML/HR: 9 INJECTION, SOLUTION INTRAVENOUS at 11:23

## 2022-01-01 RX ADMIN — ATORVASTATIN CALCIUM 80 MG: 40 TABLET, FILM COATED ORAL at 21:17

## 2022-01-01 RX ADMIN — PANTOPRAZOLE SODIUM 40 MG: 40 TABLET, DELAYED RELEASE ORAL at 08:59

## 2022-01-01 RX ADMIN — INSULIN HUMAN 4 UNITS: 100 INJECTION, SOLUTION PARENTERAL at 17:40

## 2022-01-01 RX ADMIN — ALPRAZOLAM 0.5 MG: 0.5 TABLET ORAL at 09:01

## 2022-01-01 RX ADMIN — Medication 10 ML: at 20:04

## 2022-01-01 RX ADMIN — IPRATROPIUM BROMIDE AND ALBUTEROL SULFATE 3 ML: .5; 3 SOLUTION RESPIRATORY (INHALATION) at 13:11

## 2022-01-01 RX ADMIN — LIDOCAINE HYDROCHLORIDE 50 MG: 10 INJECTION, SOLUTION EPIDURAL; INFILTRATION; INTRACAUDAL; PERINEURAL at 15:26

## 2022-01-01 RX ADMIN — IPRATROPIUM BROMIDE AND ALBUTEROL SULFATE 3 ML: .5; 3 SOLUTION RESPIRATORY (INHALATION) at 20:43

## 2022-01-01 RX ADMIN — Medication 10 ML: at 08:29

## 2022-01-01 RX ADMIN — CASTOR OIL AND BALSAM, PERU 1 APPLICATION: 788; 87 OINTMENT TOPICAL at 08:42

## 2022-01-01 RX ADMIN — IPRATROPIUM BROMIDE AND ALBUTEROL SULFATE 3 ML: .5; 3 SOLUTION RESPIRATORY (INHALATION) at 17:00

## 2022-01-01 RX ADMIN — CLONIDINE 1 PATCH: 0.1 PATCH, EXTENDED RELEASE TRANSDERMAL at 00:09

## 2022-01-01 RX ADMIN — SODIUM CHLORIDE 150 MG: 9 INJECTION, SOLUTION INTRAVENOUS at 10:40

## 2022-01-01 RX ADMIN — ALBUMIN HUMAN 12.5 G: 0.25 SOLUTION INTRAVENOUS at 11:33

## 2022-01-01 RX ADMIN — DEXMEDETOMIDINE HYDROCHLORIDE 1 MCG/KG/HR: 4 INJECTION, SOLUTION INTRAVENOUS at 08:45

## 2022-01-01 RX ADMIN — BUDESONIDE 0.5 MG: 0.5 SUSPENSION RESPIRATORY (INHALATION) at 07:08

## 2022-01-01 RX ADMIN — METOPROLOL TARTRATE 100 MG: 100 TABLET, FILM COATED ORAL at 21:56

## 2022-01-01 RX ADMIN — METOPROLOL TARTRATE 25 MG: 25 TABLET, FILM COATED ORAL at 20:33

## 2022-01-01 RX ADMIN — NYSTATIN: 100000 POWDER TOPICAL at 08:01

## 2022-01-01 RX ADMIN — TAMSULOSIN HYDROCHLORIDE 0.8 MG: 0.4 CAPSULE ORAL at 08:42

## 2022-01-01 RX ADMIN — HEPARIN SODIUM 5000 UNITS: 5000 INJECTION INTRAVENOUS; SUBCUTANEOUS at 05:16

## 2022-01-01 RX ADMIN — ASPIRIN 81 MG: 81 TABLET, COATED ORAL at 08:02

## 2022-01-01 RX ADMIN — AMLODIPINE BESYLATE 10 MG: 10 TABLET ORAL at 08:59

## 2022-01-01 RX ADMIN — GABAPENTIN 100 MG: 100 CAPSULE ORAL at 05:39

## 2022-01-01 RX ADMIN — PANTOPRAZOLE SODIUM 40 MG: 40 INJECTION, POWDER, FOR SOLUTION INTRAVENOUS at 11:29

## 2022-01-01 RX ADMIN — HEPARIN SODIUM 5000 UNITS: 5000 INJECTION INTRAVENOUS; SUBCUTANEOUS at 21:37

## 2022-01-01 RX ADMIN — ASPIRIN 81 MG: 81 TABLET, COATED ORAL at 08:49

## 2022-01-01 RX ADMIN — CYANOCOBALAMIN TAB 1000 MCG 1000 MCG: 1000 TAB at 13:22

## 2022-01-01 RX ADMIN — IPRATROPIUM BROMIDE AND ALBUTEROL SULFATE 3 ML: 2.5; .5 SOLUTION RESPIRATORY (INHALATION) at 20:15

## 2022-01-01 RX ADMIN — HEPARIN SODIUM 5000 UNITS: 5000 INJECTION INTRAVENOUS; SUBCUTANEOUS at 21:55

## 2022-01-01 RX ADMIN — ALBUMIN HUMAN 12.5 G: 0.25 SOLUTION INTRAVENOUS at 21:48

## 2022-01-01 RX ADMIN — HEPARIN SODIUM 5000 UNITS: 5000 INJECTION INTRAVENOUS; SUBCUTANEOUS at 05:11

## 2022-01-01 RX ADMIN — IPRATROPIUM BROMIDE AND ALBUTEROL SULFATE 3 ML: .5; 3 SOLUTION RESPIRATORY (INHALATION) at 18:01

## 2022-01-01 RX ADMIN — IPRATROPIUM BROMIDE AND ALBUTEROL SULFATE 3 ML: 2.5; .5 SOLUTION RESPIRATORY (INHALATION) at 19:34

## 2022-01-01 RX ADMIN — HYDRALAZINE HYDROCHLORIDE 25 MG: 25 TABLET, FILM COATED ORAL at 05:56

## 2022-01-01 RX ADMIN — METHYLPREDNISOLONE SODIUM SUCCINATE 40 MG: 40 INJECTION, POWDER, FOR SOLUTION INTRAMUSCULAR; INTRAVENOUS at 08:25

## 2022-01-01 RX ADMIN — GABAPENTIN 300 MG: 300 CAPSULE ORAL at 20:36

## 2022-01-01 RX ADMIN — IPRATROPIUM BROMIDE AND ALBUTEROL SULFATE 3 ML: .5; 3 SOLUTION RESPIRATORY (INHALATION) at 12:21

## 2022-01-01 RX ADMIN — METOPROLOL TARTRATE 5 MG: 5 INJECTION INTRAVENOUS at 09:08

## 2022-01-01 RX ADMIN — POTASSIUM PHOSPHATE, MONOBASIC POTASSIUM PHOSPHATE, DIBASIC: 224; 236 INJECTION, SOLUTION, CONCENTRATE INTRAVENOUS at 17:23

## 2022-01-01 RX ADMIN — TAZOBACTAM SODIUM AND PIPERACILLIN SODIUM 3.38 G: 375; 3 INJECTION, SOLUTION INTRAVENOUS at 00:23

## 2022-01-01 RX ADMIN — HEPARIN SODIUM 5000 UNITS: 5000 INJECTION INTRAVENOUS; SUBCUTANEOUS at 16:16

## 2022-01-01 RX ADMIN — HYDRALAZINE HYDROCHLORIDE 25 MG: 25 TABLET, FILM COATED ORAL at 08:23

## 2022-01-01 RX ADMIN — Medication 10 ML: at 08:01

## 2022-01-01 RX ADMIN — Medication 10 ML: at 08:12

## 2022-01-01 RX ADMIN — METOPROLOL TARTRATE 100 MG: 100 TABLET, FILM COATED ORAL at 21:29

## 2022-01-01 RX ADMIN — IPRATROPIUM BROMIDE AND ALBUTEROL SULFATE 3 ML: .5; 3 SOLUTION RESPIRATORY (INHALATION) at 15:18

## 2022-01-01 RX ADMIN — DEXMEDETOMIDINE HYDROCHLORIDE 0.8 MCG/KG/HR: 4 INJECTION, SOLUTION INTRAVENOUS at 18:38

## 2022-01-01 RX ADMIN — SODIUM CHLORIDE: 9 INJECTION, SOLUTION INTRAVENOUS at 15:18

## 2022-01-01 RX ADMIN — CLOPIDOGREL BISULFATE 75 MG: 75 TABLET ORAL at 09:53

## 2022-01-01 RX ADMIN — BUDESONIDE 0.5 MG: 0.5 SUSPENSION RESPIRATORY (INHALATION) at 08:12

## 2022-01-01 RX ADMIN — HEPARIN SODIUM 5000 UNITS: 5000 INJECTION INTRAVENOUS; SUBCUTANEOUS at 14:15

## 2022-01-01 RX ADMIN — TAMSULOSIN HYDROCHLORIDE 0.8 MG: 0.4 CAPSULE ORAL at 08:56

## 2022-01-01 RX ADMIN — LEVOFLOXACIN 500 MG: 500 INJECTION, SOLUTION INTRAVENOUS at 05:07

## 2022-01-01 RX ADMIN — PREDNISONE 40 MG: 20 TABLET ORAL at 08:42

## 2022-01-01 RX ADMIN — AMLODIPINE BESYLATE 10 MG: 10 TABLET ORAL at 08:54

## 2022-01-01 RX ADMIN — GABAPENTIN 100 MG: 100 CAPSULE ORAL at 22:09

## 2022-01-01 RX ADMIN — DEXMEDETOMIDINE HYDROCHLORIDE 0.6 MCG/KG/HR: 4 INJECTION, SOLUTION INTRAVENOUS at 09:26

## 2022-01-01 RX ADMIN — DIPHENHYDRAMINE HYDROCHLORIDE 50 MG: 50 INJECTION INTRAMUSCULAR; INTRAVENOUS at 10:17

## 2022-01-01 RX ADMIN — DOXYCYCLINE 100 MG: 100 CAPSULE ORAL at 08:02

## 2022-01-01 RX ADMIN — CASTOR OIL AND BALSAM, PERU 1 APPLICATION: 788; 87 OINTMENT TOPICAL at 21:10

## 2022-01-01 RX ADMIN — METOPROLOL TARTRATE 100 MG: 100 TABLET, FILM COATED ORAL at 21:23

## 2022-01-01 RX ADMIN — OXYCODONE 5 MG: 5 TABLET ORAL at 08:08

## 2022-01-01 RX ADMIN — METHYLPREDNISOLONE SODIUM SUCCINATE 125 MG: 125 INJECTION, POWDER, FOR SOLUTION INTRAMUSCULAR; INTRAVENOUS at 05:20

## 2022-01-01 RX ADMIN — TAZOBACTAM SODIUM AND PIPERACILLIN SODIUM 3.38 G: 375; 3 INJECTION, SOLUTION INTRAVENOUS at 11:34

## 2022-01-01 RX ADMIN — NICOTINE 1 PATCH: 14 PATCH, EXTENDED RELEASE TRANSDERMAL at 22:24

## 2022-01-01 RX ADMIN — HYDROMORPHONE HYDROCHLORIDE 0.25 MG: 1 INJECTION, SOLUTION INTRAMUSCULAR; INTRAVENOUS; SUBCUTANEOUS at 15:03

## 2022-01-01 RX ADMIN — IPRATROPIUM BROMIDE AND ALBUTEROL SULFATE 3 ML: .5; 3 SOLUTION RESPIRATORY (INHALATION) at 07:08

## 2022-01-01 RX ADMIN — HEPARIN SODIUM 5000 UNITS: 5000 INJECTION INTRAVENOUS; SUBCUTANEOUS at 05:38

## 2022-01-01 RX ADMIN — IPRATROPIUM BROMIDE AND ALBUTEROL SULFATE 3 ML: 2.5; .5 SOLUTION RESPIRATORY (INHALATION) at 13:13

## 2022-01-01 RX ADMIN — IPRATROPIUM BROMIDE AND ALBUTEROL SULFATE 3 ML: .5; 3 SOLUTION RESPIRATORY (INHALATION) at 01:35

## 2022-01-01 RX ADMIN — ALBUMIN HUMAN 25 G: 0.25 SOLUTION INTRAVENOUS at 20:32

## 2022-01-01 RX ADMIN — PANTOPRAZOLE SODIUM 40 MG: 40 TABLET, DELAYED RELEASE ORAL at 20:26

## 2022-01-01 RX ADMIN — GABAPENTIN 100 MG: 100 CAPSULE ORAL at 14:46

## 2022-01-01 RX ADMIN — HYDRALAZINE HYDROCHLORIDE 25 MG: 25 TABLET, FILM COATED ORAL at 14:08

## 2022-01-01 RX ADMIN — METOPROLOL TARTRATE 100 MG: 100 TABLET, FILM COATED ORAL at 08:01

## 2022-01-01 RX ADMIN — PANTOPRAZOLE SODIUM 40 MG: 40 INJECTION, POWDER, FOR SOLUTION INTRAVENOUS at 05:18

## 2022-01-01 RX ADMIN — BUDESONIDE 0.5 MG: 0.5 SUSPENSION RESPIRATORY (INHALATION) at 07:42

## 2022-01-01 RX ADMIN — NYSTATIN: 100000 POWDER TOPICAL at 21:40

## 2022-01-01 RX ADMIN — GUAIFENESIN 600 MG: 600 TABLET, EXTENDED RELEASE ORAL at 08:23

## 2022-01-01 RX ADMIN — IPRATROPIUM BROMIDE AND ALBUTEROL SULFATE 3 ML: .5; 3 SOLUTION RESPIRATORY (INHALATION) at 06:29

## 2022-01-01 RX ADMIN — METHYLPREDNISOLONE SODIUM SUCCINATE 60 MG: 125 INJECTION, POWDER, FOR SOLUTION INTRAMUSCULAR; INTRAVENOUS at 07:58

## 2022-01-01 RX ADMIN — BUMETANIDE 0.5 MG: 0.25 INJECTION INTRAMUSCULAR; INTRAVENOUS at 16:50

## 2022-01-01 RX ADMIN — PREDNISONE 40 MG: 20 TABLET ORAL at 08:13

## 2022-01-01 RX ADMIN — ALPRAZOLAM 0.5 MG: 0.5 TABLET ORAL at 04:28

## 2022-01-01 RX ADMIN — OXYCODONE 5 MG: 5 TABLET ORAL at 16:32

## 2022-01-01 RX ADMIN — HYDRALAZINE HYDROCHLORIDE 75 MG: 50 TABLET, FILM COATED ORAL at 21:16

## 2022-01-01 RX ADMIN — METHYLPREDNISOLONE SODIUM SUCCINATE 125 MG: 125 INJECTION, POWDER, FOR SOLUTION INTRAMUSCULAR; INTRAVENOUS at 11:47

## 2022-01-01 RX ADMIN — GABAPENTIN 300 MG: 300 CAPSULE ORAL at 15:48

## 2022-01-01 RX ADMIN — BUDESONIDE 0.5 MG: 0.5 SUSPENSION RESPIRATORY (INHALATION) at 20:25

## 2022-01-01 RX ADMIN — IPRATROPIUM BROMIDE 0.5 MG: 0.5 SOLUTION RESPIRATORY (INHALATION) at 19:17

## 2022-01-01 RX ADMIN — TAMSULOSIN HYDROCHLORIDE 0.4 MG: 0.4 CAPSULE ORAL at 10:24

## 2022-01-01 RX ADMIN — BUDESONIDE 0.5 MG: 0.5 SUSPENSION RESPIRATORY (INHALATION) at 06:12

## 2022-01-01 RX ADMIN — PANTOPRAZOLE SODIUM 40 MG: 40 INJECTION, POWDER, FOR SOLUTION INTRAVENOUS at 08:01

## 2022-01-01 RX ADMIN — ALBUMIN HUMAN 12.5 G: 0.25 SOLUTION INTRAVENOUS at 20:34

## 2022-01-01 RX ADMIN — METHYLPREDNISOLONE SODIUM SUCCINATE 80 MG: 125 INJECTION, POWDER, FOR SOLUTION INTRAMUSCULAR; INTRAVENOUS at 08:48

## 2022-01-01 RX ADMIN — HEPARIN SODIUM 5000 UNITS: 5000 INJECTION INTRAVENOUS; SUBCUTANEOUS at 05:24

## 2022-01-01 RX ADMIN — CLONIDINE 1 PATCH: 0.1 PATCH, EXTENDED RELEASE TRANSDERMAL at 08:55

## 2022-01-01 RX ADMIN — CASTOR OIL AND BALSAM, PERU 1 APPLICATION: 788; 87 OINTMENT TOPICAL at 21:57

## 2022-01-01 RX ADMIN — IPRATROPIUM BROMIDE AND ALBUTEROL SULFATE 3 ML: .5; 3 SOLUTION RESPIRATORY (INHALATION) at 08:17

## 2022-01-01 RX ADMIN — GUAIFENESIN 600 MG: 600 TABLET, EXTENDED RELEASE ORAL at 22:19

## 2022-01-01 RX ADMIN — METOPROLOL TARTRATE 5 MG: 5 INJECTION INTRAVENOUS at 23:59

## 2022-01-01 RX ADMIN — ALPRAZOLAM 0.5 MG: 0.5 TABLET ORAL at 10:02

## 2022-01-01 RX ADMIN — GUAIFENESIN 600 MG: 600 TABLET, EXTENDED RELEASE ORAL at 08:14

## 2022-01-01 RX ADMIN — ATORVASTATIN CALCIUM 80 MG: 40 TABLET, FILM COATED ORAL at 08:07

## 2022-01-01 RX ADMIN — METOPROLOL TARTRATE 25 MG: 25 TABLET, FILM COATED ORAL at 20:45

## 2022-01-01 RX ADMIN — IPRATROPIUM BROMIDE AND ALBUTEROL SULFATE 3 ML: .5; 3 SOLUTION RESPIRATORY (INHALATION) at 19:28

## 2022-01-01 RX ADMIN — VANCOMYCIN HYDROCHLORIDE 1500 MG: 10 INJECTION, POWDER, LYOPHILIZED, FOR SOLUTION INTRAVENOUS at 18:15

## 2022-01-01 RX ADMIN — IPRATROPIUM BROMIDE AND ALBUTEROL SULFATE 3 ML: .5; 3 SOLUTION RESPIRATORY (INHALATION) at 20:11

## 2022-01-01 RX ADMIN — PREDNISONE 10 MG: 10 TABLET ORAL at 08:29

## 2022-01-01 RX ADMIN — BUDESONIDE 0.5 MG: 0.5 SUSPENSION RESPIRATORY (INHALATION) at 19:45

## 2022-01-01 RX ADMIN — TAMSULOSIN HYDROCHLORIDE 0.4 MG: 0.4 CAPSULE ORAL at 08:18

## 2022-01-01 RX ADMIN — BUDESONIDE 0.5 MG: 0.5 SUSPENSION RESPIRATORY (INHALATION) at 07:41

## 2022-01-01 RX ADMIN — ACETAMINOPHEN 325MG 650 MG: 325 TABLET ORAL at 16:42

## 2022-01-01 RX ADMIN — TAZOBACTAM SODIUM AND PIPERACILLIN SODIUM 3.38 G: 375; 3 INJECTION, SOLUTION INTRAVENOUS at 12:03

## 2022-01-01 RX ADMIN — METOPROLOL TARTRATE 100 MG: 100 TABLET, FILM COATED ORAL at 12:12

## 2022-01-01 RX ADMIN — GABAPENTIN 300 MG: 300 CAPSULE ORAL at 16:27

## 2022-01-01 RX ADMIN — AMLODIPINE BESYLATE 10 MG: 10 TABLET ORAL at 08:23

## 2022-01-01 RX ADMIN — HYDRALAZINE HYDROCHLORIDE 75 MG: 50 TABLET, FILM COATED ORAL at 16:17

## 2022-01-01 RX ADMIN — NICOTINE 1 PATCH: 14 PATCH, EXTENDED RELEASE TRANSDERMAL at 21:28

## 2022-01-01 RX ADMIN — HYDRALAZINE HYDROCHLORIDE 25 MG: 25 TABLET, FILM COATED ORAL at 22:24

## 2022-01-01 RX ADMIN — HYDRALAZINE HYDROCHLORIDE 25 MG: 25 TABLET, FILM COATED ORAL at 14:40

## 2022-01-01 RX ADMIN — HYDRALAZINE HYDROCHLORIDE 25 MG: 25 TABLET, FILM COATED ORAL at 22:06

## 2022-01-01 RX ADMIN — IPRATROPIUM BROMIDE AND ALBUTEROL SULFATE 3 ML: .5; 3 SOLUTION RESPIRATORY (INHALATION) at 07:16

## 2022-01-01 RX ADMIN — HEPARIN SODIUM 5000 UNITS: 5000 INJECTION INTRAVENOUS; SUBCUTANEOUS at 05:26

## 2022-01-01 RX ADMIN — IPRATROPIUM BROMIDE AND ALBUTEROL SULFATE 3 ML: .5; 3 SOLUTION RESPIRATORY (INHALATION) at 16:29

## 2022-01-01 RX ADMIN — METOPROLOL TARTRATE 100 MG: 100 TABLET, FILM COATED ORAL at 09:06

## 2022-01-01 RX ADMIN — INSULIN HUMAN 2 UNITS: 100 INJECTION, SOLUTION PARENTERAL at 12:03

## 2022-01-01 RX ADMIN — DOXYCYCLINE 100 MG: 100 CAPSULE ORAL at 20:36

## 2022-01-01 RX ADMIN — TAZOBACTAM SODIUM AND PIPERACILLIN SODIUM 3.38 G: 375; 3 INJECTION, SOLUTION INTRAVENOUS at 23:56

## 2022-01-01 RX ADMIN — SUGAMMADEX 200 MG: 100 INJECTION, SOLUTION INTRAVENOUS at 11:48

## 2022-01-01 RX ADMIN — HYDRALAZINE HYDROCHLORIDE 75 MG: 50 TABLET, FILM COATED ORAL at 14:15

## 2022-01-01 RX ADMIN — TAZOBACTAM SODIUM AND PIPERACILLIN SODIUM 3.38 G: 375; 3 INJECTION, SOLUTION INTRAVENOUS at 17:09

## 2022-01-01 RX ADMIN — CASTOR OIL AND BALSAM, PERU 1 APPLICATION: 788; 87 OINTMENT TOPICAL at 02:53

## 2022-01-01 RX ADMIN — Medication 10 ML: at 21:27

## 2022-01-01 RX ADMIN — BUDESONIDE 0.5 MG: 0.5 SUSPENSION RESPIRATORY (INHALATION) at 20:18

## 2022-01-01 RX ADMIN — IPRATROPIUM BROMIDE AND ALBUTEROL SULFATE 3 ML: 2.5; .5 SOLUTION RESPIRATORY (INHALATION) at 16:44

## 2022-01-01 RX ADMIN — METOPROLOL TARTRATE 5 MG: 5 INJECTION INTRAVENOUS at 23:02

## 2022-01-01 RX ADMIN — POTASSIUM PHOSPHATE, MONOBASIC POTASSIUM PHOSPHATE, DIBASIC: 224; 236 INJECTION, SOLUTION, CONCENTRATE INTRAVENOUS at 17:19

## 2022-01-01 RX ADMIN — Medication 10 ML: at 21:11

## 2022-01-01 RX ADMIN — LABETALOL HYDROCHLORIDE 20 MG: 5 INJECTION, SOLUTION INTRAVENOUS at 03:21

## 2022-01-01 RX ADMIN — HEPARIN SODIUM 5000 UNITS: 5000 INJECTION INTRAVENOUS; SUBCUTANEOUS at 05:10

## 2022-01-01 RX ADMIN — HEPARIN SODIUM 5000 UNITS: 5000 INJECTION INTRAVENOUS; SUBCUTANEOUS at 13:30

## 2022-01-01 RX ADMIN — PREDNISONE 20 MG: 20 TABLET ORAL at 08:23

## 2022-01-01 RX ADMIN — NYSTATIN: 100000 POWDER TOPICAL at 14:59

## 2022-01-01 RX ADMIN — TAMSULOSIN HYDROCHLORIDE 0.8 MG: 0.4 CAPSULE ORAL at 08:58

## 2022-01-01 RX ADMIN — Medication 10 ML: at 08:27

## 2022-01-01 RX ADMIN — NIFEDIPINE 60 MG: 30 TABLET, FILM COATED, EXTENDED RELEASE ORAL at 08:58

## 2022-01-01 RX ADMIN — ALBUTEROL SULFATE 2.5 MG: 2.5 SOLUTION RESPIRATORY (INHALATION) at 05:28

## 2022-01-01 RX ADMIN — Medication 5 MG/HR: at 12:57

## 2022-01-01 RX ADMIN — HYDRALAZINE HYDROCHLORIDE 75 MG: 50 TABLET, FILM COATED ORAL at 05:31

## 2022-01-01 RX ADMIN — HEPARIN SODIUM 5000 UNITS: 5000 INJECTION INTRAVENOUS; SUBCUTANEOUS at 15:00

## 2022-01-01 RX ADMIN — TAZOBACTAM SODIUM AND PIPERACILLIN SODIUM 3.38 G: 375; 3 INJECTION, SOLUTION INTRAVENOUS at 12:23

## 2022-01-01 RX ADMIN — DEXMEDETOMIDINE HYDROCHLORIDE 1 MCG/KG/HR: 4 INJECTION, SOLUTION INTRAVENOUS at 21:06

## 2022-01-01 RX ADMIN — LEVOFLOXACIN 500 MG: 500 INJECTION, SOLUTION INTRAVENOUS at 05:21

## 2022-01-01 RX ADMIN — CYANOCOBALAMIN TAB 1000 MCG 1000 MCG: 1000 TAB at 08:58

## 2022-01-01 RX ADMIN — CLONIDINE HYDROCHLORIDE 0.1 MG: 0.1 TABLET ORAL at 02:10

## 2022-01-01 RX ADMIN — IPRATROPIUM BROMIDE AND ALBUTEROL SULFATE 3 ML: .5; 3 SOLUTION RESPIRATORY (INHALATION) at 12:48

## 2022-01-01 RX ADMIN — SODIUM BICARBONATE 150 MEQ: 84 INJECTION INTRAVENOUS at 11:35

## 2022-01-01 RX ADMIN — LORAZEPAM 1 MG: 2 INJECTION INTRAMUSCULAR; INTRAVENOUS at 15:16

## 2022-01-01 RX ADMIN — NICOTINE 1 PATCH: 14 PATCH, EXTENDED RELEASE TRANSDERMAL at 21:43

## 2022-01-01 RX ADMIN — IPRATROPIUM BROMIDE AND ALBUTEROL SULFATE 3 ML: .5; 3 SOLUTION RESPIRATORY (INHALATION) at 07:41

## 2022-01-01 RX ADMIN — GABAPENTIN 100 MG: 100 CAPSULE ORAL at 17:57

## 2022-01-01 RX ADMIN — CASTOR OIL AND BALSAM, PERU 1 APPLICATION: 788; 87 OINTMENT TOPICAL at 21:16

## 2022-01-01 RX ADMIN — PANTOPRAZOLE SODIUM 40 MG: 40 INJECTION, POWDER, FOR SOLUTION INTRAVENOUS at 12:13

## 2022-01-01 RX ADMIN — PANTOPRAZOLE SODIUM 40 MG: 40 TABLET, DELAYED RELEASE ORAL at 21:20

## 2022-01-01 RX ADMIN — IPRATROPIUM BROMIDE AND ALBUTEROL SULFATE 3 ML: .5; 3 SOLUTION RESPIRATORY (INHALATION) at 19:48

## 2022-01-01 RX ADMIN — IPRATROPIUM BROMIDE AND ALBUTEROL SULFATE 3 ML: .5; 3 SOLUTION RESPIRATORY (INHALATION) at 07:32

## 2022-01-01 RX ADMIN — DOXYCYCLINE 100 MG: 100 CAPSULE ORAL at 08:08

## 2022-01-01 RX ADMIN — IPRATROPIUM BROMIDE AND ALBUTEROL SULFATE 3 ML: .5; 3 SOLUTION RESPIRATORY (INHALATION) at 12:45

## 2022-01-01 RX ADMIN — METOPROLOL TARTRATE 100 MG: 100 TABLET, FILM COATED ORAL at 08:42

## 2022-01-01 RX ADMIN — DOCUSATE SODIUM 100 MG: 100 CAPSULE, LIQUID FILLED ORAL at 20:45

## 2022-01-01 RX ADMIN — HYDRALAZINE HYDROCHLORIDE 25 MG: 25 TABLET, FILM COATED ORAL at 22:14

## 2022-01-01 RX ADMIN — GADOBENATE DIMEGLUMINE 14 ML: 529 INJECTION, SOLUTION INTRAVENOUS at 15:07

## 2022-01-01 RX ADMIN — IPRATROPIUM BROMIDE AND ALBUTEROL SULFATE 3 ML: 2.5; .5 SOLUTION RESPIRATORY (INHALATION) at 15:44

## 2022-01-01 RX ADMIN — NICOTINE 1 PATCH: 14 PATCH, EXTENDED RELEASE TRANSDERMAL at 20:09

## 2022-01-01 RX ADMIN — GUAIFENESIN 600 MG: 600 TABLET, EXTENDED RELEASE ORAL at 20:33

## 2022-01-01 RX ADMIN — METOPROLOL TARTRATE 25 MG: 25 TABLET, FILM COATED ORAL at 08:49

## 2022-01-01 RX ADMIN — SENNOSIDES AND DOCUSATE SODIUM 2 TABLET: 50; 8.6 TABLET ORAL at 21:18

## 2022-01-01 RX ADMIN — LABETALOL HYDROCHLORIDE 20 MG: 5 INJECTION, SOLUTION INTRAVENOUS at 22:30

## 2022-01-01 RX ADMIN — IPRATROPIUM BROMIDE 0.5 MG: 0.5 SOLUTION RESPIRATORY (INHALATION) at 10:32

## 2022-01-01 RX ADMIN — LORAZEPAM 1 MG: 1 TABLET ORAL at 13:33

## 2022-01-01 RX ADMIN — AMLODIPINE BESYLATE 10 MG: 10 TABLET ORAL at 08:10

## 2022-01-01 RX ADMIN — NYSTATIN: 100000 POWDER TOPICAL at 08:43

## 2022-01-01 RX ADMIN — BUDESONIDE 0.5 MG: 0.5 SUSPENSION RESPIRATORY (INHALATION) at 20:20

## 2022-01-01 RX ADMIN — ALPRAZOLAM 0.5 MG: 0.5 TABLET ORAL at 00:59

## 2022-01-01 RX ADMIN — DEXMEDETOMIDINE HYDROCHLORIDE 1 MCG/KG/HR: 4 INJECTION, SOLUTION INTRAVENOUS at 09:40

## 2022-01-01 RX ADMIN — NYSTATIN 500000 UNITS: 100000 SUSPENSION ORAL at 12:22

## 2022-01-01 RX ADMIN — Medication 10 ML: at 20:21

## 2022-01-01 RX ADMIN — PANTOPRAZOLE SODIUM 40 MG: 40 TABLET, DELAYED RELEASE ORAL at 08:01

## 2022-01-01 RX ADMIN — Medication 10 ML: at 09:27

## 2022-01-01 RX ADMIN — Medication 100 MCG: at 11:31

## 2022-01-01 RX ADMIN — GABAPENTIN 100 MG: 100 CAPSULE ORAL at 14:40

## 2022-01-01 RX ADMIN — Medication 10 ML: at 09:09

## 2022-01-01 RX ADMIN — BUDESONIDE 0.5 MG: 0.5 SUSPENSION RESPIRATORY (INHALATION) at 08:19

## 2022-01-01 RX ADMIN — HEPARIN SODIUM 5000 UNITS: 5000 INJECTION INTRAVENOUS; SUBCUTANEOUS at 23:56

## 2022-01-01 RX ADMIN — PANTOPRAZOLE SODIUM 40 MG: 40 TABLET, DELAYED RELEASE ORAL at 08:21

## 2022-01-01 RX ADMIN — CASTOR OIL AND BALSAM, PERU 1 APPLICATION: 788; 87 OINTMENT TOPICAL at 20:40

## 2022-01-01 RX ADMIN — IPRATROPIUM BROMIDE AND ALBUTEROL SULFATE 3 ML: 2.5; .5 SOLUTION RESPIRATORY (INHALATION) at 19:11

## 2022-01-01 RX ADMIN — IPRATROPIUM BROMIDE AND ALBUTEROL SULFATE 3 ML: .5; 3 SOLUTION RESPIRATORY (INHALATION) at 07:39

## 2022-01-01 RX ADMIN — BUDESONIDE 0.5 MG: 0.5 SUSPENSION RESPIRATORY (INHALATION) at 21:30

## 2022-01-01 RX ADMIN — AMLODIPINE BESYLATE 10 MG: 10 TABLET ORAL at 08:49

## 2022-01-01 RX ADMIN — IPRATROPIUM BROMIDE AND ALBUTEROL SULFATE 3 ML: 2.5; .5 SOLUTION RESPIRATORY (INHALATION) at 11:30

## 2022-01-01 RX ADMIN — HEPARIN SODIUM 5000 UNITS: 5000 INJECTION INTRAVENOUS; SUBCUTANEOUS at 23:02

## 2022-01-01 RX ADMIN — IPRATROPIUM BROMIDE AND ALBUTEROL SULFATE 3 ML: .5; 3 SOLUTION RESPIRATORY (INHALATION) at 06:49

## 2022-01-01 RX ADMIN — ATORVASTATIN CALCIUM 80 MG: 40 TABLET, FILM COATED ORAL at 08:00

## 2022-01-01 RX ADMIN — IPRATROPIUM BROMIDE AND ALBUTEROL SULFATE 3 ML: .5; 3 SOLUTION RESPIRATORY (INHALATION) at 21:16

## 2022-01-01 RX ADMIN — METOCLOPRAMIDE 5 MG: 5 INJECTION, SOLUTION INTRAMUSCULAR; INTRAVENOUS at 18:07

## 2022-01-01 RX ADMIN — METOPROLOL TARTRATE 5 MG: 5 INJECTION INTRAVENOUS at 16:15

## 2022-01-01 RX ADMIN — SODIUM CHLORIDE 250 ML: 9 INJECTION, SOLUTION INTRAVENOUS at 09:30

## 2022-01-01 RX ADMIN — GABAPENTIN 300 MG: 300 CAPSULE ORAL at 16:04

## 2022-01-01 RX ADMIN — ACETAMINOPHEN 325MG 650 MG: 325 TABLET ORAL at 11:01

## 2022-01-01 RX ADMIN — PANTOPRAZOLE SODIUM 40 MG: 40 TABLET, DELAYED RELEASE ORAL at 08:25

## 2022-01-01 RX ADMIN — IPRATROPIUM BROMIDE AND ALBUTEROL SULFATE 3 ML: .5; 3 SOLUTION RESPIRATORY (INHALATION) at 07:40

## 2022-01-01 RX ADMIN — HEPARIN SODIUM 5000 UNITS: 5000 INJECTION INTRAVENOUS; SUBCUTANEOUS at 05:31

## 2022-01-01 RX ADMIN — METOCLOPRAMIDE 5 MG: 5 INJECTION, SOLUTION INTRAMUSCULAR; INTRAVENOUS at 09:08

## 2022-01-01 RX ADMIN — OXYCODONE 5 MG: 5 TABLET ORAL at 02:05

## 2022-01-01 RX ADMIN — IPRATROPIUM BROMIDE AND ALBUTEROL SULFATE 3 ML: .5; 3 SOLUTION RESPIRATORY (INHALATION) at 07:36

## 2022-01-01 RX ADMIN — BUDESONIDE 0.5 MG: 0.5 SUSPENSION RESPIRATORY (INHALATION) at 19:42

## 2022-01-01 RX ADMIN — METOPROLOL TARTRATE 5 MG: 5 INJECTION INTRAVENOUS at 05:19

## 2022-01-01 RX ADMIN — APIXABAN 5 MG: 5 TABLET, FILM COATED ORAL at 22:14

## 2022-01-01 RX ADMIN — METOPROLOL TARTRATE 100 MG: 100 TABLET, FILM COATED ORAL at 20:23

## 2022-01-01 RX ADMIN — HEPARIN SODIUM 5000 UNITS: 5000 INJECTION INTRAVENOUS; SUBCUTANEOUS at 20:35

## 2022-01-01 RX ADMIN — HEPARIN SODIUM 5000 UNITS: 5000 INJECTION INTRAVENOUS; SUBCUTANEOUS at 15:07

## 2022-01-01 RX ADMIN — HEPARIN SODIUM 5000 UNITS: 5000 INJECTION INTRAVENOUS; SUBCUTANEOUS at 06:39

## 2022-01-01 RX ADMIN — ACETAMINOPHEN 650 MG: 325 TABLET ORAL at 07:21

## 2022-01-01 RX ADMIN — CASTOR OIL AND BALSAM, PERU 1 APPLICATION: 788; 87 OINTMENT TOPICAL at 08:27

## 2022-01-01 RX ADMIN — HYDROMORPHONE HYDROCHLORIDE 0.25 MG: 1 INJECTION, SOLUTION INTRAMUSCULAR; INTRAVENOUS; SUBCUTANEOUS at 12:13

## 2022-01-01 RX ADMIN — CASTOR OIL AND BALSAM, PERU 1 APPLICATION: 788; 87 OINTMENT TOPICAL at 21:27

## 2022-01-01 RX ADMIN — GABAPENTIN 100 MG: 100 CAPSULE ORAL at 05:25

## 2022-01-01 RX ADMIN — BUDESONIDE 0.5 MG: 0.5 SUSPENSION RESPIRATORY (INHALATION) at 19:53

## 2022-01-01 RX ADMIN — HEPARIN SODIUM 5000 UNITS: 5000 INJECTION INTRAVENOUS; SUBCUTANEOUS at 21:20

## 2022-01-01 RX ADMIN — IPRATROPIUM BROMIDE AND ALBUTEROL SULFATE 3 ML: .5; 3 SOLUTION RESPIRATORY (INHALATION) at 07:24

## 2022-01-01 RX ADMIN — CYANOCOBALAMIN TAB 1000 MCG 1000 MCG: 1000 TAB at 08:14

## 2022-01-01 RX ADMIN — PENTOXIFYLLINE 400 MG: 400 TABLET, EXTENDED RELEASE ORAL at 08:01

## 2022-01-01 RX ADMIN — ATORVASTATIN CALCIUM 80 MG: 40 TABLET, FILM COATED ORAL at 08:14

## 2022-01-01 RX ADMIN — TAZOBACTAM SODIUM AND PIPERACILLIN SODIUM 3.38 G: 375; 3 INJECTION, SOLUTION INTRAVENOUS at 17:18

## 2022-01-01 RX ADMIN — IPRATROPIUM BROMIDE AND ALBUTEROL SULFATE 3 ML: 2.5; .5 SOLUTION RESPIRATORY (INHALATION) at 07:27

## 2022-01-01 RX ADMIN — PROPOFOL 150 MG: 10 INJECTION, EMULSION INTRAVENOUS at 11:06

## 2022-01-01 RX ADMIN — METOCLOPRAMIDE 5 MG: 5 INJECTION, SOLUTION INTRAMUSCULAR; INTRAVENOUS at 09:26

## 2022-01-01 RX ADMIN — IPRATROPIUM BROMIDE AND ALBUTEROL SULFATE 3 ML: 2.5; .5 SOLUTION RESPIRATORY (INHALATION) at 19:42

## 2022-01-01 RX ADMIN — Medication 10 ML: at 21:18

## 2022-01-01 RX ADMIN — HEPARIN SODIUM 5000 UNITS: 5000 INJECTION INTRAVENOUS; SUBCUTANEOUS at 16:38

## 2022-01-01 RX ADMIN — GABAPENTIN 300 MG: 300 CAPSULE ORAL at 22:14

## 2022-01-01 RX ADMIN — NYSTATIN: 100000 POWDER TOPICAL at 20:34

## 2022-01-01 RX ADMIN — MORPHINE SULFATE 2 MG: 2 INJECTION, SOLUTION INTRAMUSCULAR; INTRAVENOUS at 10:17

## 2022-01-01 RX ADMIN — MICAFUNGIN 100 MG: 20 INJECTION, POWDER, LYOPHILIZED, FOR SOLUTION INTRAVENOUS at 17:27

## 2022-01-01 RX ADMIN — METOPROLOL TARTRATE 100 MG: 100 TABLET, FILM COATED ORAL at 12:03

## 2022-01-01 RX ADMIN — METHYLPREDNISOLONE SODIUM SUCCINATE 80 MG: 125 INJECTION, POWDER, FOR SOLUTION INTRAMUSCULAR; INTRAVENOUS at 11:03

## 2022-01-01 RX ADMIN — NICOTINE 1 PATCH: 14 PATCH, EXTENDED RELEASE TRANSDERMAL at 21:03

## 2022-01-01 RX ADMIN — METOPROLOL TARTRATE 100 MG: 100 TABLET, FILM COATED ORAL at 08:21

## 2022-01-01 RX ADMIN — METHYLPREDNISOLONE SODIUM SUCCINATE 80 MG: 40 INJECTION, POWDER, FOR SOLUTION INTRAMUSCULAR; INTRAVENOUS at 15:17

## 2022-01-01 RX ADMIN — INSULIN HUMAN 2 UNITS: 100 INJECTION, SOLUTION PARENTERAL at 17:34

## 2022-01-01 RX ADMIN — DOCUSATE SODIUM 100 MG: 100 CAPSULE, LIQUID FILLED ORAL at 20:03

## 2022-01-01 RX ADMIN — PROPOFOL 50 MG: 10 INJECTION, EMULSION INTRAVENOUS at 15:26

## 2022-01-01 RX ADMIN — PANTOPRAZOLE SODIUM 40 MG: 40 INJECTION, POWDER, FOR SOLUTION INTRAVENOUS at 05:38

## 2022-01-01 RX ADMIN — GABAPENTIN 100 MG: 100 CAPSULE ORAL at 05:10

## 2022-01-01 RX ADMIN — SENNOSIDES AND DOCUSATE SODIUM 2 TABLET: 50; 8.6 TABLET ORAL at 08:02

## 2022-01-01 RX ADMIN — HYDROMORPHONE HYDROCHLORIDE 0.25 MG: 1 INJECTION, SOLUTION INTRAMUSCULAR; INTRAVENOUS; SUBCUTANEOUS at 23:56

## 2022-01-01 RX ADMIN — GABAPENTIN 100 MG: 100 CAPSULE ORAL at 21:26

## 2022-01-01 RX ADMIN — METOPROLOL TARTRATE 25 MG: 25 TABLET, FILM COATED ORAL at 08:59

## 2022-01-01 RX ADMIN — Medication 10 ML: at 08:50

## 2022-01-01 RX ADMIN — IPRATROPIUM BROMIDE AND ALBUTEROL SULFATE 3 ML: .5; 3 SOLUTION RESPIRATORY (INHALATION) at 03:26

## 2022-01-01 RX ADMIN — AMLODIPINE BESYLATE 10 MG: 10 TABLET ORAL at 08:02

## 2022-01-01 RX ADMIN — MONTELUKAST 10 MG: 10 TABLET, FILM COATED ORAL at 21:10

## 2022-01-01 RX ADMIN — HEPARIN SODIUM 5000 UNITS: 5000 INJECTION INTRAVENOUS; SUBCUTANEOUS at 05:39

## 2022-01-01 RX ADMIN — METHYLPREDNISOLONE SODIUM SUCCINATE 80 MG: 125 INJECTION, POWDER, FOR SOLUTION INTRAMUSCULAR; INTRAVENOUS at 08:29

## 2022-01-01 RX ADMIN — NYSTATIN: 100000 POWDER TOPICAL at 21:10

## 2022-01-01 RX ADMIN — FLUCONAZOLE 150 MG: 100 TABLET ORAL at 11:09

## 2022-01-01 RX ADMIN — FLUCONAZOLE 100 MG: 100 TABLET ORAL at 15:02

## 2022-01-01 RX ADMIN — DOXYCYCLINE 100 MG: 100 CAPSULE ORAL at 20:33

## 2022-01-01 RX ADMIN — METOPROLOL TARTRATE 100 MG: 100 TABLET, FILM COATED ORAL at 20:26

## 2022-01-01 RX ADMIN — DEXMEDETOMIDINE HYDROCHLORIDE 0.8 MCG/KG/HR: 4 INJECTION, SOLUTION INTRAVENOUS at 07:36

## 2022-01-01 RX ADMIN — TAZOBACTAM SODIUM AND PIPERACILLIN SODIUM 3.38 G: 375; 3 INJECTION, SOLUTION INTRAVENOUS at 12:27

## 2022-01-01 RX ADMIN — PANTOPRAZOLE SODIUM 40 MG: 40 INJECTION, POWDER, FOR SOLUTION INTRAVENOUS at 20:11

## 2022-01-01 RX ADMIN — INSULIN HUMAN 2 UNITS: 100 INJECTION, SOLUTION PARENTERAL at 00:09

## 2022-01-01 RX ADMIN — IPRATROPIUM BROMIDE AND ALBUTEROL SULFATE 3 ML: .5; 3 SOLUTION RESPIRATORY (INHALATION) at 08:12

## 2022-01-01 RX ADMIN — IPRATROPIUM BROMIDE AND ALBUTEROL SULFATE 3 ML: .5; 3 SOLUTION RESPIRATORY (INHALATION) at 02:24

## 2022-01-01 RX ADMIN — Medication 250 MG: at 08:18

## 2022-01-01 RX ADMIN — APIXABAN 5 MG: 5 TABLET, FILM COATED ORAL at 20:33

## 2022-01-01 RX ADMIN — TAMSULOSIN HYDROCHLORIDE 0.8 MG: 0.4 CAPSULE ORAL at 08:02

## 2022-01-01 RX ADMIN — SODIUM CHLORIDE 125 ML/HR: 9 INJECTION, SOLUTION INTRAVENOUS at 10:51

## 2022-01-01 RX ADMIN — POTASSIUM CHLORIDE 40 MEQ: 1.5 POWDER, FOR SOLUTION ORAL at 09:26

## 2022-01-01 RX ADMIN — HYDRALAZINE HYDROCHLORIDE 50 MG: 50 TABLET, FILM COATED ORAL at 21:06

## 2022-01-01 RX ADMIN — ONDANSETRON 4 MG: 2 INJECTION INTRAMUSCULAR; INTRAVENOUS at 12:26

## 2022-01-01 RX ADMIN — METOPROLOL TARTRATE 100 MG: 100 TABLET, FILM COATED ORAL at 08:10

## 2022-01-01 RX ADMIN — OXYCODONE 5 MG: 5 TABLET ORAL at 02:14

## 2022-01-01 RX ADMIN — ASPIRIN 81 MG: 81 TABLET, COATED ORAL at 08:00

## 2022-01-01 RX ADMIN — Medication 10 ML: at 21:40

## 2022-01-01 RX ADMIN — Medication 10 ML: at 09:07

## 2022-01-01 RX ADMIN — LEVOFLOXACIN 500 MG: 500 INJECTION, SOLUTION INTRAVENOUS at 05:19

## 2022-01-01 RX ADMIN — CASTOR OIL AND BALSAM, PERU 1 APPLICATION: 788; 87 OINTMENT TOPICAL at 20:34

## 2022-01-01 RX ADMIN — HEPARIN SODIUM 5000 UNITS: 5000 INJECTION INTRAVENOUS; SUBCUTANEOUS at 05:20

## 2022-01-01 RX ADMIN — METHYLPREDNISOLONE SODIUM SUCCINATE 80 MG: 125 INJECTION, POWDER, FOR SOLUTION INTRAMUSCULAR; INTRAVENOUS at 05:15

## 2022-01-01 RX ADMIN — IPRATROPIUM BROMIDE AND ALBUTEROL SULFATE 3 ML: 2.5; .5 SOLUTION RESPIRATORY (INHALATION) at 20:25

## 2022-01-01 RX ADMIN — PANTOPRAZOLE SODIUM 40 MG: 40 INJECTION, POWDER, FOR SOLUTION INTRAVENOUS at 06:05

## 2022-01-01 RX ADMIN — GABAPENTIN 100 MG: 100 CAPSULE ORAL at 06:03

## 2022-01-01 RX ADMIN — INSULIN HUMAN 4 UNITS: 100 INJECTION, SOLUTION PARENTERAL at 11:47

## 2022-01-01 RX ADMIN — Medication 10 ML: at 22:15

## 2022-01-01 RX ADMIN — HEPARIN SODIUM 5000 UNITS: 5000 INJECTION INTRAVENOUS; SUBCUTANEOUS at 04:56

## 2022-01-01 RX ADMIN — HYDRALAZINE HYDROCHLORIDE 25 MG: 25 TABLET, FILM COATED ORAL at 08:59

## 2022-01-01 RX ADMIN — METOPROLOL TARTRATE 5 MG: 5 INJECTION INTRAVENOUS at 09:26

## 2022-01-01 RX ADMIN — BUDESONIDE 0.5 MG: 0.5 SUSPENSION RESPIRATORY (INHALATION) at 07:30

## 2022-01-01 RX ADMIN — PANTOPRAZOLE SODIUM 40 MG: 40 TABLET, DELAYED RELEASE ORAL at 08:13

## 2022-01-01 RX ADMIN — IPRATROPIUM BROMIDE AND ALBUTEROL SULFATE 3 ML: .5; 3 SOLUTION RESPIRATORY (INHALATION) at 20:14

## 2022-01-01 RX ADMIN — HYDRALAZINE HYDROCHLORIDE 75 MG: 50 TABLET, FILM COATED ORAL at 22:09

## 2022-01-01 RX ADMIN — GABAPENTIN 300 MG: 300 CAPSULE ORAL at 08:23

## 2022-01-01 RX ADMIN — BUDESONIDE 0.5 MG: 0.5 SUSPENSION RESPIRATORY (INHALATION) at 19:34

## 2022-01-01 RX ADMIN — IPRATROPIUM BROMIDE AND ALBUTEROL SULFATE 3 ML: .5; 3 SOLUTION RESPIRATORY (INHALATION) at 16:08

## 2022-01-01 RX ADMIN — NYSTATIN 500000 UNITS: 100000 SUSPENSION ORAL at 22:15

## 2022-01-01 RX ADMIN — FUROSEMIDE 40 MG: 10 INJECTION, SOLUTION INTRAMUSCULAR; INTRAVENOUS at 18:40

## 2022-01-01 RX ADMIN — HYDRALAZINE HYDROCHLORIDE 75 MG: 50 TABLET, FILM COATED ORAL at 05:10

## 2022-01-01 RX ADMIN — GABAPENTIN 100 MG: 100 CAPSULE ORAL at 16:17

## 2022-01-01 RX ADMIN — CASTOR OIL AND BALSAM, PERU 1 APPLICATION: 788; 87 OINTMENT TOPICAL at 20:07

## 2022-01-01 RX ADMIN — CYANOCOBALAMIN TAB 1000 MCG 1000 MCG: 1000 TAB at 08:08

## 2022-01-01 RX ADMIN — NIFEDIPINE 60 MG: 30 TABLET, FILM COATED, EXTENDED RELEASE ORAL at 08:49

## 2022-01-01 RX ADMIN — IPRATROPIUM BROMIDE AND ALBUTEROL SULFATE 3 ML: .5; 3 SOLUTION RESPIRATORY (INHALATION) at 06:56

## 2022-01-01 RX ADMIN — IPRATROPIUM BROMIDE AND ALBUTEROL SULFATE 3 ML: .5; 3 SOLUTION RESPIRATORY (INHALATION) at 18:39

## 2022-01-01 RX ADMIN — SENNOSIDES AND DOCUSATE SODIUM 2 TABLET: 50; 8.6 TABLET ORAL at 21:10

## 2022-01-01 RX ADMIN — IPRATROPIUM BROMIDE AND ALBUTEROL SULFATE 3 ML: .5; 3 SOLUTION RESPIRATORY (INHALATION) at 07:30

## 2022-01-01 RX ADMIN — METOPROLOL TARTRATE 100 MG: 100 TABLET, FILM COATED ORAL at 21:09

## 2022-01-01 RX ADMIN — INSULIN HUMAN 4 UNITS: 100 INJECTION, SOLUTION PARENTERAL at 00:58

## 2022-01-01 RX ADMIN — TAZOBACTAM SODIUM AND PIPERACILLIN SODIUM 3.38 G: 375; 3 INJECTION, SOLUTION INTRAVENOUS at 17:27

## 2022-01-01 RX ADMIN — IPRATROPIUM BROMIDE AND ALBUTEROL SULFATE 3 ML: .5; 3 SOLUTION RESPIRATORY (INHALATION) at 03:31

## 2022-01-01 RX ADMIN — AMLODIPINE BESYLATE 10 MG: 10 TABLET ORAL at 12:51

## 2022-01-01 RX ADMIN — ATORVASTATIN CALCIUM 80 MG: 40 TABLET, FILM COATED ORAL at 08:02

## 2022-01-01 RX ADMIN — DEXMEDETOMIDINE HYDROCHLORIDE 1 MCG/KG/HR: 4 INJECTION, SOLUTION INTRAVENOUS at 21:13

## 2022-01-01 RX ADMIN — Medication 10 ML: at 08:10

## 2022-01-01 RX ADMIN — TAZOBACTAM SODIUM AND PIPERACILLIN SODIUM 3.38 G: 375; 3 INJECTION, SOLUTION INTRAVENOUS at 06:39

## 2022-01-01 RX ADMIN — GABAPENTIN 100 MG: 100 CAPSULE ORAL at 14:08

## 2022-01-01 RX ADMIN — GABAPENTIN 100 MG: 100 CAPSULE ORAL at 05:56

## 2022-01-01 RX ADMIN — IPRATROPIUM BROMIDE AND ALBUTEROL SULFATE 3 ML: 2.5; .5 SOLUTION RESPIRATORY (INHALATION) at 09:20

## 2022-01-01 RX ADMIN — NIFEDIPINE 60 MG: 30 TABLET, FILM COATED, EXTENDED RELEASE ORAL at 08:08

## 2022-01-01 RX ADMIN — OLANZAPINE 5 MG: 5 TABLET, FILM COATED ORAL at 23:46

## 2022-01-01 RX ADMIN — GABAPENTIN 100 MG: 100 CAPSULE ORAL at 05:31

## 2022-01-01 RX ADMIN — NICOTINE 1 PATCH: 14 PATCH, EXTENDED RELEASE TRANSDERMAL at 20:16

## 2022-01-01 RX ADMIN — NICOTINE 1 PATCH: 14 PATCH, EXTENDED RELEASE TRANSDERMAL at 20:30

## 2022-01-01 RX ADMIN — NICOTINE 1 PATCH: 14 PATCH, EXTENDED RELEASE TRANSDERMAL at 21:37

## 2022-01-01 RX ADMIN — HYDROMORPHONE HYDROCHLORIDE 0.25 MG: 1 INJECTION, SOLUTION INTRAMUSCULAR; INTRAVENOUS; SUBCUTANEOUS at 22:08

## 2022-01-01 RX ADMIN — DEXMEDETOMIDINE HYDROCHLORIDE 1 MCG/KG/HR: 4 INJECTION, SOLUTION INTRAVENOUS at 01:01

## 2022-01-01 RX ADMIN — GABAPENTIN 300 MG: 300 CAPSULE ORAL at 20:03

## 2022-01-01 RX ADMIN — HEPARIN SODIUM 5000 UNITS: 5000 INJECTION INTRAVENOUS; SUBCUTANEOUS at 14:40

## 2022-01-01 RX ADMIN — INSULIN HUMAN 2 UNITS: 100 INJECTION, SOLUTION PARENTERAL at 11:22

## 2022-01-01 RX ADMIN — HEPARIN SODIUM 5000 UNITS: 5000 INJECTION INTRAVENOUS; SUBCUTANEOUS at 17:57

## 2022-01-01 RX ADMIN — IPRATROPIUM BROMIDE AND ALBUTEROL SULFATE 3 ML: 2.5; .5 SOLUTION RESPIRATORY (INHALATION) at 18:50

## 2022-01-01 RX ADMIN — CASTOR OIL AND BALSAM, PERU 1 APPLICATION: 788; 87 OINTMENT TOPICAL at 12:52

## 2022-01-01 RX ADMIN — GABAPENTIN 100 MG: 100 CAPSULE ORAL at 21:36

## 2022-01-01 RX ADMIN — HYDRALAZINE HYDROCHLORIDE 25 MG: 25 TABLET, FILM COATED ORAL at 20:35

## 2022-01-01 RX ADMIN — HYDROMORPHONE HYDROCHLORIDE 0.25 MG: 1 INJECTION, SOLUTION INTRAMUSCULAR; INTRAVENOUS; SUBCUTANEOUS at 06:26

## 2022-01-01 RX ADMIN — METOPROLOL TARTRATE 100 MG: 100 TABLET, FILM COATED ORAL at 21:11

## 2022-01-01 RX ADMIN — IPRATROPIUM BROMIDE AND ALBUTEROL SULFATE 3 ML: 2.5; .5 SOLUTION RESPIRATORY (INHALATION) at 10:37

## 2022-01-01 RX ADMIN — IPRATROPIUM BROMIDE AND ALBUTEROL SULFATE 3 ML: .5; 3 SOLUTION RESPIRATORY (INHALATION) at 12:42

## 2022-01-01 RX ADMIN — BUDESONIDE 0.5 MG: 0.5 SUSPENSION RESPIRATORY (INHALATION) at 20:16

## 2022-01-01 RX ADMIN — IPRATROPIUM BROMIDE AND ALBUTEROL SULFATE 3 ML: 2.5; .5 SOLUTION RESPIRATORY (INHALATION) at 06:54

## 2022-01-01 RX ADMIN — HYDRALAZINE HYDROCHLORIDE 75 MG: 50 TABLET, FILM COATED ORAL at 14:46

## 2022-01-01 RX ADMIN — IPRATROPIUM BROMIDE AND ALBUTEROL SULFATE 3 ML: .5; 3 SOLUTION RESPIRATORY (INHALATION) at 19:07

## 2022-01-01 RX ADMIN — PANTOPRAZOLE SODIUM 40 MG: 40 INJECTION, POWDER, FOR SOLUTION INTRAVENOUS at 05:14

## 2022-01-01 RX ADMIN — IPRATROPIUM BROMIDE AND ALBUTEROL SULFATE 3 ML: 2.5; .5 SOLUTION RESPIRATORY (INHALATION) at 14:32

## 2022-01-01 RX ADMIN — DOXYCYCLINE 100 MG: 100 CAPSULE ORAL at 08:00

## 2022-01-01 RX ADMIN — CASTOR OIL AND BALSAM, PERU 1 APPLICATION: 788; 87 OINTMENT TOPICAL at 08:12

## 2022-01-01 RX ADMIN — ALBUMIN HUMAN 12.5 G: 0.25 SOLUTION INTRAVENOUS at 11:52

## 2022-01-01 RX ADMIN — METHYLPREDNISOLONE SODIUM SUCCINATE 80 MG: 125 INJECTION, POWDER, FOR SOLUTION INTRAMUSCULAR; INTRAVENOUS at 20:46

## 2022-01-01 RX ADMIN — PANTOPRAZOLE SODIUM 40 MG: 40 TABLET, DELAYED RELEASE ORAL at 08:42

## 2022-01-01 RX ADMIN — AMLODIPINE BESYLATE 10 MG: 10 TABLET ORAL at 08:42

## 2022-01-01 RX ADMIN — IPRATROPIUM BROMIDE AND ALBUTEROL SULFATE 3 ML: .5; 3 SOLUTION RESPIRATORY (INHALATION) at 13:03

## 2022-01-01 RX ADMIN — GABAPENTIN 300 MG: 300 CAPSULE ORAL at 21:18

## 2022-01-01 RX ADMIN — GABAPENTIN 100 MG: 100 CAPSULE ORAL at 22:55

## 2022-01-01 RX ADMIN — HEPARIN SODIUM 5000 UNITS: 5000 INJECTION INTRAVENOUS; SUBCUTANEOUS at 05:13

## 2022-01-01 RX ADMIN — ALPRAZOLAM 0.5 MG: 0.5 TABLET ORAL at 12:21

## 2022-01-01 RX ADMIN — IPRATROPIUM BROMIDE AND ALBUTEROL SULFATE 3 ML: .5; 3 SOLUTION RESPIRATORY (INHALATION) at 10:57

## 2022-01-01 RX ADMIN — ASPIRIN 81 MG: 81 TABLET, COATED ORAL at 16:04

## 2022-01-01 RX ADMIN — IPRATROPIUM BROMIDE AND ALBUTEROL SULFATE 3 ML: .5; 3 SOLUTION RESPIRATORY (INHALATION) at 19:51

## 2022-01-01 RX ADMIN — HYDRALAZINE HYDROCHLORIDE 25 MG: 25 TABLET, FILM COATED ORAL at 08:14

## 2022-01-01 RX ADMIN — PREDNISONE 10 MG: 10 TABLET ORAL at 14:58

## 2022-01-01 RX ADMIN — HYDRALAZINE HYDROCHLORIDE 25 MG: 25 TABLET, FILM COATED ORAL at 05:47

## 2022-01-01 RX ADMIN — CASTOR OIL AND BALSAM, PERU 1 APPLICATION: 788; 87 OINTMENT TOPICAL at 12:09

## 2022-01-01 RX ADMIN — SODIUM ACETATE: 164 INJECTION, SOLUTION, CONCENTRATE INTRAVENOUS at 17:54

## 2022-01-01 RX ADMIN — DEXMEDETOMIDINE HYDROCHLORIDE 1 MCG/KG/HR: 4 INJECTION, SOLUTION INTRAVENOUS at 12:35

## 2022-01-01 RX ADMIN — CASTOR OIL AND BALSAM, PERU 1 APPLICATION: 788; 87 OINTMENT TOPICAL at 20:17

## 2022-01-01 RX ADMIN — TAZOBACTAM SODIUM AND PIPERACILLIN SODIUM 3.38 G: 375; 3 INJECTION, SOLUTION INTRAVENOUS at 19:06

## 2022-01-01 RX ADMIN — METHYLPREDNISOLONE SODIUM SUCCINATE 80 MG: 125 INJECTION, POWDER, FOR SOLUTION INTRAMUSCULAR; INTRAVENOUS at 21:09

## 2022-01-01 RX ADMIN — IPRATROPIUM BROMIDE AND ALBUTEROL SULFATE 3 ML: .5; 3 SOLUTION RESPIRATORY (INHALATION) at 03:18

## 2022-01-01 RX ADMIN — DEXMEDETOMIDINE HYDROCHLORIDE 1 MCG/KG/HR: 4 INJECTION, SOLUTION INTRAVENOUS at 08:25

## 2022-01-01 RX ADMIN — TAZOBACTAM SODIUM AND PIPERACILLIN SODIUM 3.38 G: 375; 3 INJECTION, SOLUTION INTRAVENOUS at 14:33

## 2022-01-01 RX ADMIN — BUDESONIDE 0.5 MG: 0.5 SUSPENSION RESPIRATORY (INHALATION) at 06:29

## 2022-01-01 RX ADMIN — CASTOR OIL AND BALSAM, PERU 1 APPLICATION: 788; 87 OINTMENT TOPICAL at 09:01

## 2022-01-01 RX ADMIN — ATORVASTATIN CALCIUM 80 MG: 40 TABLET, FILM COATED ORAL at 08:11

## 2022-01-01 RX ADMIN — PANTOPRAZOLE SODIUM 40 MG: 40 INJECTION, POWDER, FOR SOLUTION INTRAVENOUS at 20:16

## 2022-01-01 RX ADMIN — METOPROLOL TARTRATE 100 MG: 100 TABLET, FILM COATED ORAL at 20:12

## 2022-01-01 RX ADMIN — METOPROLOL TARTRATE 25 MG: 25 TABLET, FILM COATED ORAL at 22:13

## 2022-01-01 RX ADMIN — HYDROMORPHONE HYDROCHLORIDE 0.25 MG: 1 INJECTION, SOLUTION INTRAMUSCULAR; INTRAVENOUS; SUBCUTANEOUS at 04:37

## 2022-01-01 RX ADMIN — CASTOR OIL AND BALSAM, PERU 1 APPLICATION: 788; 87 OINTMENT TOPICAL at 21:40

## 2022-01-01 RX ADMIN — OXYCODONE 5 MG: 5 TABLET ORAL at 08:00

## 2022-01-01 RX ADMIN — NICOTINE 1 PATCH: 14 PATCH, EXTENDED RELEASE TRANSDERMAL at 21:10

## 2022-01-01 RX ADMIN — METOPROLOL TARTRATE 100 MG: 100 TABLET, FILM COATED ORAL at 20:39

## 2022-01-01 RX ADMIN — HYDRALAZINE HYDROCHLORIDE 75 MG: 50 TABLET, FILM COATED ORAL at 14:58

## 2022-01-01 RX ADMIN — BUDESONIDE 0.5 MG: 0.5 SUSPENSION RESPIRATORY (INHALATION) at 21:16

## 2022-01-01 RX ADMIN — HYDRALAZINE HYDROCHLORIDE 25 MG: 25 TABLET, FILM COATED ORAL at 20:45

## 2022-01-01 RX ADMIN — BUDESONIDE 0.5 MG: 0.5 SUSPENSION RESPIRATORY (INHALATION) at 12:43

## 2022-01-01 RX ADMIN — HEPARIN SODIUM 5000 UNITS: 5000 INJECTION INTRAVENOUS; SUBCUTANEOUS at 05:19

## 2022-01-01 RX ADMIN — PANTOPRAZOLE SODIUM 40 MG: 40 TABLET, DELAYED RELEASE ORAL at 08:29

## 2022-01-01 RX ADMIN — TAZOBACTAM SODIUM AND PIPERACILLIN SODIUM 3.38 G: 375; 3 INJECTION, SOLUTION INTRAVENOUS at 09:15

## 2022-01-01 RX ADMIN — METOCLOPRAMIDE 5 MG: 5 INJECTION, SOLUTION INTRAMUSCULAR; INTRAVENOUS at 01:16

## 2022-01-01 RX ADMIN — CASTOR OIL AND BALSAM, PERU 1 APPLICATION: 788; 87 OINTMENT TOPICAL at 21:06

## 2022-01-01 RX ADMIN — PANTOPRAZOLE SODIUM 40 MG: 40 INJECTION, POWDER, FOR SOLUTION INTRAVENOUS at 08:55

## 2022-01-01 RX ADMIN — Medication 10 ML: at 20:44

## 2022-01-01 RX ADMIN — IPRATROPIUM BROMIDE AND ALBUTEROL SULFATE 3 ML: 2.5; .5 SOLUTION RESPIRATORY (INHALATION) at 07:42

## 2022-01-01 RX ADMIN — ENALAPRILAT 1.25 MG: 1.25 INJECTION INTRAVENOUS at 01:32

## 2022-01-01 RX ADMIN — METOPROLOL TARTRATE 100 MG: 100 TABLET, FILM COATED ORAL at 09:26

## 2022-01-01 RX ADMIN — BUMETANIDE 2 MG: 0.25 INJECTION, SOLUTION INTRAMUSCULAR; INTRAVENOUS at 08:59

## 2022-01-01 RX ADMIN — SENNOSIDES AND DOCUSATE SODIUM 2 TABLET: 50; 8.6 TABLET ORAL at 08:29

## 2022-01-01 RX ADMIN — GABAPENTIN 100 MG: 100 CAPSULE ORAL at 05:41

## 2022-01-01 RX ADMIN — HYDROMORPHONE HYDROCHLORIDE 0.25 MG: 1 INJECTION, SOLUTION INTRAMUSCULAR; INTRAVENOUS; SUBCUTANEOUS at 09:26

## 2022-01-01 RX ADMIN — APIXABAN 5 MG: 5 TABLET, FILM COATED ORAL at 20:45

## 2022-01-01 RX ADMIN — NICOTINE 1 PATCH: 14 PATCH, EXTENDED RELEASE TRANSDERMAL at 21:07

## 2022-01-01 RX ADMIN — BUDESONIDE AND FORMOTEROL FUMARATE DIHYDRATE 2 PUFF: 160; 4.5 AEROSOL RESPIRATORY (INHALATION) at 07:16

## 2022-01-01 RX ADMIN — SODIUM CHLORIDE 320 MG: 9 INJECTION, SOLUTION INTRAVENOUS at 11:28

## 2022-01-01 RX ADMIN — HEPARIN SODIUM 5000 UNITS: 5000 INJECTION INTRAVENOUS; SUBCUTANEOUS at 14:07

## 2022-01-01 RX ADMIN — METOPROLOL TARTRATE 100 MG: 100 TABLET, FILM COATED ORAL at 09:01

## 2022-01-01 RX ADMIN — HYDRALAZINE HYDROCHLORIDE 25 MG: 25 TABLET, FILM COATED ORAL at 08:08

## 2022-01-01 RX ADMIN — NYSTATIN 500000 UNITS: 100000 SUSPENSION ORAL at 08:29

## 2022-01-01 RX ADMIN — HYDROMORPHONE HYDROCHLORIDE 0.25 MG: 1 INJECTION, SOLUTION INTRAMUSCULAR; INTRAVENOUS; SUBCUTANEOUS at 21:06

## 2022-01-01 RX ADMIN — IPRATROPIUM BROMIDE AND ALBUTEROL SULFATE 3 ML: .5; 3 SOLUTION RESPIRATORY (INHALATION) at 20:34

## 2022-01-01 RX ADMIN — HYDRALAZINE HYDROCHLORIDE 75 MG: 50 TABLET, FILM COATED ORAL at 05:18

## 2022-01-01 RX ADMIN — GABAPENTIN 100 MG: 100 CAPSULE ORAL at 20:51

## 2022-01-01 RX ADMIN — PREDNISONE 40 MG: 20 TABLET ORAL at 09:01

## 2022-01-01 RX ADMIN — METOCLOPRAMIDE 5 MG: 5 INJECTION, SOLUTION INTRAMUSCULAR; INTRAVENOUS at 09:06

## 2022-01-01 RX ADMIN — ASPIRIN 81 MG CHEWABLE TABLET 81 MG: 81 TABLET CHEWABLE at 08:11

## 2022-01-01 RX ADMIN — GABAPENTIN 100 MG: 100 CAPSULE ORAL at 16:49

## 2022-01-01 RX ADMIN — DEXMEDETOMIDINE HYDROCHLORIDE 1 MCG/KG/HR: 4 INJECTION, SOLUTION INTRAVENOUS at 02:53

## 2022-01-01 RX ADMIN — CASTOR OIL AND BALSAM, PERU 1 APPLICATION: 788; 87 OINTMENT TOPICAL at 08:29

## 2022-01-01 RX ADMIN — PANTOPRAZOLE SODIUM 40 MG: 40 TABLET, DELAYED RELEASE ORAL at 20:39

## 2022-01-01 RX ADMIN — SENNOSIDES AND DOCUSATE SODIUM 2 TABLET: 50; 8.6 TABLET ORAL at 20:44

## 2022-01-01 RX ADMIN — AMLODIPINE BESYLATE 10 MG: 10 TABLET ORAL at 08:25

## 2022-01-01 RX ADMIN — OLANZAPINE 5 MG: 5 TABLET, FILM COATED ORAL at 10:18

## 2022-01-01 RX ADMIN — METOPROLOL TARTRATE 25 MG: 25 TABLET, FILM COATED ORAL at 08:31

## 2022-01-01 RX ADMIN — NYSTATIN: 100000 POWDER TOPICAL at 08:19

## 2022-01-01 RX ADMIN — SENNOSIDES AND DOCUSATE SODIUM 2 TABLET: 50; 8.6 TABLET ORAL at 22:14

## 2022-01-01 RX ADMIN — GABAPENTIN 100 MG: 100 CAPSULE ORAL at 20:26

## 2022-01-01 RX ADMIN — INSULIN HUMAN 2 UNITS: 100 INJECTION, SOLUTION PARENTERAL at 05:17

## 2022-01-01 RX ADMIN — AMLODIPINE BESYLATE 10 MG: 10 TABLET ORAL at 08:29

## 2022-01-01 RX ADMIN — HYDROMORPHONE HYDROCHLORIDE 0.25 MG: 1 INJECTION, SOLUTION INTRAMUSCULAR; INTRAVENOUS; SUBCUTANEOUS at 05:16

## 2022-01-01 RX ADMIN — METOPROLOL TARTRATE 50 MG: 50 TABLET, FILM COATED ORAL at 11:46

## 2022-01-01 RX ADMIN — DEXMEDETOMIDINE HYDROCHLORIDE 0.8 MCG/KG/HR: 4 INJECTION, SOLUTION INTRAVENOUS at 20:10

## 2022-01-01 RX ADMIN — LIDOCAINE HYDROCHLORIDE 50 MG: 10 INJECTION, SOLUTION INFILTRATION; PERINEURAL at 11:06

## 2022-01-01 RX ADMIN — TAZOBACTAM SODIUM AND PIPERACILLIN SODIUM 3.38 G: 375; 3 INJECTION, SOLUTION INTRAVENOUS at 21:47

## 2022-01-01 RX ADMIN — IPRATROPIUM BROMIDE AND ALBUTEROL SULFATE 3 ML: .5; 3 SOLUTION RESPIRATORY (INHALATION) at 13:27

## 2022-01-01 RX ADMIN — NICOTINE 1 PATCH: 14 PATCH, EXTENDED RELEASE TRANSDERMAL at 20:12

## 2022-01-01 RX ADMIN — BUDESONIDE 0.5 MG: 0.5 SUSPENSION RESPIRATORY (INHALATION) at 07:39

## 2022-01-01 RX ADMIN — DROPERIDOL 2.5 MG: 2.5 INJECTION, SOLUTION INTRAMUSCULAR; INTRAVENOUS at 14:06

## 2022-01-01 RX ADMIN — Medication 10 ML: at 21:46

## 2022-01-01 RX ADMIN — SMOFLIPID 250 ML: 6; 6; 5; 3 INJECTION, EMULSION INTRAVENOUS at 17:22

## 2022-01-01 RX ADMIN — METOPROLOL TARTRATE 100 MG: 100 TABLET, FILM COATED ORAL at 09:20

## 2022-01-01 RX ADMIN — AMLODIPINE BESYLATE 10 MG: 10 TABLET ORAL at 08:00

## 2022-01-01 RX ADMIN — BUDESONIDE 0.5 MG: 0.5 SUSPENSION RESPIRATORY (INHALATION) at 06:56

## 2022-01-01 RX ADMIN — IPRATROPIUM BROMIDE AND ALBUTEROL SULFATE 3 ML: .5; 3 SOLUTION RESPIRATORY (INHALATION) at 08:19

## 2022-01-01 RX ADMIN — NYSTATIN: 100000 POWDER TOPICAL at 08:42

## 2022-01-01 RX ADMIN — IPRATROPIUM BROMIDE AND ALBUTEROL SULFATE 3 ML: .5; 3 SOLUTION RESPIRATORY (INHALATION) at 02:42

## 2022-01-01 RX ADMIN — INSULIN HUMAN 4 UNITS: 100 INJECTION, SOLUTION PARENTERAL at 05:37

## 2022-01-01 RX ADMIN — HYDROMORPHONE HYDROCHLORIDE 0.25 MG: 1 INJECTION, SOLUTION INTRAMUSCULAR; INTRAVENOUS; SUBCUTANEOUS at 21:45

## 2022-01-01 RX ADMIN — METOPROLOL TARTRATE 100 MG: 100 TABLET, FILM COATED ORAL at 21:36

## 2022-01-01 RX ADMIN — LABETALOL HYDROCHLORIDE 20 MG: 5 INJECTION, SOLUTION INTRAVENOUS at 00:11

## 2022-01-01 RX ADMIN — METOPROLOL TARTRATE 100 MG: 100 TABLET, FILM COATED ORAL at 22:09

## 2022-01-01 RX ADMIN — PANTOPRAZOLE SODIUM 40 MG: 40 INJECTION, POWDER, FOR SOLUTION INTRAVENOUS at 05:32

## 2022-01-01 RX ADMIN — MONTELUKAST 10 MG: 10 TABLET, FILM COATED ORAL at 20:35

## 2022-01-01 RX ADMIN — DEXMEDETOMIDINE HYDROCHLORIDE 1 MCG/KG/HR: 4 INJECTION, SOLUTION INTRAVENOUS at 15:38

## 2022-01-01 RX ADMIN — FENTANYL CITRATE 25 MCG: 50 INJECTION, SOLUTION INTRAMUSCULAR; INTRAVENOUS at 11:42

## 2022-01-01 RX ADMIN — Medication 10 ML: at 21:29

## 2022-01-01 RX ADMIN — Medication 10 ML: at 08:14

## 2022-01-01 RX ADMIN — IPRATROPIUM BROMIDE AND ALBUTEROL SULFATE 3 ML: .5; 3 SOLUTION RESPIRATORY (INHALATION) at 12:43

## 2022-01-01 RX ADMIN — NYSTATIN: 100000 POWDER TOPICAL at 08:21

## 2022-01-01 RX ADMIN — APIXABAN 5 MG: 5 TABLET, FILM COATED ORAL at 20:36

## 2022-01-01 RX ADMIN — IPRATROPIUM BROMIDE AND ALBUTEROL SULFATE 3 ML: .5; 3 SOLUTION RESPIRATORY (INHALATION) at 08:46

## 2022-01-01 RX ADMIN — AMLODIPINE BESYLATE 10 MG: 10 TABLET ORAL at 08:01

## 2022-01-01 RX ADMIN — GABAPENTIN 300 MG: 300 CAPSULE ORAL at 15:37

## 2022-01-01 RX ADMIN — IPRATROPIUM BROMIDE AND ALBUTEROL SULFATE 3 ML: .5; 3 SOLUTION RESPIRATORY (INHALATION) at 07:29

## 2022-01-01 RX ADMIN — IPRATROPIUM BROMIDE AND ALBUTEROL SULFATE 3 ML: .5; 3 SOLUTION RESPIRATORY (INHALATION) at 23:17

## 2022-01-01 RX ADMIN — BUDESONIDE 0.5 MG: 0.5 SUSPENSION RESPIRATORY (INHALATION) at 19:32

## 2022-01-01 RX ADMIN — IPRATROPIUM BROMIDE AND ALBUTEROL SULFATE 3 ML: .5; 3 SOLUTION RESPIRATORY (INHALATION) at 19:33

## 2022-01-01 RX ADMIN — TAZOBACTAM SODIUM AND PIPERACILLIN SODIUM 3.38 G: 375; 3 INJECTION, SOLUTION INTRAVENOUS at 18:30

## 2022-01-01 RX ADMIN — PANTOPRAZOLE SODIUM 40 MG: 40 TABLET, DELAYED RELEASE ORAL at 21:11

## 2022-01-01 RX ADMIN — TAZOBACTAM SODIUM AND PIPERACILLIN SODIUM 3.38 G: 375; 3 INJECTION, SOLUTION INTRAVENOUS at 17:43

## 2022-01-01 RX ADMIN — APIXABAN 5 MG: 5 TABLET, FILM COATED ORAL at 08:14

## 2022-01-01 RX ADMIN — CASTOR OIL AND BALSAM, PERU 1 APPLICATION: 788; 87 OINTMENT TOPICAL at 14:59

## 2022-01-01 RX ADMIN — HEPARIN SODIUM 5000 UNITS: 5000 INJECTION INTRAVENOUS; SUBCUTANEOUS at 14:33

## 2022-01-01 RX ADMIN — ATORVASTATIN CALCIUM 80 MG: 40 TABLET, FILM COATED ORAL at 08:59

## 2022-01-01 RX ADMIN — METOPROLOL TARTRATE 50 MG: 50 TABLET, FILM COATED ORAL at 21:46

## 2022-01-01 RX ADMIN — IPRATROPIUM BROMIDE AND ALBUTEROL SULFATE 3 ML: .5; 3 SOLUTION RESPIRATORY (INHALATION) at 20:20

## 2022-01-01 RX ADMIN — METOPROLOL TARTRATE 100 MG: 100 TABLET, FILM COATED ORAL at 20:04

## 2022-01-01 RX ADMIN — PANTOPRAZOLE SODIUM 40 MG: 40 TABLET, DELAYED RELEASE ORAL at 05:45

## 2022-01-01 RX ADMIN — NYSTATIN: 100000 POWDER TOPICAL at 08:24

## 2022-01-01 RX ADMIN — IPRATROPIUM BROMIDE AND ALBUTEROL SULFATE 3 ML: 2.5; .5 SOLUTION RESPIRATORY (INHALATION) at 12:56

## 2022-01-01 RX ADMIN — HYDROMORPHONE HYDROCHLORIDE 0.25 MG: 1 INJECTION, SOLUTION INTRAMUSCULAR; INTRAVENOUS; SUBCUTANEOUS at 02:27

## 2022-01-01 RX ADMIN — CASTOR OIL AND BALSAM, PERU 1 APPLICATION: 788; 87 OINTMENT TOPICAL at 08:24

## 2022-01-01 RX ADMIN — BUDESONIDE 0.5 MG: 0.5 SUSPENSION RESPIRATORY (INHALATION) at 07:40

## 2022-01-01 RX ADMIN — Medication 250 MG: at 08:13

## 2022-01-01 RX ADMIN — HEPARIN SODIUM 5000 UNITS: 5000 INJECTION INTRAVENOUS; SUBCUTANEOUS at 21:23

## 2022-01-01 RX ADMIN — Medication 10 ML: at 21:21

## 2022-01-01 RX ADMIN — PANTOPRAZOLE SODIUM 40 MG: 40 TABLET, DELAYED RELEASE ORAL at 09:07

## 2022-01-01 RX ADMIN — DEXMEDETOMIDINE HYDROCHLORIDE 1 MCG/KG/HR: 4 INJECTION, SOLUTION INTRAVENOUS at 03:39

## 2022-01-01 RX ADMIN — LEVOFLOXACIN 500 MG: 500 INJECTION, SOLUTION INTRAVENOUS at 05:41

## 2022-01-01 RX ADMIN — PANTOPRAZOLE SODIUM 40 MG: 40 TABLET, DELAYED RELEASE ORAL at 09:01

## 2022-01-01 RX ADMIN — METOPROLOL TARTRATE 100 MG: 100 TABLET, FILM COATED ORAL at 20:10

## 2022-01-01 RX ADMIN — BUDESONIDE 0.5 MG: 0.5 SUSPENSION RESPIRATORY (INHALATION) at 08:46

## 2022-01-01 RX ADMIN — HEPARIN SODIUM 5000 UNITS: 5000 INJECTION INTRAVENOUS; SUBCUTANEOUS at 15:18

## 2022-01-01 RX ADMIN — BUDESONIDE 0.5 MG: 0.5 SUSPENSION RESPIRATORY (INHALATION) at 20:01

## 2022-01-01 RX ADMIN — GABAPENTIN 300 MG: 300 CAPSULE ORAL at 08:07

## 2022-01-01 RX ADMIN — INSULIN HUMAN 2 UNITS: 100 INJECTION, SOLUTION PARENTERAL at 16:54

## 2022-01-01 RX ADMIN — HYDRALAZINE HYDROCHLORIDE 75 MG: 50 TABLET, FILM COATED ORAL at 06:03

## 2022-01-01 RX ADMIN — GABAPENTIN 100 MG: 100 CAPSULE ORAL at 05:32

## 2022-01-01 RX ADMIN — METOPROLOL TARTRATE 100 MG: 100 TABLET, FILM COATED ORAL at 20:16

## 2022-01-01 RX ADMIN — IOPAMIDOL 150 ML: 755 INJECTION, SOLUTION INTRAVENOUS at 14:46

## 2022-01-01 RX ADMIN — NYSTATIN: 100000 POWDER TOPICAL at 08:29

## 2022-01-01 RX ADMIN — IPRATROPIUM BROMIDE 0.5 MG: 0.5 SOLUTION RESPIRATORY (INHALATION) at 12:22

## 2022-01-01 RX ADMIN — PANTOPRAZOLE SODIUM 40 MG: 40 TABLET, DELAYED RELEASE ORAL at 21:06

## 2022-01-01 RX ADMIN — Medication 10 ML: at 09:01

## 2022-01-01 RX ADMIN — METHYLPREDNISOLONE SODIUM SUCCINATE 60 MG: 125 INJECTION, POWDER, FOR SOLUTION INTRAMUSCULAR; INTRAVENOUS at 05:07

## 2022-01-01 RX ADMIN — BUDESONIDE 0.5 MG: 0.5 SUSPENSION RESPIRATORY (INHALATION) at 09:20

## 2022-01-01 RX ADMIN — Medication 10 ML: at 03:41

## 2022-01-01 RX ADMIN — NIFEDIPINE 60 MG: 30 TABLET, FILM COATED, EXTENDED RELEASE ORAL at 08:01

## 2022-01-01 RX ADMIN — NYSTATIN: 100000 POWDER TOPICAL at 18:11

## 2022-01-01 RX ADMIN — PANTOPRAZOLE SODIUM 40 MG: 40 INJECTION, POWDER, FOR SOLUTION INTRAVENOUS at 05:20

## 2022-01-01 RX ADMIN — OXYCODONE 5 MG: 5 TABLET ORAL at 01:40

## 2022-01-01 RX ADMIN — ASPIRIN 81 MG CHEWABLE TABLET 81 MG: 81 TABLET CHEWABLE at 09:53

## 2022-01-01 RX ADMIN — DEXMEDETOMIDINE HYDROCHLORIDE 0.6 MCG/KG/HR: 4 INJECTION, SOLUTION INTRAVENOUS at 17:29

## 2022-01-01 RX ADMIN — IPRATROPIUM BROMIDE AND ALBUTEROL SULFATE 3 ML: .5; 3 SOLUTION RESPIRATORY (INHALATION) at 17:39

## 2022-01-01 RX ADMIN — METOPROLOL TARTRATE 100 MG: 100 TABLET, FILM COATED ORAL at 09:07

## 2022-01-01 RX ADMIN — IPRATROPIUM BROMIDE AND ALBUTEROL SULFATE 3 ML: .5; 3 SOLUTION RESPIRATORY (INHALATION) at 16:20

## 2022-01-01 RX ADMIN — SENNOSIDES AND DOCUSATE SODIUM 2 TABLET: 50; 8.6 TABLET ORAL at 08:00

## 2022-01-01 RX ADMIN — Medication 10 ML: at 08:56

## 2022-01-01 RX ADMIN — PREDNISONE 40 MG: 20 TABLET ORAL at 08:41

## 2022-01-01 RX ADMIN — HYDRALAZINE HYDROCHLORIDE 25 MG: 25 TABLET, FILM COATED ORAL at 05:24

## 2022-01-01 RX ADMIN — BUDESONIDE 0.5 MG: 0.5 SUSPENSION RESPIRATORY (INHALATION) at 11:17

## 2022-01-01 RX ADMIN — LABETALOL HYDROCHLORIDE 20 MG: 5 INJECTION, SOLUTION INTRAVENOUS at 17:22

## 2022-01-01 RX ADMIN — Medication 250 MG: at 08:41

## 2022-01-01 RX ADMIN — SENNOSIDES AND DOCUSATE SODIUM 2 TABLET: 50; 8.6 TABLET ORAL at 11:56

## 2022-01-01 RX ADMIN — FENTANYL CITRATE 25 MCG: 50 INJECTION, SOLUTION INTRAMUSCULAR; INTRAVENOUS at 01:16

## 2022-01-01 RX ADMIN — FENTANYL CITRATE 25 MCG: 50 INJECTION, SOLUTION INTRAMUSCULAR; INTRAVENOUS at 22:37

## 2022-01-01 RX ADMIN — ASPIRIN 81 MG CHEWABLE TABLET 81 MG: 81 TABLET CHEWABLE at 08:49

## 2022-01-01 RX ADMIN — IPRATROPIUM BROMIDE AND ALBUTEROL SULFATE 3 ML: .5; 3 SOLUTION RESPIRATORY (INHALATION) at 23:02

## 2022-01-01 RX ADMIN — Medication 250 MG: at 09:01

## 2022-01-01 RX ADMIN — HYDRALAZINE HYDROCHLORIDE 25 MG: 25 TABLET, FILM COATED ORAL at 21:10

## 2022-01-01 RX ADMIN — ROCURONIUM BROMIDE 35 MG: 10 INJECTION, SOLUTION INTRAVENOUS at 11:06

## 2022-01-01 RX ADMIN — GABAPENTIN 100 MG: 100 CAPSULE ORAL at 15:19

## 2022-01-01 RX ADMIN — IPRATROPIUM BROMIDE AND ALBUTEROL SULFATE 3 ML: .5; 3 SOLUTION RESPIRATORY (INHALATION) at 19:12

## 2022-01-01 RX ADMIN — DEXMEDETOMIDINE HYDROCHLORIDE 1 MCG/KG/HR: 4 INJECTION, SOLUTION INTRAVENOUS at 03:56

## 2022-01-01 RX ADMIN — ALPRAZOLAM 0.5 MG: 0.5 TABLET ORAL at 22:53

## 2022-01-01 RX ADMIN — HYDRALAZINE HYDROCHLORIDE 25 MG: 25 TABLET, FILM COATED ORAL at 21:07

## 2022-01-01 RX ADMIN — CLONIDINE 1 PATCH: 0.1 PATCH, EXTENDED RELEASE TRANSDERMAL at 08:01

## 2022-01-01 RX ADMIN — IPRATROPIUM BROMIDE AND ALBUTEROL SULFATE 3 ML: .5; 3 SOLUTION RESPIRATORY (INHALATION) at 16:49

## 2022-01-01 RX ADMIN — CASTOR OIL AND BALSAM, PERU 1 APPLICATION: 788; 87 OINTMENT TOPICAL at 08:02

## 2022-01-01 RX ADMIN — LORAZEPAM 1 MG: 1 TABLET ORAL at 05:45

## 2022-01-01 RX ADMIN — HEPARIN SODIUM 5000 UNITS: 5000 INJECTION INTRAVENOUS; SUBCUTANEOUS at 21:22

## 2022-01-01 RX ADMIN — HEPARIN SODIUM 5000 UNITS: 5000 INJECTION INTRAVENOUS; SUBCUTANEOUS at 22:09

## 2022-01-01 RX ADMIN — BUDESONIDE 0.5 MG: 0.5 SUSPENSION RESPIRATORY (INHALATION) at 19:12

## 2022-01-01 RX ADMIN — ENOXAPARIN SODIUM 40 MG: 40 INJECTION SUBCUTANEOUS at 16:05

## 2022-01-01 RX ADMIN — ASPIRIN 81 MG: 81 TABLET, COATED ORAL at 08:14

## 2022-01-01 RX ADMIN — APIXABAN 5 MG: 5 TABLET, FILM COATED ORAL at 08:23

## 2022-01-01 RX ADMIN — BUDESONIDE 0.5 MG: 0.5 SUSPENSION RESPIRATORY (INHALATION) at 06:54

## 2022-01-01 RX ADMIN — Medication 10 ML: at 08:43

## 2022-01-01 RX ADMIN — CLOPIDOGREL BISULFATE 75 MG: 75 TABLET ORAL at 21:18

## 2022-01-01 RX ADMIN — IPRATROPIUM BROMIDE AND ALBUTEROL SULFATE 3 ML: .5; 3 SOLUTION RESPIRATORY (INHALATION) at 01:54

## 2022-01-01 RX ADMIN — HYDRALAZINE HYDROCHLORIDE 75 MG: 50 TABLET, FILM COATED ORAL at 21:36

## 2022-01-01 RX ADMIN — IPRATROPIUM BROMIDE AND ALBUTEROL SULFATE 3 ML: .5; 3 SOLUTION RESPIRATORY (INHALATION) at 15:55

## 2022-01-01 RX ADMIN — BUDESONIDE AND FORMOTEROL FUMARATE DIHYDRATE 2 PUFF: 160; 4.5 AEROSOL RESPIRATORY (INHALATION) at 19:16

## 2022-01-01 RX ADMIN — LABETALOL HYDROCHLORIDE 20 MG: 5 INJECTION, SOLUTION INTRAVENOUS at 21:25

## 2022-01-01 RX ADMIN — IPRATROPIUM BROMIDE AND ALBUTEROL SULFATE 3 ML: .5; 3 SOLUTION RESPIRATORY (INHALATION) at 16:06

## 2022-01-01 RX ADMIN — Medication 10 ML: at 22:07

## 2022-01-01 RX ADMIN — METOPROLOL TARTRATE 5 MG: 5 INJECTION INTRAVENOUS at 21:22

## 2022-01-01 RX ADMIN — NYSTATIN: 100000 POWDER TOPICAL at 20:06

## 2022-01-01 RX ADMIN — METOPROLOL TARTRATE 100 MG: 100 TABLET, FILM COATED ORAL at 21:06

## 2022-01-01 RX ADMIN — IPRATROPIUM BROMIDE AND ALBUTEROL SULFATE 3 ML: .5; 3 SOLUTION RESPIRATORY (INHALATION) at 12:20

## 2022-01-01 RX ADMIN — IPRATROPIUM BROMIDE AND ALBUTEROL SULFATE 3 ML: .5; 3 SOLUTION RESPIRATORY (INHALATION) at 13:22

## 2022-01-01 RX ADMIN — TAZOBACTAM SODIUM AND PIPERACILLIN SODIUM 3.38 G: 375; 3 INJECTION, SOLUTION INTRAVENOUS at 00:10

## 2022-01-01 RX ADMIN — HYDROMORPHONE HYDROCHLORIDE 0.25 MG: 1 INJECTION, SOLUTION INTRAMUSCULAR; INTRAVENOUS; SUBCUTANEOUS at 18:19

## 2022-01-01 RX ADMIN — ALPRAZOLAM 0.5 MG: 0.5 TABLET ORAL at 23:08

## 2022-01-01 RX ADMIN — LIDOCAINE HYDROCHLORIDE 3 ML: 20 JELLY TOPICAL at 11:08

## 2022-01-01 RX ADMIN — Medication 10 ML: at 20:32

## 2022-01-01 RX ADMIN — OXYCODONE 5 MG: 5 TABLET ORAL at 14:35

## 2022-01-01 RX ADMIN — CASTOR OIL AND BALSAM, PERU 1 APPLICATION: 788; 87 OINTMENT TOPICAL at 21:37

## 2022-01-01 RX ADMIN — CASTOR OIL AND BALSAM, PERU 1 APPLICATION: 788; 87 OINTMENT TOPICAL at 21:26

## 2022-01-01 RX ADMIN — PALONOSETRON 0.25 MG: 0.25 INJECTION, SOLUTION INTRAVENOUS at 10:18

## 2022-01-01 RX ADMIN — IPRATROPIUM BROMIDE AND ALBUTEROL SULFATE 3 ML: .5; 3 SOLUTION RESPIRATORY (INHALATION) at 17:28

## 2022-01-01 RX ADMIN — HEPARIN SODIUM 5000 UNITS: 5000 INJECTION INTRAVENOUS; SUBCUTANEOUS at 05:40

## 2022-01-01 RX ADMIN — NICOTINE 1 PATCH: 14 PATCH, EXTENDED RELEASE TRANSDERMAL at 20:25

## 2022-01-01 RX ADMIN — DEXMEDETOMIDINE HYDROCHLORIDE 1 MCG/KG/HR: 4 INJECTION, SOLUTION INTRAVENOUS at 23:44

## 2022-01-01 RX ADMIN — APIXABAN 5 MG: 5 TABLET, FILM COATED ORAL at 20:04

## 2022-01-01 RX ADMIN — Medication 10 ML: at 21:09

## 2022-01-01 RX ADMIN — FENTANYL CITRATE 25 MCG: 50 INJECTION, SOLUTION INTRAMUSCULAR; INTRAVENOUS at 16:21

## 2022-01-01 RX ADMIN — OXYCODONE 5 MG: 5 TABLET ORAL at 20:45

## 2022-01-01 RX ADMIN — FLUDEOXYGLUCOSE F18 1 DOSE: 300 INJECTION INTRAVENOUS at 10:26

## 2022-01-01 RX ADMIN — APIXABAN 5 MG: 5 TABLET, FILM COATED ORAL at 08:58

## 2022-01-01 RX ADMIN — METHYLPREDNISOLONE SODIUM SUCCINATE 40 MG: 40 INJECTION, POWDER, FOR SOLUTION INTRAMUSCULAR; INTRAVENOUS at 08:54

## 2022-01-01 RX ADMIN — IPRATROPIUM BROMIDE AND ALBUTEROL SULFATE 3 ML: .5; 3 SOLUTION RESPIRATORY (INHALATION) at 15:03

## 2022-01-01 RX ADMIN — NICOTINE 1 PATCH: 14 PATCH, EXTENDED RELEASE TRANSDERMAL at 20:07

## 2022-01-01 RX ADMIN — BUDESONIDE 0.5 MG: 0.5 SUSPENSION RESPIRATORY (INHALATION) at 19:28

## 2022-01-01 RX ADMIN — Medication 10 ML: at 22:37

## 2022-01-01 RX ADMIN — NICOTINE 1 PATCH: 14 PATCH, EXTENDED RELEASE TRANSDERMAL at 21:18

## 2022-01-01 RX ADMIN — Medication 10 ML: at 20:37

## 2022-01-01 RX ADMIN — ALBUTEROL SULFATE 2 PUFF: 90 AEROSOL, METERED RESPIRATORY (INHALATION) at 15:40

## 2022-01-01 RX ADMIN — INSULIN HUMAN 4 UNITS: 100 INJECTION, SOLUTION PARENTERAL at 05:38

## 2022-01-01 RX ADMIN — GABAPENTIN 100 MG: 100 CAPSULE ORAL at 21:17

## 2022-01-01 RX ADMIN — OXYCODONE 5 MG: 5 TABLET ORAL at 18:31

## 2022-01-01 RX ADMIN — IPRATROPIUM BROMIDE AND ALBUTEROL SULFATE 3 ML: .5; 3 SOLUTION RESPIRATORY (INHALATION) at 19:21

## 2022-01-01 RX ADMIN — HEPARIN SODIUM 5000 UNITS: 5000 INJECTION INTRAVENOUS; SUBCUTANEOUS at 22:06

## 2022-01-01 RX ADMIN — GABAPENTIN 100 MG: 100 CAPSULE ORAL at 21:06

## 2022-01-01 RX ADMIN — Medication 10 ML: at 20:46

## 2022-01-01 RX ADMIN — IPRATROPIUM BROMIDE AND ALBUTEROL SULFATE 3 ML: .5; 3 SOLUTION RESPIRATORY (INHALATION) at 07:07

## 2022-01-01 RX ADMIN — GABAPENTIN 100 MG: 100 CAPSULE ORAL at 21:29

## 2022-01-01 RX ADMIN — Medication 10 ML: at 08:08

## 2022-01-01 RX ADMIN — HYDROMORPHONE HYDROCHLORIDE 0.25 MG: 1 INJECTION, SOLUTION INTRAMUSCULAR; INTRAVENOUS; SUBCUTANEOUS at 18:49

## 2022-01-01 RX ADMIN — TAZOBACTAM SODIUM AND PIPERACILLIN SODIUM 3.38 G: 375; 3 INJECTION, SOLUTION INTRAVENOUS at 05:56

## 2022-01-01 RX ADMIN — IPRATROPIUM BROMIDE AND ALBUTEROL SULFATE 3 ML: .5; 3 SOLUTION RESPIRATORY (INHALATION) at 12:38

## 2022-01-01 RX ADMIN — SODIUM BICARBONATE 150 MEQ: 84 INJECTION INTRAVENOUS at 03:14

## 2022-01-01 RX ADMIN — HYDROMORPHONE HYDROCHLORIDE 0.25 MG: 1 INJECTION, SOLUTION INTRAMUSCULAR; INTRAVENOUS; SUBCUTANEOUS at 20:20

## 2022-01-01 RX ADMIN — MONTELUKAST 10 MG: 10 TABLET, FILM COATED ORAL at 20:32

## 2022-01-01 RX ADMIN — SENNOSIDES AND DOCUSATE SODIUM 2 TABLET: 50; 8.6 TABLET ORAL at 20:33

## 2022-01-01 RX ADMIN — SODIUM CHLORIDE 100 ML/HR: 9 INJECTION, SOLUTION INTRAVENOUS at 20:52

## 2022-01-01 RX ADMIN — OXYCODONE 5 MG: 5 TABLET ORAL at 06:03

## 2022-01-01 RX ADMIN — CLOPIDOGREL BISULFATE 75 MG: 75 TABLET ORAL at 08:11

## 2022-01-01 RX ADMIN — DOCUSATE SODIUM 100 MG: 100 CAPSULE, LIQUID FILLED ORAL at 20:36

## 2022-01-01 RX ADMIN — HYDRALAZINE HYDROCHLORIDE 75 MG: 50 TABLET, FILM COATED ORAL at 14:59

## 2022-01-01 RX ADMIN — FENTANYL CITRATE 25 MCG: 50 INJECTION, SOLUTION INTRAMUSCULAR; INTRAVENOUS at 15:23

## 2022-01-01 RX ADMIN — CLONIDINE HYDROCHLORIDE 0.1 MG: 0.1 TABLET ORAL at 03:21

## 2022-01-01 RX ADMIN — IPRATROPIUM BROMIDE AND ALBUTEROL SULFATE 3 ML: .5; 3 SOLUTION RESPIRATORY (INHALATION) at 23:25

## 2022-01-01 RX ADMIN — HYDRALAZINE HYDROCHLORIDE 75 MG: 50 TABLET, FILM COATED ORAL at 21:26

## 2022-01-01 RX ADMIN — METOPROLOL TARTRATE 100 MG: 100 TABLET, FILM COATED ORAL at 08:23

## 2022-01-01 RX ADMIN — DEXMEDETOMIDINE HYDROCHLORIDE 0.2 MCG/KG/HR: 4 INJECTION, SOLUTION INTRAVENOUS at 01:49

## 2022-01-01 RX ADMIN — GUAIFENESIN 600 MG: 600 TABLET, EXTENDED RELEASE ORAL at 08:49

## 2022-01-01 RX ADMIN — PENTOXIFYLLINE 400 MG: 400 TABLET, EXTENDED RELEASE ORAL at 08:23

## 2022-01-01 RX ADMIN — METOCLOPRAMIDE 5 MG: 5 INJECTION, SOLUTION INTRAMUSCULAR; INTRAVENOUS at 12:29

## 2022-01-01 RX ADMIN — ALBUMIN HUMAN 25 G: 0.25 SOLUTION INTRAVENOUS at 17:57

## 2022-01-01 RX ADMIN — GABAPENTIN 100 MG: 100 CAPSULE ORAL at 05:38

## 2022-01-01 RX ADMIN — IPRATROPIUM BROMIDE AND ALBUTEROL SULFATE 3 ML: .5; 3 SOLUTION RESPIRATORY (INHALATION) at 23:18

## 2022-01-01 RX ADMIN — Medication 10 ML: at 20:30

## 2022-01-01 RX ADMIN — Medication 12.5 MG: at 13:22

## 2022-01-01 RX ADMIN — Medication 5 MG/HR: at 22:06

## 2022-01-01 RX ADMIN — LABETALOL HYDROCHLORIDE 20 MG: 5 INJECTION, SOLUTION INTRAVENOUS at 00:47

## 2022-01-01 RX ADMIN — IPRATROPIUM BROMIDE AND ALBUTEROL SULFATE 3 ML: 2.5; .5 SOLUTION RESPIRATORY (INHALATION) at 11:32

## 2022-01-01 RX ADMIN — HYDRALAZINE HYDROCHLORIDE 25 MG: 25 TABLET, FILM COATED ORAL at 13:37

## 2022-01-01 RX ADMIN — TAZOBACTAM SODIUM AND PIPERACILLIN SODIUM 3.38 G: 375; 3 INJECTION, SOLUTION INTRAVENOUS at 01:15

## 2022-01-01 RX ADMIN — ACETAMINOPHEN 325MG 650 MG: 325 TABLET ORAL at 09:06

## 2022-01-01 RX ADMIN — IPRATROPIUM BROMIDE AND ALBUTEROL SULFATE 3 ML: .5; 3 SOLUTION RESPIRATORY (INHALATION) at 23:34

## 2022-01-01 RX ADMIN — HYDROMORPHONE HYDROCHLORIDE 0.25 MG: 1 INJECTION, SOLUTION INTRAMUSCULAR; INTRAVENOUS; SUBCUTANEOUS at 08:29

## 2022-01-01 RX ADMIN — SODIUM CHLORIDE, POTASSIUM CHLORIDE, SODIUM LACTATE AND CALCIUM CHLORIDE 1000 ML: 600; 310; 30; 20 INJECTION, SOLUTION INTRAVENOUS at 13:09

## 2022-01-01 RX ADMIN — ASPIRIN 81 MG: 81 TABLET, COATED ORAL at 08:08

## 2022-01-01 RX ADMIN — GABAPENTIN 100 MG: 100 CAPSULE ORAL at 21:39

## 2022-01-01 RX ADMIN — HYDROMORPHONE HYDROCHLORIDE 0.25 MG: 1 INJECTION, SOLUTION INTRAMUSCULAR; INTRAVENOUS; SUBCUTANEOUS at 11:04

## 2022-01-01 RX ADMIN — CASTOR OIL AND BALSAM, PERU 1 APPLICATION: 788; 87 OINTMENT TOPICAL at 20:23

## 2022-01-01 RX ADMIN — INSULIN HUMAN 2 UNITS: 100 INJECTION, SOLUTION PARENTERAL at 17:37

## 2022-01-01 RX ADMIN — ALPRAZOLAM 0.5 MG: 0.5 TABLET ORAL at 23:47

## 2022-01-01 RX ADMIN — INSULIN HUMAN 4 UNITS: 100 INJECTION, SOLUTION PARENTERAL at 12:06

## 2022-01-01 RX ADMIN — AMLODIPINE BESYLATE 10 MG: 10 TABLET ORAL at 08:21

## 2022-01-01 RX ADMIN — CASTOR OIL AND BALSAM, PERU 1 APPLICATION: 788; 87 OINTMENT TOPICAL at 20:14

## 2022-01-01 RX ADMIN — HYDRALAZINE HYDROCHLORIDE 75 MG: 50 TABLET, FILM COATED ORAL at 20:51

## 2022-01-01 RX ADMIN — IPRATROPIUM BROMIDE AND ALBUTEROL SULFATE 3 ML: .5; 3 SOLUTION RESPIRATORY (INHALATION) at 13:10

## 2022-01-01 RX ADMIN — NICOTINE 1 PATCH: 14 PATCH, EXTENDED RELEASE TRANSDERMAL at 21:23

## 2022-01-01 RX ADMIN — NYSTATIN 1 APPLICATION: 100000 POWDER TOPICAL at 08:01

## 2022-01-01 RX ADMIN — SMOFLIPID 100 ML: 6; 6; 5; 3 INJECTION, EMULSION INTRAVENOUS at 17:54

## 2022-01-01 RX ADMIN — ACETAMINOPHEN 325MG 650 MG: 325 TABLET ORAL at 08:11

## 2022-01-01 RX ADMIN — HEPARIN SODIUM 5000 UNITS: 5000 INJECTION INTRAVENOUS; SUBCUTANEOUS at 21:41

## 2022-01-01 RX ADMIN — METOPROLOL TARTRATE 100 MG: 100 TABLET, FILM COATED ORAL at 08:56

## 2022-01-01 RX ADMIN — PANTOPRAZOLE SODIUM 40 MG: 40 TABLET, DELAYED RELEASE ORAL at 08:08

## 2022-01-01 RX ADMIN — IPRATROPIUM BROMIDE AND ALBUTEROL SULFATE 3 ML: .5; 3 SOLUTION RESPIRATORY (INHALATION) at 03:17

## 2022-01-01 RX ADMIN — BUDESONIDE 0.5 MG: 0.5 SUSPENSION RESPIRATORY (INHALATION) at 07:55

## 2022-01-01 RX ADMIN — METOCLOPRAMIDE 5 MG: 5 INJECTION, SOLUTION INTRAMUSCULAR; INTRAVENOUS at 03:41

## 2022-01-01 RX ADMIN — ALPRAZOLAM 0.5 MG: 0.5 TABLET ORAL at 20:11

## 2022-01-01 RX ADMIN — HEPARIN SODIUM 5000 UNITS: 5000 INJECTION INTRAVENOUS; SUBCUTANEOUS at 05:56

## 2022-01-01 RX ADMIN — PANTOPRAZOLE SODIUM 40 MG: 40 INJECTION, POWDER, FOR SOLUTION INTRAVENOUS at 05:16

## 2022-01-01 RX ADMIN — PANTOPRAZOLE SODIUM 40 MG: 40 INJECTION, POWDER, FOR SOLUTION INTRAVENOUS at 05:34

## 2022-01-01 RX ADMIN — HYDRALAZINE HYDROCHLORIDE 75 MG: 50 TABLET, FILM COATED ORAL at 22:53

## 2022-01-01 RX ADMIN — Medication 10 ML: at 20:28

## 2022-01-01 RX ADMIN — HEPARIN SODIUM 5000 UNITS: 5000 INJECTION INTRAVENOUS; SUBCUTANEOUS at 14:46

## 2022-01-01 RX ADMIN — HEPARIN SODIUM 5000 UNITS: 5000 INJECTION INTRAVENOUS; SUBCUTANEOUS at 05:34

## 2022-01-01 RX ADMIN — IPRATROPIUM BROMIDE AND ALBUTEROL SULFATE 3 ML: .5; 3 SOLUTION RESPIRATORY (INHALATION) at 16:23

## 2022-01-01 RX ADMIN — NICOTINE 1 PATCH: 14 PATCH, EXTENDED RELEASE TRANSDERMAL at 20:40

## 2022-01-01 RX ADMIN — METHYLPREDNISOLONE SODIUM SUCCINATE 80 MG: 125 INJECTION, POWDER, FOR SOLUTION INTRAMUSCULAR; INTRAVENOUS at 09:18

## 2022-01-01 RX ADMIN — DEXAMETHASONE SODIUM PHOSPHATE 20 MG: 10 INJECTION, SOLUTION INTRAMUSCULAR; INTRAVENOUS at 10:17

## 2022-01-01 RX ADMIN — SODIUM CHLORIDE 360 MG: 9 INJECTION, SOLUTION INTRAVENOUS at 09:34

## 2022-01-01 RX ADMIN — DOXYCYCLINE 100 MG: 100 CAPSULE ORAL at 08:14

## 2022-01-01 RX ADMIN — AMLODIPINE BESYLATE 10 MG: 10 TABLET ORAL at 08:18

## 2022-01-01 RX ADMIN — CYANOCOBALAMIN TAB 1000 MCG 1000 MCG: 1000 TAB at 08:49

## 2022-01-01 RX ADMIN — GABAPENTIN 300 MG: 300 CAPSULE ORAL at 16:30

## 2022-01-01 RX ADMIN — LABETALOL HYDROCHLORIDE 20 MG: 5 INJECTION, SOLUTION INTRAVENOUS at 20:19

## 2022-01-01 RX ADMIN — DEXMEDETOMIDINE HYDROCHLORIDE 1 MCG/KG/HR: 4 INJECTION, SOLUTION INTRAVENOUS at 05:16

## 2022-01-01 RX ADMIN — DOCUSATE SODIUM 100 MG: 100 CAPSULE, LIQUID FILLED ORAL at 20:33

## 2022-01-01 RX ADMIN — HYDRALAZINE HYDROCHLORIDE 75 MG: 50 TABLET, FILM COATED ORAL at 16:24

## 2022-01-01 RX ADMIN — GABAPENTIN 300 MG: 300 CAPSULE ORAL at 08:00

## 2022-01-01 RX ADMIN — SODIUM CHLORIDE, POTASSIUM CHLORIDE, SODIUM LACTATE AND CALCIUM CHLORIDE 1000 ML: 600; 310; 30; 20 INJECTION, SOLUTION INTRAVENOUS at 12:26

## 2022-01-01 RX ADMIN — ALPRAZOLAM 0.25 MG: 0.25 TABLET ORAL at 18:50

## 2022-01-01 RX ADMIN — SODIUM BICARBONATE 150 MEQ: 84 INJECTION INTRAVENOUS at 03:59

## 2022-01-01 RX ADMIN — HEPARIN SODIUM 5000 UNITS: 5000 INJECTION INTRAVENOUS; SUBCUTANEOUS at 14:59

## 2022-01-01 RX ADMIN — NYSTATIN: 100000 POWDER TOPICAL at 15:26

## 2022-01-01 RX ADMIN — BUDESONIDE 0.5 MG: 0.5 SUSPENSION RESPIRATORY (INHALATION) at 20:14

## 2022-01-01 RX ADMIN — LABETALOL HYDROCHLORIDE 20 MG: 5 INJECTION, SOLUTION INTRAVENOUS at 16:09

## 2022-01-01 RX ADMIN — AMLODIPINE BESYLATE 10 MG: 10 TABLET ORAL at 08:07

## 2022-01-01 RX ADMIN — ACETAMINOPHEN 650 MG: 325 TABLET ORAL at 08:23

## 2022-01-01 RX ADMIN — LABETALOL HYDROCHLORIDE 20 MG: 5 INJECTION, SOLUTION INTRAVENOUS at 04:37

## 2022-01-01 RX ADMIN — Medication 10 ML: at 08:57

## 2022-01-01 RX ADMIN — METHYLPREDNISOLONE SODIUM SUCCINATE 60 MG: 125 INJECTION, POWDER, FOR SOLUTION INTRAMUSCULAR; INTRAVENOUS at 04:54

## 2022-01-01 RX ADMIN — GUAIFENESIN 600 MG: 600 TABLET, EXTENDED RELEASE ORAL at 08:08

## 2022-01-01 RX ADMIN — CASTOR OIL AND BALSAM, PERU 1 APPLICATION: 788; 87 OINTMENT TOPICAL at 20:44

## 2022-01-01 RX ADMIN — SENNOSIDES AND DOCUSATE SODIUM 2 TABLET: 50; 8.6 TABLET ORAL at 20:03

## 2022-01-01 RX ADMIN — Medication 250 MG: at 08:00

## 2022-01-01 RX ADMIN — Medication 10 ML: at 21:07

## 2022-01-01 RX ADMIN — DOCUSATE SODIUM 100 MG: 100 CAPSULE, LIQUID FILLED ORAL at 21:10

## 2022-01-01 RX ADMIN — METOPROLOL TARTRATE 25 MG: 25 TABLET, FILM COATED ORAL at 20:35

## 2022-01-01 RX ADMIN — HYDROMORPHONE HYDROCHLORIDE 0.25 MG: 1 INJECTION, SOLUTION INTRAMUSCULAR; INTRAVENOUS; SUBCUTANEOUS at 07:36

## 2022-01-01 RX ADMIN — DEXMEDETOMIDINE HYDROCHLORIDE 0.8 MCG/KG/HR: 4 INJECTION, SOLUTION INTRAVENOUS at 22:59

## 2022-01-01 RX ADMIN — AMLODIPINE BESYLATE 10 MG: 10 TABLET ORAL at 09:01

## 2022-01-01 RX ADMIN — FLUDEOXYGLUCOSE F18 1 DOSE: 300 INJECTION INTRAVENOUS at 08:28

## 2022-01-01 RX ADMIN — METOCLOPRAMIDE 5 MG: 5 INJECTION, SOLUTION INTRAMUSCULAR; INTRAVENOUS at 17:54

## 2022-01-01 RX ADMIN — IPRATROPIUM BROMIDE AND ALBUTEROL SULFATE 3 ML: .5; 3 SOLUTION RESPIRATORY (INHALATION) at 16:26

## 2022-01-01 RX ADMIN — IPRATROPIUM BROMIDE AND ALBUTEROL SULFATE 3 ML: .5; 3 SOLUTION RESPIRATORY (INHALATION) at 20:00

## 2022-01-01 RX ADMIN — BUMETANIDE 2 MG: 0.25 INJECTION, SOLUTION INTRAMUSCULAR; INTRAVENOUS at 13:29

## 2022-01-01 RX ADMIN — HEPARIN SODIUM 5000 UNITS: 5000 INJECTION INTRAVENOUS; SUBCUTANEOUS at 20:32

## 2022-01-01 RX ADMIN — PANTOPRAZOLE SODIUM 40 MG: 40 INJECTION, POWDER, FOR SOLUTION INTRAVENOUS at 21:22

## 2022-01-01 RX ADMIN — GABAPENTIN 100 MG: 100 CAPSULE ORAL at 05:19

## 2022-01-01 RX ADMIN — SENNOSIDES AND DOCUSATE SODIUM 2 TABLET: 50; 8.6 TABLET ORAL at 20:45

## 2022-01-01 RX ADMIN — ALPRAZOLAM 0.5 MG: 0.5 TABLET ORAL at 22:08

## 2022-01-01 RX ADMIN — PANTOPRAZOLE SODIUM 40 MG: 40 TABLET, DELAYED RELEASE ORAL at 20:35

## 2022-01-01 RX ADMIN — FENTANYL CITRATE 25 MCG: 50 INJECTION, SOLUTION INTRAMUSCULAR; INTRAVENOUS at 20:35

## 2022-01-01 RX ADMIN — ATORVASTATIN CALCIUM 80 MG: 40 TABLET, FILM COATED ORAL at 12:59

## 2022-01-01 RX ADMIN — METOCLOPRAMIDE 5 MG: 5 INJECTION, SOLUTION INTRAMUSCULAR; INTRAVENOUS at 17:55

## 2022-01-01 RX ADMIN — GABAPENTIN 100 MG: 100 CAPSULE ORAL at 21:56

## 2022-01-01 RX ADMIN — CASTOR OIL AND BALSAM, PERU 1 APPLICATION: 788; 87 OINTMENT TOPICAL at 09:12

## 2022-01-01 RX ADMIN — PANTOPRAZOLE SODIUM 40 MG: 40 TABLET, DELAYED RELEASE ORAL at 08:18

## 2022-01-01 RX ADMIN — ALBUMIN HUMAN 12.5 G: 0.25 SOLUTION INTRAVENOUS at 21:17

## 2022-01-01 RX ADMIN — PREDNISONE 40 MG: 20 TABLET ORAL at 08:56

## 2022-01-01 RX ADMIN — HEPARIN SODIUM 5000 UNITS: 5000 INJECTION INTRAVENOUS; SUBCUTANEOUS at 14:42

## 2022-01-01 RX ADMIN — METOCLOPRAMIDE 5 MG: 5 INJECTION, SOLUTION INTRAMUSCULAR; INTRAVENOUS at 02:32

## 2022-01-01 RX ADMIN — HEPARIN SODIUM 5000 UNITS: 5000 INJECTION INTRAVENOUS; SUBCUTANEOUS at 23:47

## 2022-01-01 RX ADMIN — MONTELUKAST 10 MG: 10 TABLET, FILM COATED ORAL at 20:45

## 2022-01-01 RX ADMIN — IPRATROPIUM BROMIDE AND ALBUTEROL SULFATE 3 ML: 2.5; .5 SOLUTION RESPIRATORY (INHALATION) at 06:12

## 2022-01-01 RX ADMIN — MORPHINE SULFATE 4 MG: 4 INJECTION, SOLUTION INTRAMUSCULAR; INTRAVENOUS at 14:06

## 2022-01-01 RX ADMIN — TAZOBACTAM SODIUM AND PIPERACILLIN SODIUM 3.38 G: 375; 3 INJECTION, SOLUTION INTRAVENOUS at 12:29

## 2022-01-01 RX ADMIN — SENNOSIDES AND DOCUSATE SODIUM 2 TABLET: 50; 8.6 TABLET ORAL at 08:23

## 2022-01-01 RX ADMIN — Medication 10 ML: at 20:43

## 2022-01-01 RX ADMIN — LABETALOL HYDROCHLORIDE 20 MG: 5 INJECTION, SOLUTION INTRAVENOUS at 15:05

## 2022-01-01 RX ADMIN — ALPRAZOLAM 0.5 MG: 0.5 TABLET ORAL at 20:18

## 2022-01-01 RX ADMIN — GABAPENTIN 100 MG: 100 CAPSULE ORAL at 22:08

## 2022-01-01 RX ADMIN — HEPARIN SODIUM 5000 UNITS: 5000 INJECTION INTRAVENOUS; SUBCUTANEOUS at 21:29

## 2022-01-01 RX ADMIN — METHYLPREDNISOLONE SODIUM SUCCINATE 125 MG: 125 INJECTION, POWDER, FOR SOLUTION INTRAMUSCULAR; INTRAVENOUS at 13:56

## 2022-01-01 RX ADMIN — OXYCODONE 5 MG: 5 TABLET ORAL at 16:27

## 2022-01-01 RX ADMIN — DOXYCYCLINE 100 MG: 100 CAPSULE ORAL at 21:18

## 2022-01-01 RX ADMIN — IPRATROPIUM BROMIDE AND ALBUTEROL SULFATE 3 ML: .5; 3 SOLUTION RESPIRATORY (INHALATION) at 15:07

## 2022-01-01 RX ADMIN — MONTELUKAST 10 MG: 10 TABLET, FILM COATED ORAL at 20:04

## 2022-01-01 RX ADMIN — MAGNESIUM SULFATE HEPTAHYDRATE 2 G: 2 INJECTION, SOLUTION INTRAVENOUS at 11:04

## 2022-01-01 RX ADMIN — ALPRAZOLAM 0.5 MG: 0.5 TABLET ORAL at 21:36

## 2022-01-01 RX ADMIN — PANTOPRAZOLE SODIUM 40 MG: 40 TABLET, DELAYED RELEASE ORAL at 08:23

## 2022-01-01 RX ADMIN — GABAPENTIN 100 MG: 100 CAPSULE ORAL at 21:11

## 2022-01-01 RX ADMIN — SENNOSIDES AND DOCUSATE SODIUM 2 TABLET: 50; 8.6 TABLET ORAL at 14:58

## 2022-01-01 RX ADMIN — HEPARIN SODIUM 5000 UNITS: 5000 INJECTION INTRAVENOUS; SUBCUTANEOUS at 16:24

## 2022-01-01 RX ADMIN — PREDNISONE 20 MG: 20 TABLET ORAL at 08:00

## 2022-01-01 RX ADMIN — MAGNESIUM SULFATE HEPTAHYDRATE 2 G: 2 INJECTION, SOLUTION INTRAVENOUS at 16:39

## 2022-01-01 RX ADMIN — DOCUSATE SODIUM 100 MG: 100 CAPSULE, LIQUID FILLED ORAL at 22:14

## 2022-01-01 RX ADMIN — MIDODRINE HYDROCHLORIDE 10 MG: 10 TABLET ORAL at 11:53

## 2022-01-01 RX ADMIN — INSULIN HUMAN 4 UNITS: 100 INJECTION, SOLUTION PARENTERAL at 20:48

## 2022-01-01 RX ADMIN — METOPROLOL TARTRATE 25 MG: 25 TABLET, FILM COATED ORAL at 21:10

## 2022-01-01 RX ADMIN — NYSTATIN: 100000 POWDER TOPICAL at 20:30

## 2022-01-01 RX ADMIN — Medication 10 ML: at 21:10

## 2022-01-01 RX ADMIN — CASTOR OIL AND BALSAM, PERU 1 APPLICATION: 788; 87 OINTMENT TOPICAL at 22:07

## 2022-01-01 RX ADMIN — IPRATROPIUM BROMIDE AND ALBUTEROL SULFATE 3 ML: .5; 3 SOLUTION RESPIRATORY (INHALATION) at 12:54

## 2022-01-01 RX ADMIN — MICAFUNGIN 100 MG: 20 INJECTION, POWDER, LYOPHILIZED, FOR SOLUTION INTRAVENOUS at 16:21

## 2022-01-01 RX ADMIN — METOPROLOL TARTRATE 25 MG: 25 TABLET, FILM COATED ORAL at 08:07

## 2022-01-01 RX ADMIN — DEXMEDETOMIDINE HYDROCHLORIDE 1.4 MCG/KG/HR: 4 INJECTION, SOLUTION INTRAVENOUS at 22:35

## 2022-01-01 RX ADMIN — ONDANSETRON 4 MG: 2 INJECTION INTRAMUSCULAR; INTRAVENOUS at 11:15

## 2022-01-01 RX ADMIN — ALBUMIN HUMAN 12.5 G: 0.25 SOLUTION INTRAVENOUS at 13:49

## 2022-01-01 RX ADMIN — SMOFLIPID 250 ML: 6; 6; 5; 3 INJECTION, EMULSION INTRAVENOUS at 17:18

## 2022-01-01 RX ADMIN — BUDESONIDE 0.5 MG: 0.5 SUSPENSION RESPIRATORY (INHALATION) at 07:28

## 2022-01-01 RX ADMIN — Medication 10 ML: at 10:52

## 2022-01-01 RX ADMIN — AMLODIPINE BESYLATE 10 MG: 10 TABLET ORAL at 14:58

## 2022-01-01 RX ADMIN — METHYLPREDNISOLONE SODIUM SUCCINATE 125 MG: 125 INJECTION, POWDER, FOR SOLUTION INTRAMUSCULAR; INTRAVENOUS at 03:15

## 2022-01-01 RX ADMIN — HYDRALAZINE HYDROCHLORIDE 75 MG: 50 TABLET, FILM COATED ORAL at 05:16

## 2022-01-01 RX ADMIN — OXYCODONE 5 MG: 5 TABLET ORAL at 18:08

## 2022-01-01 RX ADMIN — IPRATROPIUM BROMIDE AND ALBUTEROL SULFATE 3 ML: 2.5; .5 SOLUTION RESPIRATORY (INHALATION) at 16:05

## 2022-01-01 RX ADMIN — PREDNISONE 10 MG: 10 TABLET ORAL at 09:07

## 2022-01-01 RX ADMIN — IPRATROPIUM BROMIDE AND ALBUTEROL SULFATE 3 ML: .5; 3 SOLUTION RESPIRATORY (INHALATION) at 19:45

## 2022-01-01 RX ADMIN — HYDRALAZINE HYDROCHLORIDE 25 MG: 25 TABLET, FILM COATED ORAL at 13:30

## 2022-01-01 RX ADMIN — GABAPENTIN 300 MG: 300 CAPSULE ORAL at 21:09

## 2022-01-01 RX ADMIN — DEXMEDETOMIDINE HYDROCHLORIDE 1 MCG/KG/HR: 4 INJECTION, SOLUTION INTRAVENOUS at 18:31

## 2022-01-01 RX ADMIN — SENNOSIDES AND DOCUSATE SODIUM 2 TABLET: 50; 8.6 TABLET ORAL at 08:56

## 2022-01-01 RX ADMIN — OXYCODONE 5 MG: 5 TABLET ORAL at 02:40

## 2022-01-01 RX ADMIN — Medication 250 MG: at 08:56

## 2022-01-01 RX ADMIN — GABAPENTIN 100 MG: 100 CAPSULE ORAL at 14:16

## 2022-01-01 RX ADMIN — GABAPENTIN 300 MG: 300 CAPSULE ORAL at 08:58

## 2022-01-01 RX ADMIN — NIFEDIPINE 60 MG: 30 TABLET, FILM COATED, EXTENDED RELEASE ORAL at 13:22

## 2022-01-01 RX ADMIN — IPRATROPIUM BROMIDE AND ALBUTEROL SULFATE 3 ML: 2.5; .5 SOLUTION RESPIRATORY (INHALATION) at 08:36

## 2022-01-01 RX ADMIN — INSULIN HUMAN 2 UNITS: 100 INJECTION, SOLUTION PARENTERAL at 00:49

## 2022-01-01 RX ADMIN — POTASSIUM CHLORIDE 40 MEQ: 750 CAPSULE, EXTENDED RELEASE ORAL at 08:13

## 2022-01-01 RX ADMIN — ALBUTEROL SULFATE 2.5 MG: 2.5 SOLUTION RESPIRATORY (INHALATION) at 00:00

## 2022-01-01 RX ADMIN — GABAPENTIN 100 MG: 100 CAPSULE ORAL at 05:47

## 2022-01-01 RX ADMIN — ALPRAZOLAM 0.5 MG: 0.5 TABLET ORAL at 12:41

## 2022-01-01 RX ADMIN — NYSTATIN 1 APPLICATION: 100000 POWDER TOPICAL at 21:25

## 2022-01-01 RX ADMIN — DEXAMETHASONE SODIUM PHOSPHATE 8 MG: 4 INJECTION, SOLUTION INTRA-ARTICULAR; INTRALESIONAL; INTRAMUSCULAR; INTRAVENOUS; SOFT TISSUE at 11:15

## 2022-01-01 RX ADMIN — LABETALOL HYDROCHLORIDE 20 MG: 5 INJECTION, SOLUTION INTRAVENOUS at 05:15

## 2022-01-01 RX ADMIN — HYDRALAZINE HYDROCHLORIDE 75 MG: 50 TABLET, FILM COATED ORAL at 05:38

## 2022-01-01 RX ADMIN — Medication 10 ML: at 08:11

## 2022-01-01 RX ADMIN — DEXMEDETOMIDINE HYDROCHLORIDE 1 MCG/KG/HR: 4 INJECTION, SOLUTION INTRAVENOUS at 17:22

## 2022-01-01 RX ADMIN — ALPRAZOLAM 0.5 MG: 0.5 TABLET ORAL at 06:26

## 2022-01-01 RX ADMIN — METOCLOPRAMIDE 5 MG: 5 INJECTION, SOLUTION INTRAMUSCULAR; INTRAVENOUS at 17:23

## 2022-01-01 RX ADMIN — IPRATROPIUM BROMIDE 0.5 MG: 0.5 SOLUTION RESPIRATORY (INHALATION) at 07:16

## 2022-01-01 RX ADMIN — GABAPENTIN 100 MG: 100 CAPSULE ORAL at 13:58

## 2022-01-01 RX ADMIN — IPRATROPIUM BROMIDE AND ALBUTEROL SULFATE 3 ML: .5; 3 SOLUTION RESPIRATORY (INHALATION) at 11:43

## 2022-01-01 RX ADMIN — HEPARIN SODIUM 5000 UNITS: 5000 INJECTION INTRAVENOUS; SUBCUTANEOUS at 21:26

## 2022-01-01 RX ADMIN — OXYCODONE 5 MG: 5 TABLET ORAL at 10:18

## 2022-01-01 RX ADMIN — TAZOBACTAM SODIUM AND PIPERACILLIN SODIUM 3.38 G: 375; 3 INJECTION, SOLUTION INTRAVENOUS at 05:31

## 2022-01-01 RX ADMIN — TAZOBACTAM SODIUM AND PIPERACILLIN SODIUM 3.38 G: 375; 3 INJECTION, SOLUTION INTRAVENOUS at 02:32

## 2022-01-01 RX ADMIN — PANTOPRAZOLE SODIUM 40 MG: 40 TABLET, DELAYED RELEASE ORAL at 21:36

## 2022-01-01 RX ADMIN — DOXYCYCLINE 100 MG: 100 CAPSULE ORAL at 22:13

## 2022-01-01 RX ADMIN — Medication 10 ML: at 12:30

## 2022-01-01 RX ADMIN — HYDROMORPHONE HYDROCHLORIDE 0.25 MG: 1 INJECTION, SOLUTION INTRAMUSCULAR; INTRAVENOUS; SUBCUTANEOUS at 12:52

## 2022-01-01 RX ADMIN — Medication 10 ML: at 08:42

## 2022-01-01 RX ADMIN — NICOTINE 1 PATCH: 14 PATCH, EXTENDED RELEASE TRANSDERMAL at 21:14

## 2022-01-01 RX ADMIN — NYSTATIN 1 APPLICATION: 100000 POWDER TOPICAL at 21:10

## 2022-01-01 RX ADMIN — HYDROMORPHONE HYDROCHLORIDE 0.25 MG: 1 INJECTION, SOLUTION INTRAMUSCULAR; INTRAVENOUS; SUBCUTANEOUS at 04:49

## 2022-01-01 RX ADMIN — LABETALOL HYDROCHLORIDE 20 MG: 5 INJECTION, SOLUTION INTRAVENOUS at 05:07

## 2022-01-01 RX ADMIN — BUDESONIDE 0.5 MG: 0.5 SUSPENSION RESPIRATORY (INHALATION) at 19:48

## 2022-01-01 RX ADMIN — ASPIRIN 81 MG: 81 TABLET, COATED ORAL at 21:18

## 2022-01-01 RX ADMIN — TAMSULOSIN HYDROCHLORIDE 0.8 MG: 0.4 CAPSULE ORAL at 08:07

## 2022-01-01 RX ADMIN — IPRATROPIUM BROMIDE AND ALBUTEROL SULFATE 3 ML: .5; 3 SOLUTION RESPIRATORY (INHALATION) at 03:19

## 2022-01-01 RX ADMIN — METOPROLOL TARTRATE 100 MG: 100 TABLET, FILM COATED ORAL at 08:00

## 2022-01-01 RX ADMIN — Medication 250 MG: at 14:46

## 2022-01-01 RX ADMIN — TAZOBACTAM SODIUM AND PIPERACILLIN SODIUM 3.38 G: 375; 3 INJECTION, SOLUTION INTRAVENOUS at 00:41

## 2022-01-01 RX ADMIN — BUMETANIDE 0.5 MG: 0.25 INJECTION INTRAMUSCULAR; INTRAVENOUS at 09:18

## 2022-01-01 RX ADMIN — Medication 250 MG: at 08:29

## 2022-01-01 RX ADMIN — TAMSULOSIN HYDROCHLORIDE 0.8 MG: 0.4 CAPSULE ORAL at 08:49

## 2022-01-01 RX ADMIN — Medication 250 MG: at 08:01

## 2022-01-01 RX ADMIN — IPRATROPIUM BROMIDE AND ALBUTEROL SULFATE 3 ML: .5; 3 SOLUTION RESPIRATORY (INHALATION) at 12:28

## 2022-01-01 RX ADMIN — CASTOR OIL AND BALSAM, PERU 1 APPLICATION: 788; 87 OINTMENT TOPICAL at 08:55

## 2022-01-01 RX ADMIN — SODIUM BICARBONATE 150 MEQ: 84 INJECTION INTRAVENOUS at 20:21

## 2022-01-01 RX ADMIN — SENNOSIDES AND DOCUSATE SODIUM 2 TABLET: 50; 8.6 TABLET ORAL at 08:18

## 2022-01-01 RX ADMIN — IPRATROPIUM BROMIDE AND ALBUTEROL SULFATE 3 ML: .5; 3 SOLUTION RESPIRATORY (INHALATION) at 19:11

## 2022-01-01 RX ADMIN — LEVOFLOXACIN 500 MG: 500 INJECTION, SOLUTION INTRAVENOUS at 05:24

## 2022-01-01 RX ADMIN — HYDRALAZINE HYDROCHLORIDE 25 MG: 25 TABLET, FILM COATED ORAL at 20:33

## 2022-01-01 RX ADMIN — IPRATROPIUM BROMIDE AND ALBUTEROL SULFATE 3 ML: .5; 3 SOLUTION RESPIRATORY (INHALATION) at 20:23

## 2022-01-01 RX ADMIN — PANTOPRAZOLE SODIUM 40 MG: 40 TABLET, DELAYED RELEASE ORAL at 20:43

## 2022-01-01 RX ADMIN — METHYLPREDNISOLONE SODIUM SUCCINATE 80 MG: 125 INJECTION, POWDER, FOR SOLUTION INTRAMUSCULAR; INTRAVENOUS at 05:31

## 2022-01-01 RX ADMIN — CASTOR OIL AND BALSAM, PERU 1 APPLICATION: 788; 87 OINTMENT TOPICAL at 08:43

## 2022-01-01 RX ADMIN — GABAPENTIN 100 MG: 100 CAPSULE ORAL at 15:07

## 2022-01-01 RX ADMIN — DEXMEDETOMIDINE HYDROCHLORIDE 1 MCG/KG/HR: 4 INJECTION, SOLUTION INTRAVENOUS at 23:45

## 2022-01-01 RX ADMIN — NICOTINE 1 PATCH: 14 PATCH, EXTENDED RELEASE TRANSDERMAL at 21:39

## 2022-01-01 RX ADMIN — DEXMEDETOMIDINE HYDROCHLORIDE 1 MCG/KG/HR: 4 INJECTION, SOLUTION INTRAVENOUS at 05:55

## 2022-01-01 RX ADMIN — IPRATROPIUM BROMIDE AND ALBUTEROL SULFATE 3 ML: .5; 3 SOLUTION RESPIRATORY (INHALATION) at 19:53

## 2022-01-01 RX ADMIN — ALPRAZOLAM 0.5 MG: 0.5 TABLET ORAL at 22:09

## 2022-01-01 RX ADMIN — IPRATROPIUM BROMIDE AND ALBUTEROL SULFATE 3 ML: .5; 3 SOLUTION RESPIRATORY (INHALATION) at 20:16

## 2022-01-01 RX ADMIN — METHYLPREDNISOLONE SODIUM SUCCINATE 60 MG: 125 INJECTION, POWDER, FOR SOLUTION INTRAMUSCULAR; INTRAVENOUS at 01:36

## 2022-01-01 RX ADMIN — IPRATROPIUM BROMIDE 0.5 MG: 0.5 SOLUTION RESPIRATORY (INHALATION) at 14:39

## 2022-01-01 RX ADMIN — HYDRALAZINE HYDROCHLORIDE 75 MG: 50 TABLET, FILM COATED ORAL at 05:41

## 2022-01-01 RX ADMIN — HEPARIN SODIUM 5000 UNITS: 5000 INJECTION INTRAVENOUS; SUBCUTANEOUS at 18:06

## 2022-01-01 RX ADMIN — GABAPENTIN 100 MG: 100 CAPSULE ORAL at 05:30

## 2022-01-01 RX ADMIN — HEPARIN SODIUM 5000 UNITS: 5000 INJECTION INTRAVENOUS; SUBCUTANEOUS at 20:26

## 2022-01-01 RX ADMIN — CLONIDINE HYDROCHLORIDE 0.1 MG: 0.1 TABLET ORAL at 16:11

## 2022-01-01 RX ADMIN — CARBOPLATIN 490 MG: 10 INJECTION, SOLUTION INTRAVENOUS at 14:53

## 2022-01-01 RX ADMIN — PANTOPRAZOLE SODIUM 40 MG: 40 TABLET, DELAYED RELEASE ORAL at 21:29

## 2022-01-01 RX ADMIN — TAMSULOSIN HYDROCHLORIDE 0.8 MG: 0.4 CAPSULE ORAL at 08:13

## 2022-01-01 RX ADMIN — CASTOR OIL AND BALSAM, PERU 1 APPLICATION: 788; 87 OINTMENT TOPICAL at 08:19

## 2022-01-01 RX ADMIN — FENTANYL CITRATE 25 MCG: 50 INJECTION, SOLUTION INTRAMUSCULAR; INTRAVENOUS at 21:08

## 2022-01-01 RX ADMIN — CASTOR OIL AND BALSAM, PERU 1 APPLICATION: 788; 87 OINTMENT TOPICAL at 08:10

## 2022-01-01 RX ADMIN — Medication 10 ML: at 21:23

## 2022-01-01 RX ADMIN — HEPARIN SODIUM 5000 UNITS: 5000 INJECTION INTRAVENOUS; SUBCUTANEOUS at 06:03

## 2022-01-01 RX ADMIN — IPRATROPIUM BROMIDE AND ALBUTEROL SULFATE 3 ML: .5; 3 SOLUTION RESPIRATORY (INHALATION) at 12:32

## 2022-01-01 RX ADMIN — METOPROLOL TARTRATE 100 MG: 100 TABLET, FILM COATED ORAL at 12:30

## 2022-01-01 RX ADMIN — Medication 250 MG: at 08:21

## 2022-01-01 RX ADMIN — SODIUM BICARBONATE 75 MEQ: 84 INJECTION, SOLUTION INTRAVENOUS at 17:17

## 2022-01-01 RX ADMIN — PANTOPRAZOLE SODIUM 40 MG: 40 TABLET, DELAYED RELEASE ORAL at 21:39

## 2022-01-01 RX ADMIN — PANTOPRAZOLE SODIUM 40 MG: 40 TABLET, DELAYED RELEASE ORAL at 06:28

## 2022-01-01 RX ADMIN — GABAPENTIN 300 MG: 300 CAPSULE ORAL at 16:57

## 2022-01-01 RX ADMIN — HYDROMORPHONE HYDROCHLORIDE 0.25 MG: 1 INJECTION, SOLUTION INTRAMUSCULAR; INTRAVENOUS; SUBCUTANEOUS at 02:21

## 2022-01-01 RX ADMIN — IPRATROPIUM BROMIDE AND ALBUTEROL SULFATE 3 ML: 2.5; .5 SOLUTION RESPIRATORY (INHALATION) at 11:17

## 2022-01-01 RX ADMIN — HEPARIN SODIUM 5000 UNITS: 5000 INJECTION INTRAVENOUS; SUBCUTANEOUS at 13:57

## 2022-01-01 RX ADMIN — HEPARIN SODIUM 5000 UNITS: 5000 INJECTION INTRAVENOUS; SUBCUTANEOUS at 16:48

## 2022-01-01 RX ADMIN — HYDRALAZINE HYDROCHLORIDE 25 MG: 25 TABLET, FILM COATED ORAL at 20:04

## 2022-01-01 RX ADMIN — DEXMEDETOMIDINE HYDROCHLORIDE 0.8 MCG/KG/HR: 4 INJECTION, SOLUTION INTRAVENOUS at 05:26

## 2022-01-01 RX ADMIN — IPRATROPIUM BROMIDE AND ALBUTEROL SULFATE 3 ML: .5; 3 SOLUTION RESPIRATORY (INHALATION) at 12:18

## 2022-01-01 RX ADMIN — LABETALOL HYDROCHLORIDE 20 MG: 5 INJECTION, SOLUTION INTRAVENOUS at 17:55

## 2022-01-01 RX ADMIN — IPRATROPIUM BROMIDE AND ALBUTEROL SULFATE 3 ML: .5; 3 SOLUTION RESPIRATORY (INHALATION) at 23:49

## 2022-01-01 RX ADMIN — TAMSULOSIN HYDROCHLORIDE 0.8 MG: 0.4 CAPSULE ORAL at 08:17

## 2022-01-01 RX ADMIN — OXYCODONE 5 MG: 5 TABLET ORAL at 15:48

## 2022-01-01 RX ADMIN — GUAIFENESIN 600 MG: 600 TABLET, EXTENDED RELEASE ORAL at 13:33

## 2022-01-01 RX ADMIN — BUDESONIDE 0.5 MG: 0.5 SUSPENSION RESPIRATORY (INHALATION) at 20:11

## 2022-01-01 RX ADMIN — PANTOPRAZOLE SODIUM 40 MG: 40 INJECTION, POWDER, FOR SOLUTION INTRAVENOUS at 20:09

## 2022-01-01 RX ADMIN — METOPROLOL TARTRATE 100 MG: 100 TABLET, FILM COATED ORAL at 22:11

## 2022-01-01 RX ADMIN — CASTOR OIL AND BALSAM, PERU 1 APPLICATION: 788; 87 OINTMENT TOPICAL at 08:57

## 2022-01-01 RX ADMIN — DEXMEDETOMIDINE HYDROCHLORIDE 0.6 MCG/KG/HR: 4 INJECTION, SOLUTION INTRAVENOUS at 12:00

## 2022-01-01 RX ADMIN — OXYCODONE 5 MG: 5 TABLET ORAL at 22:14

## 2022-01-01 RX ADMIN — PREDNISONE 20 MG: 20 TABLET ORAL at 08:21

## 2022-01-01 RX ADMIN — ATORVASTATIN CALCIUM 80 MG: 40 TABLET, FILM COATED ORAL at 16:04

## 2022-01-01 RX ADMIN — AMLODIPINE BESYLATE 10 MG: 10 TABLET ORAL at 08:14

## 2022-01-01 RX ADMIN — METOPROLOL TARTRATE 100 MG: 100 TABLET, FILM COATED ORAL at 07:36

## 2022-01-01 RX ADMIN — PREDNISONE 40 MG: 20 TABLET ORAL at 08:01

## 2022-01-01 RX ADMIN — IPRATROPIUM BROMIDE AND ALBUTEROL SULFATE 3 ML: .5; 3 SOLUTION RESPIRATORY (INHALATION) at 12:10

## 2022-01-01 RX ADMIN — METOPROLOL TARTRATE 100 MG: 100 TABLET, FILM COATED ORAL at 08:18

## 2022-01-01 RX ADMIN — METOCLOPRAMIDE 5 MG: 5 INJECTION, SOLUTION INTRAMUSCULAR; INTRAVENOUS at 12:53

## 2022-01-01 RX ADMIN — PANTOPRAZOLE SODIUM 40 MG: 40 INJECTION, POWDER, FOR SOLUTION INTRAVENOUS at 05:11

## 2022-01-01 RX ADMIN — NYSTATIN: 100000 POWDER TOPICAL at 09:15

## 2022-01-01 RX ADMIN — CASTOR OIL AND BALSAM, PERU 1 APPLICATION: 788; 87 OINTMENT TOPICAL at 09:06

## 2022-01-01 RX ADMIN — METOCLOPRAMIDE 5 MG: 5 INJECTION, SOLUTION INTRAMUSCULAR; INTRAVENOUS at 01:02

## 2022-01-01 RX ADMIN — GABAPENTIN 100 MG: 100 CAPSULE ORAL at 05:16

## 2022-01-01 RX ADMIN — CLONIDINE 1 PATCH: 0.1 PATCH, EXTENDED RELEASE TRANSDERMAL at 08:56

## 2022-01-01 RX ADMIN — GABAPENTIN 100 MG: 100 CAPSULE ORAL at 13:38

## 2022-01-01 RX ADMIN — METOCLOPRAMIDE 5 MG: 5 INJECTION, SOLUTION INTRAMUSCULAR; INTRAVENOUS at 02:33

## 2022-01-01 RX ADMIN — DEXMEDETOMIDINE HYDROCHLORIDE 1 MCG/KG/HR: 4 INJECTION, SOLUTION INTRAVENOUS at 14:49

## 2022-01-01 RX ADMIN — HEPARIN SODIUM 5000 UNITS: 5000 INJECTION INTRAVENOUS; SUBCUTANEOUS at 05:09

## 2022-01-01 RX ADMIN — NYSTATIN: 100000 POWDER TOPICAL at 08:57

## 2022-01-01 RX ADMIN — IPRATROPIUM BROMIDE AND ALBUTEROL SULFATE 3 ML: 2.5; .5 SOLUTION RESPIRATORY (INHALATION) at 21:30

## 2022-01-01 RX ADMIN — METOCLOPRAMIDE 5 MG: 5 INJECTION, SOLUTION INTRAMUSCULAR; INTRAVENOUS at 02:58

## 2022-01-01 RX ADMIN — METOCLOPRAMIDE 5 MG: 5 INJECTION, SOLUTION INTRAMUSCULAR; INTRAVENOUS at 17:22

## 2022-01-01 RX ADMIN — METOPROLOL TARTRATE 50 MG: 50 TABLET, FILM COATED ORAL at 21:03

## 2022-01-01 RX ADMIN — IPRATROPIUM BROMIDE AND ALBUTEROL SULFATE 3 ML: .5; 3 SOLUTION RESPIRATORY (INHALATION) at 18:55

## 2022-01-01 RX ADMIN — METOPROLOL TARTRATE 5 MG: 5 INJECTION INTRAVENOUS at 02:57

## 2022-01-01 RX ADMIN — Medication 10 ML: at 20:07

## 2022-01-01 RX ADMIN — GUAIFENESIN 600 MG: 600 TABLET, EXTENDED RELEASE ORAL at 21:09

## 2022-01-01 RX ADMIN — METOCLOPRAMIDE 5 MG: 5 INJECTION, SOLUTION INTRAMUSCULAR; INTRAVENOUS at 01:36

## 2022-01-01 RX ADMIN — Medication 10 ML: at 09:24

## 2022-01-01 RX ADMIN — NYSTATIN: 100000 POWDER TOPICAL at 21:39

## 2022-01-01 RX ADMIN — METHYLPREDNISOLONE SODIUM SUCCINATE 60 MG: 125 INJECTION, POWDER, FOR SOLUTION INTRAMUSCULAR; INTRAVENOUS at 07:51

## 2022-01-01 RX ADMIN — HYDRALAZINE HYDROCHLORIDE 25 MG: 25 TABLET, FILM COATED ORAL at 05:32

## 2022-01-01 RX ADMIN — AMLODIPINE BESYLATE 5 MG: 5 TABLET ORAL at 12:23

## 2022-01-01 RX ADMIN — AMLODIPINE BESYLATE 10 MG: 10 TABLET ORAL at 13:22

## 2022-01-01 RX ADMIN — SENNOSIDES AND DOCUSATE SODIUM 2 TABLET: 50; 8.6 TABLET ORAL at 20:36

## 2022-01-01 RX ADMIN — GABAPENTIN 300 MG: 300 CAPSULE ORAL at 20:34

## 2022-01-01 RX ADMIN — IPRATROPIUM BROMIDE AND ALBUTEROL SULFATE 3 ML: .5; 3 SOLUTION RESPIRATORY (INHALATION) at 16:41

## 2022-01-01 RX ADMIN — Medication 10 ML: at 08:19

## 2022-01-01 RX ADMIN — PREDNISONE 40 MG: 20 TABLET ORAL at 08:10

## 2022-01-01 RX ADMIN — BUDESONIDE 0.5 MG: 0.5 SUSPENSION RESPIRATORY (INHALATION) at 19:20

## 2022-01-01 RX ADMIN — METHYLPREDNISOLONE SODIUM SUCCINATE 80 MG: 125 INJECTION, POWDER, FOR SOLUTION INTRAMUSCULAR; INTRAVENOUS at 05:10

## 2022-01-01 RX ADMIN — NYSTATIN 500000 UNITS: 100000 SUSPENSION ORAL at 08:14

## 2022-01-01 RX ADMIN — IPRATROPIUM BROMIDE AND ALBUTEROL SULFATE 3 ML: .5; 3 SOLUTION RESPIRATORY (INHALATION) at 15:17

## 2022-01-01 RX ADMIN — BUDESONIDE 0.5 MG: 0.5 SUSPENSION RESPIRATORY (INHALATION) at 20:15

## 2022-01-01 RX ADMIN — HYDROMORPHONE HYDROCHLORIDE 0.25 MG: 1 INJECTION, SOLUTION INTRAMUSCULAR; INTRAVENOUS; SUBCUTANEOUS at 02:32

## 2022-01-01 RX ADMIN — IPRATROPIUM BROMIDE AND ALBUTEROL SULFATE 3 ML: .5; 3 SOLUTION RESPIRATORY (INHALATION) at 10:48

## 2022-01-01 RX ADMIN — HEPARIN SODIUM 5000 UNITS: 5000 INJECTION INTRAVENOUS; SUBCUTANEOUS at 21:10

## 2022-01-01 RX ADMIN — INSULIN HUMAN 2 UNITS: 100 INJECTION, SOLUTION PARENTERAL at 06:44

## 2022-01-01 RX ADMIN — AMLODIPINE BESYLATE 10 MG: 10 TABLET ORAL at 08:56

## 2022-01-01 RX ADMIN — METOPROLOL TARTRATE 100 MG: 100 TABLET, FILM COATED ORAL at 21:16

## 2022-01-01 RX ADMIN — AMLODIPINE BESYLATE 10 MG: 10 TABLET ORAL at 12:13

## 2022-01-01 RX ADMIN — ALPRAZOLAM 0.5 MG: 0.5 TABLET ORAL at 06:22

## 2022-01-01 RX ADMIN — ASPIRIN 81 MG: 81 TABLET, COATED ORAL at 08:58

## 2022-04-26 PROBLEM — I25.810 CORONARY ARTERY DISEASE INVOLVING CORONARY BYPASS GRAFT OF NATIVE HEART WITHOUT ANGINA PECTORIS: Status: ACTIVE | Noted: 2022-01-01

## 2022-04-26 PROBLEM — F17.210 CIGARETTE SMOKER: Status: ACTIVE | Noted: 2022-01-01

## 2022-04-26 PROBLEM — K21.9 GASTROESOPHAGEAL REFLUX DISEASE WITHOUT ESOPHAGITIS: Chronic | Status: ACTIVE | Noted: 2022-01-01

## 2022-04-26 PROBLEM — L40.9 PSORIASIS: Status: ACTIVE | Noted: 2022-01-01

## 2022-04-26 PROBLEM — J44.9 COPD (CHRONIC OBSTRUCTIVE PULMONARY DISEASE): Chronic | Status: ACTIVE | Noted: 2022-01-01

## 2022-04-26 PROBLEM — J44.9 COPD (CHRONIC OBSTRUCTIVE PULMONARY DISEASE): Status: ACTIVE | Noted: 2022-01-01

## 2022-04-26 PROBLEM — N28.89 RIGHT KIDNEY MASS: Status: ACTIVE | Noted: 2022-01-01

## 2022-04-26 PROBLEM — I10 ESSENTIAL HYPERTENSION: Chronic | Status: ACTIVE | Noted: 2022-01-01

## 2022-04-26 PROBLEM — E78.2 MIXED HYPERLIPIDEMIA: Chronic | Status: ACTIVE | Noted: 2022-01-01

## 2022-04-26 PROBLEM — R39.9 LOWER URINARY TRACT SYMPTOMS (LUTS): Status: ACTIVE | Noted: 2022-01-01

## 2022-04-26 PROBLEM — C67.9 BLADDER CANCER: Status: ACTIVE | Noted: 2022-01-01

## 2022-04-26 NOTE — PROGRESS NOTES
Chief Complaint  Reese aCge is a 71 y.o. male presenting for Annual Exam (Referrals  Establish care ) and Syncope.     From [place], [place]. Lived 25 years in [place]. Relocated to [place] April 2022 to live with his financye (been together since 2012), plan to get . Had 2 children, lost son from leukemia and has daughter in [place]. Lost  hist 2 brothers in January 2020 and April 2022, also lost sister in 2021. 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     Patient has a past medical history of hypertension, hyperlipidemia, CAD s/p CABG (2015), PAD, bladder cancer s/p surgery and radiation (2015), COPD, GERD, colon polyps and psoriasis.    History of Present Illness  Patient is here with his fiamandae to establish care.    He has brought contact information from previous PCP and specialists, see image below.    Patient underwent CABG in 2015, at the same time they also found his bladder cancer.  He underwent surgery and radiation, did not have chemotherapy.  More recently they have found a spot on the right kidney, for which they recommended follow-up.  Patient denies any angina.  He is able to exert himself without chest pain, but does have some shortness of breath and wheezing.  He was found to have COPD in the past and follow-up with pulmonology for a while.    Patient states his appetite is good, he has gained some weight.  He has a long history of cigarette smoking, currently he has cut down to about 4 cigarettes a day and he plans to quit entirely.    Patient also was found to have 8 polyps on colonoscopy done about 6 years ago.  He is amenable to repeat colonoscopy.            The following portions of the patient's history were reviewed and updated as appropriate: allergies, current medications, past family history, past medical history, past social history, past surgical history and problem list.    Objective  /62 (BP Location: Left arm, Patient Position: Sitting,  "Cuff Size: Adult)   Pulse 72   Temp 98 °F (36.7 °C) (Temporal)   Ht 172.7 cm (68\")   Wt 66.4 kg (146 lb 6.4 oz)   BMI 22.26 kg/m²     Physical Exam  Vitals reviewed.   Constitutional:       Appearance: Normal appearance.   HENT:      Head: Normocephalic and atraumatic.      Nose: No congestion.   Eyes:      Extraocular Movements: Extraocular movements intact.      Conjunctiva/sclera: Conjunctivae normal.   Cardiovascular:      Rate and Rhythm: Normal rate and regular rhythm.      Heart sounds: Normal heart sounds. No murmur heard.  Pulmonary:      Effort: Pulmonary effort is normal.      Breath sounds: Normal breath sounds. No wheezing.      Comments: Overall decreased respiratory sounds, but no focal findings, no wheezing heard.  Abdominal:      General: There is no distension.      Palpations: Abdomen is soft. There is no mass.      Tenderness: There is no abdominal tenderness.   Musculoskeletal:      Cervical back: Neck supple.      Right lower leg: No edema.      Left lower leg: No edema.   Skin:     General: Skin is warm and dry.   Neurological:      Mental Status: He is alert and oriented to person, place, and time. Mental status is at baseline.   Psychiatric:         Behavior: Behavior normal.         Thought Content: Thought content normal.         Assessment/Plan   1. Malignant neoplasm of urinary bladder, unspecified site (HCC)  2. Right kidney mass  3. Lower urinary tract symptoms (LUTS)  4. Painful urination  Records requested.  Will refer to urology for follow-up.  We will do blood work and urine as ordered.  - CBC (No Diff); Future  - Ambulatory Referral to Urology  - Urinalysis With Culture If Indicated -; Future    5. Essential hypertension  BP Readings from Last 3 Encounters:   04/26/22 128/62   Blood pressure currently at goal.  Continue current medications  - metoprolol tartrate (LOPRESSOR) 100 MG tablet; Take 100 mg by mouth 2 (Two) Times a Day. 1 in AM and 1/2 in Pm  - furosemide (LASIX) 20 " MG tablet; Take 20 mg by mouth Daily.  - amLODIPine (NORVASC) 5 MG tablet; Take 5 mg by mouth Daily.  - losartan (COZAAR) 100 MG tablet; Take 100 mg by mouth Daily.  - NIFEdipine CC (ADALAT CC) 60 MG 24 hr tablet; Take 60 mg by mouth Every Night.  - Comprehensive Metabolic Panel; Future    6. Coronary artery disease involving coronary bypass graft of native heart without angina pectoris  Stable, no angina.  Continue on current medications and will refer to cardiology for follow-up.  Records requested.  - nitroglycerin (NITROSTAT) 0.4 MG SL tablet; Place 0.4 mg under the tongue Every 5 (Five) Minutes As Needed for Chest Pain. Take no more than 3 doses in 15 minutes.  - aspirin 81 MG EC tablet; Take 81 mg by mouth Daily.  - clopidogrel (PLAVIX) 75 MG tablet; Take 75 mg by mouth Daily.  - Ambulatory Referral to Cardiology    7. Mixed hyperlipidemia  Patient is fasting for lipids today.  Continue on high intensity statin.  - atorvastatin (LIPITOR) 80 MG tablet; Take 80 mg by mouth Daily.  - Lipid Panel; Future    8. PAD (peripheral artery disease) ([facility])  Patient has been following with vascular surgeon for PAD.  Will refer locally.  Patient does not have the contact information as of yet  - Ambulatory Referral to Vascular Surgery    9. Chronic obstructive pulmonary disease, unspecified COPD type ([facility])  Overall stable.  Continue on current medications.  - montelukast (SINGULAIR) 10 MG tablet; Take 10 mg by mouth Every Night.  - albuterol (PROVENTIL) (2.5 MG/3ML) 0.083% nebulizer solution; Take 2.5 mg by nebulization Every 4 (Four) Hours As Needed for Wheezing.  - budesonide-formoterol (SYMBICORT) 80-4.5 MCG/ACT inhaler; Inhale 2 puffs 2 (Two) Times a Day.  - albuterol sulfate  (90 Base) MCG/ACT inhaler; Inhale 2 puffs Every 4 (Four) Hours As Needed for Wheezing.    10. History of colon polyps  11. Screen for colon cancer  Patient would be due for repeat colonoscopy but history of polyps last about 6 years ago.  -  Amb referral for Screening Colonoscopy    12. Screening PSA (prostate specific antigen)  Patient would like PSA screening.  - PSA Screen; Future    13. Encounter for vitamin deficiency screening  - Vitamin B12; Future    14. Need for hepatitis C screening test  We will screen as recommended  - Hepatitis C Antibody; Future    15. Gastroesophageal reflux disease without esophagitis  - omeprazole (priLOSEC) 40 MG capsule; Take 40 mg by mouth Daily.      Advance Care Planning   ACP discussion was held with the patient during this visit. Patient does not have an advance directive, information provided.       BMI is within normal parameters. No follow-up required.      Return in about 3 weeks (around 5/17/2022) for Medicare Wellness.    Kwame Hines MD  Family Medicine  04/26/2022

## 2022-04-27 PROBLEM — E53.8 VITAMIN B12 DEFICIENCY: Status: ACTIVE | Noted: 2022-01-01

## 2022-04-27 NOTE — TELEPHONE ENCOUNTER
----- Message from Kwame Hines MD sent at 4/27/2022  8:07 AM EDT -----  Please let patient know that his vitamin B12 level came back low at 193.  Low vitamin B12 can cause dementia, problems with the spinal cord and nerves, anemia and other issues.  For that reason I recommend starting vitamin B12 supplement.  I have placed an order for this and sent to his pharmacy on file.  This is a supplement he should not stop taking and will likely need to take lifelong.  There is no liver infection with hepatitis C virus.  The prostate cancer screening test PSA is normal at 2.31.  This means the chance of having prostate cancer is low.  Cholesterol levels look good, but the triglycerides are mildly elevated.  I would continue on the atorvastatin/Lipitor 80 mg.  Blood sugar and kidney tests look good.  One of the liver tests known as alkaline phosphatase is mildly elevated to 146, but the other liver tests are normal, so I would not be concerned with that.  Blood count including white cells, hemoglobin and platelets are normal.  Patient was supposed to be scheduled for Medicare visit around 5/17/2022, but does not have any future appointment.  Please check if he wants to schedule.  Thanks

## 2022-04-27 NOTE — TELEPHONE ENCOUNTER
Called patient talked to him and his fiance advised per note verbalized understanding also scheduled patient for AWV on 5/18/22 at 9:15

## 2022-05-16 NOTE — TELEPHONE ENCOUNTER
Caller: DERRICK FONTENOT    Relationship: Emergency Contact    Best call back number: [phone]    Requested Prescriptions:   Requested Prescriptions     Pending Prescriptions Disp Refills   • clopidogrel (PLAVIX) 75 MG tablet 30 tablet      Sig: Take 1 tablet by mouth Daily.   • gabapentin (NEURONTIN) 100 MG capsule       Sig: Take 1 capsule by mouth 3 (Three) Times a Day. Takes it 1 BID   • losartan (COZAAR) 100 MG tablet       Sig: Take 1 tablet by mouth Daily.   • metoprolol tartrate (LOPRESSOR) 100 MG tablet       Sig: Take 1 tablet by mouth 2 (Two) Times a Day. 1 in AM and 1/2 in Pm   • montelukast (SINGULAIR) 10 MG tablet       Sig: Take 1 tablet by mouth Every Night.   • nitroglycerin (NITROSTAT) 0.4 MG SL tablet       Sig: Place 1 tablet under the tongue Every 5 (Five) Minutes As Needed for Chest Pain. Take no more than 3 doses in 15 minutes.   • NIFEdipine CC (ADALAT CC) 60 MG 24 hr tablet       Sig: Take 1 tablet by mouth Every Night.   • omeprazole (priLOSEC) 40 MG capsule       Sig: Take 1 capsule by mouth Daily.   • budesonide-formoterol (SYMBICORT) 80-4.5 MCG/ACT inhaler       Sig: Inhale 2 puffs 2 (Two) Times a Day.   • atorvastatin (LIPITOR) 80 MG tablet 90 tablet      Sig: Take 1 tablet by mouth Daily.   • aspirin 81 MG EC tablet       Sig: Take 1 tablet by mouth Daily.   • amLODIPine (NORVASC) 5 MG tablet       Sig: Take 1 tablet by mouth Daily.   • albuterol sulfate  (90 Base) MCG/ACT inhaler       Sig: Inhale 2 puffs Every 4 (Four) Hours As Needed for Wheezing.   • albuterol (PROVENTIL) (2.5 MG/3ML) 0.083% nebulizer solution       Sig: Take 2.5 mg by nebulization Every 4 (Four) Hours As Needed for Wheezing.        Pharmacy where request should be sent: [id number] - [place], [place] - [address]  AT [address] OF [facility] [address] & [facility] - [phone] [facility] [phone] FX     Additional details provided by patient:  PATIENT HAS 1-2 PILLS OF SOME.  PATIENT ALSO NEEDS AN  ORDER FOR A NEW NEBULIZER.    Does the patient have less than a 3 day supply:  [x] Yes  [] No    Haley Hodge   05/16/22 12:31 EDT

## 2022-05-16 NOTE — TELEPHONE ENCOUNTER
Rx Refill Note  Requested Prescriptions     Pending Prescriptions Disp Refills   • clopidogrel (PLAVIX) 75 MG tablet 30 tablet      Sig: Take 1 tablet by mouth Daily.   • gabapentin (NEURONTIN) 100 MG capsule       Sig: Take 1 capsule by mouth 3 (Three) Times a Day. Takes it 1 BID   • losartan (COZAAR) 100 MG tablet       Sig: Take 1 tablet by mouth Daily.   • metoprolol tartrate (LOPRESSOR) 100 MG tablet       Sig: Take 1 tablet by mouth 2 (Two) Times a Day. 1 in AM and 1/2 in Pm   • montelukast (SINGULAIR) 10 MG tablet       Sig: Take 1 tablet by mouth Every Night.   • nitroglycerin (NITROSTAT) 0.4 MG SL tablet       Sig: Place 1 tablet under the tongue Every 5 (Five) Minutes As Needed for Chest Pain. Take no more than 3 doses in 15 minutes.   • NIFEdipine CC (ADALAT CC) 60 MG 24 hr tablet       Sig: Take 1 tablet by mouth Every Night.   • omeprazole (priLOSEC) 40 MG capsule       Sig: Take 1 capsule by mouth Daily.   • budesonide-formoterol (SYMBICORT) 80-4.5 MCG/ACT inhaler       Sig: Inhale 2 puffs 2 (Two) Times a Day.   • atorvastatin (LIPITOR) 80 MG tablet 90 tablet      Sig: Take 1 tablet by mouth Daily.   • aspirin 81 MG EC tablet       Sig: Take 1 tablet by mouth Daily.   • amLODIPine (NORVASC) 5 MG tablet       Sig: Take 1 tablet by mouth Daily.   • albuterol sulfate  (90 Base) MCG/ACT inhaler       Sig: Inhale 2 puffs Every 4 (Four) Hours As Needed for Wheezing.   • albuterol (PROVENTIL) (2.5 MG/3ML) 0.083% nebulizer solution       Sig: Take 2.5 mg by nebulization Every 4 (Four) Hours As Needed for Wheezing.      Last office visit with prescribing clinician: 4/26/2022      Next office visit with prescribing clinician: 5/25/2022            Laura Dickens LPN  05/16/22, 12:43 EDT

## 2022-05-17 NOTE — TELEPHONE ENCOUNTER
----- Message from Sonu Obregon MD sent at 5/17/2022  2:26 PM EDT -----  Hello,  Your polyps show precancerous cells in this location consistent with adenomatous tissue. This means that there is no cancer seen but polyp might have become cancerous.  I recommend a repeat colonoscopy in 3 years due to the combination of endoscopic and pathologic findings.    Thank you,   Dr. Obregon

## 2022-05-25 PROBLEM — I25.810 CORONARY ARTERY DISEASE INVOLVING CORONARY BYPASS GRAFT OF NATIVE HEART WITHOUT ANGINA PECTORIS: Chronic | Status: ACTIVE | Noted: 2022-01-01

## 2022-05-25 PROBLEM — E53.8 VITAMIN B12 DEFICIENCY: Chronic | Status: ACTIVE | Noted: 2022-01-01

## 2022-05-25 PROBLEM — D12.6 TUBULAR ADENOMA OF COLON: Status: ACTIVE | Noted: 2022-01-01

## 2022-05-25 PROBLEM — D12.2 ADENOMATOUS POLYP OF ASCENDING COLON: Status: ACTIVE | Noted: 2022-01-01

## 2022-05-25 PROBLEM — C67.9 BLADDER CANCER: Chronic | Status: ACTIVE | Noted: 2022-01-01

## 2022-05-25 PROBLEM — J96.10 CHRONIC RESPIRATORY FAILURE: Chronic | Status: ACTIVE | Noted: 2022-01-01

## 2022-05-25 NOTE — PROGRESS NOTES
QUICK REFERENCE INFORMATION:  The ABCs of the Annual Wellness Visit    Subsequent Medicare Wellness Visit    From [place]. Lived 25 years in [place]. Relocated to [place] April 2022 to live with his stephane (been together since 2012), plan to get . Had 2 children, lost son from leukemia and has daughter in [place]. Lost  hist 2 brothers in January 2020 and April 2022, also lost sister in 2021. 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      Patient has a past medical history of hypertension, hyperlipidemia, CAD s/p CABG (2015), PAD, bladder cancer s/p surgery and radiation (2015), colon adenomatous polyps (5/2022), COPD on nightly O2 (2L/min), GERD, colon polyps and psoriasis.    Patient is here with his kyra for annual Medicare visit.    He is overall doing well, he has gained some weight over the last few weeks, appetite is good.  He is trying to stay physically active.    Patient was started on gabapentin 100 mg 3 times daily in the past for numbness of his feet, currently he has been taking only 2 times a day.  He states he was never tested for vitamin B12 deficiency, never took supplement.  He has now started on supplement per my instructions.  Currently his feet does not really bother him.    Patient also has a history of COPD and chronic respiratory failure on oxygen at home during the night, 2 L/min.  He is requesting referral to pulmonology for follow-up.  He also is requesting a new nebulizer.  Patient continues to smoke, used to smoke 1.5 packs a day, started around age 14, more recently he has cut down to half a pack a day.  He would like to quit, but is not ready to commit to date.  He never did LDCT lung cancer screening, is interested.    Patient was noted with right kidney mass and also has a history of bladder cancer.  He has upcoming appointment with urology.  CT scan from 2018 on file.    Patient states he was put on omeprazole 40 mg at the time he was  diagnosed with his heart condition.  He has not been experiencing acid reflux in the past.    Patient recently underwent colonoscopy and was found to have multiple adenomatous polyps, due for repeat colonoscopy in 1 year.    Patient has upcoming appointment with cardiology.  He is also waiting for appointment with vascular surgery.      HEALTH RISK ASSESSMENT    1950    Recent Hospitalizations:  No hospitalization(s) within the last year..        Current Medical Providers:  Patient Care Team:  Kwame Hines MD as PCP - General (Family Medicine)        Smoking Status:  Social History     Tobacco Use   Smoking Status Current Every Day Smoker   • Packs/day: 0.25   • Years: 59.00   • Pack years: 14.75   • Types: Cigarettes   • Start date: 1962   Smokeless Tobacco Never Used       Alcohol Consumption:  Social History     Substance and Sexual Activity   Alcohol Use Yes   • Alcohol/week: 3.0 - 4.0 standard drinks   • Types: 3 - 4 Cans of beer per week    Comment: 2-3 weeks       Depression Screen:   PHQ-2/PHQ-9 Depression Screening 5/25/2022   Little Interest or Pleasure in Doing Things 0-->not at all   Feeling Down, Depressed or Hopeless 0-->not at all   Trouble Falling or Staying Asleep, or Sleeping Too Much -   Feeling Tired or Having Little Energy -   Poor Appetite or Overeating -   Feeling Bad about Yourself - or that You are a Failure or Have Let Yourself or Your Family Down -   Trouble Concentrating on Things, Such as Reading the Newspaper or Watching Television -   Moving or Speaking So Slowly that Other People Could Have Noticed? Or the Opposite - Being So Fidgety -   Thoughts that You Would be Better Off Dead or of Hurting Yourself in Some Way -   PHQ-9: Brief Depression Severity Measure Score 0   If You Checked Off Any Problems, How Difficult Have These Problems Made It For You to Do Your Work, Take Care of Things at Home, or Get Along with Other People? -       Health Habits and Functional and  Cognitive Screening:  Functional & Cognitive Status 5/25/2022   Do you have difficulty preparing food and eating? No   Do you have difficulty bathing yourself, getting dressed or grooming yourself? No   Do you have difficulty using the toilet? No   Do you have difficulty moving around from place to place? No   Do you have trouble with steps or getting out of a bed or a chair? No   Current Diet Well Balanced Diet   Dental Exam Not up to date   Eye Exam Not up to date   Exercise (times per week) 0 times per week   Current Exercises Include No Regular Exercise   Do you need help using the phone?  No   Are you deaf or do you have serious difficulty hearing?  No   Do you need help with transportation? No   Do you need help shopping? No   Do you need help preparing meals?  No   Do you need help with housework?  No   Do you need help with laundry? No   Do you need help taking your medications? No   Do you need help managing money? No   Do you ever drive or ride in a car without wearing a seat belt? No   Have you felt unusual stress, anger or loneliness in the last month? No   Who do you live with? Spouse   If you need help, do you have trouble finding someone available to you? No   Have you been bothered in the last four weeks by sexual problems? No   Do you have difficulty concentrating, remembering or making decisions? No       Fall Risk Screen:  STEADI Fall Risk Assessment was completed, and patient is at MODERATE risk for falls. Assessment completed on:5/25/2022    ACE III MINI        Does the patient have evidence of cognitive impairment? No    Aspirin use counseling: Taking ASA appropriately as indicated    Recent Lab Results:  CMP:  Lab Results   Component Value Date    BUN 21 04/26/2022    CREATININE 1.26 04/26/2022    BCR 16.7 04/26/2022     04/26/2022    K 4.4 04/26/2022    CO2 26.4 04/26/2022    CALCIUM 9.6 04/26/2022    ALBUMIN 4.70 04/26/2022    BILITOT 0.4 04/26/2022    ALKPHOS 146 (H) 04/26/2022    AST  20 04/26/2022    ALT 19 04/26/2022     HbA1c:  No results found for: HGBA1C  Microalbumin:  No results found for: MICROALBUR, POCMALB, POCCREAT  Lipid Panel  Lab Results   Component Value Date    CHOL 158 04/26/2022    TRIG 179 (H) 04/26/2022    HDL 51 04/26/2022    LDL 77 04/26/2022    AST 20 04/26/2022    ALT 19 04/26/2022       Visual Acuity:  No exam data present    Age-appropriate Screening Schedule:  Refer to the list below for future screening recommendations based on patient's age, sex and/or medical conditions. Orders for these recommended tests are listed in the plan section. The patient has been provided with a written plan.    Health Maintenance   Topic Date Due   • TDAP/TD VACCINES (1 - Tdap) Never done   • ZOSTER VACCINE (1 of 2) Never done   • INFLUENZA VACCINE  08/01/2022   • LIPID PANEL  04/26/2023        Subjective   History of Present Illness    Reese Cage is a 72 y.o. male who presents for a Subsequent Wellness Visit.    CHRONIC CONDITIONS    The following portions of the patient's history were reviewed and updated as appropriate: allergies, current medications, past family history, past medical history, past social history, past surgical history and problem list.    Outpatient Medications Prior to Visit   Medication Sig Dispense Refill   • albuterol (PROVENTIL) (2.5 MG/3ML) 0.083% nebulizer solution Take 2.5 mg by nebulization Every 4 (Four) Hours As Needed for Wheezing. 300 mL 1   • albuterol sulfate  (90 Base) MCG/ACT inhaler Inhale 2 puffs Every 4 (Four) Hours As Needed for Wheezing. 54 g 1   • aspirin 81 MG EC tablet Take 1 tablet by mouth Daily. (Patient taking differently: Take 81 mg by mouth Every Night.) 90 tablet 1   • atorvastatin (LIPITOR) 80 MG tablet Take 1 tablet by mouth Daily. 90 tablet 1   • budesonide-formoterol (SYMBICORT) 80-4.5 MCG/ACT inhaler Inhale 2 puffs 2 (Two) Times a Day. 30.6 g 1   • clopidogrel (PLAVIX) 75 MG tablet Take 1 tablet by mouth Daily. 90 tablet  1   • furosemide (LASIX) 20 MG tablet Take 20 mg by mouth Daily.     • losartan (COZAAR) 100 MG tablet Take 1 tablet by mouth Daily. 90 tablet 1   • metoprolol tartrate (LOPRESSOR) 100 MG tablet Take 1 tablet by mouth Every Morning AND 0.5 tablets Every Evening. 135 tablet 1   • montelukast (SINGULAIR) 10 MG tablet Take 1 tablet by mouth Every Night. 90 tablet 1   • nitroglycerin (NITROSTAT) 0.4 MG SL tablet Place 1 tablet under the tongue Every 5 (Five) Minutes As Needed for Chest Pain. Take no more than 3 doses in 15 minutes. 30 tablet 1   • vitamin B-12 (CYANOCOBALAMIN) 1000 MCG tablet Take 1 tablet by mouth Daily. 90 tablet 3   • amLODIPine (NORVASC) 5 MG tablet Take 5 mg by mouth Daily.     • gabapentin (NEURONTIN) 100 MG capsule Take 1 capsule by mouth 3 (Three) Times a Day. Takes it 1 BID 90 capsule 0   • NIFEdipine CC (ADALAT CC) 60 MG 24 hr tablet Take 60 mg by mouth Every Night.     • omeprazole (priLOSEC) 40 MG capsule Take 1 capsule by mouth Daily. 90 capsule 1   • Sod Picosulfate-Mag Ox-Cit Acd 10-3.5-12 MG-GM -GM/160ML solution Take 160 mL by mouth Take As Directed for 2 doses. 320 mL 0     No facility-administered medications prior to visit.       Patient Active Problem List   Diagnosis   • Essential hypertension   • Mixed hyperlipidemia   • Gastroesophageal reflux disease without esophagitis   • Psoriasis   • Cigarette smoker   • Bladder cancer (HCC)   • Lower urinary tract symptoms (LUTS)   • COPD (chronic obstructive pulmonary disease) (HCC)   • Right kidney mass   • Coronary artery disease involving coronary bypass graft of native heart without angina pectoris   • Vitamin B12 deficiency   • Adenomatous polyp of ascending colon       Advance Care Planning:  ACP discussion was held with the patient during this visit. Patient does not have an advance directive, information provided.    Identification of Risk Factors:  Risk factors include: Advance Directive Discussion  Tobacco Use/Dependance (use  "alexandra .tobaccocessation for documentation).    Review of Systems    Compared to one year ago, the patient feels his physical health is the same.  Compared to one year ago, the patient feels his mental health is the same.    Objective     Physical Exam  Vitals reviewed.   Constitutional:       Appearance: Normal appearance.   HENT:      Head: Normocephalic and atraumatic.      Nose: No congestion.   Eyes:      Extraocular Movements: Extraocular movements intact.      Conjunctiva/sclera: Conjunctivae normal.   Cardiovascular:      Rate and Rhythm: Normal rate and regular rhythm.      Heart sounds: Normal heart sounds. No murmur heard.  Pulmonary:      Effort: Pulmonary effort is normal. No respiratory distress.      Breath sounds: Normal breath sounds.   Abdominal:      General: There is no distension.      Palpations: Abdomen is soft. There is no mass.      Tenderness: There is no abdominal tenderness.   Musculoskeletal:      Cervical back: Neck supple.      Right lower leg: No edema.      Left lower leg: No edema.   Skin:     General: Skin is warm and dry.   Neurological:      Mental Status: He is alert and oriented to person, place, and time. Mental status is at baseline.   Psychiatric:         Behavior: Behavior normal.         Thought Content: Thought content normal.          Procedures     Vitals:    05/25/22 0940   BP: 106/60   BP Location: Left arm   Patient Position: Sitting   Cuff Size: Adult   Pulse: 64   Temp: 97.8 °F (36.6 °C)   TempSrc: Temporal   Weight: 70.7 kg (155 lb 12.8 oz)   Height: 174 cm (68.5\")   PainSc: 0-No pain       BMI is within normal parameters. No other follow-up for BMI required.      Assessment & Plan     1. Encounter for subsequent annual wellness visit (AWV) in Medicare patient      2. Essential hypertension  BP Readings from Last 3 Encounters:   05/25/22 106/60   04/26/22 128/62   Blood pressure currently well controlled.  He has been on amlodipine 5 mg and nifedipine 60 mg, " will transition to nifedipine 90 mg and discontinue amlodipine 5 mg.  - NIFEdipine XL (PROCARDIA XL) 90 MG 24 hr tablet; Take 1 tablet by mouth Daily.  Dispense: 90 tablet; Refill: 1    3. Mixed hyperlipidemia  Mildly elevated triglycerides, otherwise near optimal profile.  For now continue on atorvastatin 80 mg.    4. Coronary artery disease involving coronary bypass graft of native heart without angina pectoris  No current symptoms.  Patient has upcoming appointment with cardiology.    5. Malignant neoplasm of urinary bladder, unspecified site (HCC)  6. Right kidney mass  Patient will follow-up with urology.    7. Adenomatous polyp of ascending colon  Repeat colonoscopy 1 year    8. Chronic obstructive pulmonary disease, unspecified COPD type (HCC)  9. Chronic respiratory failure, unspecified whether with hypoxia or hypercapnia (HCC)  Continue home oxygen.  New nebulizer ordered.  Referral to pulmonology to establish care.  - Home Nebulizer  - Ambulatory Referral to Pulmonology    10. Gastroesophageal reflux disease without esophagitis  He has not had symptoms for a long time.  I recommend to discontinue omeprazole.  Result also can interact with clopidogrel.  If needed we can consider famotidine/Pepcid if you would get symptoms of acid reflux    11. Vitamin B12 deficiency  Suspect vitamin B12 deficiency as cause of numbness of his feet.  He is not having any pain, so we will discontinue this gabapentin.  If you start develop symptoms we can be addressed.  I have explained to him that he should take vitamin B12 lifelong.    12. Encounter for screening for lung cancer  13. Nicotine dependence, cigarettes, uncomplicated  Counseled on cessation, patient not ready to quit yet but is precontemplative.  Discussed pros and cons of lung cancer screening, including false positives that might lead extra work-up versus early detection of lung cancer that may be curable.  Patient would like to proceed with screening.  - CT  chest low dose wo; Future    Patient Self-Management and Personalized Health Advice  The patient has been provided with information about: tobacco cessation and designing advance directives and preventive services including:   · Annual Wellness Visit (AWV)  · Lung Cancer Screening Counseling and Annual Screening for Lung Cancer with Low Dose Computed Tomography (LDCT); (use of smartset for low dose CT for lung cancer screening for documentation and orders).    Outpatient Encounter Medications as of 5/25/2022   Medication Sig Dispense Refill   • albuterol (PROVENTIL) (2.5 MG/3ML) 0.083% nebulizer solution Take 2.5 mg by nebulization Every 4 (Four) Hours As Needed for Wheezing. 300 mL 1   • albuterol sulfate  (90 Base) MCG/ACT inhaler Inhale 2 puffs Every 4 (Four) Hours As Needed for Wheezing. 54 g 1   • aspirin 81 MG EC tablet Take 1 tablet by mouth Daily. (Patient taking differently: Take 81 mg by mouth Every Night.) 90 tablet 1   • atorvastatin (LIPITOR) 80 MG tablet Take 1 tablet by mouth Daily. 90 tablet 1   • budesonide-formoterol (SYMBICORT) 80-4.5 MCG/ACT inhaler Inhale 2 puffs 2 (Two) Times a Day. 30.6 g 1   • clopidogrel (PLAVIX) 75 MG tablet Take 1 tablet by mouth Daily. 90 tablet 1   • furosemide (LASIX) 20 MG tablet Take 20 mg by mouth Daily.     • losartan (COZAAR) 100 MG tablet Take 1 tablet by mouth Daily. 90 tablet 1   • metoprolol tartrate (LOPRESSOR) 100 MG tablet Take 1 tablet by mouth Every Morning AND 0.5 tablets Every Evening. 135 tablet 1   • montelukast (SINGULAIR) 10 MG tablet Take 1 tablet by mouth Every Night. 90 tablet 1   • nitroglycerin (NITROSTAT) 0.4 MG SL tablet Place 1 tablet under the tongue Every 5 (Five) Minutes As Needed for Chest Pain. Take no more than 3 doses in 15 minutes. 30 tablet 1   • vitamin B-12 (CYANOCOBALAMIN) 1000 MCG tablet Take 1 tablet by mouth Daily. 90 tablet 3   • [DISCONTINUED] amLODIPine (NORVASC) 5 MG tablet Take 5 mg by mouth Daily.     •  [DISCONTINUED] gabapentin (NEURONTIN) 100 MG capsule Take 1 capsule by mouth 3 (Three) Times a Day. Takes it 1 BID 90 capsule 0   • [DISCONTINUED] NIFEdipine CC (ADALAT CC) 60 MG 24 hr tablet Take 60 mg by mouth Every Night.     • [DISCONTINUED] omeprazole (priLOSEC) 40 MG capsule Take 1 capsule by mouth Daily. 90 capsule 1   • NIFEdipine XL (PROCARDIA XL) 90 MG 24 hr tablet Take 1 tablet by mouth Daily. 90 tablet 1   • Sod Picosulfate-Mag Ox-Cit Acd 10-3.5-12 MG-GM -GM/160ML solution Take 160 mL by mouth Take As Directed for 2 doses. 320 mL 0     No facility-administered encounter medications on file as of 5/25/2022.       Reviewed use of high risk medication in the elderly: yes  Reviewed for potential of harmful drug interactions in the elderly: yes    Follow Up:  No follow-ups on file.     Future Appointments       Provider Department Center    6/7/2022 9:00 AM Néstor Kelly MD [facility] UROLOGY [facility]    6/15/2022 1:00 PM Jassi Cote III, MD [facility] CARDIOLOGY [facility]    8/29/2022 9:15 AM Kwame Hines MD [facility] INTERNAL MEDICINE [facility]            Patient Instructions     Please review the decision aid used during our discussion regarding the Low dose lung cancer screening visit today.                              An After Visit Summary and PPPS with all of these plans were given to the patient.      Kwame Hines MD

## 2022-05-25 NOTE — PROGRESS NOTES
Faxed request to Dr. Shahab Israel (fax #[phone]) for pulmonology records.  Office number [phone]

## 2022-06-01 PROBLEM — R91.8 MASS OF UPPER LOBE OF LEFT LUNG: Status: ACTIVE | Noted: 2022-01-01

## 2022-06-01 PROBLEM — R59.0 PARATRACHEAL LYMPHADENOPATHY: Status: ACTIVE | Noted: 2022-01-01

## 2022-06-01 NOTE — TELEPHONE ENCOUNTER
Noted, I am aware.  I have been trying to call patient 3 times this morning with results, but they are not picking up the phone.  I have also left to voicemails that I am trying to reach them.

## 2022-06-01 NOTE — TELEPHONE ENCOUNTER
Dr Deleon from radiology called to informed you that the lung screening Ct showed that patient has a very large 5.3 cm cancerous mass in left  upper lobe

## 2022-06-01 NOTE — TELEPHONE ENCOUNTER
I have tried to call patient 4 times today and left message 3 times on the voicemail.  Unfortunately they did not , and I have no other means of contacting them at the moment.    The need to be notified that there is a large mass over 2 inches size of the left upper lung, there is also enlarged lymph nodes in the chest, this is very concerning for possible lung cancer and we need to work this up urgently.  I have placed a urgent referral to lung specialist.  Please let them know.    1. Mass of upper lobe of left lung  2. Paratracheal lymphadenopathy  3. Chronic obstructive pulmonary disease, unspecified COPD type (HCC)  4. Chronic respiratory failure, unspecified whether with hypoxia or hypercapnia (HCC)  - Ambulatory Referral to Pulmonology    Kwame Hines MD

## 2022-06-07 NOTE — TELEPHONE ENCOUNTER
UA w/ culture ordered 04/26/22. Never collected. Can order be cancelled?    no pain L/no blurred vision L/no photophobia/no pain R/no blurred vision R

## 2022-06-07 NOTE — PROGRESS NOTES
Met patient and fiance in lung nodule clinic with Dr. Del Valle. He recently moved to [place] and new PCP ordered LDCT revealing 5.3cm left hilum mass with AP window node. He experienced the death of several siblings in the last couple years and reports weight loss of 30lb since that time. He has regained 10-15lb in the last 2 months. He has productive cough with clear production. He denies hemoptysis. He does have SOA with activity which is chronic. He has inhaler which he reports has expected outcomes. Scans reviewed. Per Dr. Del Valle bronch with [facility] next week. He will need to hold Plavix x5 days prior. He v/u and agreeable to plan. Introduced lung navigator role and provided contact information. He knows to call with questions or concerns.

## 2022-06-07 NOTE — PROGRESS NOTES
Bladder Cancer Male Office Visit      Patient Name: Reese Cage  : 1950   MRN: [id number]     Chief Complaint:  Urothelial Carcinoma of the Bladder.     Referring Provider: Kwame Hines MD    History of Present Illness: Mr. Reese Cage is a 72 y.o. males who presents today to establish care based upon history of urothelial cell carcinoma of the bladder.  He has previously followed with urologist in [facility].  The patient has a past medical history significant for hypertension, coronary artery disease, GERD, lower urinary tract symptoms, urothelial cell carcinoma the bladder, COPD, tobacco abuse.    The patient has previously been followed by urologist Dr. Darden.  Unfortunately today we do not have all medical records regarding prior history.  We do have multiple operative reports that demonstrate a history of high-grade T1 urothelial cell carcinoma bladder diagnosed in 2017.  The patient was treated with mitomycin at the time of this procedure.  He then underwent repeat resection for recurrence in 2018.  He reports that he was treated with 6-week course of induction BCG.  I do not have formal records of this.  Do not have formal records of any further recurrences.  We discussed that we will request records.  Have patient request records of pathology as well as treatments.    He denies significant current lower urinary tract symptoms.  Denies hematuria.      Subjective      Review of System: Review of Systems   Constitutional: Negative for chills, fatigue, fever and unexpected weight change.   HENT: Negative for sore throat.    Eyes: Negative for visual disturbance.   Respiratory: Negative for cough, chest tightness and shortness of breath.    Cardiovascular: Negative for chest pain and leg swelling.   Gastrointestinal: Negative for blood in stool, constipation, diarrhea, nausea, rectal pain and vomiting.   Genitourinary: Negative for decreased urine volume, difficulty  urinating, dysuria, enuresis, flank pain, frequency, genital sores, hematuria and urgency.   Musculoskeletal: Negative for back pain and joint swelling.   Skin: Negative for rash and wound.   Neurological: Negative for seizures, speech difficulty, weakness and headaches.   Psychiatric/Behavioral: Negative for confusion, sleep disturbance and suicidal ideas. The patient is not nervous/anxious.       I have reviewed the ROS documented by my clinical staff, updated appropriately and I agree. Néstor Kelly MD    Past Medical History:   Past Medical History:   Diagnosis Date   • Adenomatous polyp of ascending colon 5/25/2022    Multiple throughout colon. Dr. Obregon 5/2022. Rpt 1y   • Bladder cancer (HCC)    • Coronary artery disease involving coronary bypass graft of native heart without angina pectoris 4/26/2022   • Essential hypertension 04/26/2022   • Gastroesophageal reflux disease without esophagitis 04/26/2022   • Mixed hyperlipidemia 04/26/2022   • Psoriasis    • Vitamin B12 deficiency 4/27/2022 4/2022 Vit B12 193.        Past Surgical History:   Past Surgical History:   Procedure Laterality Date   • BLADDER TUMOR EXCISION      Cancer   • CORONARY ARTERY BYPASS GRAFT  2015    [facility]ANNY [facility]       Family History:   Family History   Problem Relation Age of Onset   • Hypertension Mother    • Hypertension Brother    • Heart attack Brother        Social History:   Social History     Socioeconomic History   • Marital status: Significant Other   Tobacco Use   • Smoking status: Current Every Day Smoker     Packs/day: 0.25     Years: 59.00     Pack years: 14.75     Types: Cigarettes     Start date: 1962   • Smokeless tobacco: Never Used   Vaping Use   • Vaping Use: Never used   Substance and Sexual Activity   • Alcohol use: Yes     Alcohol/week: 3.0 - 4.0 standard drinks     Types: 3 - 4 Cans of beer per week     Comment: 2-3 weeks   • Drug use: Never   • Sexual activity: Defer       Medications:     Current  "Outpatient Medications:   •  albuterol (PROVENTIL) (2.5 MG/3ML) 0.083% nebulizer solution, Take 2.5 mg by nebulization Every 4 (Four) Hours As Needed for Wheezing., Disp: 300 mL, Rfl: 1  •  albuterol sulfate  (90 Base) MCG/ACT inhaler, Inhale 2 puffs Every 4 (Four) Hours As Needed for Wheezing., Disp: 54 g, Rfl: 1  •  aspirin 81 MG EC tablet, Take 1 tablet by mouth Daily. (Patient taking differently: Take 81 mg by mouth Every Night.), Disp: 90 tablet, Rfl: 1  •  atorvastatin (LIPITOR) 80 MG tablet, Take 1 tablet by mouth Daily., Disp: 90 tablet, Rfl: 1  •  clopidogrel (PLAVIX) 75 MG tablet, Take 1 tablet by mouth Daily., Disp: 90 tablet, Rfl: 1  •  furosemide (LASIX) 20 MG tablet, Take 20 mg by mouth Daily., Disp: , Rfl:   •  losartan (COZAAR) 100 MG tablet, Take 1 tablet by mouth Daily., Disp: 90 tablet, Rfl: 1  •  metoprolol tartrate (LOPRESSOR) 100 MG tablet, Take 1 tablet by mouth Every Morning AND 0.5 tablets Every Evening., Disp: 135 tablet, Rfl: 1  •  montelukast (SINGULAIR) 10 MG tablet, Take 1 tablet by mouth Every Night., Disp: 90 tablet, Rfl: 1  •  NIFEdipine XL (PROCARDIA XL) 90 MG 24 hr tablet, Take 1 tablet by mouth Daily., Disp: 90 tablet, Rfl: 1  •  nitroglycerin (NITROSTAT) 0.4 MG SL tablet, Place 1 tablet under the tongue Every 5 (Five) Minutes As Needed for Chest Pain. Take no more than 3 doses in 15 minutes., Disp: 30 tablet, Rfl: 1  •  vitamin B-12 (CYANOCOBALAMIN) 1000 MCG tablet, Take 1 tablet by mouth Daily., Disp: 90 tablet, Rfl: 3  •  Budeson-Glycopyrrol-Formoterol (Breztri Aerosphere) 160-9-4.8 MCG/ACT aerosol inhaler, Inhale 2 puffs 2 (Two) Times a Day., Disp: , Rfl:     Allergies:   No Known Allergies        Objective     Physical Exam:   Vital Signs:   Vitals:    06/07/22 0928   Weight: 70.3 kg (155 lb)   Height: 174 cm (68.5\")   PainSc: 0-No pain     Body mass index is 23.22 kg/m².     Physical Exam  Vitals and nursing note reviewed.   Constitutional:       Appearance: Normal " appearance.   HENT:      Head: Normocephalic and atraumatic.   Cardiovascular:      Comments: Well perfused  Pulmonary:      Effort: Pulmonary effort is normal.   Abdominal:      General: Abdomen is flat.      Palpations: Abdomen is soft.   Musculoskeletal:         General: Normal range of motion.   Skin:     General: Skin is warm and dry.   Neurological:      General: No focal deficit present.      Mental Status: He is alert and oriented to person, place, and time. Mental status is at baseline.   Psychiatric:         Mood and Affect: Mood normal.         Behavior: Behavior normal.         Thought Content: Thought content normal.         Judgment: Judgment normal.         Labs:   Lab Results   Component Value Date    PSA 2.310 04/26/2022       Lab Results   Component Value Date    WBC 10.21 04/26/2022    HGB 15.4 04/26/2022    HCT 48.3 04/26/2022    MCV 84.9 04/26/2022     04/26/2022       Lab Results   Component Value Date    GLUCOSE 75 04/26/2022    BUN 21 04/26/2022    CREATININE 1.26 04/26/2022    BCR 16.7 04/26/2022    K 4.4 04/26/2022    CO2 26.4 04/26/2022    CALCIUM 9.6 04/26/2022    ALBUMIN 4.70 04/26/2022    AST 20 04/26/2022    ALT 19 04/26/2022       Images:   CT Chest Low Dose Cancer Screening WO    Result Date: 6/1/2022  1. Left upper lobe 5.3 cm mass extending to the left hilum most consistent with pulmonary malignancy. Recommend PET/CT or tissue sampling for further characterization/staging. 2. Enlarged prevascular and AP window lymph nodes concerning for metastatic adenopathy these can be better characterize with PET/CT. 3. Advanced emphysema. 4. Status post sternotomy and CABG. RECOMMENDATION: PET/CT or tissue sampling. LUNG RADS ASSESSMENT: Lung-RADS L4X. - Very suspicious. Electronically Signed: Thad Deleon MD 5/31/2022 18:37 EDT      Measures:   Tobacco:   Reese Cage  reports that he has been smoking cigarettes. He started smoking about 60 years ago. He has a 14.75 pack-year smoking  history. He has never used smokeless tobacco.. I have educated him on the risk of diseases from using tobacco products.    Assessment / Plan      Assessment/Plan:  Mr. Reese Cage is a 72 y.o. who presented today to establish care based upon history of urothelial cell carcinoma of the bladder.  We have limited records today, previously followed with Dr. Darden in [facility].  He was diagnosed with high-grade T1 urothelial cell carcinoma per report in 2017.  Recurrence in 2018.  He underwent single induction course of BCG.  No records of recent follow-up, he reports that he has not had any recurrence.  He presents today to establish care after recently moving to [place].    Of note the patient recently underwent CT chest imaging which demonstrated concerning nodule.  He is following with pulmonology, Dr. Del Valle with plan for bronchoscopy, EBUS, biopsy 6/15/2022.    We discussed the indication for cystoscopy and CT urography based upon establishing care.  He is understanding.  We have also asked the patient to request records from his urologist including pathology results.  Our office will also contact for possible records.        Diagnoses and all orders for this visit:    1. Malignant neoplasm of urinary bladder, unspecified site (HCC) (Primary)  -     CT Abdomen Pelvis With & Without Contrast; Future         Patient Education:   I had a lengthy discussion with the patient and explained the natural history of bladder cancer. I explained that bladder cancer is the most common cancer of the urinary tract. diagnosed in approximately 71,000 people each year in the [place], and about 14,000 individuals. More than 90% of bladder cancers in the [place] are urothelial tumors. Studies of urothelial bladder cancer have identified multiple risk factors, the most important of which are cigarette smoking and various occupational exposures.    Patients with bladder cancer classically present with  painless hematuria, although irritative voiding symptoms (frequency, urgency, dysuria) can be the initial manifestation. In some patients, metastases will cause the initial symptoms.     I explained the normal anatomy of the bladder and informed the patient that most bladder cancer originates from the inner layer (the urothelium). The spectrum of bladder cancer includes non-muscle-invasive (superficial), muscle-invasive, and metastatic disease, each with its own clinical behavior, prognosis, and treatment. I told the patient that approximately 70 percent of all new cases of urothelial bladder cancer are classified as non-muscle-invasive. This category of bladder cancer includes Ta, T1 (submucosal invasive) tumors, and carcinoma in situ. The patient was also informed that the initial treatment generally is a complete cystoscopic (transurethral) resection of all visible bladder tumor (TURBT). And this is often followed by adjuvant intravesical therapy. After the initial therapy, careful surveillance with cystoscopy and cytology is required on intermittent bases according to his risk stratification. I did tell the patient that superficial bladder cancer have the tendency for recurrence and in a smaller percentage of patients are at risk of progression.    Follow Up:   Return in about 1 week (around 6/14/2022) for Follow up for Cystoscopy.    I spent approximately 60 minutes providing clinical care for this patient; including review of patient's chart and provider documentation, face to face time spent with patient in examination room (obtaining history, performing physical exam, discussing diagnosis and management options), placing orders, and completing patient documentation.     Néstor Kelly MD  [facility] [place]

## 2022-06-09 NOTE — TELEPHONE ENCOUNTER
Not sure what happened to the referral, but clearly patient was seen by pulmonology 2 days ago, so I think no concerns with that.

## 2022-06-14 NOTE — PROGRESS NOTES
Pulmonary Medicine Evaluation    Chief Complaint     Abnormal screening CT scan of the chest showing lung mass and adenopathy    History of Present Illness/History Review     Mr. Cage is a very nice 72-year-old gentleman with a past medical history of chronic obstructive pulmonary disease on nocturnal oxygen at 2 L/min, gastroesophageal reflux disease, history of bladder cancer status postresection and radiation in 2015, and coronary artery disease with coronary artery bypass grafting in 2015 who is here for further evaluation of an abnormal low-dose screening CT scan of the chest.  He has recently relocated to [place] from [place], [place].  Most of his care was previously obtained at [facility] in [place], [place].  He is currently smoking although has been trying to cut down.  He states that he is short of breath with exertion.  He intermittently uses Ventolin and he also has been started recently on Breztri which she feels is very useful to him.  He states that over the past 6 months he had initially lost about 30 pounds but his appetite has since improved and he is gaining some of this back.  He has denied hemoptysis.  He has denied any fevers or chills.    He is in the process of getting his screening care taken care of for preventive medicine.  He recently underwent a colonoscopy on May 13, 2022 and was able to be off of his Plavix for that.  He also tolerated anesthesia without any difficulty.    CT scan of the chest performed via the low-dose protocol which was performed on 5/31/2022 has been reviewed.  I was also able to go through this with the patient and his family.  This discloses an approximately 5 sonometer noncalcified mass at the left hilum.  There is mild associated volume loss distal to this.  In addition, there is mediastinal and hilar adenopathy concerning for metastatic disease.  Centrilobular emphysema is noted.      Review of Systems  A full review  of systems has been completed and it is negative except as mentioned expressly in HPI and the scanned review sheet.    No Known Allergies    Current Outpatient Medications:   •  albuterol (PROVENTIL) (2.5 MG/3ML) 0.083% nebulizer solution, Take 2.5 mg by nebulization Every 4 (Four) Hours As Needed for Wheezing., Disp: 300 mL, Rfl: 1  •  albuterol sulfate  (90 Base) MCG/ACT inhaler, Inhale 2 puffs Every 4 (Four) Hours As Needed for Wheezing., Disp: 54 g, Rfl: 1  •  aspirin 81 MG EC tablet, Take 1 tablet by mouth Daily. (Patient taking differently: Take 81 mg by mouth Every Night.), Disp: 90 tablet, Rfl: 1  •  atorvastatin (LIPITOR) 80 MG tablet, Take 1 tablet by mouth Daily., Disp: 90 tablet, Rfl: 1  •  Budeson-Glycopyrrol-Formoterol (Breztri Aerosphere) 160-9-4.8 MCG/ACT aerosol inhaler, Inhale 2 puffs 2 (Two) Times a Day., Disp: , Rfl:   •  clopidogrel (PLAVIX) 75 MG tablet, Take 1 tablet by mouth Daily. (Patient taking differently: Take 75 mg by mouth Daily. No cardiac stents), Disp: 90 tablet, Rfl: 1  •  furosemide (LASIX) 20 MG tablet, Take 20 mg by mouth Daily., Disp: , Rfl:   •  losartan (COZAAR) 100 MG tablet, Take 1 tablet by mouth Daily., Disp: 90 tablet, Rfl: 1  •  metoprolol tartrate (LOPRESSOR) 100 MG tablet, Take 1 tablet by mouth Every Morning AND 0.5 tablets Every Evening., Disp: 135 tablet, Rfl: 1  •  montelukast (SINGULAIR) 10 MG tablet, Take 1 tablet by mouth Every Night., Disp: 90 tablet, Rfl: 1  •  NIFEdipine XL (PROCARDIA XL) 90 MG 24 hr tablet, Take 1 tablet by mouth Daily., Disp: 90 tablet, Rfl: 1  •  nitroglycerin (NITROSTAT) 0.4 MG SL tablet, Place 1 tablet under the tongue Every 5 (Five) Minutes As Needed for Chest Pain. Take no more than 3 doses in 15 minutes., Disp: 30 tablet, Rfl: 1  •  vitamin B-12 (CYANOCOBALAMIN) 1000 MCG tablet, Take 1 tablet by mouth Daily., Disp: 90 tablet, Rfl: 3  •  gabapentin (NEURONTIN) 300 MG capsule, Take 300 mg by mouth 3 (Three) Times a Day., Disp: ,  "Rfl:   •  O2 (OXYGEN), Inhale 2 L/min As Needed., Disp: , Rfl:   •  omeprazole (priLOSEC) 40 MG capsule, Take 40 mg by mouth Daily., Disp: , Rfl:     Past Medical History:   Diagnosis Date   • Adenomatous polyp of ascending colon 05/25/2022    Multiple throughout colon. Dr. Obregon 5/2022. Rpt 1y   • Bladder cancer (HCC)    • COPD (chronic obstructive pulmonary disease) (HCC)    • Coronary artery disease involving coronary bypass graft of native heart without angina pectoris 04/26/2022   • Essential hypertension 04/26/2022   • Gastroesophageal reflux disease without esophagitis 04/26/2022   • Hard of hearing    • Mixed hyperlipidemia 04/26/2022   • PONV (postoperative nausea and vomiting)    • Psoriasis    • Renal mass    • Vitamin B12 deficiency 04/27/2022 4/2022 Vit B12 193.    • Wears dentures     full set   • Wears glasses      Family History   Problem Relation Age of Onset   • Hypertension Mother    • Hypertension Brother    • Heart attack Brother      Social History     Tobacco Use   • Smoking status: Current Every Day Smoker     Packs/day: 0.50     Years: 59.00     Pack years: 29.50     Types: Cigarettes     Start date: 1962   • Smokeless tobacco: Never Used   Vaping Use   • Vaping Use: Never used   Substance Use Topics   • Alcohol use: Yes     Comment: 1-2 per month   • Drug use: Never       /62   Pulse 76   Temp 97.8 °F (36.6 °C)   Ht 174 cm (68.5\")   Wt 69.9 kg (154 lb)   SpO2 95% Comment: resting, room air  BMI 23.08 kg/m²   Physical Exam  Vitals reviewed.   Constitutional:       General: He is not in acute distress.     Appearance: Normal appearance. He is normal weight. He is not ill-appearing.   HENT:      Head: Normocephalic.      Right Ear: Tympanic membrane normal.      Nose: Nose normal. No congestion.      Mouth/Throat:      Mouth: Mucous membranes are moist.      Pharynx: No oropharyngeal exudate or posterior oropharyngeal erythema.   Eyes:      General: No scleral icterus.     " Extraocular Movements: Extraocular movements intact.      Conjunctiva/sclera: Conjunctivae normal.      Pupils: Pupils are equal, round, and reactive to light.   Cardiovascular:      Rate and Rhythm: Normal rate and regular rhythm.      Pulses: Normal pulses.      Heart sounds: Normal heart sounds. No murmur heard.    No friction rub.   Pulmonary:      Effort: Pulmonary effort is normal. No respiratory distress.      Breath sounds: Normal breath sounds. No stridor. No wheezing, rhonchi or rales.   Chest:      Chest wall: No tenderness.   Breasts:      Right: No supraclavicular adenopathy.      Left: No supraclavicular adenopathy.       Abdominal:      General: Abdomen is flat. Bowel sounds are normal. There is no distension.      Palpations: Abdomen is soft.   Musculoskeletal:         General: No swelling. Normal range of motion.      Cervical back: Normal range of motion. No rigidity.      Right lower leg: No edema.      Left lower leg: No edema.   Lymphadenopathy:      Head:      Right side of head: No submental or submandibular adenopathy.      Left side of head: No submental or submandibular adenopathy.      Cervical: No cervical adenopathy.      Upper Body:      Right upper body: No supraclavicular adenopathy.      Left upper body: No supraclavicular adenopathy.   Skin:     General: Skin is warm.      Capillary Refill: Capillary refill takes less than 2 seconds.      Coloration: Skin is not jaundiced.   Neurological:      General: No focal deficit present.      Mental Status: He is alert and oriented to person, place, and time.   Psychiatric:         Mood and Affect: Mood normal.         Thought Content: Thought content normal.         Results     CT imaging as per the HPI.    Problem List       ICD-10-CM ICD-9-CM   1. Lung mass of left upper lobe  R91.8 786.6   2. Mediastinal adenopathy  R59.0 785.6   3. Centrilobular emphysema (HCC)  J43.2 492.8   4. Current smoker  F17.200 305.1       Impression and Plan      Mr. Cage has very concerning findings on the CT scan of his chest with an approximately 5 cm mass in the left hilum as well as some mediastinal and hilar adenopathy.  Given his risk factors and weight loss in addition to these findings on the CT scan, this is highly concerning for primary bronchogenic carcinoma.  I have taken the opportunity to go through the images with him and his fiancée and I have answered all of their questions.  I have suggested that we proceed with airway examination and tissue biopsy of these abnormalities.  I feel that these can be most readily evaluated with linear endobronchial ultrasound with transbronchial needle aspirations.  We may require transbronchial lung biopsy with fluoroscopy and I will be prepared for that eventuality as well.  I did explain the risk, benefits, and alternatives to him and I have answered all of his questions to the best of my ability.  He has agreed to proceed with bronchoscopy at the earliest opportunity.    He is on Plavix and this will need to be held approximately 5 days prior to the operation to minimize his bleeding risk.  He previously tolerated being off of this for his most recent set of anesthesia in May 2022 during colonoscopy.  He previously has not had any problems with anesthesia.    I have also given him some samples of Breztri which she has used in the past and he states that these worked very well for him.  He will also continue on with Ventolin.    He will continue to use supplemental oxygen at 2 L/min as needed and with sleep.    Will need to approach smoking cessation very seriously after we have sorted out the identity of these lung abnormalities.    Thank you very much for asking me to help in the care of this very nice patient.  I look forward to following along closely with you.    I spent a total of 70 minutes with Mr. Cage with greater than 70% that time spent consultation regarding his diagnoses, review the data, and  formulation of plan moving forward.    Ryan Del Valle MD, [facility]  [facility]    CC: Kwame Hines MD

## 2022-06-14 NOTE — H&P (VIEW-ONLY)
Pulmonary Medicine Evaluation    Chief Complaint     Abnormal screening CT scan of the chest showing lung mass and adenopathy    History of Present Illness/History Review     Mr. Cage is a very nice 72-year-old gentleman with a past medical history of chronic obstructive pulmonary disease on nocturnal oxygen at 2 L/min, gastroesophageal reflux disease, history of bladder cancer status postresection and radiation in 2015, and coronary artery disease with coronary artery bypass grafting in 2015 who is here for further evaluation of an abnormal low-dose screening CT scan of the chest.  He has recently relocated to [place] from [place], [place].  Most of his care was previously obtained at [facility] in [place], [place].  He is currently smoking although has been trying to cut down.  He states that he is short of breath with exertion.  He intermittently uses Ventolin and he also has been started recently on Breztri which she feels is very useful to him.  He states that over the past 6 months he had initially lost about 30 pounds but his appetite has since improved and he is gaining some of this back.  He has denied hemoptysis.  He has denied any fevers or chills.    He is in the process of getting his screening care taken care of for preventive medicine.  He recently underwent a colonoscopy on May 13, 2022 and was able to be off of his Plavix for that.  He also tolerated anesthesia without any difficulty.    CT scan of the chest performed via the low-dose protocol which was performed on 5/31/2022 has been reviewed.  I was also able to go through this with the patient and his family.  This discloses an approximately 5 sonometer noncalcified mass at the left hilum.  There is mild associated volume loss distal to this.  In addition, there is mediastinal and hilar adenopathy concerning for metastatic disease.  Centrilobular emphysema is noted.      Review of Systems  A full review  of systems has been completed and it is negative except as mentioned expressly in HPI and the scanned review sheet.    No Known Allergies    Current Outpatient Medications:   •  albuterol (PROVENTIL) (2.5 MG/3ML) 0.083% nebulizer solution, Take 2.5 mg by nebulization Every 4 (Four) Hours As Needed for Wheezing., Disp: 300 mL, Rfl: 1  •  albuterol sulfate  (90 Base) MCG/ACT inhaler, Inhale 2 puffs Every 4 (Four) Hours As Needed for Wheezing., Disp: 54 g, Rfl: 1  •  aspirin 81 MG EC tablet, Take 1 tablet by mouth Daily. (Patient taking differently: Take 81 mg by mouth Every Night.), Disp: 90 tablet, Rfl: 1  •  atorvastatin (LIPITOR) 80 MG tablet, Take 1 tablet by mouth Daily., Disp: 90 tablet, Rfl: 1  •  Budeson-Glycopyrrol-Formoterol (Breztri Aerosphere) 160-9-4.8 MCG/ACT aerosol inhaler, Inhale 2 puffs 2 (Two) Times a Day., Disp: , Rfl:   •  clopidogrel (PLAVIX) 75 MG tablet, Take 1 tablet by mouth Daily. (Patient taking differently: Take 75 mg by mouth Daily. No cardiac stents), Disp: 90 tablet, Rfl: 1  •  furosemide (LASIX) 20 MG tablet, Take 20 mg by mouth Daily., Disp: , Rfl:   •  losartan (COZAAR) 100 MG tablet, Take 1 tablet by mouth Daily., Disp: 90 tablet, Rfl: 1  •  metoprolol tartrate (LOPRESSOR) 100 MG tablet, Take 1 tablet by mouth Every Morning AND 0.5 tablets Every Evening., Disp: 135 tablet, Rfl: 1  •  montelukast (SINGULAIR) 10 MG tablet, Take 1 tablet by mouth Every Night., Disp: 90 tablet, Rfl: 1  •  NIFEdipine XL (PROCARDIA XL) 90 MG 24 hr tablet, Take 1 tablet by mouth Daily., Disp: 90 tablet, Rfl: 1  •  nitroglycerin (NITROSTAT) 0.4 MG SL tablet, Place 1 tablet under the tongue Every 5 (Five) Minutes As Needed for Chest Pain. Take no more than 3 doses in 15 minutes., Disp: 30 tablet, Rfl: 1  •  vitamin B-12 (CYANOCOBALAMIN) 1000 MCG tablet, Take 1 tablet by mouth Daily., Disp: 90 tablet, Rfl: 3  •  gabapentin (NEURONTIN) 300 MG capsule, Take 300 mg by mouth 3 (Three) Times a Day., Disp: ,  "Rfl:   •  O2 (OXYGEN), Inhale 2 L/min As Needed., Disp: , Rfl:   •  omeprazole (priLOSEC) 40 MG capsule, Take 40 mg by mouth Daily., Disp: , Rfl:     Past Medical History:   Diagnosis Date   • Adenomatous polyp of ascending colon 05/25/2022    Multiple throughout colon. Dr. Obregon 5/2022. Rpt 1y   • Bladder cancer (HCC)    • COPD (chronic obstructive pulmonary disease) (HCC)    • Coronary artery disease involving coronary bypass graft of native heart without angina pectoris 04/26/2022   • Essential hypertension 04/26/2022   • Gastroesophageal reflux disease without esophagitis 04/26/2022   • Hard of hearing    • Mixed hyperlipidemia 04/26/2022   • PONV (postoperative nausea and vomiting)    • Psoriasis    • Renal mass    • Vitamin B12 deficiency 04/27/2022 4/2022 Vit B12 193.    • Wears dentures     full set   • Wears glasses      Family History   Problem Relation Age of Onset   • Hypertension Mother    • Hypertension Brother    • Heart attack Brother      Social History     Tobacco Use   • Smoking status: Current Every Day Smoker     Packs/day: 0.50     Years: 59.00     Pack years: 29.50     Types: Cigarettes     Start date: 1962   • Smokeless tobacco: Never Used   Vaping Use   • Vaping Use: Never used   Substance Use Topics   • Alcohol use: Yes     Comment: 1-2 per month   • Drug use: Never       /62   Pulse 76   Temp 97.8 °F (36.6 °C)   Ht 174 cm (68.5\")   Wt 69.9 kg (154 lb)   SpO2 95% Comment: resting, room air  BMI 23.08 kg/m²   Physical Exam  Vitals reviewed.   Constitutional:       General: He is not in acute distress.     Appearance: Normal appearance. He is normal weight. He is not ill-appearing.   HENT:      Head: Normocephalic.      Right Ear: Tympanic membrane normal.      Nose: Nose normal. No congestion.      Mouth/Throat:      Mouth: Mucous membranes are moist.      Pharynx: No oropharyngeal exudate or posterior oropharyngeal erythema.   Eyes:      General: No scleral icterus.     " Extraocular Movements: Extraocular movements intact.      Conjunctiva/sclera: Conjunctivae normal.      Pupils: Pupils are equal, round, and reactive to light.   Cardiovascular:      Rate and Rhythm: Normal rate and regular rhythm.      Pulses: Normal pulses.      Heart sounds: Normal heart sounds. No murmur heard.    No friction rub.   Pulmonary:      Effort: Pulmonary effort is normal. No respiratory distress.      Breath sounds: Normal breath sounds. No stridor. No wheezing, rhonchi or rales.   Chest:      Chest wall: No tenderness.   Breasts:      Right: No supraclavicular adenopathy.      Left: No supraclavicular adenopathy.       Abdominal:      General: Abdomen is flat. Bowel sounds are normal. There is no distension.      Palpations: Abdomen is soft.   Musculoskeletal:         General: No swelling. Normal range of motion.      Cervical back: Normal range of motion. No rigidity.      Right lower leg: No edema.      Left lower leg: No edema.   Lymphadenopathy:      Head:      Right side of head: No submental or submandibular adenopathy.      Left side of head: No submental or submandibular adenopathy.      Cervical: No cervical adenopathy.      Upper Body:      Right upper body: No supraclavicular adenopathy.      Left upper body: No supraclavicular adenopathy.   Skin:     General: Skin is warm.      Capillary Refill: Capillary refill takes less than 2 seconds.      Coloration: Skin is not jaundiced.   Neurological:      General: No focal deficit present.      Mental Status: He is alert and oriented to person, place, and time.   Psychiatric:         Mood and Affect: Mood normal.         Thought Content: Thought content normal.         Results     CT imaging as per the HPI.    Problem List       ICD-10-CM ICD-9-CM   1. Lung mass of left upper lobe  R91.8 786.6   2. Mediastinal adenopathy  R59.0 785.6   3. Centrilobular emphysema (HCC)  J43.2 492.8   4. Current smoker  F17.200 305.1       Impression and Plan      Mr. Cage has very concerning findings on the CT scan of his chest with an approximately 5 cm mass in the left hilum as well as some mediastinal and hilar adenopathy.  Given his risk factors and weight loss in addition to these findings on the CT scan, this is highly concerning for primary bronchogenic carcinoma.  I have taken the opportunity to go through the images with him and his fiancée and I have answered all of their questions.  I have suggested that we proceed with airway examination and tissue biopsy of these abnormalities.  I feel that these can be most readily evaluated with linear endobronchial ultrasound with transbronchial needle aspirations.  We may require transbronchial lung biopsy with fluoroscopy and I will be prepared for that eventuality as well.  I did explain the risk, benefits, and alternatives to him and I have answered all of his questions to the best of my ability.  He has agreed to proceed with bronchoscopy at the earliest opportunity.    He is on Plavix and this will need to be held approximately 5 days prior to the operation to minimize his bleeding risk.  He previously tolerated being off of this for his most recent set of anesthesia in May 2022 during colonoscopy.  He previously has not had any problems with anesthesia.    I have also given him some samples of Breztri which she has used in the past and he states that these worked very well for him.  He will also continue on with Ventolin.    He will continue to use supplemental oxygen at 2 L/min as needed and with sleep.    Will need to approach smoking cessation very seriously after we have sorted out the identity of these lung abnormalities.    Thank you very much for asking me to help in the care of this very nice patient.  I look forward to following along closely with you.    I spent a total of 70 minutes with Mr. Cage with greater than 70% that time spent consultation regarding his diagnoses, review the data, and  formulation of plan moving forward.    Ryan Del Valle MD, [facility]  [facility]    CC: Kwaem Hines MD

## 2022-06-14 NOTE — DISCHARGE INSTRUCTIONS
Per Anesthesia Request, patient instructed not to take their ACE/ARB medications on the AM of surgery.     The following information and instructions were given:    Do not eat, drink, smoke or chew gum after midnight the night before surgery. This includes no mints.  Take all routine, prescribed medications including heart and blood pressure medicines with a sip of water unless otherwise instructed by your physician.   Do NOT take diabetic medication unless instructed by your physician.    DO NOT shave for two days before your procedure.  Do not wear makeup.      DO NOT wear fingernail polish (gel/regular) and/or acrylic/artificial nails on the day of surgery.   If you had a recent manicure and would rather not remove polish or artificial nails, the minimum requirement is that the polish/artificial nails must be removed from the middle finger on each hand.      Remove all jewelry (advise to go to jeweler if unable to remove).  Jewelry, especially rings, can no longer be taped for surgery.    Leave anything you consider valuable at home.    Bring the following with you the day of your procedure (when applicable):       -Picture ID and insurance cards       -Co-pay/deductible required by insurance       -Medications in the original bottles (not a list) including all over-the-counter medications if not brought to PAT       -Copy of advance directive, living will or power of  documents if not brought to PAT       -PAT Pass    Educational handout related to procedure given to patient.  Educational handout also includes general information related to their recovery that mentions signs and symptoms of infection and when to call the doctor.

## 2022-06-14 NOTE — PAT
Pt last dose plavix 5 days prior to procedure. ( does not have stent)     Patient denies any current skin issues.     Per Anesthesia Request, patient instructed not to take their ACE/ARB medications on the AM of surgery.

## 2022-06-15 NOTE — ANESTHESIA POSTPROCEDURE EVALUATION
Patient: Reese Caeg    Procedure Summary     Date: 06/15/22 Room / Location:  [facility]    Anesthesia Start: 1102 Anesthesia Stop: 1210    Procedure: BRONCHOSCOPY WITH ENDOBRONCHIAL ULTRASOUND WITH FLUORO (N/A Bronchus) Diagnosis:       Mass of upper lobe of left lung      (Mass of upper lobe of left lung [R91.8])    Surgeons: Ryan Del Valle MD Provider: Tim Olson MD    Anesthesia Type: general ASA Status: 3          Anesthesia Type: general    Vitals  Vitals Value Taken Time   /75 06/15/22 1209   Temp 97 °F (36.1 °C) 06/15/22 1209   Pulse 83 06/15/22 1209   Resp 18 06/15/22 1209   SpO2 97 % 06/15/22 1209           Post Anesthesia Care and Evaluation    Patient location during evaluation: PACU  Patient participation: complete - patient participated  Level of consciousness: awake and alert  Pain management: adequate    Airway patency: patent  Anesthetic complications: No anesthetic complications  PONV Status: none  Cardiovascular status: hemodynamically stable and acceptable  Respiratory status: nonlabored ventilation, acceptable and face mask  Hydration status: acceptable

## 2022-06-15 NOTE — ANESTHESIA PREPROCEDURE EVALUATION
Anesthesia Evaluation     Patient summary reviewed and Nursing notes reviewed   NPO Solid Status: > 8 hours  NPO Liquid Status: > 8 hours           Airway   Mallampati: I  TM distance: >3 FB  Neck ROM: full  No difficulty expected  Dental    (+) upper dentures    Pulmonary    (+) decreased breath sounds,   Cardiovascular   Exercise tolerance: poor (<4 METS)    Rhythm: regular  Rate: normal    (+) CABG >6 Months,       Neuro/Psych  GI/Hepatic/Renal/Endo      Musculoskeletal     Abdominal    Substance History      OB/GYN          Other                        Anesthesia Plan    ASA 3     general     intravenous induction     Anesthetic plan, risks, benefits, and alternatives have been provided, discussed and informed consent has been obtained with: patient.        CODE STATUS:

## 2022-06-15 NOTE — ANESTHESIA PROCEDURE NOTES
Airway  Urgency: elective    Date/Time: 6/15/2022 11:08 AM  Airway not difficult    General Information and Staff    Patient location during procedure: OR  CRNA/CAA: Shahana Eagle CRNA    Indications and Patient Condition  Indications for airway management: airway protection    Preoxygenated: yes  MILS not maintained throughout  Mask difficulty assessment: 2 - vent by mask + OA or adjuvant +/- NMBA    Final Airway Details  Final airway type: endotracheal airway      Successful airway: ETT  Cuffed: yes   Successful intubation technique: direct laryngoscopy  Facilitating devices/methods: intubating stylet  Endotracheal tube insertion site: oral  Blade: Patrick  Blade size: 4  ETT size (mm): 8.5  Cormack-Lehane Classification: grade I - full view of glottis  Placement verified by: chest auscultation and capnometry   Measured from: lips  ETT/EBT  to lips (cm): 22  Number of attempts at approach: 1  Assessment: lips, teeth, and gum same as pre-op and atraumatic intubation    Additional Comments  Symmetric chest rise and fall. +ETCO2 +BBS.

## 2022-06-15 NOTE — OP NOTE
Bronchoscopy Procedural Note       Date of Operation: 6/15/2022    Indication: This is a 72 y.o. gentleman whom I saw last week in the clinic who had an incidentally discovered left hilar mass on screening CT with associated adenopathy.  He is here for bronchoscopy for tissue diagnosis.    Pre-op Diagnosis: Left hilar mass with mediastinal and hilar adenopathy    Post-op Diagnosis: Same, incidental discovery of endobronchial lesion in the apicoposterior segment of the left upper lobe    Surgeon: Ryan Del Valle MD    Referring Physician: Kwame Hines MD    Procedures Performed:  Flexible fiberoptic bronchoscopy  Endobronchial ultrasound with transbronchial needle aspiration of stations 4R, 4L, 7, and left hilar mass  Endobronchial mucosal forceps biopsy of the left upper lobe  Bronchial washings for microbiology and cytology    Anesthesia: General    Estimated Blood Loss: <10 ml    Description of Procedure:  After obtaining informed consent and completing a procedural pause, the flexible fiberoptic bronchoscope was passed without difficulty through the pre-existing endotracheal tube.  The tube is well positioned approximately 3 cm above the de.  The de is sharp.  I first proceeded to the right side and surveyed the right upper, the right middle, and the right lower lobes.  There were minimal amounts of retained secretions which were easily suctioned.  No endobronchial lesions were noted.  There is a faint background of bronchitis throughout.  I then proceeded to the left side and surveyed the left upper, the lingula, and the left lower lobes.  A similar amount of bronchitis is noted.  Minimal retained secretions.  In the apicoposterior segment of the left upper lobe, there is an endobronchial lesion that is partially obstructive of the distal segment.  Neovascularization is noted.  No other endobronchial lesions were noted throughout the left side.  The left upper lobe was exited and I switched out  for the linear endobronchial ultrasound scope    Utilizing endobronchial ultrasound, I made a survey of the bilateral agustina and mediastinum.  I was able to identify multiple soft tissue density lesions at stations 4R, 4L, 7, and in the right hilum consistent with the patient's known mass.  Multiple transbronchial needle aspirations were taken of these areas with good return of material in that order.  Hemostasis was good.  No other targets were identified.    I then switch back to the light scope and utilizing forceps was able to take several endobronchial biopsies of the apicoposterior segment lesion of the left upper lobe however the positioning of this is quite difficult and it was difficult to get good visualization particularly after the initial biopsy due to heme.  At that point I elected to just go ahead and brush the area with a cytology brush.  Hemostasis again was deemed to be good.  The estimated blood loss is less than 10 mL.  The scope was removed and the patient was turned over to anesthesia for postoperative care.    There were no immediate complications.    Findings and Recommendations:  Based upon the findings of what is most likely neoplasm in the left upper lobe, I suspect that this is well as the left hilar mass and the adenopathy represent bronchogenic carcinoma.  We will await pathology as well as microbiological studies.  I am going to discuss my preliminary findings with the patient's fiancé and I will plan to discuss final diagnosis with him when I have results available via telephone.    He will be able to restart his Plavix beginning tomorrow morning or evening depending upon his usual schedule time.          Ryan Del Valle MD, [facility]  Pulmonary and Critical Care Medicine   06/15/22 11:51 EDT

## 2022-06-20 NOTE — PROGRESS NOTES
I was able to make a phone call to Mr. Cage' family today.  He was not available.  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  The left hilar mass in addition to the mediastinal and hilar lymph nodes were negative for malignancy though they were quite enlarged on imaging as well as on the ultrasound window during the biopsy.    At this point, I am going to get the patient a PET scan to further evaluate the stage of this squamous cell carcinoma.  He remains to be seen what needs to be done about this adenopathy.  Sampling error could have been contributory to the negative findings though I did get quite a bit of material from these areas.  Hopefully the PET scan can give us a bit of information about it.  In addition, I have asked for his case to be presented at the cancer committee lung conference.  Unfortunately I will be unable to be there.    I am going to go ahead and order the PET scan.  We will also get him referred to medical oncology.    Ryan Del Valle MD

## 2022-06-20 NOTE — PROGRESS NOTES
Dr. Del Valle sent me another message saying that he spoke with patient's family member regarding ordering a PET/Medical Oncology referral. I put in the Medical Oncology order for MD. I called Central Scheduling to get patient's PET scheduled, but the supervisor isn't in right now. I emailed Duane in PET asking when we could get patient in for a PET.  Haley in scheduling for Medical Oncology said that we needed to get patient's PET scan scheduled first before she would schedule patient, so I will call her back after patient is scheduled for his PET scan. AG    I talked with Duane in PET and he scheduled patient for his PET scan on 6/24 with an arrival at 2:45 PM, inject at 3:15 and then scan at 4:15 PM. I also let Dr. Del Valle know about the scheduled PET. I sent Haley in Med Onc Scheduling an Epic in box asking her to please schedule patient for consult with an Oncologist. AG    I called patient's fiance and went over instructions for PET along with date/time/location of the scan. I also told Vicky they could go on My Chart and see the PET instructions. AG

## 2022-06-20 NOTE — PROGRESS NOTES
Dr. Del Valle sent Jazzmine Argueta, RN and Lung Nurse Navigator, a message in her Epic in box saying that patient needed to be put on Lung TB tomorrow, but that he would not be there. Becca asked Dr. Tabor if he would present patient and he said that he would. I let Dr. Del Valle know that Dr. Tabor is presenting pt for him. AG

## 2022-06-21 NOTE — PROGRESS NOTES
Preprocedure diagnosis:  History of urothelial cell carcinoma of the bladder    Postprocedure diagnosis:  History of urothelial cell carcinoma the bladder    Procedure:  Diagnostic Cystourethroscopy    Attending surgeon:  Néstor Kelly MD    Anesthesia:  2% lidocaine jelly intraurethrally    Complications:  None    Indications:  72 y.o. male undergoing a diagnostic cystoscopy for the above mentioned indications.  Informed consent was obtained.      Findings:  Cystoscopic findings normal pendulous, bulbar, membranous urethra.  Within the prostatic urethra there was lateral lobe coaptation, with no median lobe.   Within the urinary bladder formal cystoscopy was performed with normal bladder mucosa and no tumors, masses or stones. Right and left ureteral orifice were identified and in the normal orthotopic position.       Procedure:  The patient was placed in supine position and prepped and draped in sterile fashion with lidocaine jelly instill per the urethra for anesthesia 5 minutes prior to procedure start.  A brief timeout was performed including available nursing staff and the patient.  The 16 Fr digital flexible cystoscope was lubricated and gently advanced into the urethral meatus. The penile, bulbar, and membranous urethra appeared widely patent without obvious stricture or mucosal abnormality. Within the prostatic urethra there was lateral lobe coaptation, with no median lobe. The cytoscope was then advanced into the bladder. The bladder was completely visualized starting with the trigone. There were bilateral orthotopic ureteral orifices. The posterior wall, lateral walls, anterior wall, and dome were completely visualized WITHOUT obvious mucosal lesions, tumors, stones, or trabeculations.  A small area of erythema was noted by the right ureteral orifice, prior resection site.  No obvious tumor noted.  Cytology was obtained based on this finding.  The cystoscope was retroflexed and the bladder neck and  prostate were further visualized. The cystoscope was then gently withdrawn while visualizing the urethra and the procedure was then terminated.  The patient tolerated the procedure well.      Follow Up:     The patient will follow-up in 2 weeks with CT urography.  We will follow-up on cytology obtained at the procedure.

## 2022-06-21 NOTE — PROGRESS NOTES
Called patient's fianceVicky, to let her know of patient's appointment with Dr. Tabor, but had to leave her a message to call me back. AG

## 2022-06-21 NOTE — PROGRESS NOTES
Talked with patient's fiance today regarding patient's appointment to see Dr. Tabor on 6/27 with an arrival time of 8 AM. Directions given to the [facility]. AG

## 2022-06-27 PROBLEM — Z51.81 ENCOUNTER FOR THERAPEUTIC DRUG MONITORING: Status: ACTIVE | Noted: 2022-01-01

## 2022-06-27 PROBLEM — C34.12 CANCER OF UPPER LOBE OF LEFT LUNG: Status: ACTIVE | Noted: 2022-01-01

## 2022-06-27 NOTE — PROGRESS NOTES
DATE OF CONSULTATION: 6/27/2022    REFERRING PHYSICIAN: Kwame Hines MD    Dear Kwame Gauthier MD  Thank you for asking for my medical advice on this patient. I saw him in the  [place] office on 6/27/2022    REASON FOR CONSULTATION: Left upper lobe lung cancer    PROBLEM LIST:   1.  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  Presented with abnormal screening CT scan done May 31, 2022 with 5.3 cm left upper lobe lung mass  B.  Whole-body PET scan done on June 23, 2022 revealed isolated hypermetabolic activity in the lung mass  C.  Diagnosed after bronchoscopy with biopsy done by Dr. Nguyen in [place], 2022  2.  Coronary artery disease:  A.  Status post CABG done in 2015  B.  Patient is on chronic aspirin with Plavix  3.  COPD:  A.  The patient is on nocturnal oxygen replacement  4.  Peripheral neuropathy  5.  Hypertension    HISTORY OF PRESENT ILLNESS: The patient is a very pleasant 72 y.o.  male   who was in his usual state of health until May 2022.  Patient relocated to [place].  He establish care with a new primary care provider.  Given his chronic history of smoking he had a screening CT scan done May 31 with endobronchial ultrasound [place] 15, 2022 biopsy from the lung mass confirmed squamous cell carcinoma biopsy from multiple hilar and mediastinal nodes all came back negative.  The patient had whole-body PET scan done June 23, 2022 confirmed isolated hypermetabolic activity in the lung mass.  The patient was referred to me for further recommendations.    SUBJECTIVE: When I saw the patient today he is here today with his fiancée.  He is complaining of on and off coughing up blood.  Started after the procedure.  He denies any shortness of breath.  He is staying active.  Never been diagnosed with cancer before.  He is cutting back on smoking however still smokes 3 to 10 cigarettes a day.    Review of Systems   Constitutional: Positive for fatigue.    Respiratory: Positive for cough and shortness of breath.    Gastrointestinal: Negative.    Genitourinary: Negative.        Past Medical History:   Diagnosis Date   • Adenomatous polyp of ascending colon 05/25/2022    Multiple throughout colon. Dr. Obregon 5/2022. Rpt 1y   • Bladder cancer (HCC)    • COPD (chronic obstructive pulmonary disease) (HCC)    • Coronary artery disease involving coronary bypass graft of native heart without angina pectoris 04/26/2022   • Essential hypertension 04/26/2022   • Gastroesophageal reflux disease without esophagitis 04/26/2022   • Hard of hearing    • Mixed hyperlipidemia 04/26/2022   • PONV (postoperative nausea and vomiting)    • Psoriasis    • Renal mass    • Vitamin B12 deficiency 04/27/2022 4/2022 Vit B12 193.    • Wears dentures     full set   • Wears glasses        Social History     Socioeconomic History   • Marital status: Significant Other   Tobacco Use   • Smoking status: Current Every Day Smoker     Packs/day: 0.50     Years: 59.00     Pack years: 29.50     Types: Cigarettes     Start date: 1962   • Smokeless tobacco: Never Used   Vaping Use   • Vaping Use: Never used   Substance and Sexual Activity   • Alcohol use: Yes     Comment: 1-2 per month   • Drug use: Never   • Sexual activity: Defer       Family History   Problem Relation Age of Onset   • Hypertension Mother    • Hypertension Brother    • Heart attack Brother        Past Surgical History:   Procedure Laterality Date   • BLADDER TUMOR EXCISION      Cancer   • BRONCHOSCOPY N/A 6/15/2022    Procedure: BRONCHOSCOPY WITH ENDOBRONCHIAL ULTRASOUND WITH FLUORO;  Surgeon: Ryan Del Valle MD;  Location[facility] ENDOSCOPY;  Service: Pulmonary;  Laterality: N/A;  EBUS scope removed with balloon intact   • CARDIAC CATHETERIZATION     • CARDIAC SURGERY     • COLONOSCOPY     • CORONARY ARTERY BYPASS GRAFT  2015    [facility] JANNY [facility]       No Known Allergies       Current Outpatient Medications:   •  albuterol  (PROVENTIL) (2.5 MG/3ML) 0.083% nebulizer solution, Take 2.5 mg by nebulization Every 4 (Four) Hours As Needed for Wheezing., Disp: 300 mL, Rfl: 1  •  albuterol sulfate  (90 Base) MCG/ACT inhaler, Inhale 2 puffs Every 4 (Four) Hours As Needed for Wheezing., Disp: 54 g, Rfl: 1  •  aspirin 81 MG EC tablet, Take 1 tablet by mouth Daily. (Patient taking differently: Take 81 mg by mouth Every Night.), Disp: 90 tablet, Rfl: 1  •  atorvastatin (LIPITOR) 80 MG tablet, Take 1 tablet by mouth Daily., Disp: 90 tablet, Rfl: 1  •  Budeson-Glycopyrrol-Formoterol (Breztri Aerosphere) 160-9-4.8 MCG/ACT aerosol inhaler, Inhale 2 puffs 2 (Two) Times a Day., Disp: , Rfl:   •  clopidogrel (PLAVIX) 75 MG tablet, Take 1 tablet by mouth Daily. (Patient taking differently: Take 75 mg by mouth Daily. No cardiac stents), Disp: 90 tablet, Rfl: 1  •  furosemide (LASIX) 20 MG tablet, Take 20 mg by mouth Daily., Disp: , Rfl:   •  gabapentin (NEURONTIN) 300 MG capsule, Take 300 mg by mouth 3 (Three) Times a Day., Disp: , Rfl:   •  losartan (COZAAR) 100 MG tablet, Take 1 tablet by mouth Daily., Disp: 90 tablet, Rfl: 1  •  metoprolol tartrate (LOPRESSOR) 100 MG tablet, Take 1 tablet by mouth Every Morning AND 0.5 tablets Every Evening., Disp: 135 tablet, Rfl: 1  •  montelukast (SINGULAIR) 10 MG tablet, Take 1 tablet by mouth Every Night., Disp: 90 tablet, Rfl: 1  •  NIFEdipine XL (PROCARDIA XL) 90 MG 24 hr tablet, Take 1 tablet by mouth Daily., Disp: 90 tablet, Rfl: 1  •  nitroglycerin (NITROSTAT) 0.4 MG SL tablet, Place 1 tablet under the tongue Every 5 (Five) Minutes As Needed for Chest Pain. Take no more than 3 doses in 15 minutes., Disp: 30 tablet, Rfl: 1  •  O2 (OXYGEN), Inhale 2 L/min As Needed., Disp: , Rfl:   •  omeprazole (priLOSEC) 40 MG capsule, Take 40 mg by mouth Daily., Disp: , Rfl:   •  vitamin B-12 (CYANOCOBALAMIN) 1000 MCG tablet, Take 1 tablet by mouth Daily., Disp: 90 tablet, Rfl: 3    PHYSICAL EXAMINATION:   /57    "Pulse 91   Temp 97.3 °F (36.3 °C) (Temporal)   Resp 18   Ht 172.7 cm (67.99\")   Wt 70.3 kg (155 lb)   SpO2 93%   BMI 23.57 kg/m²   Pain Score    06/27/22 0815   PainSc: 0-No pain      ECOG score: 0            ECOG Performance Status: 1 - Symptomatic but completely ambulatory  General Appearance:  alert, cooperative, no apparent distress and appears stated age   Neurologic/Psychiatric: A&O x 3, gait steady, appropriate affect, strength 5/5 in all muscle groups   HEENT:  Normocephalic, without obvious abnormality, mucous membranes moist   Neck: Supple, symmetrical, trachea midline, no adenopathy;  No thyromegaly, masses, or tenderness   Lungs:   Clear to auscultation bilaterally; respirations regular, even, and unlabored bilaterally   Heart:  Regular rate and rhythm, no murmurs appreciated   Abdomen:   Soft, non-tender, non-distended and no organomegaly   Lymph nodes: No cervical, supraclavicular, inguinal or axillary adenopathy noted   Extremities: Normal, atraumatic; no clubbing, cyanosis, or edema    Skin: No rashes, ulcers, or suspicious lesions noted       Hospital Outpatient Visit on 06/24/2022   Component Date Value Ref Range Status   • Glucose 06/24/2022 104  70 - 130 mg/dL Final    Meter: [id number] : [id number] Sanjay Hutson   Admission on 06/15/2022, Discharged on 06/15/2022   Component Date Value Ref Range Status   • Case Report 06/15/2022    Final                    Value:Surgical Pathology Report                         Case: [id number]                                  Authorizing Provider:  Ryan Del Valle MD    Collected:           06/15/2022 11:18 AM          Ordering Location:     [facility]   Received:            06/15/2022 12:21 PM                                 ENDO SUITES                                                                  Pathologist:           Kristopher Davis MD                                                            Specimens:   1) - Lymph Node, " Station 4R [facility] for pathology                                                      2) - Lymph Node, Station 4L [facility] for pathology                                                      3) - Lymph Node, Station 7 [facility]NA for pathology                                                       4) - Lung, R, Right hilar mass TBNA for pathology                                                   5) - Lung, Left Upper Lobe, Left upper lobe endobronchial bx for pathology                • Clinical Information 06/15/2022    Final                    Value:This result contains rich text formatting which cannot be displayed here.   • Final Diagnosis 06/15/2022    Final                    Value:This result contains rich text formatting which cannot be displayed here.   • Comment 06/15/2022    Final                    Value:This result contains rich text formatting which cannot be displayed here.   • Gross Description 06/15/2022    Final                    Value:This result contains rich text formatting which cannot be displayed here.   • Special Stains 06/15/2022    Final                    Value:This result contains rich text formatting which cannot be displayed here.   • Microscopic Description 06/15/2022    Final                    Value:This result contains rich text formatting which cannot be displayed here.   • Reference Lab Report 06/15/2022    Final                    Value:[facility]  [address] [place] [address]  Phone: [phone] or [phone]  Fax: [phone]  Junior Manley M.D., Medical Director    PATIENT NAME                        LABORATORY NO.  AG SCHULTZ.              [id number]  [id number]                           AGE            SEX   SSN         CLIENT REF #  [place] [id number]     1950      [id number]  [address]                REQUESTING M.D.   ATTENDING M.D..   [place] [address]                   OLIVIA HUTSON  DATE COLLECTED    DATE RECEIVED     DATE REPORTED  06/15/2022        06/15/2022        06/17/2022    DIAGNOSIS:  A.     BRONCH BRUSH, LEFT UPPER LOBE:  Negative for malignant cells.  B.     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   Numerous benign bronchial epithelial cells are present. Pulmonary  macrophages and inflammatory cells are                           present.  B.   2 IHC stains are performed with adequate controls.  The tumor cells are  positive for p40 and negative for TTF-1.  These findings confirm the  impression of squamous cell carcinoma in situ.  No definitive invasion is  identified within this limited sample.    Professional interpretation rendered by Junior Manley M.D., LULYAConstanceP. at [employer], [address] [place] [address].    CLINICAL HISTORY:  Mass of upper lobe of left lung    SPECIMENS SUBMITTED:  A.  BRONCH BRUSH, LEFT UPPER LOBE  B.  BRONCH WASH    GROSS SPECIMEN DESCRIPTION:  A.   1 BRUSH IN FIXATIVE  B.   45CC RED FLUID WITH SEDIMENT IN FIXATIVE  Cell block has been examined.    CYTOTECHNOLOGIST:    DARREL HAWK (ASCP)                REVIEWED, DIAGNOSED AND ELECTRONICALLY  SIGNED BY:    Junior Mnaley M.D., F.C.A.P.  CPT CODES:  88112x2, 70856, 69995, 08317     • Fungus Culture 06/15/2022 No fungus isolated at 1 week   Preliminary   • AFB Culture 06/15/2022 No AFB isolated at 1 week   Preliminary   • AFB Stain 06/15/2022 No acid fast bacilli seen on concentrated smear   Preliminary   • Fungal Stain 06/15/2022 No fungal elements seen   Final   • Respiratory Culture 06/15/2022 Light growth (2+) Normal respiratory suhas. No S. aureus or Pseudomonas aeruginosa detected. Final report.   Final   • Gram Stain 06/15/2022 Moderate (3+) WBCs per low power field   Final   • Gram Stain 06/15/2022 Rare (1+) Epithelial cells per low power field   Final   • Gram Stain 06/15/2022 No organisms seen    Final   Pre-Admission Testing on 06/14/2022   Component Date Value Ref Range Status   • QT Interval 06/14/2022 392  ms Final   • QTC Interval 06/14/2022 431  ms Final   • Glucose 06/14/2022 111 (A) 65 - 99 mg/dL Final   • BUN 06/14/2022 16  8 - 23 mg/dL Final   • Creatinine 06/14/2022 1.11  0.76 - 1.27 mg/dL Final   • Sodium 06/14/2022 137  136 - 145 mmol/L Final   • Potassium 06/14/2022 4.2  3.5 - 5.2 mmol/L Final    Slight hemolysis detected by analyzer. Results may be affected.   • Chloride 06/14/2022 99  98 - 107 mmol/L Final   • CO2 06/14/2022 29.0  22.0 - 29.0 mmol/L Final   • Calcium 06/14/2022 8.7  8.6 - 10.5 mg/dL Final   • Total Protein 06/14/2022 7.3  6.0 - 8.5 g/dL Final   • Albumin 06/14/2022 3.80  3.50 - 5.20 g/dL Final   • ALT (SGPT) 06/14/2022 12  1 - 41 U/L Final   • AST (SGOT) 06/14/2022 16  1 - 40 U/L Final   • Alkaline Phosphatase 06/14/2022 117  39 - 117 U/L Final   • Total Bilirubin 06/14/2022 0.3  0.0 - 1.2 mg/dL Final   • Globulin 06/14/2022 3.5  gm/dL Final    Calculated Result   • A/G Ratio 06/14/2022 1.1  g/dL Final   • BUN/Creatinine Ratio 06/14/2022 14.4  7.0 - 25.0 Final   • Anion Gap 06/14/2022 9.0  5.0 - 15.0 mmol/L Final   • eGFR 06/14/2022 71.0  >60.0 mL/min/1.73 Final    National Kidney Foundation and American Society of Nephrology (ASN) Task Force recommended calculation based on the Chronic Kidney Disease Epidemiology Collaboration (CKD-EPI) equation refit without adjustment for race.   • Protime 06/14/2022 12.6  11.4 - 14.4 Seconds Final   • INR 06/14/2022 0.95  0.84 - 1.13 Final   • PTT 06/14/2022 30.7  22.0 - 39.0 seconds Final   • COVID19 06/14/2022 Not Detected  Not Detected - Ref. Range Final   • WBC 06/14/2022 10.00  3.40 - 10.80 10*3/mm3 Final   • RBC 06/14/2022 4.68  4.14 - 5.80 10*6/mm3 Final   • Hemoglobin 06/14/2022 13.0  13.0 - 17.7 g/dL Final   • Hematocrit 06/14/2022 38.0  37.5 - 51.0 % Final   • MCV 06/14/2022 81.2  79.0 - 97.0 fL Final   • MCH 06/14/2022 27.8   26.6 - 33.0 pg Final   • MCHC 06/14/2022 34.2  31.5 - 35.7 g/dL Final   • RDW 06/14/2022 16.8 (A) 12.3 - 15.4 % Final   • RDW-SD 06/14/2022 50.1  37.0 - 54.0 fl Final   • MPV 06/14/2022 9.1  6.0 - 12.0 fL Final   • Platelets 06/14/2022 316  140 - 450 10*3/mm3 Final   • Neutrophil % 06/14/2022 56.9  42.7 - 76.0 % Final   • Lymphocyte % 06/14/2022 28.9  19.6 - 45.3 % Final   • Monocyte % 06/14/2022 11.4  5.0 - 12.0 % Final   • Eosinophil % 06/14/2022 1.5  0.3 - 6.2 % Final   • Basophil % 06/14/2022 0.9  0.0 - 1.5 % Final   • Immature Grans % 06/14/2022 0.4  0.0 - 0.5 % Final   • Neutrophils, Absolute 06/14/2022 5.69  1.70 - 7.00 10*3/mm3 Final   • Lymphocytes, Absolute 06/14/2022 2.89  0.70 - 3.10 10*3/mm3 Final   • Monocytes, Absolute 06/14/2022 1.14 (A) 0.10 - 0.90 10*3/mm3 Final   • Eosinophils, Absolute 06/14/2022 0.15  0.00 - 0.40 10*3/mm3 Final   • Basophils, Absolute 06/14/2022 0.09  0.00 - 0.20 10*3/mm3 Final   • Immature Grans, Absolute 06/14/2022 0.04  0.00 - 0.05 10*3/mm3 Final   • nRBC 06/14/2022 0.0  0.0 - 0.2 /100 WBC Final              DIAGNOSTIC DATA:   1. Radiology: Whole-body PET scan done on June 23, 2022 confirmed hypermetabolic left upper lobe lung mass  2. Dr. Nguyen's note from June 2022 reviewed by me and documented in the  chart.   3. Pathology report: Squamous cell carcinoma left upper lobe  4. Laboratory data: As above    ASSESSMENT: The patient is a very pleasant 72 y.o.  male  with left upper lobe lung cancer    PLAN:     1.  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  I had a long discussion today with the patient and his fiancé about his  new diagnosis of lung cancer. I did go over the final pathology report in  detail with both of them. I reviewed the patient's documents including refereing provider's notes, lab results, imaging, and path report.  B.  I reviewed the patient PET scan films myself I went over the pictures with him and his fiancée my  personal interpretation as above.  C.  I discussed the case with Dr. Orozco over the phone today to coordinate patient care.  D.  The patient will be treated with neoadjuvant chemotherapy as immunotherapy using carboplatin paclitaxel with Nivolumab.  This is an FDA approved treatment based on Checkmate 891 that revealed significant improvement and complete pathologic response with the addition of immunotherapy to chemotherapy it went up from 2% to 24%.  E.  This will be followed by surgical resection.  F.  I will order MRI brain to complete staging work-up.  I will check his PD-L1 status on tissue.  G.  The patient will need to have lung function test.  If for some reason he is not a surgical candidate we will change the plan to definitive concurrent chemotherapy with radiotherapy followed by 1 year of durvalumab.  H. We discussed potential side effects of immunotherapy including but not limited to immune mediated reactions with thyroiditis, pneumonitis, hepatitis, colitis, rash, and electrolyte abnormalities, fatigue, multiorgan failure, and possibly death.  I. We reviewed the potential side effects of this regimen including fatigue, vomiting and nausea, hair loss, nephropathy, neuropathy, hearing loss, myelosuppression, and risk of infusion reaction.  J.  I will set him up to meet with my nurse practitioner for treatment education.  K.  We will plan start treatment next week and he will see me for cycle #2.    2.  Coronary artery disease, status post CABG:  A.  I asked the patient to hold his Plavix for now given his intermittent hemoptysis.  He can resume it after this improves.  B.  The patient would be at high risk of bleeding while he is on chemotherapy given the fact that he is on chronic aspirin with Plavix.    3.  Peripheral neuropathy:  A.  The patient will continue gabapentin.  B.  We have to watch his neuropathy carefully since Taxol and carboplatin both can cause chemotherapy-induced neuropathy.    4.   Hypertension:  A.  The patient will continue his antihypertensive agents.  B.  We will monitor his blood pressure closely.  He will be at risk for hypotension while on chemotherapy and we may have to adjust his antihypertensive agents.      FOLLOW UP: 4 weeks with cycle #2.    Rich Tabor MD  6/27/2022

## 2022-06-30 NOTE — PROGRESS NOTES
A My-Chart message has been sent to the patient for PATIENT ROUNDING with [facility].

## 2022-07-06 NOTE — PLAN OF CARE
Outpatient Infusion • [address] [facility] • [address] • [place] [address] • [phone]      CHEMOTHERAPY EDUCATION    NAME:  Reese Cage      : 1950           DATE: 22    Medication Education Sheets: (select all that apply)  Carboplatin, Nivolumab and Paclitaxel    Other Education Sheets: (select all that apply)  CINV, Diarrhea, HOPA Immune Checkpoint Inhibitors, Checkpoint Inhibitor Wallet Card and Symptom Tracker Sheet and ANNA Information    Chemotherapy Regimen:   OP LUNG  Nivolumab 360mg /  PACLitaxel / CARBOplatin AUC=5   every 21 days (for 3 cycles)       **Adverse effects and risks of nivolumab were discussed, patient preferred not to hear the adverse effects and risks of carboplatin and paclitaxel**  Nausea and risk of infusion related reactions related to chemotherapy were discussed.    TOPICS EDUCATION PROVIDED COMMENTS   DIARRHEA:  causes, s/s of dehydration, ways to manage, dietary changes, when to call MD [x] Immunotherapy - Discussed risk of diarrhea and immune related colitis. Instructed patient to call MD if 4-6 episodes in 24 hours and discussed role of high dose steroids for the treatment of immune related colitis.    NAUSEA & VOMITING:  cause, use of antiemetics, dietary changes, when to call MD [x] • Emetic risk: High  • Premeds: Fosaprepitant, Olanzapine and Palonosetron  • PRN home meds: Ondansetron    Instructed the patient to take the scheduled anti-nausea medications at night on days 2, 3, and 4 after chemotherapy    Instructed the patient to take a dose of the PRN medication at the first onset of nausea and if it's not working to call us for additional medications.  Also provided non-drug measures to mitigate nausea.   PAIN:  causes, ways to manage [x] Discussed how the patient might feel fatigued and achy for a few days after treatment as the immune system starts to ramp up.   SKIN & NAIL CHANGES:  cause, s/s, ways to manage [x] Immunotherapy - Discussed potential  for a rash or itchy skin from immunotherapy, offered nonpharmacologic prevention strategies, and instructed patient to call if a rash develops that worsens or gets larger   ORGAN TOXICITIES:  cause, s/s, need for diagnostic tests, labs, when to notify MD [x] Discussed potential effects on organ systems, monitoring, diagnostic tests, labs, and when to notify their MD. Discussed the signs/symptoms of the following: hepatotoxicity, nephrotoxicity, hormone gland changes, eye changes, skin changes and lung changes     Discussed the importance of receiving immune-suppressing medications as soon as possible if experiencing a severe side effect.    INFUSION RELATED REACTIONS:  Cause, s/s, anaphylaxis, vesicant, etc. [x] Premeds: Dexamethasone, Diphenhydramine and Famotidine    Discussed rare but serious risk of infusion or allergic-type reaction.   MISCELLANEOUS:  drug interactions, administration, labs, etc. [] Discussed chemotherapy schedule, lab draws, infusion times, and total expected visit time.   • DDIs: No significant DDIs  • Lab draws: day of or 1-3 days prior to treatment     Medications:  Prior to Admission medications    Medication Sig Start Date End Date Taking? Authorizing Provider   albuterol (PROVENTIL) (2.5 MG/3ML) 0.083% nebulizer solution Take 2.5 mg by nebulization Every 4 (Four) Hours As Needed for Wheezing. 5/16/22   Kwame Hines MD   albuterol sulfate  (90 Base) MCG/ACT inhaler Inhale 2 puffs Every 4 (Four) Hours As Needed for Wheezing. 5/16/22   Kwame Hines MD   aspirin 81 MG EC tablet Take 1 tablet by mouth Daily.  Patient taking differently: Take 81 mg by mouth Every Night. 5/16/22   Kwame Hines MD   atorvastatin (LIPITOR) 80 MG tablet Take 1 tablet by mouth Daily. 5/16/22   Kwame Hines MD   Budeson-Glycopyrrol-Formoterol (Breztri Aerosphere) 160-9-4.8 MCG/ACT aerosol inhaler Inhale 2 puffs 2 (Two) Times a Day.    Provider, MD Altagracia   cloNIDine (CATAPRES) 0.2  MG tablet Take 0.2 mg by mouth 2 (Two) Times a Day.    Altagracia Calabrese MD   clopidogrel (PLAVIX) 75 MG tablet Take 1 tablet by mouth Daily.  Patient taking differently: Take 75 mg by mouth Daily. No cardiac stents 5/16/22   Kwame Hines MD   furosemide (LASIX) 20 MG tablet Take 20 mg by mouth Daily.    Altagracia Calabrese MD   gabapentin (NEURONTIN) 300 MG capsule Take 300 mg by mouth 3 (Three) Times a Day.    Altagracia Calabrese MD   losartan (COZAAR) 100 MG tablet Take 1 tablet by mouth Daily. 5/16/22   Kwame Hines MD   metoprolol tartrate (LOPRESSOR) 100 MG tablet Take 1 tablet by mouth Every Morning AND 0.5 tablets Every Evening. 5/16/22   Kwame Hines MD   montelukast (SINGULAIR) 10 MG tablet Take 1 tablet by mouth Every Night. 5/16/22   Kwame Hines MD   NIFEdipine XL (PROCARDIA XL) 90 MG 24 hr tablet Take 1 tablet by mouth Daily. 5/25/22   Kwame Hines MD   nitroglycerin (NITROSTAT) 0.4 MG SL tablet Place 1 tablet under the tongue Every 5 (Five) Minutes As Needed for Chest Pain. Take no more than 3 doses in 15 minutes. 5/16/22   Kwame Hines MD   O2 (OXYGEN) Inhale 2 L/min As Needed.    Altagracia Calabrese MD   OLANZapine (zyPREXA) 5 MG tablet Take 1 tablet by mouth Every Night. Take on days 2, 3 and 4 after chemotherapy. 6/27/22   Rich Tabor MD   omeprazole (priLOSEC) 40 MG capsule Take 40 mg by mouth Daily.    Altagracia Calabrese MD   ondansetron (ZOFRAN) 8 MG tablet Take 1 tablet by mouth 3 (Three) Times a Day As Needed for Nausea or Vomiting. 6/27/22   Rich Tabor MD   vitamin B-12 (CYANOCOBALAMIN) 1000 MCG tablet Take 1 tablet by mouth Daily. 4/27/22   Kwame Hines MD         Notes: All questions and concerns were addressed. Provided a personalized treatment calendar to patient (includes treatment and lab schedule). Provided patient with contact information for the pharmacist and clinic while instructing them to call if any questions or  concerns arise. Informed consent for treatment was obtained. Patient was receptive to information and expressed understanding.     Nicola Gibbs, Pharmacy Intern    7/6/2022  15:11 EDT

## 2022-07-06 NOTE — PROGRESS NOTES
"CHEMOTHERAPY PREPARATION    Reese Cage  [id number]  1950    Subjective   Chief Complaint: Treatment Preparation and Needs Assessment    History of present illness:  Reese Cage is a 72 y.o. year old male who is here today for chemotherapy preparation and needs assessment. 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     Oncology History:    Oncology/Hematology History   Cancer of upper lobe of left lung (HCC)   6/27/2022 Initial Diagnosis    Cancer of upper lobe of left lung (HCC)     6/27/2022 Cancer Staged    Staging form: Lung, AJCC 8th Edition  - Clinical stage from 6/27/2022: Stage IIB (cT3, cN0, cM0) - Signed by Rich Tabor MD on 6/27/2022 7/7/2022 -  Chemotherapy    OP LUNG  Nivolumab 360mg /  PACLitaxel / CARBOplatin AUC=5          The current medication list and allergy list were reviewed and reconciled.     Past Medical History, Past Surgical History, Social History, Family History have been reviewed and are without significant changes except as mentioned.    Review of Systems   Constitutional: Positive for fatigue.   Respiratory: Positive for cough and shortness of breath.    Psychiatric/Behavioral: The patient is nervous/anxious.        Objective   Physical Exam  Vital Signs: /60   Pulse 86   Temp 97.5 °F (36.4 °C) (Temporal)   Resp 18   Ht 172.7 cm (67.99\")   Wt 70.3 kg (155 lb)   SpO2 91%   BMI 23.57 kg/m²   Vitals:    07/06/22 1511   PainSc: 0-No pain           General Appearance:  alert, cooperative, no apparent distress and appears stated age   Neurologic/Psychiatric: A&O x 3, gait steady, appropriate affect   HEENT:  Normocephalic, without obvious abnormality, mucous membranes moist   Lungs:   Clear to auscultation bilaterally; respirations regular, even, and unlabored bilaterally   Heart:  Regular rate and rhythm, no murmurs appreciated "   Extremities: Normal, atraumatic; no clubbing, cyanosis, or edema    Skin: No rashes, lesions, or abnormal coloration noted     ECOG Performance Status: 1 - Symptomatic but completely ambulatory            NEEDS ASSESSMENTS    Genetics  The patient's new diagnosis and family history have been reviewed for genetic counseling needs. A genetic referral is not recommended.     Molecular Testing  Further molecular testing on tumor is indicated. PD-L1 submitted.     Psychosocial  The patient has completed a PHQ-9 Depression Screening and the Distress Thermometer (DT) today.   PHQ-9 Total Score:  . PHQ-9 results show 1-4 (Minimal Depression). The patient scored their distress today as 0 on a scale of 0-10 with 0 being no distress and 10 being extreme distress.   Problems marked by the patient as being an issue for them within the last week include no problems.   Results were reviewed along with psychosocial resources offered by our cancer center. Our oncology social worker will be flagged for a DT score of 4 or above, and a same day call will be made for a score of 9 or 10. A mental health referral is not recommended at this time. The patient is not accepting of a referral to [facility].   Copies of patient's questionnaires will be scanned into EMR for details and further reference.    Barriers to care  A barriers form was also completed by the patient today. We discussed services offered by our facility to help him have adequate access to care. The patient was given the name and card for our Oncology Social Worker, Ashwini Ly. Based upon barriers assessment today, the patient will not require a follow-up call from the  to further discuss needs.   A copy of the barriers form will also be scanned into EMR for details and further reference.     VAD Assessment  The patient and I discussed planned intervenous chemotherapy as well as other IV treatments that are often needed throughout the course of  "treatment. These may include, but are not limited to blood transfusions, antibiotics, and IV hydration. The vasculature does appear to be adequate for multiple peripheral IVs throughout their treatment course. Discussed risks and benefits of VADs. The patient would not like to pursue Port-A-Cath insertion prior to initiation of treatment.     Advance Care Planning   ACP discussion was held with the patient during this visit. Patient does not have an advance directive, information provided.  The patient and I discussed advanced care planning, \"Conversations that Matter\".   This service was offered, free of charge, for development of advance directives with a certified ACP facilitator.  The patient does not have an up-to-date advanced directive. This document is not on file with our office. The patient is interested in an appointment with one of our facilitators to create or update their advanced directives.         Palliative Care  The patient and I discussed palliative care services. Palliative care is not the same as Hospice care. This is specialized medical care for people living with serious illness with the goal of improving quality of life for the patient and their family. [facility] has partnered with [facility] to offer our patients outpatient palliative care early along with their treatment to assist in coordination of care, symptom management, pain management, and medical decision making.  Oncology criteria for palliative care referral is not met at this time. The patient is not interested in a palliative care consultation.     Additional Referral needs  none      CHEMOTHERAPY EDUCATION    Booklets Given: Chemotherapy and You [x]  Eating Hints [x]    Sexuality/Fertility Books []      Chemotherapy/Biotherapy Education Sheets: (list all that apply)  nausea management, acid reflux management, diarrhea management, Cancer resourse contacts information, skin and mouth care and Treatment Calendar       "                                                                                                                                                           Chemotherapy Regimen:   Treatment Plans     Name Type Plan Dates Plan Provider         Active    OP LUNG  Nivolumab 360mg /  PACLitaxel / CARBOplatin AUC=5  ONCOLOGY TREATMENT  6/27/2022 - Present Rich Tabor MD                    DETAILED CHEMOTHERAPY TEACHING COMPLETED BY PHARMACY. CHEMOTHERAPY CONSENT COMPLETED BY PHARMACY. SEE PHARMACY EDUCATION FOR DOCUMENTATION.     Chemotherapy education comprehension reviewed. Questions answered and additional information discussed on topics including:  Anemia, Thrombocytopenia, Neutropenia, Nutrition and appetite changes, Constipation, Diarrhea, Nausea & vomiting, Skin & nail changes and Organ toxicities        Assessment and Plan:    Diagnoses and all orders for this visit:    1. Cancer of upper lobe of left lung (HCC) (Primary)          I spent 30 minutes caring for Reese on this date of service. This time includes time spent by me in the following activities: preparing for the visit, reviewing tests, obtaining and/or reviewing a separately obtained history, performing a medically appropriate examination and/or evaluation, counseling and educating the patient/family/caregiver, referring and communicating with other health care professionals, documenting information in the medical record and care coordination.     The patient and I have reviewed their new cancer diagnosis and scheduled treatment plan. Needs assessment was completed including genetics, psychosocial needs, barriers to care, VAD evaluation, advanced care planning, and palliative care services. Referrals have been ordered as appropriate based upon our evaluation and patient desires.     Chemotherapy teaching was also completed today as documented above. Adequate time was given to answer all questions to his satisfaction. Patient and family are aware of  their care team members and contact information if they have questions or problems throughout the treatment course. Needs assessments and education has been completed. The patient is adequately prepared to begin treatment as scheduled.     Pain assessment was performed today as a part of patient’s care. For patients with pain related to surgery, gynecologic malignancy or cancer treatment, the plan is as noted in the assessment/plan.  For patients with pain not related to these issues, they are to seek any further needed care from a more appropriate provider, such as PCP.    I reviewed the MRI results from 6/28/2022 that revealed no evidence of metastatic disease.     Pre-treatment labs will be drawn today.     I reviewed home medications including Zofran every 8 hours as needed for treatment induced nausea as well as olanzapine on days 2, 3, and 4 of each cycle.     Referral placed for advanced care planning.     Electronically signed by FRANTZ Dillard on 07/06/22 at 15:04 EDT.

## 2022-07-07 NOTE — PROGRESS NOTES
"Outpatient Oncology Nutrition     Reason for Visit:    Oncology Nutrition Screening and Patient Education / Met with patient during his initial chemotherapy infusion appointment.     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     Chemotherapy: neoadjuvant carbo/taxol/nivolumab - every 21 days x 3 cycles     Patient Active Problem List:    Patient Active Problem List   Diagnosis   • Essential hypertension   • Mixed hyperlipidemia   • Gastroesophageal reflux disease without esophagitis   • Psoriasis   • Cigarette smoker   • Bladder cancer (HCC)   • Lower urinary tract symptoms (LUTS)   • COPD (chronic obstructive pulmonary disease) (HCC)   • Right kidney mass   • Coronary artery disease involving coronary bypass graft of native heart without angina pectoris   • Vitamin B12 deficiency   • Adenomatous polyp of ascending colon   • Chronic respiratory failure (HCC)   • Mass of upper lobe of left lung   • Paratracheal lymphadenopathy   • Cancer of upper lobe of left lung (HCC)   • Encounter for therapeutic drug monitoring       Food / Nutrition Related History   Patient reports he usually eats 3 meals daily and will snack in between.  He endorses weight gain as below.    Hydration Status   Discussed the importance of hydration and recommended he continue with his current water intake.    Goal: ~80 ounces     How many 8 ounces glasses of water do you consume per day? ~16    Enteral Feeding       Anthropometric Measurements     Height:    Ht Readings from Last 1 Encounters:   22 172.7 cm (67.99\")       Weight:    Wt Readings from Last 1 Encounters:   22 71.2 kg (157 lb)       BMI:  23.9 - Normal     Weight Change: ~9# weight gain x ~1 month / has been stable for the past ~6 weeks     Review of Lab Data (Time Frame - 1 month / 2 month)   Labs reviewed - " 7/6/22    Medication Review   MAR reviewed     Nutrition Focused Physical Findings       Nutrition Impact Symptoms   No significant problems with eating     Physical Activity   Not my normal self, but able to be up and about with fairly normal activities    Current Nutritional Intake     Oral diet:  Regular     Intake: oral intake has been normal     Malnutrition Risk Assessment     Recent weight loss over the past 6 months:  0 = No    Eating poorly because of a decreased appetite:  0 = No    Malnutrition Screening Score:     MST = 0 or 1 Patient not at risk for malnutrition    Nutrition Diagnosis     Problem    Etiology    Signs / Symptoms      Nutrition Intervention   Discussed the importance of good nutrition during his treatment course focusing on adequate calorie, protein, nutrient and fluid intake.  Advised him to be consuming smaller more frequent meals/snacks throughout the day to aid with potential nausea management.  Emphasized the importance of protein and its role in the diet; reviewed high protein foods; and recommended he have a protein source at each meal/snack.        Goal   To achieve adequate nutritional and hydration intake.  To aid with nutrition impact symptom management as needed.     Monitoring / Evaluation   Answered his questions and he voiced understanding of information discussed.  RD's contact information provided and encouraged to call with questions.  Will follow up as indicated.    Scarlet Cnaela MS, RD, LD

## 2022-07-07 NOTE — ADDENDUM NOTE
Encounter addended by: Sowmya Santacruz RN on: 7/7/2022 3:45 PM   Actions taken: Vitals modified, Flowsheet accepted

## 2022-07-07 NOTE — TELEPHONE ENCOUNTER
Caller: Reese Cage    Relationship: Self    Best call back number: [phone]    Requested Prescriptions:   Requested Prescriptions     Pending Prescriptions Disp Refills   • omeprazole (priLOSEC) 40 MG capsule       Sig: Take 1 capsule by mouth Daily.   • gabapentin (NEURONTIN) 300 MG capsule       Sig: Take 1 capsule by mouth 3 (Three) Times a Day.   • Budeson-Glycopyrrol-Formoterol (Breztri Aerosphere) 160-9-4.8 MCG/ACT aerosol inhaler       Sig: Inhale 2 puffs 2 (Two) Times a Day.        Pharmacy where request should be sent: [id number] - [place] [address] [facility] AT [facility] [address] [facility] [address] [facility] [phone] [facility] [phone] FX     Additional details provided by patient: PATIENT WOULD LIKE TO START TAKING THESE MEDICATION AGAIN.       Does the patient have less than a 3 day supply:  [x] Yes  [] No    Frida Murray   07/07/22 13:41 EDT

## 2022-07-11 NOTE — TELEPHONE ENCOUNTER
Rx Refill Note  Requested Prescriptions     Pending Prescriptions Disp Refills   • gabapentin (NEURONTIN) 300 MG capsule [Pharmacy Med Name: GABAPENTIN 300MG CAPSULES] 90 capsule      Sig: TAKE ONE CAPSULE BY MOUTH THREE TIMES DAILY      Last refill:  7/7/22 #90/0  Last office visit with prescribing clinician: 5/25/22  Next office visit with prescribing clinician: 8/29/22           Alyssa Marie RN  07/11/22, 09:49 EDT

## 2022-07-12 PROBLEM — N17.9 AKI (ACUTE KIDNEY INJURY): Status: ACTIVE | Noted: 2022-01-01

## 2022-07-12 NOTE — TELEPHONE ENCOUNTER
Called and left another message for patient/fiance to call me back. No other numbers in chart other than one provided.

## 2022-07-12 NOTE — TELEPHONE ENCOUNTER
Patient's fiance called she states patient is very sick non-stop diarrhea, vomiting, stomach is killing him, he can't eat anything. She says it is so bad she can't get him to the ER because of him having to be in the bathroom. Please call.

## 2022-07-12 NOTE — TELEPHONE ENCOUNTER
----- Message from Edenilson Henry MD sent at 7/29/2019 12:29 PM EDT -----  Likely related to the illness.  If he is not improving tomorrow, I want to see him in the office.  ----- Message -----  From: Belinda Kim MA  Sent: 7/29/2019   8:37 AM  To: Edenilson Henry MD    I have called the pt he said that the urine symptoms are better but he still has an upset stomach, he cant eat and feels like poop. Please advise  ----- Message -----  From: Edenilson Henry MD  Sent: 7/29/2019   8:01 AM  To: Belinda Kim MA    When you have a chance, please call patient and see how he is doing. The urine cx from urgent care is not back yet.   ----- Message -----  From: Edenilson Henry MD  Sent: 7/26/2019   3:18 PM  To: Edenilson Henry MD    Urine culture, phone check         Vicky called me back and advised that patient has had diarrhea since Saturday non-stop.  She said it is pure water and happens every few minutes.  I asked if they tried imodium otc and she said he has not.  He has also had a lot of N/V.  Patient has thrown up 5 times today and zofran is not helping. The neighbor had some liquid phenergan that he took which seemed to help better.  He received his first cycle of chemo on 7/7/22 (carboplatin/paclitaxel/nivolumab).  He is having severe abdominal pain that doubles him over.  I advised Vicky that I am concerned about the pain as it could be a bowel obstruction or he could have so much inflammation from all the diarrhea causing this.  She said she can't get him up to get here. I encouraged her to call us inm the future before it gets this far so we can manage outpatient.   I advised I discussed with Dr. Tabor and he advised he needs to go to ER.  Vicky is going to call an ambulance to bring him to ER.

## 2022-07-13 PROBLEM — J18.9 SEPSIS DUE TO PNEUMONIA: Status: ACTIVE | Noted: 2022-01-01

## 2022-07-13 PROBLEM — A41.9 SEPSIS DUE TO PNEUMONIA: Status: ACTIVE | Noted: 2022-01-01

## 2022-07-13 PROBLEM — K56.7 ILEUS: Status: ACTIVE | Noted: 2022-01-01

## 2022-07-14 PROBLEM — J96.21 ACUTE ON CHRONIC RESPIRATORY FAILURE WITH HYPOXIA: Status: ACTIVE | Noted: 2022-01-01

## 2022-07-23 NOTE — ANESTHESIA POSTPROCEDURE EVALUATION
Patient: Reese Cage    Procedure Summary     Date: 07/23/22 Room / Location:  [facility]    Anesthesia Start: 1518 Anesthesia Stop:     Procedure: ESOPHAGOGASTRODUODENOSCOPY (N/A ) Diagnosis:     Surgeons: Reno Charlton MD Provider: Shahab Alford Jr., MD    Anesthesia Type: general ASA Status: 4          Anesthesia Type: general    Vitals  Vitals Value Taken Time   /72 07/23/22 1534   Temp 98 °F (36.7 °C) 07/23/22 1534   Pulse 80 07/23/22 1534   Resp 14 07/23/22 1534   SpO2 99 % 07/23/22 1534           Post Anesthesia Care and Evaluation    Patient location during evaluation: bedside  Patient participation: complete - patient participated  Level of consciousness: lethargic  Pain management: adequate    Airway patency: patent  Anesthetic complications: No anesthetic complications  PONV Status: none  Cardiovascular status: hemodynamically stable and acceptable  Respiratory status: nonlabored ventilation, acceptable and nasal cannula  Hydration status: acceptable

## 2022-07-23 NOTE — ANESTHESIA PREPROCEDURE EVALUATION
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    Plan discussed with CRNA.        CODE STATUS:    Code Status (Patient has no pulse and is not breathing): CPR (Attempt to Resuscitate)  Medical Interventions (Patient has pulse or is breathing): Full Support

## 2022-07-25 NOTE — TELEPHONE ENCOUNTER
Provider: DR. SAURABH DAVIS    Caller: DERRICK FONTENOT    Relationship to Patient: SPOUSE/RGE    Phone Number: [phone]    Reason for Call: DERRICK IS CALLING INFORM OF CHANGE IN HEALTH & TO CANCEL PATIENT'S APPT 8/9/22, AS PATIENT IS CURRENTLY IN [place] [id number].    When was the patient last seen: 6/21/22

## 2022-07-26 NOTE — TELEPHONE ENCOUNTER
Caller: DERRICK FONTENOT    Relationship to patient: S/O    Best call back number: [phone]    Chief complaint: CANC. APPTS.DUE TO BEING IN HOSP.    Type of visit: FOLLOW UP 2/INFUSION    Requested date: THE DRS. HAVE BEEN IN SO WILL GET A HOSP. FOLLOW UP APPT. WHEN DISCHARGED, I ASSUME.    If rescheduling, when is the original appointment: 7/28/2022

## 2022-08-01 NOTE — THERAPY TREATMENT NOTE
Patient Name: Reese Cage  : 1950    MRN: [id number]                              Today's Date: 2022       Admit Date: 2022    Visit Dx:     ICD-10-CM ICD-9-CM   1. DAX (acute kidney injury) (HCC)  N17.9 584.9   2. Nausea, vomiting, and diarrhea  R11.2 787.91    R19.7 787.01   3. Ileus (HCC)  K56.7 560.1   4. Left lower lobe pulmonary infiltrate  R91.8 793.19   5. Malignant neoplasm of upper lobe of left lung (HCC)  C34.12 162.3   6. Esophagitis  K20.90 530.10     Patient Active Problem List   Diagnosis   • Essential hypertension   • Mixed hyperlipidemia   • Gastroesophageal reflux disease without esophagitis   • Psoriasis   • Cigarette smoker   • Bladder cancer (HCC)   • Lower urinary tract symptoms (LUTS)   • COPD (chronic obstructive pulmonary disease) (HCC)   • Right kidney mass   • Coronary artery disease involving coronary bypass graft of native heart without angina pectoris   • Vitamin B12 deficiency   • Adenomatous polyp of ascending colon   • Acute on chronic respiratory failure with hypoxia (HCC)   • Mass of upper lobe of left lung   • Paratracheal lymphadenopathy   • Cancer of upper lobe of left lung (HCC)   • Encounter for therapeutic drug monitoring   • DAX (acute kidney injury) (HCC)   • Ileus (HCC)     Past Medical History:   Diagnosis Date   • Adenomatous polyp of ascending colon 2022    Multiple throughout colon. Dr. Obregon 2022. Rpt 1y   • Bladder cancer (HCC)    • Cancer of upper lobe of left lung (HCC) 2022   • COPD (chronic obstructive pulmonary disease) (HCC)    • Coronary artery disease involving coronary bypass graft of native heart without angina pectoris 2022   • Essential hypertension 2022   • Gastroesophageal reflux disease without esophagitis 2022   • Hard of hearing    • Mixed hyperlipidemia 2022   • PONV (postoperative nausea and vomiting)    • Psoriasis    • Renal mass    • Vitamin B12 deficiency 2022 Vit  B12 193.    • Wears dentures     full set   • Wears glasses      Past Surgical History:   Procedure Laterality Date   • BLADDER TUMOR EXCISION      Cancer   • BRONCHOSCOPY N/A 6/15/2022    Procedure: BRONCHOSCOPY WITH ENDOBRONCHIAL ULTRASOUND WITH FLUORO;  Surgeon: Ryan Del Valle MD;  Location:  [facility];  Service: Pulmonary;  Laterality: N/A;  EBUS scope removed with balloon intact   • CARDIAC CATHETERIZATION     • CARDIAC SURGERY     • COLONOSCOPY     • CORONARY ARTERY BYPASS GRAFT  2015    [facility]   • ENDOSCOPY N/A 7/23/2022    Procedure: ESOPHAGOGASTRODUODENOSCOPY WITH BIOPSY;  Surgeon: Reno Charlton MD;  Location:  [facility];  Service: Gastroenterology;  Laterality: N/A;      General Information     Row Name 08/01/22 1047          Physical Therapy Time and Intention    Document Type therapy note (daily note)  -AY     Mode of Treatment physical therapy  -AY     Row Name 08/01/22 1047          General Information    Patient Profile Reviewed yes  -AY     Existing Precautions/Restrictions fall;oxygen therapy device and L/min  -AY     Barriers to Rehab medically complex  -AY     Row Name 08/01/22 1047          Cognition    Orientation Status (Cognition) oriented x 3  -AY     Row Name 08/01/22 1047          Safety Issues, Functional Mobility    Safety Issues Affecting Function (Mobility) insight into deficits/self-awareness;sequencing abilities;awareness of need for assistance;safety precaution awareness  -AY     Impairments Affecting Function (Mobility) balance;coordination;endurance/activity tolerance;postural/trunk control;shortness of breath;strength  -AY           User Key  (r) = Recorded By, (t) = Taken By, (c) = Cosigned By    Initials Name Provider Type    AY Nena Olson PT Physical Therapist               Mobility     Row Name 08/01/22 1048          Sit-Stand Transfer    Sit-Stand [place] (Transfers) contact guard  -AY     Assistive Device (Sit-Stand Transfers)  walker, front-wheeled  -AY     Row Name 08/01/22 1048          Gait/Stairs (Locomotion)    [place] Level (Gait) contact guard;1 person assist;verbal cues  -AY     Assistive Device (Gait) walker, front-wheeled  -AY     Distance in Feet (Gait) 250  -AY     Deviations/Abnormal Patterns (Gait) base of support, narrow;arnav decreased;festinating/shuffling;stride length decreased;gait speed decreased  -AY     Bilateral Gait Deviations forward flexed posture;heel strike decreased  -AY     Comment, (Gait/Stairs) pt demonstrated step through gait pattern with decreased arnav and flexed posture. Cueing for posture, increased stride length, and heel strike. O2 remained >90% on 3L NC. Gait distance limited by fatigue.  -AY           User Key  (r) = Recorded By, (t) = Taken By, (c) = Cosigned By    Initials Name Provider Type    Nena Olivera PT Physical Therapist               Obj/Interventions     Row Name 08/01/22 1050          Balance    Balance Assessment sitting static balance;sitting dynamic balance;sit to stand dynamic balance;standing static balance;standing dynamic balance  -AY     Static Sitting Balance standby assist  -AY     Dynamic Sitting Balance standby assist  -AY     Position, Sitting Balance unsupported;sitting edge of mat  -AY     Sit to Stand Dynamic Balance contact guard  -AY     Static Standing Balance contact guard  -AY     Dynamic Standing Balance contact guard  -AY           User Key  (r) = Recorded By, (t) = Taken By, (c) = Cosigned By    Initials Name Provider Type    Nena Olivera PT Physical Therapist               Goals/Plan    No documentation.                Clinical Impression     Row Name 08/01/22 1055          Pain    Pretreatment Pain Rating 0/10 - no pain  -AY     Posttreatment Pain Rating 0/10 - no pain  -AY     Additional Documentation Pain Scale: Numbers Pre/Post-Treatment (Group)  -AY     Row Name 08/01/22 1055          Plan of Care Review    Plan of Care Reviewed With  patient  -AY     Progress improving  -AY     Outcome Evaluation Pt showing improvement as he increased ambulation distance to 250ft with RWx and CGA. O2 remained >90% on 3L NC. Cont to progress as able. d/c rec for home with assist, OP PT, and RWx.  -AY     Row Name 08/01/22 1055          Vital Signs    Pre Systolic BP Rehab --  VSS  -AY     Pre SpO2 (%) 94  -AY     O2 Delivery Pre Treatment nasal cannula  -AY     Intra SpO2 (%) 90  -AY     O2 Delivery Intra Treatment nasal cannula  -AY     Post SpO2 (%) 91  -AY     O2 Delivery Post Treatment nasal cannula  -AY     Pre Patient Position Sitting  -AY     Intra Patient Position Standing  -AY     Post Patient Position Sitting  -AY     Row Name 08/01/22 1055          Positioning and Restraints    Pre-Treatment Position sitting in chair/recliner  -AY     Post Treatment Position chair  -AY     In Chair notified nsg;reclined;call light within reach;sitting;encouraged to call for assist;exit alarm on;waffle cushion;legs elevated;with family/caregiver  -AY           User Key  (r) = Recorded By, (t) = Taken By, (c) = Cosigned By    Initials Name Provider Type    AY Nena Olson, PT Physical Therapist               Outcome Measures     Row Name 08/01/22 1056 08/01/22 0800       How much help from another person do you currently need...    Turning from your back to your side while in flat bed without using bedrails? 4  -AY 4  -ZS    Moving from lying on back to sitting on the side of a flat bed without bedrails? 3  -AY 3  -ZS    Moving to and from a bed to a chair (including a wheelchair)? 3  -AY 3  -ZS    Standing up from a chair using your arms (e.g., wheelchair, bedside chair)? 3  -AY 3  -ZS    Climbing 3-5 steps with a railing? 3  -AY 2  -ZS    To walk in hospital room? 3  -AY 3  -ZS    AM-PAC 6 Clicks Score (PT) 19  -AY 18  -ZS    Highest level of mobility 6 --> Walked 10 steps or more  -AY 6 --> Walked 10 steps or more  -ZS    Row Name 08/01/22 1056          Functional  Assessment    Outcome Measure Options AM-PAC 6 Clicks Basic Mobility (PT)  -AY           User Key  (r) = Recorded By, (t) = Taken By, (c) = Cosigned By    Initials Name Provider Type    Sudheer Herrera, RN Registered Nurse    Nena Olivera, PT Physical Therapist                             Physical Therapy Education                 Title: PT OT SLP Therapies (Done)     Topic: Physical Therapy (Done)     Point: Mobility training (Done)     Learning Progress Summary           Patient Acceptance, E,TB, VU,NR by AY at 8/1/2022 1057   Family Acceptance, E,TB, VU,NR by AY at 8/1/2022 1057      Show all documentation for this point (4)                 Point: Home exercise program (Done)     Learning Progress Summary           Patient Acceptance, E,TB, VU,NR by AY at 8/1/2022 1057   Family Acceptance, E,TB, VU,NR by AY at 8/1/2022 1057   Significant Other Acceptance, E, VU by ED at 7/18/2022 1554      Show all documentation for this point (3)                 Point: Body mechanics (Done)     Learning Progress Summary           Patient Acceptance, E,TB, VU,NR by AY at 8/1/2022 1057   Family Acceptance, E,TB, VU,NR by AY at 8/1/2022 1057   Significant Other Acceptance, E, VU by ED at 7/18/2022 1554      Show all documentation for this point (4)                 Point: Precautions (Done)     Learning Progress Summary           Patient Acceptance, E,TB, VU,NR by AY at 8/1/2022 1057   Family Acceptance, E,TB, VU,NR by AY at 8/1/2022 1057      Show all documentation for this point (4)                             User Key     Initials Effective Dates Name Provider Type Discipline    AY 11/10/20 -  Nena Olson, PT Physical Therapist PT    ED 05/09/22 -  Lucinda Douglas, KAYLYNN Registered Nurse Nurse              PT Recommendation and Plan     Plan of Care Reviewed With: patient  Progress: improving  Outcome Evaluation: Pt showing improvement as he increased ambulation distance to 250ft with RWx and CGA. O2 remained >90% on 3L NC. Cont  to progress as able. d/c rec for home with assist, OP PT, and RWx.     Time Calculation:    PT Charges     Row Name 08/01/22 1057             Time Calculation    Start Time 0950  -AY      PT Received On 08/01/22  -AY              Timed Charges    64670 - PT Therapeutic Activity Minutes 38  -AY              Total Minutes    Timed Charges Total Minutes 38  -AY       Total Minutes 38  -AY            User Key  (r) = Recorded By, (t) = Taken By, (c) = Cosigned By    Initials Name Provider Type    AY Nena Olson, ANU Physical Therapist              Therapy Charges for Today     Code Description Service Date Service Provider Modifiers Qty    95990029475  PT THERAPEUTIC ACT EA 15 MIN 8/1/2022 Nena Olson, PT GP 3          PT G-Codes  Outcome Measure Options: AM-PAC 6 Clicks Basic Mobility (PT)  AM-PAC 6 Clicks Score (PT): 19  AM-PAC 6 Clicks Score (OT): 15    Nena Olson PT  8/1/2022

## 2022-08-01 NOTE — PROGRESS NOTES
"   LOS: 20 days    Patient Care Team:  Kwame Hines MD as PCP - General (Family Medicine)  Ryan Del Valle MD as Consulting Physician (Pulmonary Disease)  Néstor Kelly MD as Consulting Physician (Urology)    Chief Complaint: Acute kidney injury, nausea vomiting diarrhea.    72-year-old with some non-small cell CA treated with chemotherapy including immunotherapy with carboplatinum, paclitaxel and Opdivo, started last week complain of nausea vomiting diarrhea poor oral intake, on admission creatinine 3.8 slowly got worse.  CT scan showed aspiration pneumonia with ileus this.  Despite IV fluid since admission renal function continued to deteriorate and was started on dialysis on 7/15.      Subjective   No acute events overnight.  Feeling better today      Objective     Vital Sign Min/Max for last 24 hours  Temp  Min: 97.5 °F (36.4 °C)  Max: 98.4 °F (36.9 °C)   BP  Min: 102/78  Max: 165/64   Pulse  Min: 75  Max: 101   Resp  Min: 16  Max: 20   SpO2  Min: 79 %  Max: 97 %   Flow (L/min)  Min: 2  Max: 2   No data recorded     Flowsheet Rows    Flowsheet Row First Filed Value   Admission Height 170.2 cm (67\") Documented at 07/12/2022 1123   Admission Weight 70.3 kg (155 lb) Documented at 07/12/2022 1123          I/O this shift:  In: 75 [P.O.:75]  Out: 425 [Urine:425]  I/O last 3 completed shifts:  In: 1160 [P.O.:1160]  Out: 1155 [Urine:1155]    Physical Exam:    Gen: Alert, NAD   HENT: NC, AT, EOMI   NECK: Supple, no JVD, Trachea midline   LUNGS: CTA bilaterally, non labored respirtation   CVS: S1/S2 audible, RRR, no murmur   Abd: Soft, NT, ND, BS+   Ext: No pedal edema, no cyanosis   CNS: Alert, No focal deficit noted grossly  Psy: Cooperative  Skin: Warm, dry and intact  Dialysis catheter noted.     WBC WBC   Date Value Ref Range Status   08/01/2022 11.20 (H) 3.40 - 10.80 10*3/mm3 Final   07/31/2022 10.41 3.40 - 10.80 10*3/mm3 Final   07/30/2022 9.72 3.40 - 10.80 10*3/mm3 Final      HGB Hemoglobin   Date " Value Ref Range Status   08/01/2022 8.6 (L) 13.0 - 17.7 g/dL Final   07/31/2022 8.7 (L) 13.0 - 17.7 g/dL Final   07/30/2022 8.2 (L) 13.0 - 17.7 g/dL Final      HCT Hematocrit   Date Value Ref Range Status   08/01/2022 26.5 (L) 37.5 - 51.0 % Final   07/31/2022 26.3 (L) 37.5 - 51.0 % Final   07/30/2022 25.3 (L) 37.5 - 51.0 % Final      Platlets No results found for: LABPLAT   MCV MCV   Date Value Ref Range Status   08/01/2022 87.5 79.0 - 97.0 fL Final   07/31/2022 86.8 79.0 - 97.0 fL Final   07/30/2022 88.2 79.0 - 97.0 fL Final          Sodium Sodium   Date Value Ref Range Status   08/01/2022 135 (L) 136 - 145 mmol/L Final   07/31/2022 137 136 - 145 mmol/L Final   07/30/2022 132 (L) 136 - 145 mmol/L Final      Potassium Potassium   Date Value Ref Range Status   08/01/2022 4.2 3.5 - 5.2 mmol/L Final     Comment:     Slight hemolysis detected by analyzer. Results may be affected.   07/31/2022 4.5 3.5 - 5.2 mmol/L Final     Comment:     Slight hemolysis detected by analyzer. Results may be affected.   07/30/2022 4.2 3.5 - 5.2 mmol/L Final      Chloride Chloride   Date Value Ref Range Status   08/01/2022 100 98 - 107 mmol/L Final   07/31/2022 103 98 - 107 mmol/L Final   07/30/2022 99 98 - 107 mmol/L Final      CO2 CO2   Date Value Ref Range Status   08/01/2022 25.0 22.0 - 29.0 mmol/L Final   07/31/2022 26.0 22.0 - 29.0 mmol/L Final   07/30/2022 24.0 22.0 - 29.0 mmol/L Final      BUN BUN   Date Value Ref Range Status   08/01/2022 43 (H) 8 - 23 mg/dL Final   07/31/2022 26 (H) 8 - 23 mg/dL Final   07/30/2022 28 (H) 8 - 23 mg/dL Final      Creatinine Creatinine   Date Value Ref Range Status   08/01/2022 3.33 (H) 0.76 - 1.27 mg/dL Final   07/31/2022 2.35 (H) 0.76 - 1.27 mg/dL Final   07/30/2022 2.50 (H) 0.76 - 1.27 mg/dL Final      Calcium Calcium   Date Value Ref Range Status   08/01/2022 8.4 (L) 8.6 - 10.5 mg/dL Final   07/31/2022 8.4 (L) 8.6 - 10.5 mg/dL Final   07/30/2022 8.1 (L) 8.6 - 10.5 mg/dL Final      PO4 No results  found for: CAPO4   Albumin No results found for: ALBUMIN   Magnesium No results found for: MG   Uric Acid No results found for: URICACID        Results Review:     I reviewed the patient's new clinical results.  I reviewed the patient's new imaging results and agree with the interpretation.    amLODIPine, 10 mg, Oral, Q24H  castor oil-balsam peru, 1 application, Topical, Q12H  cloNIDine, 1 patch, Transdermal, Weekly  gabapentin, 100 mg, Oral, Q8H  heparin (porcine), 5,000 Units, Subcutaneous, Q8H  hydrALAZINE, 75 mg, Oral, Q8H  ipratropium-albuterol, 3 mL, Nebulization, 4x Daily - RT  metoprolol tartrate, 100 mg, Oral, Q12H  nicotine, 1 patch, Transdermal, Q24H  nystatin, , Topical, Q12H  pantoprazole, 40 mg, Oral, BID  predniSONE, 20 mg, Oral, Daily  saccharomyces boulardii, 250 mg, Oral, Daily  sodium chloride, 10 mL, Intravenous, Q12H           Medication Review: Reviewed    Assessment & Plan       DAX (acute kidney injury) (HCC)    Essential hypertension    Mixed hyperlipidemia    Bladder cancer (HCC)    COPD (chronic obstructive pulmonary disease) (HCC)    Adenomatous polyp of ascending colon    Acute on chronic respiratory failure with hypoxia (HCC)    Mass of upper lobe of left lung    Cancer of upper lobe of left lung (HCC)    Ileus (HCC)     1.  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 Serologic workup-  C3 - 88,  C4 16, CK 43, UPCR 0.65, FeNA  0.69% suggestive of pre- renal etiology. HD started 7/15/22.   2.  Metabolic acidosis resolved with medications and dialysis  3.  Non-small cell CA treated with chemotherapy as noted above.  4.  Anemia: S/p chemo  5.  Pancytopenia secondary to chemotherapy.  6.  Ileus.  Now on TPN for nutrition  7.  Nonnephrotic range proteinuria.  8.  Shock: Resolved.  9.  Hypertension: Stable.   10 Hyponatremia: resolved.     Plan:  - Will hold Tunneled dialysis access for now, UOP is improving- Daily evaluation for HD.   ml/24 hrs. Watch for renal recovery.   - Avoid nephrotoxic medications.  - Adjust medication for the new GFR.    Discussed with patient.     High risk patient with multiple medical problems.     Shabana Brown MD  08/01/22  08:45 EDT

## 2022-08-01 NOTE — THERAPY TREATMENT NOTE
Patient Name: Reese Cage  : 1950    MRN: [id number]                              Today's Date: 2022       Admit Date: 2022    Visit Dx:     ICD-10-CM ICD-9-CM   1. DAX (acute kidney injury) (HCC)  N17.9 584.9   2. Nausea, vomiting, and diarrhea  R11.2 787.91    R19.7 787.01   3. Ileus (HCC)  K56.7 560.1   4. Left lower lobe pulmonary infiltrate  R91.8 793.19   5. Malignant neoplasm of upper lobe of left lung (HCC)  C34.12 162.3   6. Esophagitis  K20.90 530.10     Patient Active Problem List   Diagnosis   • Essential hypertension   • Mixed hyperlipidemia   • Gastroesophageal reflux disease without esophagitis   • Psoriasis   • Cigarette smoker   • Bladder cancer (HCC)   • Lower urinary tract symptoms (LUTS)   • COPD (chronic obstructive pulmonary disease) (HCC)   • Right kidney mass   • Coronary artery disease involving coronary bypass graft of native heart without angina pectoris   • Vitamin B12 deficiency   • Adenomatous polyp of ascending colon   • Acute on chronic respiratory failure with hypoxia (HCC)   • Mass of upper lobe of left lung   • Paratracheal lymphadenopathy   • Cancer of upper lobe of left lung (HCC)   • Encounter for therapeutic drug monitoring   • DAX (acute kidney injury) (HCC)   • Ileus (HCC)     Past Medical History:   Diagnosis Date   • Adenomatous polyp of ascending colon 2022    Multiple throughout colon. Dr. Obregon 2022. Rpt 1y   • Bladder cancer (HCC)    • Cancer of upper lobe of left lung (HCC) 2022   • COPD (chronic obstructive pulmonary disease) (HCC)    • Coronary artery disease involving coronary bypass graft of native heart without angina pectoris 2022   • Essential hypertension 2022   • Gastroesophageal reflux disease without esophagitis 2022   • Hard of hearing    • Mixed hyperlipidemia 2022   • PONV (postoperative nausea and vomiting)    • Psoriasis    • Renal mass    • Vitamin B12 deficiency 2022 Vit  B12 193.    • Wears dentures     full set   • Wears glasses      Past Surgical History:   Procedure Laterality Date   • BLADDER TUMOR EXCISION      Cancer   • BRONCHOSCOPY N/A 6/15/2022    Procedure: BRONCHOSCOPY WITH ENDOBRONCHIAL ULTRASOUND WITH FLUORO;  Surgeon: Ryan Del Valle MD;  Location:  [facility];  Service: Pulmonary;  Laterality: N/A;  EBUS scope removed with balloon intact   • CARDIAC CATHETERIZATION     • CARDIAC SURGERY     • COLONOSCOPY     • CORONARY ARTERY BYPASS GRAFT  2015    [facility] [place], [place]   • ENDOSCOPY N/A 7/23/2022    Procedure: ESOPHAGOGASTRODUODENOSCOPY WITH BIOPSY;  Surgeon: Reno Charlton MD;  Location:  [facility];  Service: Gastroenterology;  Laterality: N/A;      General Information     Row Name 08/01/22 1056          OT Time and Intention    Document Type therapy note (daily note)  -AN     Mode of Treatment occupational therapy  -AN     Row Name 08/01/22 1056          General Information    Patient Profile Reviewed yes  -AN     Existing Precautions/Restrictions fall;oxygen therapy device and L/min  -AN     Barriers to Rehab medically complex  -AN     Row Name 08/01/22 1056          Cognition    Orientation Status (Cognition) oriented x 3  -AN     Row Name 08/01/22 1056          Safety Issues, Functional Mobility    Safety Issues Affecting Function (Mobility) safety precaution awareness;insight into deficits/self-awareness;positioning of assistive device;awareness of need for assistance;safety precautions follow-through/compliance  -AN     Impairments Affecting Function (Mobility) balance;coordination;endurance/activity tolerance;postural/trunk control;shortness of breath;strength  -AN           User Key  (r) = Recorded By, (t) = Taken By, (c) = Cosigned By    Initials Name Provider Type    AN Emily More OT Occupational Therapist                 Mobility/ADL's     Row Name 08/01/22 1056          Bed Mobility    Bed Mobility supine-sit  -AN     Supine-Sit  [place] (Bed Mobility) contact guard  -AN     Bed Mobility, Safety Issues decreased use of arms for pushing/pulling;decreased use of legs for bridging/pushing;impaired trunk control for bed mobility  -AN     Assistive Device (Bed Mobility) bed rails;head of bed elevated  -AN     Row Name 08/01/22 1056          Transfers    Transfers sit-stand transfer;stand-sit transfer;bed-chair transfer  -AN     Comment, (Transfers) cues for hand placement and transfer technique using RW  -AN     Bed-Chair [place] (Transfers) verbal cues;contact guard  -AN     Assistive Device (Bed-Chair Transfers) walker, front-wheeled  -AN     Sit-Stand [place] (Transfers) contact guard;verbal cues  -AN     Stand-Sit [place] (Transfers) contact guard;verbal cues  -AN     Row Name 08/01/22 1056          Sit-Stand Transfer    Assistive Device (Sit-Stand Transfers) walker, front-wheeled  -AN     Row Name 08/01/22 1056          Stand-Sit Transfer    Assistive Device (Stand-Sit Transfers) walker, front-wheeled  -AN     Row Name 08/01/22 1056          Functional Mobility    Functional Mobility- Ind. Level contact guard assist  -AN     Functional Mobility- Device walker, front-wheeled  -AN     Functional Mobility-Distance (Feet) --  <household distance  -AN     Functional Mobility- Safety Issues sequencing ability decreased;step length decreased;supplemental O2  -AN     Row Name 08/01/22 1056          Activities of Daily Living    BADL Assessment/Intervention lower body dressing  -AN     Row Name 08/01/22 1056          Lower Body Dressing Assessment/Training    [place] Level (Lower Body Dressing) don;doff;shoes/slippers;maximum assist (25% patient effort)  -AN     Position (Lower Body Dressing) supine  -AN           User Key  (r) = Recorded By, (t) = Taken By, (c) = Cosigned By    Initials Name Provider Type    Emily Alvarez OT Occupational Therapist               Obj/Interventions     Row Name 08/01/22 1050           Motor Skills    Motor Skills functional endurance  -AN     Functional Endurance desat to 84% on 4L once sitting EOB requiring cues for pursed lip breathing  -AN     Row Name 08/01/22 1058          Balance    Balance Assessment sitting static balance;sitting dynamic balance;sit to stand dynamic balance;standing static balance;standing dynamic balance  -AN     Static Sitting Balance standby assist  -AN     Dynamic Sitting Balance standby assist  -AN     Position, Sitting Balance sitting in chair;sitting edge of bed  -AN     Sit to Stand Dynamic Balance verbal cues;minimal assist;1-person assist  -AN     Static Standing Balance contact guard  -AN     Dynamic Standing Balance contact guard  -AN     Position/Device Used, Standing Balance walker, rolling  -AN     Balance Interventions standing;sit to stand;supported;static;dynamic;minimal challenge;occupation based/functional task  -AN           User Key  (r) = Recorded By, (t) = Taken By, (c) = Cosigned By    Initials Name Provider Type    AN Emily More OT Occupational Therapist               Goals/Plan    No documentation.                Clinical Impression     Row Name 08/01/22 1059          Pain Assessment    Pretreatment Pain Rating 0/10 - no pain  -AN     Posttreatment Pain Rating 0/10 - no pain  -AN     Pre/Posttreatment Pain Comment tolerated  -AN     Pain Intervention(s) Repositioned;Ambulation/increased activity  -AN     Row Name 08/01/22 1059          Plan of Care Review    Plan of Care Reviewed With patient;spouse  -AN     Progress improving  -AN     Outcome Evaluation Pt continues to progress with all mobility and ADLs. Pt performed bed mobility with CGA, STS using RW with CGA, and ambulated short distance using the RW with CGA. No LOB throughout. OT changes rec to home with assist at [place].  -AN     Row Name 08/01/22 1059          Therapy Assessment/Plan (OT)    Therapy Frequency (OT) daily  -AN     Row Name 08/01/22 1059          Therapy Plan  Review/Discharge Plan (OT)    Anticipated Discharge Disposition (OT) home with assist  -AN     Row Name 08/01/22 1059          Vital Signs    Pre Systolic BP Rehab --  VSS  -AN     Pre SpO2 (%) 96  -AN     O2 Delivery Pre Treatment nasal cannula  -AN     Intra SpO2 (%) 84  -AN     O2 Delivery Intra Treatment nasal cannula  -AN     Post SpO2 (%) 94  -AN     O2 Delivery Post Treatment nasal cannula  -AN     Pre Patient Position Supine  -AN     Intra Patient Position Standing  -AN     Post Patient Position Sitting  -AN     Row Name 08/01/22 1059          Positioning and Restraints    Pre-Treatment Position in bed  -AN     Post Treatment Position chair  -AN     In Chair notified nsg;reclined;call light within reach;encouraged to call for assist;exit alarm on;with family/caregiver;heels elevated;legs elevated  -AN           User Key  (r) = Recorded By, (t) = Taken By, (c) = Cosigned By    Initials Name Provider Type    Emily Alvarez, MANUEL Occupational Therapist               Outcome Measures     Row Name 08/01/22 1103          How much help from another is currently needed...    Putting on and taking off regular lower body clothing? 3  -AN     Bathing (including washing, rinsing, and drying) 3  -AN     Toileting (which includes using toilet bed pan or urinal) 3  -AN     Putting on and taking off regular upper body clothing 3  -AN     Taking care of personal grooming (such as brushing teeth) 3  -AN     Eating meals 3  -AN     AM-PAC 6 Clicks Score (OT) 18  -AN     Row Name 08/01/22 1056 08/01/22 0800       How much help from another person do you currently need...    Turning from your back to your side while in flat bed without using bedrails? 4  -AY 4  -ZS    Moving from lying on back to sitting on the side of a flat bed without bedrails? 3  -AY 3  -ZS    Moving to and from a bed to a chair (including a wheelchair)? 3  -AY 3  -ZS    Standing up from a chair using your arms (e.g., wheelchair, bedside chair)? 3  -AY 3   -ZS    Climbing 3-5 steps with a railing? 3  -AY 2  -ZS    To walk in hospital room? 3  -AY 3  -ZS    AM-PAC 6 Clicks Score (PT) 19  -AY 18  -ZS    Highest level of mobility 6 --> Walked 10 steps or more  -AY 6 --> Walked 10 steps or more  -ZS    Row Name 08/01/22 1103 08/01/22 1056       Functional Assessment    Outcome Measure Options AM-PAC 6 Clicks Daily Activity (OT)  -AN AM-PAC 6 Clicks Basic Mobility (PT)  -AY          User Key  (r) = Recorded By, (t) = Taken By, (c) = Cosigned By    Initials Name Provider Type    Sudheer Herrera, RN Registered Nurse    Nena Olivera, PT Physical Therapist    Emily Alvarez, OT Occupational Therapist                Occupational Therapy Education                 Title: PT OT SLP Therapies (Done)     Topic: Occupational Therapy (Done)     Point: ADL training (Done)     Description:   Instruct learner(s) on proper safety adaptation and remediation techniques during self care or transfers.   Instruct in proper use of assistive devices.              Learning Progress Summary           Patient Acceptance, E, VU,NR by AN at 8/1/2022 1128   Family Acceptance, E, VU,NR by AN at 8/1/2022 1128      Show all documentation for this point (5)                 Point: Home exercise program (Done)     Description:   Instruct learner(s) on appropriate technique for monitoring, assisting and/or progressing therapeutic exercises/activities.              Learning Progress Summary           Patient Acceptance, E, VU by AN at 7/29/2022 0937   Family Acceptance, E, VU,NR by AN at 7/27/2022 1108      Show all documentation for this point (2)                 Point: Precautions (Done)     Description:   Instruct learner(s) on prescribed precautions during self-care and functional transfers.              Learning Progress Summary           Patient Acceptance, E, VU,NR by AN at 8/1/2022 1128   Family Acceptance, E, VU,NR by AN at 8/1/2022 1128      Show all documentation for this point (5)                  Point: Body mechanics (Done)     Description:   Instruct learner(s) on proper positioning and spine alignment during self-care, functional mobility activities and/or exercises.              Learning Progress Summary           Patient Acceptance, E, VU,NR by YULI at 8/1/2022 1128   Family Acceptance, E, VU,NR by YULI at 8/1/2022 1128      Show all documentation for this point (5)                             User Key     Initials Effective Dates Name Provider Type Discipline     09/21/21 -  Emily More OT Occupational Therapist OT              OT Recommendation and Plan  Planned Therapy Interventions (OT): activity tolerance training, adaptive equipment training, BADL retraining, functional balance retraining, occupation/activity based interventions, patient/caregiver education/training, transfer/mobility retraining, strengthening exercise  Therapy Frequency (OT): daily  Plan of Care Review  Plan of Care Reviewed With: patient, spouse  Progress: improving  Outcome Evaluation: Pt continues to progress with all mobility and ADLs. Pt performed bed mobility with CGA, STS using RW with CGA, and ambulated short distance using the RW with CGA. No LOB throughout. OT changes rec to home with assist at [place].     Time Calculation:    Time Calculation- OT     Row Name 08/01/22 1129             Time Calculation- OT    OT Start Time 0905  -AN      OT Received On 08/01/22  -AN      OT Goal Re-Cert Due Date 08/08/22  -AN              Timed Charges    43075 - OT Therapeutic Activity Minutes 25  -AN              Total Minutes    Timed Charges Total Minutes 25  -AN       Total Minutes 25  -AN            User Key  (r) = Recorded By, (t) = Taken By, (c) = Cosigned By    Initials Name Provider Type    Emily Alvarez OT Occupational Therapist              Therapy Charges for Today     Code Description Service Date Service Provider Modifiers Qty    [id number]  OT THERAPEUTIC ACT EA 15 MIN 8/1/2022 Emily More  OT GO 2               Emily More OT  8/1/2022

## 2022-08-01 NOTE — PLAN OF CARE
Goal Outcome Evaluation:  Plan of Care Reviewed With: patient        Progress: improving  Outcome Evaluation: Pt showing improvement as he increased ambulation distance to 250ft with RWx and CGA. O2 remained >90% on 3L NC. Cont to progress as able. d/c rec for home with assist, OP PT, and RWx.

## 2022-08-01 NOTE — CASE MANAGEMENT/SOCIAL WORK
Continued Stay Note  [facility]     Patient Name: Reese Cage  MRN: [id number]  Today's Date: 8/1/2022    Admit Date: 7/12/2022     Discharge Plan     Row Name 08/01/22 1229       Plan    Plan Home    Patient/Family in Agreement with Plan   yes    Plan Comments   I met with Mr. Cage and his fiance' Vicky today at the bedside to discuss discharge planning. Physical and occupational therapy recommend home at discharge.Nephrology is postponing placing a tunnelled catheter for hemodialysis as Mr. Cage's renal function has been improving.     Both he and Vicky voiced financial concerns regarding this hospital stay. I provided them with the phone number to our financial counseling department. Case management will continue to follow Mr. Cage' progress and provide for him any anticipated discharge needs.     Case management to provide him with a rolling walker and hospital bed via [facility] [place] at discharge.        Final Discharge Disposition Code 01 - home or self-care           Expected Discharge Date and Time     Expected Discharge Date Expected Discharge Time    Aug 5, 2022       Caryl Abernathy RN

## 2022-08-01 NOTE — PROGRESS NOTES
INTENSIVIST   PROGRESS NOTE     Hospital:  LOS: 20 days     Mr. Reese Cage, 72 y.o. male is followed for a Chief Complaint of: Acute kidney injury      Subjective   S     Interval History:  No events overnight.        The patient's relevant past medical, surgical and social history were reviewed and updated in Epic as appropriate.      ROS:   Constitutional: Negative for fever.   Respiratory: Negative for dyspnea.   Cardiovascular: Negative for chest pain.   Gastrointestinal: Negative for  nausea, vomiting and diarrhea.     Objective   O     Vitals:  Temp  Min: 97.5 °F (36.4 °C)  Max: 98.4 °F (36.9 °C)  BP  Min: 102/78  Max: 165/64  Pulse  Min: 70  Max: 101  Resp  Min: 16  Max: 20  SpO2  Min: 87 %  Max: 97 % Flow (L/min)  Min: 2  Max: 2    Intake/Ouptut 24 hrs (7:00AM - 6:59 AM)  Intake & Output (last 3 days)       07/29 0701  07/30 0700 07/30 0701 07/31 0700 07/31 0701  08/01 0700 08/01 0701  08/02 0700    P.O.  75    I.V. (mL/kg) 25 (0.4) 18 (0.3)      IV Piggyback 50 50      Total Intake(mL/kg) 915 (13.1) 788 (11.3) 1160 (16.6) 75 (1.1)    Urine (mL/kg/hr) 380 (0.2) 865 (0.5) 790 (0.5) 425 (1.4)    Other 5500 2520      Total Output 5880 3385 790 425    Net -4965 -2597 +370 -350                  Physical Examination  Telemetry:  Normal sinus rhythm.    Constitutional:  No acute distress.   Eyes: No scleral icterus.   PERRL, EOM intact.    Neck:  Supple, FROM   Cardiovascular: Normal rate, regular and rhythm. Normal heart sounds.  No murmurs, gallop or rub.   Respiratory: No respiratory distress. Normal respiratory effort.  Bilateral rales with expiratory wheezing.    Abdominal:  Soft. No masses. Nontender. No distension. No HSM.   Extremities: No digital cyanosis. No clubbing.  No peripheral edema.   Skin: No rashes, lesions or ulcers   Neurological:   Alert and Oriented to person, place, and time.                 Results from last 7 days   Lab Units 08/01/22  0412 07/31/22  9238  07/30/22  0410   WBC 10*3/mm3 11.20* 10.41 9.72   HEMOGLOBIN g/dL 8.6* 8.7* 8.2*   MCV fL 87.5 86.8 88.2   PLATELETS 10*3/mm3 140 147 142     Results from last 7 days   Lab Units 08/01/22  0412 07/31/22  0350 07/30/22  0410   SODIUM mmol/L 135* 137 132*   POTASSIUM mmol/L 4.2 4.5 4.2   CO2 mmol/L 25.0 26.0 24.0   CREATININE mg/dL 3.33* 2.35* 2.50*   GLUCOSE mg/dL 130* 116* 133*     Estimated Creatinine Clearance: 20.1 mL/min (A) (by C-G formula based on SCr of 3.33 mg/dL (H)).              Images:  Imaging Results (Last 24 Hours)     ** No results found for the last 24 hours. **            Results: Reviewed.  I reviewed the patient's new laboratory and imaging results.  I independently reviewed the patient's new images.    Medications: Reviewed.    Assessment & Plan   A / P     Mr. 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 Tabor on 7/8/22. On 7/9, he developed abdominal pain with nausea, vomiting and diarrhea. On 7/12/22, he developed shortness of breath and fevers and presented to the [facility] ED and was admitted to Hospital Medicine. At admission, he was noted to have DAX and a LLL pneumonia likely secondary to aspiration.     His nausea and vomiting improved and his NGT was removed on 7/14/22. His renal function continued to decline and Nephrology was consulted. He developed refractory hypotension on 7/14 and was transferred to the ICU. There was concern for immune mediated pneumonitis and nephritis and IV Solumedrol was started on 7/15.     He did require TPN for a period of time secondary to ileus. EGD on 7/23/22 showed esophagitis and NG trauma.     Hemodialysis was initiated on 7/15/22.     He has not had any signs of recurrent bleeding. Steroids are being tapered without significant change in respiratory status.     Nutrition:   No diet orders on file  Advance  Directives:   Code Status and Medical Interventions:   Ordered at: 07/12/22 4063     Code Status (Patient has no pulse and is not breathing):    CPR (Attempt to Resuscitate)     Medical Interventions (Patient has pulse or is breathing):    Full Support       Active Hospital Problems    Diagnosis    • **DAX (acute kidney injury) (HCC)    • Ileus (HCC)    • Cancer of upper lobe of left lung (HCC)    • Mass of upper lobe of left lung    • Acute on chronic respiratory failure with hypoxia (HCC)    • Adenomatous polyp of ascending colon      Multiple throughout colon. Dr. Obregon 5/2022. Rpt 1y     • Bladder cancer (HCC)    • COPD (chronic obstructive pulmonary disease) (HCC)    • Essential hypertension    • Mixed hyperlipidemia        Assessment / Plan:    1. Continue to wean steroids.   2. Renal following for the need for repeat hemodialysis and possible tunneled dialysis catheter placement.   3. D/c serial H/H. Continue to monitor daily CBC.   4. PT following. Will need rehab when medically ready.   5. Continue current blood pressure regimen  6. Wean supplemental O2 as tolerated.   7. AM labs  8. Okay to transfer to telemetry.     High level of risk due to:  severe exacerbation of chronic illness and illness with threat to life or bodily function.    Plan of care and goals reviewed during interdisciplinary rounds.  I discussed the patient's findings and my recommendations with patient and nursing staff      Sushma Root, DO    Intensive Care Medicine and Pulmonary Medicine

## 2022-08-01 NOTE — PLAN OF CARE
Goal Outcome Evaluation:  Plan of Care Reviewed With: patient, spouse        Progress: improving  Outcome Evaluation: Pt continues to progress with all mobility and ADLs. Pt performed bed mobility with CGA, STS using RW with CGA, and ambulated short distance using the RW with CGA. No LOB throughout. OT changes rec to home with assist at dc.

## 2022-08-01 NOTE — PROGRESS NOTES
NN spoke with pt at .  Pt alert and able to answer questions appropriately. Pt O2 sat  94% on  2 L currently, home O2 use 2L. Patient in great spirits today awaiting transition to floor room.   Deep breathing exercises encouraged. No new concerns or questions voiced at this time.  NN will continue to follow as needed.       Per current GOLD Standards, please consider:  LAMA/LABA/ICS in place (Breztri), Outpatient PFT, Rehab as appropriate, Palliative Care consult, NRT at SD, Annual LDCT per current screening guidelines (age 50-80 years old, smoking history of 20 pack years or more or has quit within past 15 years)

## 2022-08-02 NOTE — PLAN OF CARE
Goal Outcome Evaluation:   VSS. Afib. 3L nasal canula. Patient a/ox4. No complaints of pain. Skin care provided. NPO since midnight for possible dialysis cath today. Good urine output overnight. No apparent further needs at this time.

## 2022-08-02 NOTE — PROGRESS NOTES
DATE OF VISIT: 8/2/2022    Chief Complaint: Followup for non-small cell lung cancer     SUBJECTIVE: The patient is laying comfortably in bed.  He is complaining of fatigue.  His breathing is improving.    REVIEW OF SYSTEMS: All the other 9 systems are reviewed by me and negative  except what is mentioned in HPI.     Past History:  Medical History: has a past medical history of Adenomatous polyp of ascending colon (05/25/2022), Bladder cancer (HCC), Cancer of upper lobe of left lung (HCC) (6/27/2022), COPD (chronic obstructive pulmonary disease) (HCC), Coronary artery disease involving coronary bypass graft of native heart without angina pectoris (04/26/2022), Essential hypertension (04/26/2022), Gastroesophageal reflux disease without esophagitis (04/26/2022), Hard of hearing, Mixed hyperlipidemia (04/26/2022), PONV (postoperative nausea and vomiting), Psoriasis, Renal mass, Vitamin B12 deficiency (04/27/2022), Wears dentures, and Wears glasses.   Surgical History: has a past surgical history that includes Coronary artery bypass graft (2015); Bladder tumor excision; Colonoscopy; Cardiac catheterization; Cardiac surgery; Bronchoscopy (N/A, 6/15/2022); and Esophagogastroduodenoscopy (N/A, 7/23/2022).   Family History: family history includes Heart attack in his brother; Hypertension in his brother and mother.   Social History: reports that he has been smoking cigarettes. He started smoking about 60 years ago. He has a 29.50 pack-year smoking history. He has never used smokeless tobacco. He reports current alcohol use. He reports that he does not use drugs.    Medications Prior to Admission   Medication Sig Dispense Refill Last Dose   • albuterol (PROVENTIL) (2.5 MG/3ML) 0.083% nebulizer solution Take 2.5 mg by nebulization Every 4 (Four) Hours As Needed for Wheezing. 300 mL 1 7/11/2022 at Unknown time   • albuterol sulfate  (90 Base) MCG/ACT inhaler Inhale 2 puffs Every 4 (Four) Hours As Needed for Wheezing. 54  g 1 7/11/2022 at Unknown time   • aspirin 81 MG EC tablet Take 1 tablet by mouth Daily. (Patient taking differently: Take 81 mg by mouth Every Night.) 90 tablet 1 7/11/2022 at Unknown time   • Budeson-Glycopyrrol-Formoterol (Breztri Aerosphere) 160-9-4.8 MCG/ACT aerosol inhaler Inhale 2 puffs 2 (Two) Times a Day. 1 each 1 7/11/2022 at Unknown time   • cloNIDine (CATAPRES) 0.2 MG tablet Take 0.2 mg by mouth 2 (Two) Times a Day.   7/11/2022 at Unknown time   • furosemide (LASIX) 20 MG tablet Take 20 mg by mouth Daily.   7/11/2022 at Unknown time   • gabapentin (NEURONTIN) 300 MG capsule Take 1 capsule by mouth 3 (Three) Times a Day. 90 capsule 0 7/12/2022 at Unknown time   • losartan (COZAAR) 100 MG tablet Take 1 tablet by mouth Daily. 90 tablet 1 7/11/2022 at Unknown time   • metoprolol tartrate (LOPRESSOR) 100 MG tablet Take 1 tablet by mouth Every Morning AND 0.5 tablets Every Evening. 135 tablet 1 7/11/2022 at Unknown time   • NIFEdipine XL (PROCARDIA XL) 90 MG 24 hr tablet Take 1 tablet by mouth Daily. 90 tablet 1 7/11/2022 at Unknown time   • OLANZapine (zyPREXA) 5 MG tablet TAKE 1 TABLET BY MOUTH EVERY NIGHT TAKE ON DAYS 2, 3, AND 4 AFTER CHEMOTHERAPY 3 tablet 2 7/11/2022 at Unknown time   • omeprazole (priLOSEC) 40 MG capsule Take 1 capsule by mouth Daily. 30 capsule 5 7/12/2022 at Unknown time   • ondansetron (ZOFRAN) 8 MG tablet Take 1 tablet by mouth 3 (Three) Times a Day As Needed for Nausea or Vomiting. 30 tablet 2 7/11/2022 at Unknown time   • vitamin B-12 (CYANOCOBALAMIN) 1000 MCG tablet Take 1 tablet by mouth Daily. 90 tablet 3 7/11/2022 at Unknown time   • Zinc 50 MG tablet Take 1 tablet by mouth Daily.   7/11/2022 at Unknown time   • atorvastatin (LIPITOR) 80 MG tablet Take 1 tablet by mouth Daily. 90 tablet 1 7/9/2022   • clopidogrel (PLAVIX) 75 MG tablet Take 1 tablet by mouth Daily. (Patient taking differently: Take 75 mg by mouth Daily. No cardiac stents) 90 tablet 1 7/9/2022   • montelukast  (SINGULAIR) 10 MG tablet Take 1 tablet by mouth Every Night. 90 tablet 1 7/9/2022   • nitroglycerin (NITROSTAT) 0.4 MG SL tablet Place 1 tablet under the tongue Every 5 (Five) Minutes As Needed for Chest Pain. Take no more than 3 doses in 15 minutes. 30 tablet 1 Unknown at Unknown time   • O2 (OXYGEN) Inhale 2 L/min As Needed.         Allergies: Patient has no known allergies.     Current Facility-Administered Medications:   •  acetaminophen (TYLENOL) tablet 650 mg, 650 mg, Oral, Q4H PRN, 650 mg at 08/01/22 0823 **OR** [DISCONTINUED] acetaminophen (TYLENOL) 160 MG/5ML solution 650 mg, 650 mg, Nasogastric, Q4H PRN, 649.6 mg at 07/14/22 1913 **OR** [DISCONTINUED] acetaminophen (TYLENOL) suppository 650 mg, 650 mg, Rectal, Q4H PRN, Felix Lopez MD  •  albumin human 25 % IV SOLN 12.5 g, 12.5 g, Intravenous, BID, Kevin, Shabana Garcia MD  •  albuterol (PROVENTIL) nebulizer solution 0.083% 2.5 mg/3mL, 2.5 mg, Nebulization, Q4H PRN, Alyssa Padron MD  •  ALPRAZolam (XANAX) tablet 0.5 mg, 0.5 mg, Oral, TID PRN, Alyssa Padron MD, 0.5 mg at 08/01/22 2209  •  amLODIPine (NORVASC) tablet 10 mg, 10 mg, Oral, Q24H, Alyssa Padron MD, 10 mg at 08/02/22 0907  •  sennosides-docusate (PERICOLACE) 8.6-50 MG per tablet 2 tablet, 2 tablet, Oral, BID, 2 tablet at 08/02/22 1156 **AND** polyethylene glycol (MIRALAX) packet 17 g, 17 g, Oral, Daily PRN **AND** bisacodyl (DULCOLAX) EC tablet 5 mg, 5 mg, Oral, Daily PRN **AND** bisacodyl (DULCOLAX) suppository 10 mg, 10 mg, Rectal, Daily PRN, Seferino Escobar MD  •  castor oil-balsam peru (VENELEX) ointment 1 application, 1 application, Topical, Q12H, Alyssa Padron MD, 1 application at 08/02/22 0912  •  cloNIDine (CATAPRES) tablet 0.1 mg, 0.1 mg, Oral, Q8H PRN, Alyssa Padron MD, 0.1 mg at 07/26/22 0210  •  cloNIDine (CATAPRES-TTS) 0.1 MG/24HR patch 1 patch, 1 patch, Transdermal, Weekly, Alyssa Padron MD, 1 patch at 07/31/22 0801  •   "gabapentin (NEURONTIN) capsule 100 mg, 100 mg, Oral, Q8H, Alyssa Padron MD, 100 mg at 08/02/22 0538  •  heparin (porcine) 5000 UNIT/ML injection 5,000 Units, 5,000 Units, Subcutaneous, Q8H, Alyssa Padron MD, 5,000 Units at 08/02/22 0538  •  hydrALAZINE (APRESOLINE) tablet 75 mg, 75 mg, Oral, Q8H, Alyssa Padron MD, 75 mg at 08/02/22 0538  •  ipratropium-albuterol (DUO-NEB) nebulizer solution 3 mL, 3 mL, Nebulization, 4x Daily - RT, Alyssa Padron MD, 3 mL at 08/02/22 1143  •  labetalol (NORMODYNE,TRANDATE) injection 20 mg, 20 mg, Intravenous, Q4H PRN, Alyssa Padron MD, 20 mg at 07/26/22 0437  •  metoprolol tartrate (LOPRESSOR) tablet 100 mg, 100 mg, Oral, Q12H, Alyssa Padron MD, 100 mg at 08/02/22 0907  •  nicotine (NICODERM CQ) 14 MG/24HR patch 1 patch, 1 patch, Transdermal, Q24H, Alyssa Padron MD, 1 patch at 08/01/22 2007  •  nystatin (MYCOSTATIN) powder, , Topical, Q12H, Alyssa Padron MD, Given at 08/02/22 0915  •  [DISCONTINUED] ondansetron (ZOFRAN) tablet 4 mg, 4 mg, Nasogastric, Q6H PRN **OR** ondansetron (ZOFRAN) injection 4 mg, 4 mg, Intravenous, Q6H PRN, Alyssa Padron MD, 4 mg at 07/20/22 0231  •  pantoprazole (PROTONIX) EC tablet 40 mg, 40 mg, Oral, BID, Alyssa Padron MD, 40 mg at 08/02/22 0907  •  predniSONE (DELTASONE) tablet 10 mg, 10 mg, Oral, Daily With Breakfast, Case, Sushma V., DO, 10 mg at 08/02/22 0907  •  saccharomyces boulardii (FLORASTOR) capsule 250 mg, 250 mg, Oral, Daily, Alyssa Padron MD, 250 mg at 08/02/22 0907  •  sodium chloride 0.9 % flush 10 mL, 10 mL, Intravenous, Q12H, Alyssa Padron MD, 10 mL at 08/02/22 0907  •  sodium chloride 0.9 % flush 10 mL, 10 mL, Intravenous, PRN, Alyssa Padron MD    PHYSICAL EXAMINATION:   /64 (BP Location: Left arm, Patient Position: Lying)   Pulse 80   Temp 97.4 °F (36.3 °C) (Oral)   Resp 18   Ht 170.2 cm (67.01\")   Wt 70 kg (154 " lb 5.2 oz)   SpO2 96%   BMI 24.16 kg/m²                ECOG Performance Status: 1 - Symptomatic but completely ambulatory  GENERAL: Age appropriate. No acute distress.   NEURO/PSYCH: A&O x 3, strength 5/5 in all muscle groups  HEENT: Head atraumatic, normocephalic.   NECK: Supple. No JVD. No lymphadenopathy.   LUNGS: Clear to auscultation bilaterally. No wheezing. No rhonchi.   HEART: Regular rate and rhythm. S1, S2, no murmurs.   ABDOMEN: Soft, nontender, nondistended. Bowel sounds positive. No  hepatosplenomegaly.   EXTREMITIES: No clubbing, cyanosis, or edema.   SKIN: No rashes. No purpura.       No results displayed because visit has over 200 results.          No results found.    ASSESSMENT: The patient is a very pleasant 72 y.o. male  with non-small cell lung cancer      PLAN:  1.  Non-small cell lung cancer:  A.  I will hold off any further neoadjuvant chemotherapy or immunotherapy.  B.  I will arrange for restaging scans in 1 month and hopefully surgery at that point if he continues to improve.    2.  Acute kidney injury:  A.  Multifactorial however the possibility of nephritis could not be ruled out.  B.  Continue to wean of prednisone gradually.    Okay to discharge patient home from oncology perspective.  Rich Tabor MD  8/2/2022

## 2022-08-02 NOTE — PROGRESS NOTES
"   LOS: 21 days    Patient Care Team:  Kwame Hines MD as PCP - General (Family Medicine)  Ryan Del Valle MD as Consulting Physician (Pulmonary Disease)  Néstor Kelly MD as Consulting Physician (Urology)    Chief Complaint: Acute kidney injury, nausea vomiting diarrhea.    72-year-old with some non-small cell CA treated with chemotherapy including immunotherapy with carboplatinum, paclitaxel and Opdivo, started last week complain of nausea vomiting diarrhea poor oral intake, on admission creatinine 3.8 slowly got worse.  CT scan showed aspiration pneumonia with ileus this.  Despite IV fluid since admission renal function continued to deteriorate and was started on dialysis on 7/15.      Subjective   No acute events overnight. Was not able to sleep last night.       Objective     Vital Sign Min/Max for last 24 hours  Temp  Min: 97.4 °F (36.3 °C)  Max: 98.3 °F (36.8 °C)   BP  Min: 95/60  Max: 164/86   Pulse  Min: 70  Max: 104   Resp  Min: 18  Max: 18   SpO2  Min: 87 %  Max: 97 %   Flow (L/min)  Min: 2  Max: 3   No data recorded     Flowsheet Rows    Flowsheet Row First Filed Value   Admission Height 170.2 cm (67\") Documented at 07/12/2022 1123   Admission Weight 70.3 kg (155 lb) Documented at 07/12/2022 1123          No intake/output data recorded.  I/O last 3 completed shifts:  In: 715 [P.O.:715]  Out: 1890 [Urine:1890]    Physical Exam:    Gen: Alert, NAD   HENT: NC, AT, EOMI   NECK: Supple, no JVD, Trachea midline   LUNGS: CTA bilaterally, non labored respirtation   CVS: S1/S2 audible, RRR, no murmur   Abd: Soft, NT, ND, BS+   Ext: No pedal edema, no cyanosis   CNS: Alert, No focal deficit noted grossly  Psy: Cooperative  Skin: Warm, dry and intact  Dialysis catheter noted.     WBC WBC   Date Value Ref Range Status   08/02/2022 11.56 (H) 3.40 - 10.80 10*3/mm3 Final   08/01/2022 11.20 (H) 3.40 - 10.80 10*3/mm3 Final   07/31/2022 10.41 3.40 - 10.80 10*3/mm3 Final      HGB Hemoglobin   Date Value Ref " Range Status   08/02/2022 8.7 (L) 13.0 - 17.7 g/dL Final   08/01/2022 8.6 (L) 13.0 - 17.7 g/dL Final   07/31/2022 8.7 (L) 13.0 - 17.7 g/dL Final      HCT Hematocrit   Date Value Ref Range Status   08/02/2022 26.2 (L) 37.5 - 51.0 % Final   08/01/2022 26.5 (L) 37.5 - 51.0 % Final   07/31/2022 26.3 (L) 37.5 - 51.0 % Final      Platlets No results found for: LABPLAT   MCV MCV   Date Value Ref Range Status   08/02/2022 85.6 79.0 - 97.0 fL Final   08/01/2022 87.5 79.0 - 97.0 fL Final   07/31/2022 86.8 79.0 - 97.0 fL Final          Sodium Sodium   Date Value Ref Range Status   08/02/2022 137 136 - 145 mmol/L Final   08/01/2022 135 (L) 136 - 145 mmol/L Final   07/31/2022 137 136 - 145 mmol/L Final      Potassium Potassium   Date Value Ref Range Status   08/02/2022 4.2 3.5 - 5.2 mmol/L Final   08/01/2022 4.2 3.5 - 5.2 mmol/L Final     Comment:     Slight hemolysis detected by analyzer. Results may be affected.   07/31/2022 4.5 3.5 - 5.2 mmol/L Final     Comment:     Slight hemolysis detected by analyzer. Results may be affected.      Chloride Chloride   Date Value Ref Range Status   08/02/2022 100 98 - 107 mmol/L Final   08/01/2022 100 98 - 107 mmol/L Final   07/31/2022 103 98 - 107 mmol/L Final      CO2 CO2   Date Value Ref Range Status   08/02/2022 24.0 22.0 - 29.0 mmol/L Final   08/01/2022 25.0 22.0 - 29.0 mmol/L Final   07/31/2022 26.0 22.0 - 29.0 mmol/L Final      BUN BUN   Date Value Ref Range Status   08/02/2022 56 (H) 8 - 23 mg/dL Final   08/01/2022 43 (H) 8 - 23 mg/dL Final   07/31/2022 26 (H) 8 - 23 mg/dL Final      Creatinine Creatinine   Date Value Ref Range Status   08/02/2022 4.08 (H) 0.76 - 1.27 mg/dL Final   08/01/2022 3.33 (H) 0.76 - 1.27 mg/dL Final   07/31/2022 2.35 (H) 0.76 - 1.27 mg/dL Final      Calcium Calcium   Date Value Ref Range Status   08/02/2022 8.4 (L) 8.6 - 10.5 mg/dL Final   08/01/2022 8.4 (L) 8.6 - 10.5 mg/dL Final   07/31/2022 8.4 (L) 8.6 - 10.5 mg/dL Final      PO4 No results found for:  CAPO4   Albumin No results found for: ALBUMIN   Magnesium No results found for: MG   Uric Acid No results found for: URICACID        Results Review:     I reviewed the patient's new clinical results.  I reviewed the patient's new imaging results and agree with the interpretation.    amLODIPine, 10 mg, Oral, Q24H  castor oil-balsam peru, 1 application, Topical, Q12H  cloNIDine, 1 patch, Transdermal, Weekly  gabapentin, 100 mg, Oral, Q8H  heparin (porcine), 5,000 Units, Subcutaneous, Q8H  hydrALAZINE, 75 mg, Oral, Q8H  ipratropium-albuterol, 3 mL, Nebulization, 4x Daily - RT  metoprolol tartrate, 100 mg, Oral, Q12H  nicotine, 1 patch, Transdermal, Q24H  nystatin, , Topical, Q12H  pantoprazole, 40 mg, Oral, BID  predniSONE, 10 mg, Oral, Daily With Breakfast  saccharomyces boulardii, 250 mg, Oral, Daily  senna-docusate sodium, 2 tablet, Oral, BID  sodium chloride, 10 mL, Intravenous, Q12H           Medication Review: Reviewed    Assessment & Plan       DAX (acute kidney injury) (HCC)    Essential hypertension    Mixed hyperlipidemia    Bladder cancer (HCC)    COPD (chronic obstructive pulmonary disease) (HCC)    Adenomatous polyp of ascending colon    Acute on chronic respiratory failure with hypoxia (HCC)    Mass of upper lobe of left lung    Cancer of upper lobe of left lung (HCC)    Ileus (HCC)     1.  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 Serologic workup-  C3 - 88,  C4 16, CK 43, UPCR 0.65, FeNA  0.69% suggestive of pre- renal etiology. HD started 7/15/22.   2.  Metabolic acidosis resolved with medications and dialysis  3.  Non-small cell CA treated with chemotherapy as noted above.  4.  Anemia: S/p chemo  5.  Pancytopenia secondary to chemotherapy.  6.  Ileus.  Now on TPN for nutrition  7.  Nonnephrotic range proteinuria.  8.  Shock: Resolved.  9.  Hypertension: Stable.   10 Hyponatremia: resolved.     Plan:  - UOP 1.6 lit/24 hr improivng- Daily evaluation for  HD. No need of HD. Watch for renal recovery. Albumin infusion. I am hopeful that his renal function function will peak and than improve. If tomorrow no improvement, will consider tunneled catheter placement. NPO at midnight. Can eat today.   - Avoid nephrotoxic medications.  - Adjust medication for the new GFR.    Discussed with patient.     High risk patient with multiple medical problems.     Shabana Brown MD  08/02/22  09:06 EDT

## 2022-08-02 NOTE — DISCHARGE INSTRUCTIONS
Please call the [place] Prescription Assistance Program at [phone] for help with medication costs.         Prednisone to 30 mg daily on discharge. Taper 10 mg daily every 5-7 days. Would recommend maintaining prednisone 10 mg daily until he is seen in Nephrology office on Aug 30 th @ 8:15 PM. Call nephrology office if additional dose are needed.

## 2022-08-02 NOTE — PROGRESS NOTES
NN spoke with pt at .  Pt alert and able to answer questions appropriately. Pt O2 sat >90% on   3L currently, home O2 use 2 L. COPD action plan reviewed. Deep breathing exercises encouraged. Patient informed of upcoming pulmonary clinic appointment date and time.  No new concerns or questions voiced at this time.  NN will continue to follow as needed.         Per current [employer] Standards, please consider:  LAMA/LABA/ICS in place (Breztri), Outpatient PFT, Rehab as appropriate, Palliative Care consult, NRT at MD, Annual LDCT per current screening guidelines (age 50-80 years old, smoking history of 20 pack years or more or has quit within past 15 years)       Patient has been scheduled for a hospital follow up with [facility] for 8/18/2022 @ 4pm with JAVAN Padron MD.

## 2022-08-02 NOTE — PROGRESS NOTES
Psychiatric Medicine Services  PROGRESS NOTE    Patient Name: Reese Cage  : 1950  MRN: [id number]    Date of Admission: 2022  Primary Care Physician: Kwame Hines MD    Subjective   Subjective     CC:  Weakness    HPI:  Hungry. Tired. Cough with congestion, BROWN noted. No chest pain. No n/v/abdominal pain. Constipation noted.    Review of Systems   Constitutional: Positive for activity change, appetite change and fatigue.   HENT: Positive for congestion.    Respiratory: Positive for cough and shortness of breath.    Cardiovascular: Negative.    Gastrointestinal: Positive for constipation.   Genitourinary: Negative.    Musculoskeletal: Negative.    Neurological: Positive for weakness.   Psychiatric/Behavioral: Positive for dysphoric mood.         Objective   Objective     Vital Signs:   Temp:  [97.4 °F (36.3 °C)-98.3 °F (36.8 °C)] 97.4 °F (36.3 °C)  Heart Rate:  [] 95  Resp:  [18] 18  BP: ()/(57-95) 127/69  Flow (L/min):  [2-3] 3     Physical Exam:  NAD, alert and oriented  OP clear, MMM  Neck supple  No LAD  RRR  Decreased at bases, with exp rales B  +BS, ND, NT, soft  KHAN  Normal affect  Family at bedside    Results Reviewed:  LAB RESULTS:      Lab 22  0641 22  0412 22  0350 22  0410 22  0309 22  0323 22  1500   WBC 11.56* 11.20* 10.41 9.72 12.08*   < >  --    HEMOGLOBIN 8.7* 8.6* 8.7* 8.2* 9.0*   < >  --    HEMATOCRIT 26.2* 26.5* 26.3* 25.3* 27.5*   < >  --    PLATELETS 135* 140 147 142 177   < >  --    NEUTROS ABS 8.03* 7.30* 6.34 5.91 8.01*   < >  --    IMMATURE GRANS (ABS) 0.11* 0.10* 0.10* 0.11* 0.15*   < >  --    LYMPHS ABS 1.84 2.12 2.08 1.92 1.80   < >  --    MONOS ABS 1.53* 1.66* 1.88* 1.76* 2.09*   < >  --    EOS ABS 0.03 0.00 0.00 0.00 0.02   < >  --    MCV 85.6 87.5 86.8 88.2 86.5   < >  --    PROCALCITONIN  --   --   --   --   --   --  0.19   LACTATE  --   --   --   --   --   --  1.4    < > =  values in this interval not displayed.         Lab 08/02/22  0642 08/01/22  0412 07/31/22  0350 07/30/22  0410 07/29/22  0309 07/28/22  0323   SODIUM 137 135* 137 132* 131* 137   POTASSIUM 4.2 4.2 4.5 4.2 4.6 4.9   CHLORIDE 100 100 103 99 99 102   CO2 24.0 25.0 26.0 24.0 23.0 23.0   ANION GAP 13.0 10.0 8.0 9.0 9.0 12.0   BUN 56* 43* 26* 28* 45* 73*   CREATININE 4.08* 3.33* 2.35* 2.50* 4.07* 5.10*   EGFR 14.9* 19.0* 28.9* 26.8* 14.9* 11.4*   GLUCOSE 100* 130* 116* 133* 134* 109*   CALCIUM 8.4* 8.4* 8.4* 8.1* 8.1* 8.4*   HEMOGLOBIN A1C  --   --   --   --   --  5.80*                         Brief Urine Lab Results  (Last result in the past 365 days)      Color   Clarity   Blood   Leuk Est   Nitrite   Protein   CREAT   Urine HCG        07/15/22 0220             160.3               Microbiology Results Abnormal     Procedure Component Value - Date/Time    Blood Culture - Blood, Blood, Venous Line [[id number]]  (Normal) Collected: 07/27/22 1500    Lab Status: Final result Specimen: Blood, Venous Line Updated: 08/01/22 1533     Blood Culture No growth at 5 days    Blood Culture - Blood, Blood, Venous Line [[id number]]  (Normal) Collected: 07/27/22 1500    Lab Status: Final result Specimen: Blood, Venous Line Updated: 08/01/22 1533     Blood Culture No growth at 5 days    Respiratory Culture - Sputum, Cough [[id number]] Collected: 07/28/22 1815    Lab Status: Final result Specimen: Sputum from Cough Updated: 07/28/22 2117     Respiratory Culture Rejected     Gram Stain No WBCs per low power field      Rare (1+) Epithelial cells per low power field      Few (2+) Gram negative bacilli    Narrative:      Specimen rejected due to oropharyngeal contamination. Please reorder and recollect specimen if clinically necessary.    Respiratory Culture - Sputum, Cough [[id number]] Collected: 07/27/22 2025    Lab Status: Final result Specimen: Sputum from Cough Updated: 07/27/22 2123     Respiratory Culture Rejected     Gram Stain Few (2+)  Epithelial cells per low power field      No WBCs per low power field      No organisms seen    Narrative:      Specimen rejected due to oropharyngeal contamination. Please reorder and recollect specimen if clinically necessary.    Blood Culture - Blood, Arm, Left [[id number]]  (Normal) Collected: 07/14/22 2325    Lab Status: Final result Specimen: Blood from Arm, Left Updated: 07/20/22 0502     Blood Culture No growth at 5 days    Narrative:      Aerobic bottle only      Blood Culture - Blood, Arm, Left [[id number]]  (Normal) Collected: 07/14/22 2350    Lab Status: Final result Specimen: Blood from Arm, Left Updated: 07/20/22 0502     Blood Culture No growth at 5 days    Blood Culture - Blood, Arm, Right [[id number]]  (Normal) Collected: 07/12/22 1615    Lab Status: Final result Specimen: Blood from Arm, Right Updated: 07/17/22 1647     Blood Culture No growth at 5 days    Blood Culture - Blood, Arm, Left [[id number]]  (Normal) Collected: 07/12/22 1620    Lab Status: Final result Specimen: Blood from Arm, Left Updated: 07/17/22 1646     Blood Culture No growth at 5 days    S. Pneumo Ag Urine or CSF - Urine, Urine, Clean Catch [[id number]]  (Normal) Collected: 07/15/22 0247    Lab Status: Final result Specimen: Urine, Clean Catch Updated: 07/15/22 1312     Strep Pneumo Ag Negative    Legionella Antigen, Urine - Urine, Urine, Catheter [[id number]]  (Normal) Collected: 07/15/22 0220    Lab Status: Final result Specimen: Urine, Catheter Updated: 07/15/22 1311     LEGIONELLA ANTIGEN, URINE Negative    MRSA Screen, PCR (Inpatient) - Swab, Nares [[id number]]  (Normal) Collected: 07/14/22 2343    Lab Status: Final result Specimen: Swab from Nares Updated: 07/15/22 0857     MRSA PCR Negative    Narrative:      The negative predictive value of this diagnostic test is high and should only be used to consider de-escalating anti-MRSA therapy. A positive result may indicate colonization with MRSA and must be correlated  clinically.  MRSA Negative    Eosinophil Smear - Urine, Urine, Clean Catch [[id number]]  (Normal) Collected: 07/15/22 0220    Lab Status: Final result Specimen: Urine, Clean Catch Updated: 07/15/22 0528     Eosinophil Smear 0 % EOS/100 Cells     Narrative:      No eosinophil seen    COVID PRE-OP / PRE-PROCEDURE SCREENING ORDER (NO ISOLATION) - Swab, Nasopharynx [[id number]]  (Normal) Collected: 07/14/22 2343    Lab Status: Final result Specimen: Swab from Nasopharynx Updated: 07/15/22 0100    Narrative:      The following orders were created for panel order COVID PRE-OP / PRE-PROCEDURE SCREENING ORDER (NO ISOLATION) - Swab, Nasopharynx.  Procedure                               Abnormality         Status                     ---------                               -----------         ------                     Respiratory Panel PCR w/...[[id number]]  Normal              Final result                 Please view results for these tests on the individual orders.    Respiratory Panel PCR w/COVID-19(SARS-CoV-2) [facility] In-House, NP Swab in UTM/VTM, 3-4 HR TAT - Swab, Nasopharynx [[id number]]  (Normal) Collected: 07/14/22 2343    Lab Status: Final result Specimen: Swab from Nasopharynx Updated: 07/15/22 0100     ADENOVIRUS, PCR Not Detected     Coronavirus 229E Not Detected     Coronavirus HKU1 Not Detected     Coronavirus NL63 Not Detected     Coronavirus OC43 Not Detected     COVID19 Not Detected     Human Metapneumovirus Not Detected     Human Rhinovirus/Enterovirus Not Detected     Influenza A PCR Not Detected     Influenza B PCR Not Detected     Parainfluenza Virus 1 Not Detected     Parainfluenza Virus 2 Not Detected     Parainfluenza Virus 3 Not Detected     Parainfluenza Virus 4 Not Detected     RSV, PCR Not Detected     Bordetella pertussis pcr Not Detected     Bordetella parapertussis PCR Not Detected     Chlamydophila pneumoniae PCR Not Detected     Mycoplasma pneumo by PCR Not Detected     Narrative:      In the setting of a positive respiratory panel with a viral infection PLUS a negative procalcitonin without other underlying concern for bacterial infection, consider observing off antibiotics or discontinuation of antibiotics and continue supportive care. If the respiratory panel is positive for atypical bacterial infection (Bordetella pertussis, Chlamydophila pneumoniae, or Mycoplasma pneumoniae), consider antibiotic de-escalation to target atypical bacterial infection.    Clostridium Difficile Toxin - Stool, Per Rectum [[id number]]  (Normal) Collected: 07/14/22 1058    Lab Status: Final result Specimen: Stool from Per Rectum Updated: 07/14/22 1248    Narrative:      The following orders were created for panel order Clostridium Difficile Toxin - Stool, Per Rectum.  Procedure                               Abnormality         Status                     ---------                               -----------         ------                     Clostridium Difficile To...[[id number]]  Normal              Final result                 Please view results for these tests on the individual orders.    Clostridium Difficile Toxin, PCR - Stool, Per Rectum [[id number]]  (Normal) Collected: 07/14/22 1058    Lab Status: Final result Specimen: Stool from Per Rectum Updated: 07/14/22 1248     C. Difficile Toxins by PCR Not Detected    Narrative:      Performance characteristics of test not established for patients <2 years of age.  Negative for Toxigenic C. Difficile    Urine Culture - Urine, Urine, Clean Catch [[id number]] Collected: 07/12/22 1549    Lab Status: Final result Specimen: Urine, Clean Catch Updated: 07/14/22 0922     Urine Culture <25,000 CFU/mL Mixed Melanie Isolated    Narrative:      Specimen contains mixed organisms of questionable pathogenicity suggestive of contamination. If symptoms persist, suggest recollection.  Colonization of the urinary tract without infection is common. Treatment is discouraged  unless the patient is symptomatic, pregnant, or undergoing an invasive urologic procedure.    MRSA Screen, PCR (Inpatient) - Swab, Nares [[id number]]  (Normal) Collected: 07/13/22 1727    Lab Status: Final result Specimen: Swab from Nares Updated: 07/13/22 1851     MRSA PCR Negative    Narrative:      The negative predictive value of this diagnostic test is high and should only be used to consider de-escalating anti-MRSA therapy. A positive result may indicate colonization with MRSA and must be correlated clinically.  MRSA Negative    COVID PRE-OP / PRE-PROCEDURE SCREENING ORDER (NO ISOLATION) - Swab, Nasopharynx [[id number]]  (Normal) Collected: 07/12/22 1629    Lab Status: Final result Specimen: Swab from Nasopharynx Updated: 07/12/22 1731    Narrative:      The following orders were created for panel order COVID PRE-OP / PRE-PROCEDURE SCREENING ORDER (NO ISOLATION) - Swab, Nasopharynx.  Procedure                               Abnormality         Status                     ---------                               -----------         ------                     COVID-19 and FLU A/B PCR...[[id number]]  Normal              Final result                 Please view results for these tests on the individual orders.    COVID-19 and FLU A/B PCR - Swab, Nasopharynx [[id number]]  (Normal) Collected: 07/12/22 1629    Lab Status: Final result Specimen: Swab from Nasopharynx Updated: 07/12/22 1731     COVID19 Not Detected     Influenza A PCR Not Detected     Influenza B PCR Not Detected    Narrative:      Fact sheet for providers: https://www.fda.gov/media/041927/download    Fact sheet for patients: https://www.fda.gov/media/569591/download    Test performed by PCR.          No radiology results from the last 24 hrs    Results for orders placed during the hospital encounter of 07/12/22    Adult Transthoracic Echo Complete W/ Cont if Necessary Per Protocol    Interpretation Summary  · Left ventricular ejection fraction appears to be  56 - 60%. Left ventricular systolic function is normal.  · Left ventricular diastolic function was indeterminate.  · Left ventricular wall thickness is consistent with moderate concentric hypertrophy.  · Estimated right ventricular systolic pressure from tricuspid regurgitation is normal (<35 mmHg).      I have reviewed the medications:  Scheduled Meds:amLODIPine, 10 mg, Oral, Q24H  castor oil-balsam peru, 1 application, Topical, Q12H  cloNIDine, 1 patch, Transdermal, Weekly  gabapentin, 100 mg, Oral, Q8H  heparin (porcine), 5,000 Units, Subcutaneous, Q8H  hydrALAZINE, 75 mg, Oral, Q8H  ipratropium-albuterol, 3 mL, Nebulization, 4x Daily - RT  metoprolol tartrate, 100 mg, Oral, Q12H  nicotine, 1 patch, Transdermal, Q24H  nystatin, , Topical, Q12H  pantoprazole, 40 mg, Oral, BID  predniSONE, 10 mg, Oral, Daily With Breakfast  saccharomyces boulardii, 250 mg, Oral, Daily  sodium chloride, 10 mL, Intravenous, Q12H      Continuous Infusions:   PRN Meds:.•  acetaminophen **OR** [DISCONTINUED] acetaminophen **OR** [DISCONTINUED] acetaminophen  •  albuterol  •  ALPRAZolam  •  cloNIDine  •  labetalol  •  [DISCONTINUED] ondansetron **OR** ondansetron  •  sodium chloride    Assessment & Plan   Assessment & Plan     Active Hospital Problems    Diagnosis  POA   • **DAX (acute kidney injury) (HCC) [N17.9]  Yes   • Ileus (HCC) [K56.7]  Yes   • Cancer of upper lobe of left lung (HCC) [C34.12]  Yes   • Mass of upper lobe of left lung [R91.8]  Yes   • Acute on chronic respiratory failure with hypoxia (HCC) [J96.21]  Yes   • Adenomatous polyp of ascending colon [D12.2]  Yes   • Bladder cancer (HCC) [C67.9]  Yes   • COPD (chronic obstructive pulmonary disease) (HCC) [J44.9]  Yes   • Essential hypertension [I10]  Yes   • Mixed hyperlipidemia [E78.2]  Yes      Resolved Hospital Problems   No resolved problems to display.        Brief Hospital Course to date:  Mr. Cage is a 73yo M with a history of HTN, HLD, CAD s/p CABG, HFpEF, COPD,  bladder cancer and a recent diagnosis of TRISTAN squamous cell lung cancer (Stage IIb) who was started on chemotherapy by Dr. Tabor on 7/8/22. On 7/9, he developed abdominal pain with nausea, vomiting and diarrhea. On 7/12/22, he developed shortness of breath and fevers and presented to the [facility] ED and was admitted to Hospital Medicine. At admission, he was noted to have DAX and a LLL pneumonia likely secondary to aspiration.      His nausea and vomiting improved and his NGT was removed on 7/14/22. His renal function continued to decline and Nephrology was consulted. He developed refractory hypotension on 7/14 and was transferred to the ICU. There was concern for immune mediated pneumonitis and nephritis and IV Solumedrol was started on 7/15.      He did require TPN for a period of time secondary to ileus. EGD on 7/23/22 showed esophagitis and NG trauma. This was performed secondary to anemia. Patient on PPI.     Hemodialysis was initiated on 7/15/22.      He has not had any signs of recurrent bleeding. Steroids are being tapered without significant change in respiratory status.   He continues to have a markedly abnormal chest exam and CT with effusions/pneumonia-pneumonitis noted.    DAX, possible immune mediated s/p nivolumab for squamous cell lung cancer  -non-oliguric, HD per renal, but good UOP    Pneumonia s/p antibiotics    Pneumonitis  -s/p nivolumab, on steroids, weaning per pulmonary, long taper    B effusions R>L  -monitor, HD, with UF per renal  -hx of HFpEF  -consider thoracentesis    Concerns for GIB  -s/p EGD with esophagitis, NG trauma  -PPI    Anemia  -monitor    Hx of COPD  -nebs/steroids    HTN  -monitoring    Squamous cell lung cancer  -s/p chemotherapy    Expected Discharge Location and Transportation: Rehab  Expected Discharge Date: 8/5    DVT prophylaxis:  Medical DVT prophylaxis orders are present.     AM-PAC 6 Clicks Score (PT): 19 (08/01/22 6767)    CODE STATUS:   Code Status and Medical  Interventions:   Ordered at: 07/12/22 1759     Code Status (Patient has no pulse and is not breathing):    CPR (Attempt to Resuscitate)     Medical Interventions (Patient has pulse or is breathing):    Full Support       Seferino Escobar MD  08/02/22

## 2022-08-03 NOTE — PLAN OF CARE
Problem: Adult Inpatient Plan of Care  Goal: Plan of Care Review  Recent Flowsheet Documentation  Taken 8/3/2022 1516 by Palmer Umana, OT  Progress: no change  Plan of Care Reviewed With:   patient   spouse  Outcome Evaluation: Stable O2 sats on 4Lnc during bed mobility and transfer to recliner w/ RW. Pt declined further mobility and/or ADL completion, perseverating on food/drink (NPO, educated). Max cues for safety awareness throughout session.  Will cont IPOT per POC as tolerated. Rec d/c to home with assist.

## 2022-08-03 NOTE — THERAPY TREATMENT NOTE
Patient Name: Reese Cage  : 1950    MRN: [id number]                              Today's Date: 8/3/2022       Admit Date: 2022    Visit Dx:     ICD-10-CM ICD-9-CM   1. DAX (acute kidney injury) (HCC)  N17.9 584.9   2. Nausea, vomiting, and diarrhea  R11.2 787.91    R19.7 787.01   3. Ileus (HCC)  K56.7 560.1   4. Left lower lobe pulmonary infiltrate  R91.8 793.19   5. Malignant neoplasm of upper lobe of left lung (HCC)  C34.12 162.3   6. Esophagitis  K20.90 530.10     Patient Active Problem List   Diagnosis   • Essential hypertension   • Mixed hyperlipidemia   • Gastroesophageal reflux disease without esophagitis   • Psoriasis   • Cigarette smoker   • Bladder cancer (HCC)   • Lower urinary tract symptoms (LUTS)   • COPD (chronic obstructive pulmonary disease) (HCC)   • Right kidney mass   • Coronary artery disease involving coronary bypass graft of native heart without angina pectoris   • Vitamin B12 deficiency   • Adenomatous polyp of ascending colon   • Acute on chronic respiratory failure with hypoxia (HCC)   • Mass of upper lobe of left lung   • Paratracheal lymphadenopathy   • Cancer of upper lobe of left lung (HCC)   • Encounter for therapeutic drug monitoring   • DAX (acute kidney injury) (HCC)   • Ileus (HCC)     Past Medical History:   Diagnosis Date   • Adenomatous polyp of ascending colon 2022    Multiple throughout colon. Dr. Obregon 2022. Rpt 1y   • Bladder cancer (HCC)    • Cancer of upper lobe of left lung (HCC) 2022   • COPD (chronic obstructive pulmonary disease) (HCC)    • Coronary artery disease involving coronary bypass graft of native heart without angina pectoris 2022   • Essential hypertension 2022   • Gastroesophageal reflux disease without esophagitis 2022   • Hard of hearing    • Mixed hyperlipidemia 2022   • PONV (postoperative nausea and vomiting)    • Psoriasis    • Renal mass    • Vitamin B12 deficiency 2022 Vit  B12 193.    • Wears dentures     full set   • Wears glasses      Past Surgical History:   Procedure Laterality Date   • BLADDER TUMOR EXCISION      Cancer   • BRONCHOSCOPY N/A 6/15/2022    Procedure: BRONCHOSCOPY WITH ENDOBRONCHIAL ULTRASOUND WITH FLUORO;  Surgeon: Ryan Del Valle MD;  Location:  [facility];  Service: Pulmonary;  Laterality: N/A;  EBUS scope removed with balloon intact   • CARDIAC CATHETERIZATION     • CARDIAC SURGERY     • COLONOSCOPY     • CORONARY ARTERY BYPASS GRAFT  2015    [facility] [place], [place]   • ENDOSCOPY N/A 7/23/2022    Procedure: ESOPHAGOGASTRODUODENOSCOPY WITH BIOPSY;  Surgeon: Reno Charlton MD;  Location:  [facility];  Service: Gastroenterology;  Laterality: N/A;      General Information     Row Name 08/03/22 1511          OT Time and Intention    Document Type therapy note (daily note)  -CS     Mode of Treatment occupational therapy  -CS     Row Name 08/03/22 1511          General Information    Patient Profile Reviewed yes  -CS     Existing Precautions/Restrictions fall;oxygen therapy device and L/min;other (see comments)  Manpreet,  -CS     Barriers to Rehab medically complex  -CS     Row Name 08/03/22 1511          Cognition    Orientation Status (Cognition) oriented x 3  -CS     Row Name 08/03/22 1511          Safety Issues, Functional Mobility    Safety Issues Affecting Function (Mobility) impulsivity;awareness of need for assistance;insight into deficits/self-awareness;positioning of assistive device;safety precaution awareness;safety precautions follow-through/compliance  -CS     Impairments Affecting Function (Mobility) balance;endurance/activity tolerance;postural/trunk control;shortness of breath;strength;pain  -CS           User Key  (r) = Recorded By, (t) = Taken By, (c) = Cosigned By    Initials Name Provider Type    CS Palmer Umana OT Occupational Therapist                 Mobility/ADL's     Row Name 08/03/22 1513          Bed Mobility    Bed Mobility  supine-sit;scooting/bridging  -CS     Scooting/Bridging Seco (Bed Mobility) contact guard  -CS     Supine-Sit Seco (Bed Mobility) contact guard  -CS     Assistive Device (Bed Mobility) bed rails;head of bed elevated  -CS     Comment, (Bed Mobility) impulsive, required Max cues to await prompts and for safety awareness, no dizziness up on sitting EOB w/ good balance  -CS     Row Name 08/03/22 1513          Transfers    Bed-Chair Seco (Transfers) contact guard;verbal cues  -CS     Assistive Device (Bed-Chair Transfers) walker, front-wheeled  -CS     Sit-Stand Seco (Transfers) contact guard;verbal cues  -CS     Row Name 08/03/22 1513          Sit-Stand Transfer    Assistive Device (Sit-Stand Transfers) walker, front-wheeled  -CS     Comment, (Sit-Stand Transfer) cues for sequencing hand placement  -CS           User Key  (r) = Recorded By, (t) = Taken By, (c) = Cosigned By    Initials Name Provider Type    Palmer Castellanos OT Occupational Therapist               Obj/Interventions     Row Name 08/03/22 1515          Balance    Balance Assessment sitting static balance;standing static balance;sitting dynamic balance;standing dynamic balance  -CS     Static Sitting Balance supervision  -CS     Dynamic Sitting Balance contact guard  -CS     Position, Sitting Balance unsupported;sitting edge of bed  -CS     Static Standing Balance contact guard;verbal cues;non-verbal cues (demo/gesture)  -CS     Dynamic Standing Balance minimal assist;verbal cues  -CS     Position/Device Used, Standing Balance walker, front-wheeled;supported  -CS     Balance Interventions sitting;sit to stand;standing  -CS           User Key  (r) = Recorded By, (t) = Taken By, (c) = Cosigned By    Initials Name Provider Type    Palmer Castellanos OT Occupational Therapist               Goals/Plan    No documentation.                Clinical Impression     Row Name 08/03/22 1516          Pain Assessment    Pretreatment  Pain Rating 0/10 - no pain  -CS     Posttreatment Pain Rating 0/10 - no pain  -CS     Pre/Posttreatment Pain Comment tolerated  -CS     Row Name 08/03/22 1516          Plan of Care Review    Plan of Care Reviewed With patient;spouse  -CS     Progress no change  -CS     Outcome Evaluation Stable O2 sats on 4Lnc during bed mobility and transfer to recliner w/ RW. Pt declined further mobility and/or ADL completion, perseverating on food/drink (NPO, educated). Max cues for safety awareness throughout session.  Will cont IPOT per POC as tolerated. Rec d/c to home with assist.  -CS     Row Name 08/03/22 1516          Therapy Plan Review/Discharge Plan (OT)    Anticipated Discharge Disposition (OT) home with assist  -CS     Row Name 08/03/22 1516          Vital Signs    Pre Systolic BP Rehab --  RN cleared for tx, VSS on 4Lnc  -CS     O2 Delivery Pre Treatment nasal cannula  -CS     O2 Delivery Intra Treatment nasal cannula  -CS     O2 Delivery Post Treatment nasal cannula  -CS     Pre Patient Position Supine  -CS     Intra Patient Position Standing  -CS     Post Patient Position Sitting  -     Row Name 08/03/22 1516          Positioning and Restraints    Pre-Treatment Position in bed  -CS     Post Treatment Position chair  -CS     In Chair notified nsg;reclined;sitting;call light within reach;encouraged to call for assist;exit alarm on;with family/caregiver;with nsg;waffle cushion;legs elevated;heels elevated  -CS           User Key  (r) = Recorded By, (t) = Taken By, (c) = Cosigned By    Initials Name Provider Type    CS Palmer Umana, OT Occupational Therapist               Outcome Measures     Row Name 08/03/22 1519          How much help from another is currently needed...    Putting on and taking off regular lower body clothing? 3  -CS     Bathing (including washing, rinsing, and drying) 3  -CS     Toileting (which includes using toilet bed pan or urinal) 3  -CS     Putting on and taking off regular upper body  clothing 3  -CS     Taking care of personal grooming (such as brushing teeth) 3  -CS     Eating meals 3  -CS     AM-PAC 6 Clicks Score (OT) 18  -CS     Row Name 08/03/22 1519          Functional Assessment    Outcome Measure Options AM-PAC 6 Clicks Daily Activity (OT)  -CS           User Key  (r) = Recorded By, (t) = Taken By, (c) = Cosigned By    Initials Name Provider Type    CS Palmer Umana OT Occupational Therapist                Occupational Therapy Education                 Title: PT OT SLP Therapies (In Progress)     Topic: Occupational Therapy (In Progress)     Point: ADL training (In Progress)     Description:   Instruct learner(s) on proper safety adaptation and remediation techniques during self care or transfers.   Instruct in proper use of assistive devices.              Learning Progress Summary           Patient Acceptance, E,D, NR by CS at 8/3/2022 1520   Family Acceptance, E,D, NR by CS at 8/3/2022 1520      Show all documentation for this point (7)                 Point: Home exercise program (Done)     Description:   Instruct learner(s) on appropriate technique for monitoring, assisting and/or progressing therapeutic exercises/activities.              Learning Progress Summary           Patient Acceptance, E, VU by AN at 7/29/2022 0937   Family Acceptance, E, VU,NR by AN at 7/27/2022 1108      Show all documentation for this point (2)                 Point: Precautions (In Progress)     Description:   Instruct learner(s) on prescribed precautions during self-care and functional transfers.              Learning Progress Summary           Patient Acceptance, E,D, NR by CS at 8/3/2022 1520   Family Acceptance, E,D, NR by CS at 8/3/2022 1520      Show all documentation for this point (7)                 Point: Body mechanics (In Progress)     Description:   Instruct learner(s) on proper positioning and spine alignment during self-care, functional mobility activities and/or exercises.               Learning Progress Summary           Patient Acceptance, E,D, NR by CS at 8/3/2022 1520   Family Acceptance, E,D, NR by  at 8/3/2022 1520      Show all documentation for this point (7)                             User Key     Initials Effective Dates Name Provider Type Discipline    CS 06/16/21 -  Palmer Umana OT Occupational Therapist OT    AN 09/21/21 -  Emily More OT Occupational Therapist OT              OT Recommendation and Plan     Plan of Care Review  Plan of Care Reviewed With: patient, spouse  Progress: no change  Outcome Evaluation: Stable O2 sats on 4Lnc during bed mobility and transfer to recliner w/ RW. Pt declined further mobility and/or ADL completion, perseverating on food/drink (NPO, educated). Max cues for safety awareness throughout session.  Will cont IPOT per POC as tolerated. Rec d/c to home with assist.     Time Calculation:    Time Calculation- OT     Row Name 08/03/22 1520             Time Calculation- OT    OT Start Time 1500  -CS      OT Received On 08/03/22  -CS      OT Goal Re-Cert Due Date 08/08/22  -CS              Timed Charges    21906 - OT Therapeutic Activity Minutes 15  -CS              Total Minutes    Timed Charges Total Minutes 15  -CS       Total Minutes 15  -CS            User Key  (r) = Recorded By, (t) = Taken By, (c) = Cosigned By    Initials Name Provider Type    CS Palmer Umana OT Occupational Therapist              Therapy Charges for Today     Code Description Service Date Service Provider Modifiers Qty    83493174697 HC OT THERAPEUTIC ACT EA 15 MIN 8/3/2022 Palmer Umana OT GO 1               Palmer Umana OT  8/3/2022

## 2022-08-03 NOTE — PROGRESS NOTES
"   LOS: 22 days    Patient Care Team:  Kwame Hines MD as PCP - General (Family Medicine)  Ryan Del Valle MD as Consulting Physician (Pulmonary Disease)  Néstor Kelly MD as Consulting Physician (Urology)    Chief Complaint: Acute kidney injury, nausea vomiting diarrhea.    72-year-old with some non-small cell CA treated with chemotherapy including immunotherapy with carboplatinum, paclitaxel and Opdivo, started last week complain of nausea vomiting diarrhea poor oral intake, on admission creatinine 3.8 slowly got worse.  CT scan showed aspiration pneumonia with ileus this.  Despite IV fluid since admission renal function continued to deteriorate and was started on dialysis on 7/15.      Subjective   Fever noted. Weak today.       Objective     Vital Sign Min/Max for last 24 hours  Temp  Min: 97.6 °F (36.4 °C)  Max: 102.7 °F (39.3 °C)   BP  Min: 107/64  Max: 155/58   Pulse  Min: 77  Max: 117   Resp  Min: 18  Max: 22   SpO2  Min: 90 %  Max: 99 %   Flow (L/min)  Min: 2  Max: 4   No data recorded     Flowsheet Rows    Flowsheet Row First Filed Value   Admission Height 170.2 cm (67\") Documented at 07/12/2022 1123   Admission Weight 70.3 kg (155 lb) Documented at 07/12/2022 1123          No intake/output data recorded.  I/O last 3 completed shifts:  In: 1385 [P.O.:1385]  Out: 3725 [Urine:3725]    Physical Exam:    Gen: Alert, NAD   HENT: NC, AT, EOMI   NECK: Supple, no JVD, Trachea midline   LUNGS: CTA bilaterally, non labored respirtation   CVS: S1/S2 audible, RRR, no murmur   Abd: Soft, NT, ND, BS+   Ext: No pedal edema, no cyanosis   CNS: Alert, No focal deficit noted grossly  Psy: Cooperative  Skin: Warm, dry and intact  Dialysis catheter noted.     WBC WBC   Date Value Ref Range Status   08/02/2022 11.56 (H) 3.40 - 10.80 10*3/mm3 Final   08/01/2022 11.20 (H) 3.40 - 10.80 10*3/mm3 Final      HGB Hemoglobin   Date Value Ref Range Status   08/02/2022 8.7 (L) 13.0 - 17.7 g/dL Final   08/01/2022 8.6 (L) " 13.0 - 17.7 g/dL Final      HCT Hematocrit   Date Value Ref Range Status   08/02/2022 26.2 (L) 37.5 - 51.0 % Final   08/01/2022 26.5 (L) 37.5 - 51.0 % Final      Platlets No results found for: LABPLAT   MCV MCV   Date Value Ref Range Status   08/02/2022 85.6 79.0 - 97.0 fL Final   08/01/2022 87.5 79.0 - 97.0 fL Final          Sodium Sodium   Date Value Ref Range Status   08/02/2022 137 136 - 145 mmol/L Final   08/01/2022 135 (L) 136 - 145 mmol/L Final      Potassium Potassium   Date Value Ref Range Status   08/02/2022 4.2 3.5 - 5.2 mmol/L Final   08/01/2022 4.2 3.5 - 5.2 mmol/L Final     Comment:     Slight hemolysis detected by analyzer. Results may be affected.      Chloride Chloride   Date Value Ref Range Status   08/02/2022 100 98 - 107 mmol/L Final   08/01/2022 100 98 - 107 mmol/L Final      CO2 CO2   Date Value Ref Range Status   08/02/2022 24.0 22.0 - 29.0 mmol/L Final   08/01/2022 25.0 22.0 - 29.0 mmol/L Final      BUN BUN   Date Value Ref Range Status   08/02/2022 56 (H) 8 - 23 mg/dL Final   08/01/2022 43 (H) 8 - 23 mg/dL Final      Creatinine Creatinine   Date Value Ref Range Status   08/02/2022 4.08 (H) 0.76 - 1.27 mg/dL Final   08/01/2022 3.33 (H) 0.76 - 1.27 mg/dL Final      Calcium Calcium   Date Value Ref Range Status   08/02/2022 8.4 (L) 8.6 - 10.5 mg/dL Final   08/01/2022 8.4 (L) 8.6 - 10.5 mg/dL Final      PO4 No results found for: CAPO4   Albumin No results found for: ALBUMIN   Magnesium No results found for: MG   Uric Acid No results found for: URICACID        Results Review:     I reviewed the patient's new clinical results.  I reviewed the patient's new imaging results and agree with the interpretation.    amLODIPine, 10 mg, Oral, Q24H  castor oil-balsam peru, 1 application, Topical, Q12H  cloNIDine, 1 patch, Transdermal, Weekly  gabapentin, 100 mg, Oral, Q8H  heparin (porcine), 5,000 Units, Subcutaneous, Q8H  hydrALAZINE, 75 mg, Oral, Q8H  ipratropium-albuterol, 3 mL, Nebulization, 4x Daily -  RT  metoprolol tartrate, 100 mg, Oral, Q12H  nicotine, 1 patch, Transdermal, Q24H  nystatin, , Topical, Q12H  pantoprazole, 40 mg, Oral, BID  piperacillin-tazobactam, 3.375 g, Intravenous, Once  piperacillin-tazobactam, 3.375 g, Intravenous, Q12H  predniSONE, 10 mg, Oral, Daily With Breakfast  saccharomyces boulardii, 250 mg, Oral, Daily  senna-docusate sodium, 2 tablet, Oral, BID  sodium chloride, 10 mL, Intravenous, Q12H           Medication Review: Reviewed    Assessment & Plan       DAX (acute kidney injury) (HCC)    Essential hypertension    Mixed hyperlipidemia    Bladder cancer (HCC)    COPD (chronic obstructive pulmonary disease) (HCC)    Adenomatous polyp of ascending colon    Acute on chronic respiratory failure with hypoxia (HCC)    Mass of upper lobe of left lung    Cancer of upper lobe of left lung (HCC)    Ileus (HCC)     1.  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 Serologic workup-  C3 - 88,  C4 16, CK 43, UPCR 0.65, FeNA  0.69% suggestive of pre- renal etiology. HD started 7/15/22.   2.  Metabolic acidosis resolved with medications and dialysis  3.  Non-small cell CA treated with chemotherapy as noted above.  4.  Anemia: S/p chemo  5.  Pancytopenia secondary to chemotherapy.  6.  Ileus.  Now on TPN for nutrition  7.  Nonnephrotic range proteinuria.  8.  Shock: Resolved.  9.  Hypertension: Stable.   10 Hyponatremia: resolved.     Plan:  - UOP 2.5 lit/24 hr excellent- check labs today.   - Avoid nephrotoxic medications.  - Adjust medication for the new GFR.    Discussed with patient.     High risk patient with multiple medical problems.     Shabana Brown MD  08/03/22  08:59 EDT

## 2022-08-03 NOTE — PROGRESS NOTES
[facility] Medicine Services  PROGRESS NOTE    Patient Name: Reese Cage  : 1950  MRN: [id number]    Date of Admission: 2022  Primary Care Physician: Kwame Hines MD    Subjective   Subjective     CC:  Weakness    HPI:  Constipation resolved. No diarrhea. Continues with cough/congestion. Tolerating PO without aspiration/choking per family. Noted fever this AM.    Review of Systems   Constitutional: Positive for activity change, appetite change and fatigue.   HENT: Positive for congestion.    Respiratory: Positive for cough. Negative for shortness of breath.    Cardiovascular: Negative.    Gastrointestinal: Negative for constipation and diarrhea.   Genitourinary: Negative.    Musculoskeletal: Negative.    Neurological: Positive for weakness.   Psychiatric/Behavioral: Positive for dysphoric mood.         Objective   Objective     Vital Signs:   Temp:  [97.6 °F (36.4 °C)-102.7 °F (39.3 °C)] 102.7 °F (39.3 °C)  Heart Rate:  [] 101  Resp:  [18-22] 20  BP: (107-155)/(58-78) 107/64  Flow (L/min):  [2-4] 4     Physical Exam:  NAD, alert and oriented  OP clear, MMM  Neck supple  No LAD  RRR  Coarse BS on R, improved on L  +BS, ND, NT, soft  KHAN  Normal affect  Urine in pinto bag clear without sediment  Family at bedside    Results Reviewed:  LAB RESULTS:      Lab 22  0641 22  0412 22  0350 22  0410 22  0309 22  0323 22  1500   WBC 11.56* 11.20* 10.41 9.72 12.08*   < >  --    HEMOGLOBIN 8.7* 8.6* 8.7* 8.2* 9.0*   < >  --    HEMATOCRIT 26.2* 26.5* 26.3* 25.3* 27.5*   < >  --    PLATELETS 135* 140 147 142 177   < >  --    NEUTROS ABS 8.03* 7.30* 6.34 5.91 8.01*   < >  --    IMMATURE GRANS (ABS) 0.11* 0.10* 0.10* 0.11* 0.15*   < >  --    LYMPHS ABS 1.84 2.12 2.08 1.92 1.80   < >  --    MONOS ABS 1.53* 1.66* 1.88* 1.76* 2.09*   < >  --    EOS ABS 0.03 0.00 0.00 0.00 0.02   < >  --    MCV 85.6 87.5 86.8 88.2 86.5   < >  --     PROCALCITONIN  --   --   --   --   --   --  0.19   LACTATE  --   --   --   --   --   --  1.4    < > = values in this interval not displayed.         Lab 08/02/22  0642 08/01/22  0412 07/31/22  0350 07/30/22  0410 07/29/22  0309 07/28/22  0323   SODIUM 137 135* 137 132* 131* 137   POTASSIUM 4.2 4.2 4.5 4.2 4.6 4.9   CHLORIDE 100 100 103 99 99 102   CO2 24.0 25.0 26.0 24.0 23.0 23.0   ANION GAP 13.0 10.0 8.0 9.0 9.0 12.0   BUN 56* 43* 26* 28* 45* 73*   CREATININE 4.08* 3.33* 2.35* 2.50* 4.07* 5.10*   EGFR 14.9* 19.0* 28.9* 26.8* 14.9* 11.4*   GLUCOSE 100* 130* 116* 133* 134* 109*   CALCIUM 8.4* 8.4* 8.4* 8.1* 8.1* 8.4*   HEMOGLOBIN A1C  --   --   --   --   --  5.80*                         Brief Urine Lab Results  (Last result in the past 365 days)      Color   Clarity   Blood   Leuk Est   Nitrite   Protein   CREAT   Urine HCG        07/15/22 0220             160.3               Microbiology Results Abnormal     Procedure Component Value - Date/Time    Blood Culture - Blood, Blood, Venous Line [[id number]]  (Normal) Collected: 07/27/22 1500    Lab Status: Final result Specimen: Blood, Venous Line Updated: 08/01/22 1533     Blood Culture No growth at 5 days    Blood Culture - Blood, Blood, Venous Line [[id number]]  (Normal) Collected: 07/27/22 1500    Lab Status: Final result Specimen: Blood, Venous Line Updated: 08/01/22 1533     Blood Culture No growth at 5 days    Respiratory Culture - Sputum, Cough [[id number]] Collected: 07/28/22 1815    Lab Status: Final result Specimen: Sputum from Cough Updated: 07/28/22 2117     Respiratory Culture Rejected     Gram Stain No WBCs per low power field      Rare (1+) Epithelial cells per low power field      Few (2+) Gram negative bacilli    Narrative:      Specimen rejected due to oropharyngeal contamination. Please reorder and recollect specimen if clinically necessary.    Respiratory Culture - Sputum, Cough [[id number]] Collected: 07/27/22 2025    Lab Status: Final result  Specimen: Sputum from Cough Updated: 07/27/22 2123     Respiratory Culture Rejected     Gram Stain Few (2+) Epithelial cells per low power field      No WBCs per low power field      No organisms seen    Narrative:      Specimen rejected due to oropharyngeal contamination. Please reorder and recollect specimen if clinically necessary.    Blood Culture - Blood, Arm, Left [[id number]]  (Normal) Collected: 07/14/22 2325    Lab Status: Final result Specimen: Blood from Arm, Left Updated: 07/20/22 0502     Blood Culture No growth at 5 days    Narrative:      Aerobic bottle only      Blood Culture - Blood, Arm, Left [[id number]]  (Normal) Collected: 07/14/22 2350    Lab Status: Final result Specimen: Blood from Arm, Left Updated: 07/20/22 0502     Blood Culture No growth at 5 days    Blood Culture - Blood, Arm, Right [[id number]]  (Normal) Collected: 07/12/22 1615    Lab Status: Final result Specimen: Blood from Arm, Right Updated: 07/17/22 1647     Blood Culture No growth at 5 days    Blood Culture - Blood, Arm, Left [[id number]]  (Normal) Collected: 07/12/22 1620    Lab Status: Final result Specimen: Blood from Arm, Left Updated: 07/17/22 1646     Blood Culture No growth at 5 days    S. Pneumo Ag Urine or CSF - Urine, Urine, Clean Catch [[id number]]  (Normal) Collected: 07/15/22 0247    Lab Status: Final result Specimen: Urine, Clean Catch Updated: 07/15/22 1312     Strep Pneumo Ag Negative    Legionella Antigen, Urine - Urine, Urine, Catheter [[id number]]  (Normal) Collected: 07/15/22 0220    Lab Status: Final result Specimen: Urine, Catheter Updated: 07/15/22 1311     LEGIONELLA ANTIGEN, URINE Negative    MRSA Screen, PCR (Inpatient) - Swab, Nares [[id number]]  (Normal) Collected: 07/14/22 2343    Lab Status: Final result Specimen: Swab from Nares Updated: 07/15/22 0857     MRSA PCR Negative    Narrative:      The negative predictive value of this diagnostic test is high and should only be used to consider de-escalating  anti-MRSA therapy. A positive result may indicate colonization with MRSA and must be correlated clinically.  MRSA Negative    Eosinophil Smear - Urine, Urine, Clean Catch [[id number]]  (Normal) Collected: 07/15/22 0220    Lab Status: Final result Specimen: Urine, Clean Catch Updated: 07/15/22 0528     Eosinophil Smear 0 % EOS/100 Cells     Narrative:      No eosinophil seen    COVID PRE-OP / PRE-PROCEDURE SCREENING ORDER (NO ISOLATION) - Swab, Nasopharynx [[id number]]  (Normal) Collected: 07/14/22 2343    Lab Status: Final result Specimen: Swab from Nasopharynx Updated: 07/15/22 0100    Narrative:      The following orders were created for panel order COVID PRE-OP / PRE-PROCEDURE SCREENING ORDER (NO ISOLATION) - Swab, Nasopharynx.  Procedure                               Abnormality         Status                     ---------                               -----------         ------                     Respiratory Panel PCR w/...[[id number]]  Normal              Final result                 Please view results for these tests on the individual orders.    Respiratory Panel PCR w/COVID-19(SARS-CoV-2) [facility]HT[facility]/VARINDER/DEMETRIUS/PAD/COR/MAD/FAB In-House, NP Swab in UTM/VTM, 3-4 HR TAT - Swab, Nasopharynx [[id number]]  (Normal) Collected: 07/14/22 2343    Lab Status: Final result Specimen: Swab from Nasopharynx Updated: 07/15/22 0100     ADENOVIRUS, PCR Not Detected     Coronavirus 229E Not Detected     Coronavirus HKU1 Not Detected     Coronavirus NL63 Not Detected     Coronavirus OC43 Not Detected     COVID19 Not Detected     Human Metapneumovirus Not Detected     Human Rhinovirus/Enterovirus Not Detected     Influenza A PCR Not Detected     Influenza B PCR Not Detected     Parainfluenza Virus 1 Not Detected     Parainfluenza Virus 2 Not Detected     Parainfluenza Virus 3 Not Detected     Parainfluenza Virus 4 Not Detected     RSV, PCR Not Detected     Bordetella pertussis pcr Not Detected     Bordetella parapertussis PCR Not Detected      Chlamydophila pneumoniae PCR Not Detected     Mycoplasma pneumo by PCR Not Detected    Narrative:      In the setting of a positive respiratory panel with a viral infection PLUS a negative procalcitonin without other underlying concern for bacterial infection, consider observing off antibiotics or discontinuation of antibiotics and continue supportive care. If the respiratory panel is positive for atypical bacterial infection (Bordetella pertussis, Chlamydophila pneumoniae, or Mycoplasma pneumoniae), consider antibiotic de-escalation to target atypical bacterial infection.    Clostridium Difficile Toxin - Stool, Per Rectum [[id number]]  (Normal) Collected: 07/14/22 1058    Lab Status: Final result Specimen: Stool from Per Rectum Updated: 07/14/22 1248    Narrative:      The following orders were created for panel order Clostridium Difficile Toxin - Stool, Per Rectum.  Procedure                               Abnormality         Status                     ---------                               -----------         ------                     Clostridium Difficile To...[[id number]]  Normal              Final result                 Please view results for these tests on the individual orders.    Clostridium Difficile Toxin, PCR - Stool, Per Rectum [[id number]]  (Normal) Collected: 07/14/22 1058    Lab Status: Final result Specimen: Stool from Per Rectum Updated: 07/14/22 1248     C. Difficile Toxins by PCR Not Detected    Narrative:      Performance characteristics of test not established for patients <2 years of age.  Negative for Toxigenic C. Difficile    Urine Culture - Urine, Urine, Clean Catch [[id number]] Collected: 07/12/22 1549    Lab Status: Final result Specimen: Urine, Clean Catch Updated: 07/14/22 0922     Urine Culture <25,000 CFU/mL Mixed Melanie Isolated    Narrative:      Specimen contains mixed organisms of questionable pathogenicity suggestive of contamination. If symptoms persist, suggest  recollection.  Colonization of the urinary tract without infection is common. Treatment is discouraged unless the patient is symptomatic, pregnant, or undergoing an invasive urologic procedure.    MRSA Screen, PCR (Inpatient) - Swab, Nares [[id number]]  (Normal) Collected: 07/13/22 1727    Lab Status: Final result Specimen: Swab from Nares Updated: 07/13/22 1851     MRSA PCR Negative    Narrative:      The negative predictive value of this diagnostic test is high and should only be used to consider de-escalating anti-MRSA therapy. A positive result may indicate colonization with MRSA and must be correlated clinically.  MRSA Negative    COVID PRE-OP / PRE-PROCEDURE SCREENING ORDER (NO ISOLATION) - Swab, Nasopharynx [[id number]]  (Normal) Collected: 07/12/22 1629    Lab Status: Final result Specimen: Swab from Nasopharynx Updated: 07/12/22 1731    Narrative:      The following orders were created for panel order COVID PRE-OP / PRE-PROCEDURE SCREENING ORDER (NO ISOLATION) - Swab, Nasopharynx.  Procedure                               Abnormality         Status                     ---------                               -----------         ------                     COVID-19 and FLU A/B PCR...[[id number]]  Normal              Final result                 Please view results for these tests on the individual orders.    COVID-19 and FLU A/B PCR - Swab, Nasopharynx [[id number]]  (Normal) Collected: 07/12/22 1629    Lab Status: Final result Specimen: Swab from Nasopharynx Updated: 07/12/22 1731     COVID19 Not Detected     Influenza A PCR Not Detected     Influenza B PCR Not Detected    Narrative:      Fact sheet for providers: https://www.fda.gov/media/936313/download    Fact sheet for patients: https://www.fda.gov/media/647206/download    Test performed by PCR.          No radiology results from the last 24 hrs    Results for orders placed during the hospital encounter of 07/12/22    Adult Transthoracic Echo Complete W/ Cont  if Necessary Per Protocol    Interpretation Summary  · Left ventricular ejection fraction appears to be 56 - 60%. Left ventricular systolic function is normal.  · Left ventricular diastolic function was indeterminate.  · Left ventricular wall thickness is consistent with moderate concentric hypertrophy.  · Estimated right ventricular systolic pressure from tricuspid regurgitation is normal (<35 mmHg).      I have reviewed the medications:  Scheduled Meds:amLODIPine, 10 mg, Oral, Q24H  castor oil-balsam peru, 1 application, Topical, Q12H  cloNIDine, 1 patch, Transdermal, Weekly  gabapentin, 100 mg, Oral, Q8H  heparin (porcine), 5,000 Units, Subcutaneous, Q8H  hydrALAZINE, 75 mg, Oral, Q8H  ipratropium-albuterol, 3 mL, Nebulization, 4x Daily - RT  metoprolol tartrate, 100 mg, Oral, Q12H  nicotine, 1 patch, Transdermal, Q24H  nystatin, , Topical, Q12H  pantoprazole, 40 mg, Oral, BID  piperacillin-tazobactam, 3.375 g, Intravenous, Once  piperacillin-tazobactam, 2.25 g, Intravenous, Q8H  predniSONE, 10 mg, Oral, Daily With Breakfast  saccharomyces boulardii, 250 mg, Oral, Daily  senna-docusate sodium, 2 tablet, Oral, BID  sodium chloride, 10 mL, Intravenous, Q12H      Continuous Infusions:   PRN Meds:.•  acetaminophen **OR** [DISCONTINUED] acetaminophen **OR** [DISCONTINUED] acetaminophen  •  albuterol  •  ALPRAZolam  •  senna-docusate sodium **AND** polyethylene glycol **AND** bisacodyl **AND** bisacodyl  •  cloNIDine  •  labetalol  •  [DISCONTINUED] ondansetron **OR** ondansetron  •  sodium chloride    Assessment & Plan   Assessment & Plan     Active Hospital Problems    Diagnosis  POA   • **DAX (acute kidney injury) (HCC) [N17.9]  Yes   • Ileus (HCC) [K56.7]  Yes   • Cancer of upper lobe of left lung (HCC) [C34.12]  Yes   • Mass of upper lobe of left lung [R91.8]  Yes   • Acute on chronic respiratory failure with hypoxia (HCC) [J96.21]  Yes   • Adenomatous polyp of ascending colon [D12.2]  Yes   • Bladder cancer (HCC)  [C67.9]  Yes   • COPD (chronic obstructive pulmonary disease) (HCC) [J44.9]  Yes   • Essential hypertension [I10]  Yes   • Mixed hyperlipidemia [E78.2]  Yes      Resolved Hospital Problems   No resolved problems to display.        Brief Hospital Course to date:  Mr. 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 Tabor on 7/8/22. On 7/9, he developed abdominal pain with nausea, vomiting and diarrhea. On 7/12/22, he developed shortness of breath and fevers and presented to the [facility] ED and was admitted to Hospital Medicine. At admission, he was noted to have DAX and a LLL pneumonia likely secondary to aspiration.      His nausea and vomiting improved and his NGT was removed on 7/14/22. His renal function continued to decline and Nephrology was consulted. He developed refractory hypotension on 7/14 and was transferred to the ICU. There was concern for immune mediated pneumonitis and nephritis and IV Solumedrol was started on 7/15.      He did require TPN for a period of time secondary to ileus. EGD on 7/23/22 showed esophagitis and NG trauma. This was performed secondary to anemia. Patient on PPI.     Hemodialysis was initiated on 7/15/22.      He has not had any signs of recurrent bleeding. Steroids are being tapered without significant change in respiratory status.   He continues to have a markedly abnormal chest exam and CT with effusions/pneumonia-pneumonitis noted.    DAX, possibly immune mediated s/p nivolumab for squamous cell lung cancer  -non-oliguric, HD per renal, but good UOP    Pneumonia s/p antibiotics    Fevers  -with dialysis access, check blood cultures x 2  -with pinto catheter, check UA  -abnormal chest exam/cough, congestion  -zosyn for now    Pneumonitis  -s/p nivolumab, on steroids, weaning per pulmonary, long taper    B effusions R>L  -monitor, HD, with  UF per renal  -hx of HFpEF  -most recent CXR appears improved    Concerns for GIB  -s/p EGD with esophagitis, NG trauma  -PPI    Anemia  -monitor    Hx of COPD  -nebs/steroids    HTN  -monitoring    Squamous cell lung cancer  -s/p chemotherapy    Expected Discharge Location and Transportation: Rehab  Expected Discharge Date: 8/5    DVT prophylaxis:  Medical DVT prophylaxis orders are present.     AM-PAC 6 Clicks Score (PT): 19 (08/02/22 0800)    CODE STATUS:   Code Status and Medical Interventions:   Ordered at: 07/12/22 5151     Code Status (Patient has no pulse and is not breathing):    CPR (Attempt to Resuscitate)     Medical Interventions (Patient has pulse or is breathing):    Full Support       Seferino Escobar MD  08/03/22

## 2022-08-03 NOTE — PROGRESS NOTES
Daily chart review complete.  Per discussion with RN, patient with a rough night and fevers.  Bedside visit deferred this date to allow rest.  KPAP flyer given to RN.  No other questions or concerns at  This time.  NN continues to follow.        Per current GOLD Standards, please consider:  LAMA/LABA/ICS in place ([facility]tri), Outpatient PFT, Rehab as appropriate, Palliative Care consult, NRT at [place], Annual LDCT per current screening guidelines (age 50-80 years old, smoking history of 20 pack years or more or has quit within past 15 years)         Patient has been scheduled for a hospital follow up with [facility] for 8/18/2022 @ 4pm with JAVAN Padron MD.

## 2022-08-03 NOTE — PLAN OF CARE
Goal Outcome Evaluation:   VSS. Afib. 2-3L nasal cannula. No complaints of pain. Skin care provided. Complaints of SOA x1, PRN nebs given. NPO since midnight except sips with meds for possible tunneled cath today. No apparent further needs at this time.

## 2022-08-03 NOTE — CASE MANAGEMENT/SOCIAL WORK
Continued Stay Note  [facility]     Patient Name: Reese Cage  MRN: [id number]  Today's Date: 8/3/2022    Admit Date: 7/12/2022     Discharge Plan     Row Name 08/03/22 1512       Plan    Plan Home    Plan Comments Rehab services working with patient. I am following for discharge planning, including arranging hemodialysis if it is needed.    Final Discharge Disposition Code 01 - home or self-care               Discharge Codes    No documentation.               Expected Discharge Date and Time     Expected Discharge Date Expected Discharge Time    Aug 5, 2022             Miroslava Grajeda RN

## 2022-08-04 NOTE — NURSING NOTE
New order to change pinto catheter this am, per rounds CAUTI mentioned--per Hospitalist and possibly remove pinto catheter today??-- waiting for order before changing catheter.    Of note, admitted on 07/12/22 with a UTI.

## 2022-08-04 NOTE — PROGRESS NOTES
"   LOS: 23 days    Patient Care Team:  Kwame Hines MD as PCP - General (Family Medicine)  Ryan Del Valle MD as Consulting Physician (Pulmonary Disease)  Néstor Kelly MD as Consulting Physician (Urology)    Chief Complaint: Acute kidney injury, nausea vomiting diarrhea.    72-year-old with some non-small cell CA treated with chemotherapy including immunotherapy with carboplatinum, paclitaxel and Opdivo, started last week complain of nausea vomiting diarrhea poor oral intake, on admission creatinine 3.8 slowly got worse.  CT scan showed aspiration pneumonia with ileus this.  Despite IV fluid since admission renal function continued to deteriorate and was started on dialysis on 7/15.      Subjective   Fever noted. Weak today.       Objective     Vital Sign Min/Max for last 24 hours  Temp  Min: 96.9 °F (36.1 °C)  Max: 99.3 °F (37.4 °C)   BP  Min: 91/67  Max: 113/59   Pulse  Min: 86  Max: 99   Resp  Min: 16  Max: 20   SpO2  Min: 89 %  Max: 95 %   Flow (L/min)  Min: 3  Max: 3   No data recorded     Flowsheet Rows    Flowsheet Row First Filed Value   Admission Height 170.2 cm (67\") Documented at 07/12/2022 1123   Admission Weight 70.3 kg (155 lb) Documented at 07/12/2022 1123          No intake/output data recorded.  I/O last 3 completed shifts:  In: 585 [P.O.:585]  Out: 2450 [Urine:2450]    Physical Exam:    Gen: Alert, NAD   HENT: NC, AT, EOMI   NECK: Supple, no JVD, Trachea midline   LUNGS: CTA bilaterally, non labored respirtation   CVS: S1/S2 audible, RRR, no murmur   Abd: Soft, NT, ND, BS+   Ext: No pedal edema, no cyanosis   CNS: Alert, No focal deficit noted grossly  Psy: Cooperative  Skin: Warm, dry and intact  Dialysis catheter noted.     WBC WBC   Date Value Ref Range Status   08/04/2022 14.53 (H) 3.40 - 10.80 10*3/mm3 Final   08/03/2022 20.09 (H) 3.40 - 10.80 10*3/mm3 Final   08/02/2022 11.56 (H) 3.40 - 10.80 10*3/mm3 Final      HGB Hemoglobin   Date Value Ref Range Status   08/04/2022 7.7 (L) " 13.0 - 17.7 g/dL Final   08/03/2022 9.0 (L) 13.0 - 17.7 g/dL Final   08/02/2022 8.7 (L) 13.0 - 17.7 g/dL Final      HCT Hematocrit   Date Value Ref Range Status   08/04/2022 23.8 (L) 37.5 - 51.0 % Final   08/03/2022 26.7 (L) 37.5 - 51.0 % Final   08/02/2022 26.2 (L) 37.5 - 51.0 % Final      Platlets No results found for: LABPLAT   MCV MCV   Date Value Ref Range Status   08/04/2022 88.5 79.0 - 97.0 fL Final   08/03/2022 87.0 79.0 - 97.0 fL Final   08/02/2022 85.6 79.0 - 97.0 fL Final          Sodium Sodium   Date Value Ref Range Status   08/04/2022 134 (L) 136 - 145 mmol/L Final   08/03/2022 137 136 - 145 mmol/L Final   08/02/2022 137 136 - 145 mmol/L Final      Potassium Potassium   Date Value Ref Range Status   08/04/2022 4.2 3.5 - 5.2 mmol/L Final   08/03/2022 4.1 3.5 - 5.2 mmol/L Final   08/02/2022 4.2 3.5 - 5.2 mmol/L Final      Chloride Chloride   Date Value Ref Range Status   08/04/2022 96 (L) 98 - 107 mmol/L Final   08/03/2022 101 98 - 107 mmol/L Final   08/02/2022 100 98 - 107 mmol/L Final      CO2 CO2   Date Value Ref Range Status   08/04/2022 20.0 (L) 22.0 - 29.0 mmol/L Final   08/03/2022 25.0 22.0 - 29.0 mmol/L Final   08/02/2022 24.0 22.0 - 29.0 mmol/L Final      BUN BUN   Date Value Ref Range Status   08/04/2022 67 (H) 8 - 23 mg/dL Final   08/03/2022 63 (H) 8 - 23 mg/dL Final   08/02/2022 56 (H) 8 - 23 mg/dL Final      Creatinine Creatinine   Date Value Ref Range Status   08/04/2022 4.19 (H) 0.76 - 1.27 mg/dL Final   08/03/2022 3.79 (H) 0.76 - 1.27 mg/dL Final   08/02/2022 4.08 (H) 0.76 - 1.27 mg/dL Final      Calcium Calcium   Date Value Ref Range Status   08/04/2022 7.6 (L) 8.6 - 10.5 mg/dL Final   08/03/2022 8.0 (L) 8.6 - 10.5 mg/dL Final   08/02/2022 8.4 (L) 8.6 - 10.5 mg/dL Final      PO4 No results found for: CAPO4   Albumin Albumin   Date Value Ref Range Status   08/04/2022 3.00 (L) 3.50 - 5.20 g/dL Final   08/03/2022 3.60 3.50 - 5.20 g/dL Final      Magnesium No results found for: MG   Uric Acid  No results found for: URICACID        Results Review:     I reviewed the patient's new clinical results.  I reviewed the patient's new imaging results and agree with the interpretation.    amLODIPine, 10 mg, Oral, Q24H  castor oil-balsam peru, 1 application, Topical, Q12H  cloNIDine, 1 patch, Transdermal, Weekly  gabapentin, 100 mg, Oral, Q8H  heparin (porcine), 5,000 Units, Subcutaneous, Q8H  hydrALAZINE, 75 mg, Oral, Q8H  ipratropium-albuterol, 3 mL, Nebulization, 4x Daily - RT  metoprolol tartrate, 100 mg, Oral, Q12H  nicotine, 1 patch, Transdermal, Q24H  nystatin, , Topical, Q12H  pantoprazole, 40 mg, Oral, BID  piperacillin-tazobactam, 3.375 g, Intravenous, Q12H  predniSONE, 10 mg, Oral, Daily With Breakfast  saccharomyces boulardii, 250 mg, Oral, Daily  senna-docusate sodium, 2 tablet, Oral, BID  sodium chloride, 10 mL, Intravenous, Q12H           Medication Review: Reviewed    Assessment & Plan       DAX (acute kidney injury) (HCC)    Essential hypertension    Mixed hyperlipidemia    Bladder cancer (HCC)    COPD (chronic obstructive pulmonary disease) (HCC)    Adenomatous polyp of ascending colon    Acute on chronic respiratory failure with hypoxia (HCC)    Mass of upper lobe of left lung    Cancer of upper lobe of left lung (HCC)    Ileus (HCC)     1.  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 Serologic workup-  C3 - 88,  C4 16, CK 43, UPCR 0.65, FeNA . HD started 7/15/22.   2.  Metabolic acidosis resolved with medications and dialysis  3.  Non-small cell CA treated with chemotherapy as noted above.  4.  Anemia: S/p chemo  5.  Pancytopenia secondary to chemotherapy.  6.  Ileus.  Now on TPN for nutrition  7.  Nonnephrotic range proteinuria.  8.  Shock: Resolved.  9.  Hypertension: Stable.   10 Hyponatremia: resolved.     Plan:  - Good UOP however not clearly demonstrating signs of improvement. Discussed with primary service. Given high suspicion for AIN will  up his pred to 40 mg daily for few days and monitor renal function. If no improvement in renal function will have to consider renal biopsy at some point. Hold off on TDC conversion of HD cath today. Will reassess tomorrow.      Discussed with patient.     High risk patient with multiple medical problems.     Edward Abraham MD  08/04/22  14:58 EDT

## 2022-08-04 NOTE — PROGRESS NOTES
Daily chart review complete.  Unit rounding complete. Patient and SO appeared to be resting.  Primary RN states patient doing much better today.  No other questions or concerns at this time.  NN continues to follow.      Per current [employer] Standards, please consider:  LAMA/LABA/ICS in place ([facility]), Outpatient PFT, Rehab as appropriate, Palliative Care consult, NRT at [place], Annual LDCT per current screening guidelines (age 50-80 years old, smoking history of 20 pack years or more or has quit within past 15 years)         Patient has been scheduled for a hospital follow up with [facility] for 8/18/2022 @ 4pm with JAVAN Padron MD.

## 2022-08-04 NOTE — PROGRESS NOTES
[facility]  PROGRESS NOTE    Patient Name: Reese Cage  : 1950  MRN: [id number]    Date of Admission: 2022  Primary Care Physician: Kwame Hines MD    Subjective   Subjective     CC:  Weakness    HPI:  Constipation resolved. No diarrhea. Continues with intermittent wheezing but cough and congestion are better. No recurrence of f/c/sweats. Tired.    Review of Systems   Constitutional: Positive for activity change, appetite change and fatigue.   HENT: Positive for congestion.    Respiratory: Positive for cough. Negative for shortness of breath.    Cardiovascular: Negative.    Gastrointestinal: Negative for constipation and diarrhea.   Genitourinary: Negative.    Musculoskeletal: Negative.    Neurological: Positive for weakness.         Objective   Objective     Vital Signs:   Temp:  [96.9 °F (36.1 °C)-100.1 °F (37.8 °C)] 97 °F (36.1 °C)  Heart Rate:  [86-99] 88  Resp:  [16-20] 18  BP: ()/(43-67) 91/67  Flow (L/min):  [3-4] 3     Physical Exam:  NAD, alert and oriented  OP clear, MMM  Neck supple  No LAD  RRR  Expiratory wheezing, improved BS B otherwise  +BS, ND, NT, soft  KHAN  Normal affect  Urine in pinto bag clear without sediment  Family at bedside  No change in examination otherwise from 8/3    Results Reviewed:  LAB RESULTS:      Lab 22  0635 22  1417 22  0641 22  0412 22  0350   WBC 14.53* 20.09* 11.56* 11.20* 10.41   HEMOGLOBIN 7.7* 9.0* 8.7* 8.6* 8.7*   HEMATOCRIT 23.8* 26.7* 26.2* 26.5* 26.3*   PLATELETS 102* 135* 135* 140 147   NEUTROS ABS 11.73* 17.78* 8.03* 7.30* 6.34   IMMATURE GRANS (ABS) 0.10* 0.22* 0.11* 0.10* 0.10*   LYMPHS ABS 1.20 0.98 1.84 2.12 2.08   MONOS ABS 1.22* 1.05* 1.53* 1.66* 1.88*   EOS ABS 0.25 0.03 0.03 0.00 0.00   MCV 88.5 87.0 85.6 87.5 86.8         Lab 22  0635 22  1417 22  0642 22  0412 22  0350   SODIUM 134* 137 137 135* 137   POTASSIUM 4.2 4.1  4.2 4.2 4.5   CHLORIDE 96* 101 100 100 103   CO2 20.0* 25.0 24.0 25.0 26.0   ANION GAP 18.0* 11.0 13.0 10.0 8.0   BUN 67* 63* 56* 43* 26*   CREATININE 4.19* 3.79* 4.08* 3.33* 2.35*   EGFR 14.4* 16.3* 14.9* 19.0* 28.9*   GLUCOSE 84 93 100* 130* 116*   CALCIUM 7.6* 8.0* 8.4* 8.4* 8.4*         Lab 08/04/22  0635 08/03/22  1417   TOTAL PROTEIN 5.3* 6.0   ALBUMIN 3.00* 3.60   GLOBULIN 2.3 2.4   ALT (SGPT) 19 22   AST (SGOT) 13 16   BILIRUBIN 0.4 0.5   ALK PHOS 133* 144*                     Brief Urine Lab Results  (Last result in the past 365 days)      Color   Clarity   Blood   Leuk Est   Nitrite   Protein   CREAT   Urine HCG        08/03/22 0945 Yellow   Cloudy   Negative   Moderate (2+)   Negative   100 mg/dL (2+)                 Microbiology Results Abnormal     Procedure Component Value - Date/Time    Blood Culture - Blood, Blood, Venous Line [[id number]]  (Normal) Collected: 07/27/22 1500    Lab Status: Final result Specimen: Blood, Venous Line Updated: 08/01/22 1533     Blood Culture No growth at 5 days    Blood Culture - Blood, Blood, Venous Line [[id number]]  (Normal) Collected: 07/27/22 1500    Lab Status: Final result Specimen: Blood, Venous Line Updated: 08/01/22 1533     Blood Culture No growth at 5 days    Respiratory Culture - Sputum, Cough [[id number]] Collected: 07/28/22 1815    Lab Status: Final result Specimen: Sputum from Cough Updated: 07/28/22 2117     Respiratory Culture Rejected     Gram Stain No WBCs per low power field      Rare (1+) Epithelial cells per low power field      Few (2+) Gram negative bacilli    Narrative:      Specimen rejected due to oropharyngeal contamination. Please reorder and recollect specimen if clinically necessary.    Respiratory Culture - Sputum, Cough [[id number]] Collected: 07/27/22 2025    Lab Status: Final result Specimen: Sputum from Cough Updated: 07/27/22 2123     Respiratory Culture Rejected     Gram Stain Few (2+) Epithelial cells per low power field      No WBCs  per low power field      No organisms seen    Narrative:      Specimen rejected due to oropharyngeal contamination. Please reorder and recollect specimen if clinically necessary.    Blood Culture - Blood, Arm, Left [[id number]]  (Normal) Collected: 07/14/22 2325    Lab Status: Final result Specimen: Blood from Arm, Left Updated: 07/20/22 0502     Blood Culture No growth at 5 days    Narrative:      Aerobic bottle only      Blood Culture - Blood, Arm, Left [[id number]]  (Normal) Collected: 07/14/22 2350    Lab Status: Final result Specimen: Blood from Arm, Left Updated: 07/20/22 0502     Blood Culture No growth at 5 days    Blood Culture - Blood, Arm, Right [[id number]]  (Normal) Collected: 07/12/22 1615    Lab Status: Final result Specimen: Blood from Arm, Right Updated: 07/17/22 1647     Blood Culture No growth at 5 days    Blood Culture - Blood, Arm, Left [[id number]]  (Normal) Collected: 07/12/22 1620    Lab Status: Final result Specimen: Blood from Arm, Left Updated: 07/17/22 1646     Blood Culture No growth at 5 days    S. Pneumo Ag Urine or CSF - Urine, Urine, Clean Catch [[id number]]  (Normal) Collected: 07/15/22 0247    Lab Status: Final result Specimen: Urine, Clean Catch Updated: 07/15/22 1312     Strep Pneumo Ag Negative    Legionella Antigen, Urine - Urine, Urine, Catheter [[id number]]  (Normal) Collected: 07/15/22 0220    Lab Status: Final result Specimen: Urine, Catheter Updated: 07/15/22 1311     LEGIONELLA ANTIGEN, URINE Negative    MRSA Screen, PCR (Inpatient) - Swab, Nares [[id number]]  (Normal) Collected: 07/14/22 2343    Lab Status: Final result Specimen: Swab from Nares Updated: 07/15/22 0857     MRSA PCR Negative    Narrative:      The negative predictive value of this diagnostic test is high and should only be used to consider de-escalating anti-MRSA therapy. A positive result may indicate colonization with MRSA and must be correlated clinically.  MRSA Negative    Eosinophil Smear - Urine, Urine,  Clean Catch [[id number]]  (Normal) Collected: 07/15/22 0220    Lab Status: Final result Specimen: Urine, Clean Catch Updated: 07/15/22 0528     Eosinophil Smear 0 % EOS/100 Cells     Narrative:      No eosinophil seen    COVID PRE-OP / PRE-PROCEDURE SCREENING ORDER (NO ISOLATION) - Swab, Nasopharynx [[id number]]  (Normal) Collected: 07/14/22 2343    Lab Status: Final result Specimen: Swab from Nasopharynx Updated: 07/15/22 0100    Narrative:      The following orders were created for panel order COVID PRE-OP / PRE-PROCEDURE SCREENING ORDER (NO ISOLATION) - Swab, Nasopharynx.  Procedure                               Abnormality         Status                     ---------                               -----------         ------                     Respiratory Panel PCR w/...[[id number]]  Normal              Final result                 Please view results for these tests on the individual orders.    Respiratory Panel PCR w/COVID-19(SARS-CoV-2) ASHTYN/VARINDER/DEMETRIUS/PAD/COR/MAD/FAB In-House, NP Swab in UTM/VTM, 3-4 HR TAT - Swab, Nasopharynx [[id number]]  (Normal) Collected: 07/14/22 2343    Lab Status: Final result Specimen: Swab from Nasopharynx Updated: 07/15/22 0100     ADENOVIRUS, PCR Not Detected     Coronavirus 229E Not Detected     Coronavirus HKU1 Not Detected     Coronavirus NL63 Not Detected     Coronavirus OC43 Not Detected     COVID19 Not Detected     Human Metapneumovirus Not Detected     Human Rhinovirus/Enterovirus Not Detected     Influenza A PCR Not Detected     Influenza B PCR Not Detected     Parainfluenza Virus 1 Not Detected     Parainfluenza Virus 2 Not Detected     Parainfluenza Virus 3 Not Detected     Parainfluenza Virus 4 Not Detected     RSV, PCR Not Detected     Bordetella pertussis pcr Not Detected     Bordetella parapertussis PCR Not Detected     Chlamydophila pneumoniae PCR Not Detected     Mycoplasma pneumo by PCR Not Detected    Narrative:      In the setting of a positive respiratory panel with a  viral infection PLUS a negative procalcitonin without other underlying concern for bacterial infection, consider observing off antibiotics or discontinuation of antibiotics and continue supportive care. If the respiratory panel is positive for atypical bacterial infection (Bordetella pertussis, Chlamydophila pneumoniae, or Mycoplasma pneumoniae), consider antibiotic de-escalation to target atypical bacterial infection.    Clostridium Difficile Toxin - Stool, Per Rectum [[id number]]  (Normal) Collected: 07/14/22 1058    Lab Status: Final result Specimen: Stool from Per Rectum Updated: 07/14/22 1248    Narrative:      The following orders were created for panel order Clostridium Difficile Toxin - Stool, Per Rectum.  Procedure                               Abnormality         Status                     ---------                               -----------         ------                     Clostridium Difficile To...[[id number]]  Normal              Final result                 Please view results for these tests on the individual orders.    Clostridium Difficile Toxin, PCR - Stool, Per Rectum [[id number]]  (Normal) Collected: 07/14/22 1058    Lab Status: Final result Specimen: Stool from Per Rectum Updated: 07/14/22 1248     C. Difficile Toxins by PCR Not Detected    Narrative:      Performance characteristics of test not established for patients <2 years of age.  Negative for Toxigenic C. Difficile    Urine Culture - Urine, Urine, Clean Catch [[id number]] Collected: 07/12/22 1549    Lab Status: Final result Specimen: Urine, Clean Catch Updated: 07/14/22 0922     Urine Culture <25,000 CFU/mL Mixed Melanie Isolated    Narrative:      Specimen contains mixed organisms of questionable pathogenicity suggestive of contamination. If symptoms persist, suggest recollection.  Colonization of the urinary tract without infection is common. Treatment is discouraged unless the patient is symptomatic, pregnant, or undergoing an invasive  urologic procedure.    MRSA Screen, PCR (Inpatient) - Swab, Nares [[id number]]  (Normal) Collected: 07/13/22 1727    Lab Status: Final result Specimen: Swab from Nares Updated: 07/13/22 1851     MRSA PCR Negative    Narrative:      The negative predictive value of this diagnostic test is high and should only be used to consider de-escalating anti-MRSA therapy. A positive result may indicate colonization with MRSA and must be correlated clinically.  MRSA Negative    COVID PRE-OP / PRE-PROCEDURE SCREENING ORDER (NO ISOLATION) - Swab, Nasopharynx [[id number]]  (Normal) Collected: 07/12/22 1629    Lab Status: Final result Specimen: Swab from Nasopharynx Updated: 07/12/22 1731    Narrative:      The following orders were created for panel order COVID PRE-OP / PRE-PROCEDURE SCREENING ORDER (NO ISOLATION) - Swab, Nasopharynx.  Procedure                               Abnormality         Status                     ---------                               -----------         ------                     COVID-19 and FLU A/B PCR...[[id number]]  Normal              Final result                 Please view results for these tests on the individual orders.    COVID-19 and FLU A/B PCR - Swab, Nasopharynx [[id number]]  (Normal) Collected: 07/12/22 1629    Lab Status: Final result Specimen: Swab from Nasopharynx Updated: 07/12/22 1731     COVID19 Not Detected     Influenza A PCR Not Detected     Influenza B PCR Not Detected    Narrative:      Fact sheet for providers: https://www.fda.gov/media/362999/download    Fact sheet for patients: https://www.fda.gov/media/756978/download    Test performed by PCR.          XR Chest 1 View    Result Date: 8/3/2022  DATE OF EXAM: 8/3/2022 4:38 AM  PROCEDURE: XR CHEST 1 VW-  INDICATIONS: DYSPNEA, ON EXERTION   COMPARISON: AP portable chest 7/20/2022. CT chest 7/28/2022.  TECHNIQUE: Single radiographic view of the chest was obtained.  FINDINGS: Ill-defined airspace and interstitial disease is seen in the  perihilar right lung and right upper lobe. The right lung aeration has markedly improved since 7/28/2022.  Ill-defined left suprahilar interstitial and alveolar disease has improved, as well.  Previously seen right pleural effusion is no longer visible. Heart size is mildly enlarged but stable with CABG. Right internal jugular approach central line tip terminates at the lower SVC level. There is no visible pneumothorax. No acute osseous abnormalities.      Impression:  1. Ill-defined alveolar and interstitial disease in the perihilar right lung and right upper lobe, as well as in the suprahilar left upper lobe. These findings have significantly improved, when compared to 7/28/2022. FINDINGS are thought to reflect improving pneumonia. 2. Previously seen right pleural effusion is no longer visible.  This report was finalized on 8/3/2022 8:17 AM by Caryl Brian MD.        Results for orders placed during the hospital encounter of 07/12/22    Adult Transthoracic Echo Complete W/ Cont if Necessary Per Protocol    Interpretation Summary  · Left ventricular ejection fraction appears to be 56 - 60%. Left ventricular systolic function is normal.  · Left ventricular diastolic function was indeterminate.  · Left ventricular wall thickness is consistent with moderate concentric hypertrophy.  · Estimated right ventricular systolic pressure from tricuspid regurgitation is normal (<35 mmHg).      I have reviewed the medications:  Scheduled Meds:amLODIPine, 10 mg, Oral, Q24H  castor oil-balsam peru, 1 application, Topical, Q12H  cloNIDine, 1 patch, Transdermal, Weekly  gabapentin, 100 mg, Oral, Q8H  heparin (porcine), 5,000 Units, Subcutaneous, Q8H  hydrALAZINE, 75 mg, Oral, Q8H  ipratropium-albuterol, 3 mL, Nebulization, 4x Daily - RT  metoprolol tartrate, 100 mg, Oral, Q12H  nicotine, 1 patch, Transdermal, Q24H  nystatin, , Topical, Q12H  pantoprazole, 40 mg, Oral, BID  piperacillin-tazobactam, 3.375 g, Intravenous,  Q12H  predniSONE, 10 mg, Oral, Daily With Breakfast  saccharomyces boulardii, 250 mg, Oral, Daily  senna-docusate sodium, 2 tablet, Oral, BID  sodium chloride, 10 mL, Intravenous, Q12H      Continuous Infusions:   PRN Meds:.•  acetaminophen **OR** [DISCONTINUED] acetaminophen **OR** [DISCONTINUED] acetaminophen  •  albuterol  •  senna-docusate sodium **AND** polyethylene glycol **AND** bisacodyl **AND** bisacodyl  •  cloNIDine  •  labetalol  •  [DISCONTINUED] ondansetron **OR** ondansetron  •  sodium chloride    Assessment & Plan   Assessment & Plan     Active Hospital Problems    Diagnosis  POA   • **DAX (acute kidney injury) (HCC) [N17.9]  Yes   • Ileus (HCC) [K56.7]  Yes   • Cancer of upper lobe of left lung (HCC) [C34.12]  Yes   • Mass of upper lobe of left lung [R91.8]  Yes   • Acute on chronic respiratory failure with hypoxia (HCC) [J96.21]  Yes   • Adenomatous polyp of ascending colon [D12.2]  Yes   • Bladder cancer (HCC) [C67.9]  Yes   • COPD (chronic obstructive pulmonary disease) (HCC) [J44.9]  Yes   • Essential hypertension [I10]  Yes   • Mixed hyperlipidemia [E78.2]  Yes      Resolved Hospital Problems   No resolved problems to display.        Brief Hospital Course to date:  Mr. 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 Tabor on 7/8/22. On 7/9, he developed abdominal pain with nausea, vomiting and diarrhea. On 7/12/22, he developed shortness of breath and fevers and presented to the [facility] ED and was admitted to Hospital Medicine. At admission, he was noted to have DAX and a LLL pneumonia likely secondary to aspiration.      His nausea and vomiting improved and his NGT was removed on 7/14/22. His renal function continued to decline and Nephrology was consulted. He developed refractory hypotension on 7/14 and was transferred to the ICU. There was concern for  immune mediated pneumonitis and nephritis and IV Solumedrol was started on 7/15.      He did require TPN for a period of time secondary to ileus. EGD on 7/23/22 showed esophagitis and NG trauma. This was performed secondary to anemia. Patient on PPI.     Hemodialysis was initiated on 7/15/22.      He has not had any signs of recurrent bleeding. Steroids are being tapered without significant change in respiratory status.   He continues to have a markedly abnormal chest exam and CT with effusions/pneumonia-pneumonitis noted.    DAX, possibly immune mediated s/p nivolumab for squamous cell lung cancer  -non-oliguric, HD per renal, but good UOP    Pneumonia s/p antibiotics    Fevers, suspected CAUTI  -change pinto  -continue zosyn  -follow up urine culture    Pneumonitis  -s/p nivolumab, on steroids, weaning per pulmonary, long taper    B effusions R>L  -monitor, HD, with UF per renal  -hx of HFpEF  -most recent CXR appears improved    Concerns for GIB  -s/p EGD with esophagitis, NG trauma  -PPI    Anemia  -monitoring/low, stable    Hx of COPD  -nebs/steroids    HTN  -monitoring    Squamous cell lung cancer  -s/p chemotherapy    Expected Discharge Location and Transportation: Rehab  Expected Discharge Date: 8/5    DVT prophylaxis:  Medical DVT prophylaxis orders are present.     AM-PAC 6 Clicks Score (PT): 19 (08/02/22 0800)    CODE STATUS:   Code Status and Medical Interventions:   Ordered at: 07/12/22 8545     Code Status (Patient has no pulse and is not breathing):    CPR (Attempt to Resuscitate)     Medical Interventions (Patient has pulse or is breathing):    Full Support       Seferino Escobar MD  08/04/22

## 2022-08-04 NOTE — PLAN OF CARE
Goal Outcome Evaluation:      Pt A&Ox4, 3L NC, VSS, controlled Afib on the monitor. Spouse at bedside. Febrile this am at 102.7, Tylenol given, UA collected and sent to lab. Last temp 96.9 at 1500. Appeared asleep for most of shift, this nurse observed rise and fall of chest and pulse ox readings in upper 90s. Agitated during latter shift r/t NPO status, MD notified, diet placed. NPO at midnight for possible tunnel cath.

## 2022-08-05 NOTE — PROGRESS NOTES
Daily chart review and rounding complete.  NN spoke with SO at .  Patient resting on 3L with O2 sat of 91%, home baseline is 2L.  Plans for HD still pending.  No other questions or concerns at this time.  NN continues to follow.        Per current GOLD Standards, please consider:  LAMA/LABA/ICS in place (Breztri), Outpatient PFT, Rehab as appropriate, Palliative Care consult, NRT at ND, Annual LDCT per current screening guidelines (age 50-80 years old, smoking history of 20 pack years or more or has quit within past 15 years)         Patient has been scheduled for a hospital follow up with [facility] for 8/18/2022 @ 4pm with JAVAN Padron MD.

## 2022-08-05 NOTE — CASE MANAGEMENT/SOCIAL WORK
Continued Stay Note  [facility]     Patient Name: Reese Cage  MRN: [id number]  Today's Date: 8/5/2022    Admit Date: 7/12/2022     Discharge Plan     Row Name 08/05/22 0910       Plan    Plan HOme with spouse    Plan Comments I have discussed discharge plans with patient and also his wife. She has obtained a rolling walker for him, he is current with [facility] for his home oxygen. I spoke with Dr. Cadet yesterday and will await his plans.   No current home health needed as well as patient declining this.    Final Discharge Disposition Code 01 - home or self-care               Discharge Codes    No documentation.               Expected Discharge Date and Time     Expected Discharge Date Expected Discharge Time    Aug 5, 2022             Miroslava Grajeda RN

## 2022-08-05 NOTE — PROGRESS NOTES
[facility] Medicine Services  PROGRESS NOTE    Patient Name: Reese Cage  : 1950  MRN: [id number]    Date of Admission: 2022  Primary Care Physician: Kwame Hines MD    Subjective   Subjective     CC:  Weakness    HPI:  Constipation resolved. No diarrhea. Continues with intermittent wheezing, with cough, using spirometry. No f/c. Notes I&O catheterization today.    Review of Systems   Constitutional: Positive for activity change, appetite change and fatigue.   HENT: Positive for congestion.    Respiratory: Positive for cough. Negative for shortness of breath.    Cardiovascular: Negative.    Gastrointestinal: Negative for constipation and diarrhea.   Genitourinary: Positive for difficulty urinating and dysuria.   Musculoskeletal: Negative.    Neurological: Positive for weakness.     No change otherwise from     Objective   Objective     Vital Signs:   Temp:  [97.4 °F (36.3 °C)-98 °F (36.7 °C)] 97.6 °F (36.4 °C)  Heart Rate:  [] 101  Resp:  [16-18] 18  BP: ()/(44-74) 106/74  Flow (L/min):  [3] 3     Physical Exam:  NAD, alert and oriented  OP clear, MMM  Neck supple  No LAD  RRR  Expiratory wheezing, improved BS B otherwise  +BS, ND, NT, soft  KHAN  Normal affect  Case out  Family at bedside  No change in examination otherwise from     Results Reviewed:  LAB RESULTS:      Lab 22  0533 22  0635 22  1417 22  0641 22  0412   WBC 9.57 14.53* 20.09* 11.56* 11.20*   HEMOGLOBIN 7.0* 7.7* 9.0* 8.7* 8.6*   HEMATOCRIT 21.1* 23.8* 26.7* 26.2* 26.5*   PLATELETS 98* 102* 135* 135* 140   NEUTROS ABS 7.38* 11.73* 17.78* 8.03* 7.30*   IMMATURE GRANS (ABS) 0.09* 0.10* 0.22* 0.11* 0.10*   LYMPHS ABS 0.92 1.20 0.98 1.84 2.12   MONOS ABS 0.92* 1.22* 1.05* 1.53* 1.66*   EOS ABS 0.24 0.25 0.03 0.03 0.00   MCV 85.4 88.5 87.0 85.6 87.5         Lab 22  0533 22  0635 22  1417 22  0642 22  0412   SODIUM 137 134*  137 137 135*   POTASSIUM 4.2 4.2 4.1 4.2 4.2   CHLORIDE 101 96* 101 100 100   CO2 21.0* 20.0* 25.0 24.0 25.0   ANION GAP 15.0 18.0* 11.0 13.0 10.0   BUN 76* 67* 63* 56* 43*   CREATININE 4.59* 4.19* 3.79* 4.08* 3.33*   EGFR 12.9* 14.4* 16.3* 14.9* 19.0*   GLUCOSE 117* 84 93 100* 130*   CALCIUM 7.7* 7.6* 8.0* 8.4* 8.4*         Lab 08/05/22  0533 08/04/22  0635 08/03/22  1417   TOTAL PROTEIN 5.4* 5.3* 6.0   ALBUMIN 3.00* 3.00* 3.60   GLOBULIN 2.4 2.3 2.4   ALT (SGPT) 15 19 22   AST (SGOT) 9 13 16   BILIRUBIN 0.3 0.4 0.5   ALK PHOS 132* 133* 144*                     Brief Urine Lab Results  (Last result in the past 365 days)      Color   Clarity   Blood   Leuk Est   Nitrite   Protein   CREAT   Urine HCG        08/04/22 1533 Yellow   Turbid   Trace   Large (3+)   Negative   100 mg/dL (2+)                 Microbiology Results Abnormal     Procedure Component Value - Date/Time    Blood Culture - Blood, Arm, Left [[id number]]  (Normal) Collected: 08/03/22 2116    Lab Status: Preliminary result Specimen: Blood from Arm, Left Updated: 08/04/22 2146     Blood Culture No growth at 24 hours    Blood Culture - Blood, Arm, Left [[id number]]  (Normal) Collected: 08/03/22 1415    Lab Status: Preliminary result Specimen: Blood from Arm, Left Updated: 08/04/22 1503     Blood Culture No growth at 24 hours    Blood Culture - Blood, Blood, Venous Line [[id number]]  (Normal) Collected: 07/27/22 1500    Lab Status: Final result Specimen: Blood, Venous Line Updated: 08/01/22 1533     Blood Culture No growth at 5 days    Blood Culture - Blood, Blood, Venous Line [[id number]]  (Normal) Collected: 07/27/22 1500    Lab Status: Final result Specimen: Blood, Venous Line Updated: 08/01/22 1533     Blood Culture No growth at 5 days    Respiratory Culture - Sputum, Cough [[id number]] Collected: 07/28/22 1815    Lab Status: Final result Specimen: Sputum from Cough Updated: 07/28/22 2117     Respiratory Culture Rejected     Gram Stain No WBCs per low power  field      Rare (1+) Epithelial cells per low power field      Few (2+) Gram negative bacilli    Narrative:      Specimen rejected due to oropharyngeal contamination. Please reorder and recollect specimen if clinically necessary.    Respiratory Culture - Sputum, Cough [[id number]] Collected: 07/27/22 2025    Lab Status: Final result Specimen: Sputum from Cough Updated: 07/27/22 2123     Respiratory Culture Rejected     Gram Stain Few (2+) Epithelial cells per low power field      No WBCs per low power field      No organisms seen    Narrative:      Specimen rejected due to oropharyngeal contamination. Please reorder and recollect specimen if clinically necessary.    Blood Culture - Blood, Arm, Left [[id number]]  (Normal) Collected: 07/14/22 2325    Lab Status: Final result Specimen: Blood from Arm, Left Updated: 07/20/22 0502     Blood Culture No growth at 5 days    Narrative:      Aerobic bottle only      Blood Culture - Blood, Arm, Left [[id number]]  (Normal) Collected: 07/14/22 2350    Lab Status: Final result Specimen: Blood from Arm, Left Updated: 07/20/22 0502     Blood Culture No growth at 5 days    Blood Culture - Blood, Arm, Right [[id number]]  (Normal) Collected: 07/12/22 1615    Lab Status: Final result Specimen: Blood from Arm, Right Updated: 07/17/22 1647     Blood Culture No growth at 5 days    Blood Culture - Blood, Arm, Left [[id number]]  (Normal) Collected: 07/12/22 1620    Lab Status: Final result Specimen: Blood from Arm, Left Updated: 07/17/22 1646     Blood Culture No growth at 5 days    S. Pneumo Ag Urine or CSF - Urine, Urine, Clean Catch [[id number]]  (Normal) Collected: 07/15/22 0247    Lab Status: Final result Specimen: Urine, Clean Catch Updated: 07/15/22 1312     Strep Pneumo Ag Negative    Legionella Antigen, Urine - Urine, Urine, Catheter [[id number]]  (Normal) Collected: 07/15/22 0220    Lab Status: Final result Specimen: Urine, Catheter Updated: 07/15/22 1311     LEGIONELLA ANTIGEN, URINE  Negative    MRSA Screen, PCR (Inpatient) - Swab, Nares [[id number]]  (Normal) Collected: 07/14/22 2343    Lab Status: Final result Specimen: Swab from Nares Updated: 07/15/22 0857     MRSA PCR Negative    Narrative:      The negative predictive value of this diagnostic test is high and should only be used to consider de-escalating anti-MRSA therapy. A positive result may indicate colonization with MRSA and must be correlated clinically.  MRSA Negative    Eosinophil Smear - Urine, Urine, Clean Catch [[id number]]  (Normal) Collected: 07/15/22 0220    Lab Status: Final result Specimen: Urine, Clean Catch Updated: 07/15/22 0528     Eosinophil Smear 0 % EOS/100 Cells     Narrative:      No eosinophil seen    COVID PRE-OP / PRE-PROCEDURE SCREENING ORDER (NO ISOLATION) - Swab, Nasopharynx [[id number]]  (Normal) Collected: 07/14/22 2343    Lab Status: Final result Specimen: Swab from Nasopharynx Updated: 07/15/22 0100    Narrative:      The following orders were created for panel order COVID PRE-OP / PRE-PROCEDURE SCREENING ORDER (NO ISOLATION) - Swab, Nasopharynx.  Procedure                               Abnormality         Status                     ---------                               -----------         ------                     Respiratory Panel PCR w/...[[id number]]  Normal              Final result                 Please view results for these tests on the individual orders.    Respiratory Panel PCR w/COVID-19(SARS-CoV-2) [facility]/VARINDER/DEMETRIUS/PAD/[facility]/FAB In-House, NP Swab in UTM/VTM, 3-4 HR TAT - Swab, Nasopharynx [[id number]]  (Normal) Collected: 07/14/22 2343    Lab Status: Final result Specimen: Swab from Nasopharynx Updated: 07/15/22 0100     ADENOVIRUS, PCR Not Detected     Coronavirus 229E Not Detected     Coronavirus HKU1 Not Detected     Coronavirus NL63 Not Detected     Coronavirus OC43 Not Detected     COVID19 Not Detected     Human Metapneumovirus Not Detected     Human Rhinovirus/Enterovirus Not Detected      Influenza A PCR Not Detected     Influenza B PCR Not Detected     Parainfluenza Virus 1 Not Detected     Parainfluenza Virus 2 Not Detected     Parainfluenza Virus 3 Not Detected     Parainfluenza Virus 4 Not Detected     RSV, PCR Not Detected     Bordetella pertussis pcr Not Detected     Bordetella parapertussis PCR Not Detected     Chlamydophila pneumoniae PCR Not Detected     Mycoplasma pneumo by PCR Not Detected    Narrative:      In the setting of a positive respiratory panel with a viral infection PLUS a negative procalcitonin without other underlying concern for bacterial infection, consider observing off antibiotics or discontinuation of antibiotics and continue supportive care. If the respiratory panel is positive for atypical bacterial infection (Bordetella pertussis, Chlamydophila pneumoniae, or Mycoplasma pneumoniae), consider antibiotic de-escalation to target atypical bacterial infection.    Clostridium Difficile Toxin - Stool, Per Rectum [[id number]]  (Normal) Collected: 07/14/22 1058    Lab Status: Final result Specimen: Stool from Per Rectum Updated: 07/14/22 1248    Narrative:      The following orders were created for panel order Clostridium Difficile Toxin - Stool, Per Rectum.  Procedure                               Abnormality         Status                     ---------                               -----------         ------                     Clostridium Difficile To...[[id number]]  Normal              Final result                 Please view results for these tests on the individual orders.    Clostridium Difficile Toxin, PCR - Stool, Per Rectum [[id number]]  (Normal) Collected: 07/14/22 1058    Lab Status: Final result Specimen: Stool from Per Rectum Updated: 07/14/22 1248     C. Difficile Toxins by PCR Not Detected    Narrative:      Performance characteristics of test not established for patients <2 years of age.  Negative for Toxigenic C. Difficile    Urine Culture - Urine, Urine, Clean  Catch [[id number]] Collected: 07/12/22 1549    Lab Status: Final result Specimen: Urine, Clean Catch Updated: 07/14/22 0922     Urine Culture <25,000 CFU/mL Mixed Melanie Isolated    Narrative:      Specimen contains mixed organisms of questionable pathogenicity suggestive of contamination. If symptoms persist, suggest recollection.  Colonization of the urinary tract without infection is common. Treatment is discouraged unless the patient is symptomatic, pregnant, or undergoing an invasive urologic procedure.    MRSA Screen, PCR (Inpatient) - Swab, Nares [[id number]]  (Normal) Collected: 07/13/22 1727    Lab Status: Final result Specimen: Swab from Nares Updated: 07/13/22 1851     MRSA PCR Negative    Narrative:      The negative predictive value of this diagnostic test is high and should only be used to consider de-escalating anti-MRSA therapy. A positive result may indicate colonization with MRSA and must be correlated clinically.  MRSA Negative    COVID PRE-OP / PRE-PROCEDURE SCREENING ORDER (NO ISOLATION) - Swab, Nasopharynx [[id number]]  (Normal) Collected: 07/12/22 1629    Lab Status: Final result Specimen: Swab from Nasopharynx Updated: 07/12/22 1731    Narrative:      The following orders were created for panel order COVID PRE-OP / PRE-PROCEDURE SCREENING ORDER (NO ISOLATION) - Swab, Nasopharynx.  Procedure                               Abnormality         Status                     ---------                               -----------         ------                     COVID-19 and FLU A/B PCR...[[id number]]  Normal              Final result                 Please view results for these tests on the individual orders.    COVID-19 and FLU A/B PCR - Swab, Nasopharynx [[id number]]  (Normal) Collected: 07/12/22 1629    Lab Status: Final result Specimen: Swab from Nasopharynx Updated: 07/12/22 1731     COVID19 Not Detected     Influenza A PCR Not Detected     Influenza B PCR Not Detected    Narrative:      Fact sheet for  providers: https://www.fda.gov/media/298076/download    Fact sheet for patients: https://www.fda.gov/media/010004/download    Test performed by PCR.          No radiology results from the last 24 hrs    Results for orders placed during the hospital encounter of 07/12/22    Adult Transthoracic Echo Complete W/ Cont if Necessary Per Protocol    Interpretation Summary  · Left ventricular ejection fraction appears to be 56 - 60%. Left ventricular systolic function is normal.  · Left ventricular diastolic function was indeterminate.  · Left ventricular wall thickness is consistent with moderate concentric hypertrophy.  · Estimated right ventricular systolic pressure from tricuspid regurgitation is normal (<35 mmHg).      I have reviewed the medications:  Scheduled Meds:amLODIPine, 10 mg, Oral, Q24H  castor oil-balsam peru, 1 application, Topical, Q12H  cloNIDine, 1 patch, Transdermal, Weekly  gabapentin, 100 mg, Oral, Q8H  heparin (porcine), 5,000 Units, Subcutaneous, Q8H  hydrALAZINE, 25 mg, Oral, Q8H  ipratropium-albuterol, 3 mL, Nebulization, 4x Daily - RT  metoprolol tartrate, 100 mg, Oral, Q12H  nicotine, 1 patch, Transdermal, Q24H  nystatin, , Topical, Q12H  pantoprazole, 40 mg, Oral, BID  piperacillin-tazobactam, 3.375 g, Intravenous, Q12H  saccharomyces boulardii, 250 mg, Oral, Daily  senna-docusate sodium, 2 tablet, Oral, BID  sodium chloride, 10 mL, Intravenous, Q12H  tamsulosin, 0.4 mg, Oral, Daily      Continuous Infusions:   PRN Meds:.•  acetaminophen **OR** [DISCONTINUED] acetaminophen **OR** [DISCONTINUED] acetaminophen  •  albuterol  •  senna-docusate sodium **AND** polyethylene glycol **AND** bisacodyl **AND** bisacodyl  •  cloNIDine  •  labetalol  •  [DISCONTINUED] ondansetron **OR** ondansetron  •  sodium chloride    Assessment & Plan   Assessment & Plan     Active Hospital Problems    Diagnosis  POA   • **DAX (acute kidney injury) (HCC) [N17.9]  Yes   • Ileus (HCC) [K56.7]  Yes   • Cancer of upper  lobe of left lung (HCC) [C34.12]  Yes   • Mass of upper lobe of left lung [R91.8]  Yes   • Acute on chronic respiratory failure with hypoxia (HCC) [J96.21]  Yes   • Adenomatous polyp of ascending colon [D12.2]  Yes   • Bladder cancer (HCC) [C67.9]  Yes   • COPD (chronic obstructive pulmonary disease) (HCC) [J44.9]  Yes   • Essential hypertension [I10]  Yes   • Mixed hyperlipidemia [E78.2]  Yes      Resolved Hospital Problems   No resolved problems to display.        Brief Hospital Course to date:  Mr. Caeg is a 71yo M with a history of HTN, HLD, CAD s/p CABG, HFpEF, COPD, bladder cancer and a recent diagnosis of TRISTAN squamous cell lung cancer (Stage IIb) who was started on chemotherapy by Dr. Tabor on 7/8/22. On 7/9, he developed abdominal pain with nausea, vomiting and diarrhea. On 7/12/22, he developed shortness of breath and fevers and presented to the [facility] ED and was admitted to Hospital Medicine. At admission, he was noted to have DAX and a LLL pneumonia likely secondary to aspiration.      His nausea and vomiting improved and his NGT was removed on 7/14/22. His renal function continued to decline and Nephrology was consulted. He developed refractory hypotension on 7/14 and was transferred to the ICU. There was concern for immune mediated pneumonitis and nephritis and IV Solumedrol was started on 7/15.      He did require TPN for a period of time secondary to ileus. EGD on 7/23/22 showed esophagitis and NG trauma. This was performed secondary to anemia. Patient on PPI.     Hemodialysis was initiated on 7/15/22.      He has not had any signs of recurrent bleeding. Steroids are being tapered without significant change in respiratory status.   He continues to have a markedly abnormal chest exam and CT with effusions/pneumonia-pneumonitis noted.    DAX, possibly immune mediated s/p nivolumab for squamous cell lung cancer  -non-oliguric, HD per renal, but good UOP  -d/w renal, creatinine rising, considering tunneled  catheter/HD  -may need PRBC    Pneumonia s/p antibiotics    Fevers, suspected CAUTI  -changed pinto  -first culture with yeast  -repeat UA, culture pending  -consider ID inpuat    Urinary retention  -mobilize and add flomax  -treat UTI    Pneumonitis  -s/p nivolumab, on steroids    B effusions R>L  -monitor, HD, with UF per renal  -hx of HFpEF  -most recent CXR appears improved    Concerns for GIB  -s/p EGD with esophagitis, NG trauma  -PPI, no evidence of bleeding    Anemia  -monitoring/low, may warrant PRBC now    Hx of COPD  -nebs/steroids    HTN  -monitoring    Squamous cell lung cancer  -s/p chemotherapy    Expected Discharge Location and Transportation: Rehab  Expected Discharge Date: 8/8    DVT prophylaxis:  Medical DVT prophylaxis orders are present.     AM-PAC 6 Clicks Score (PT): 18 (08/05/22 0800)    CODE STATUS:   Code Status and Medical Interventions:   Ordered at: 07/12/22 8123     Code Status (Patient has no pulse and is not breathing):    CPR (Attempt to Resuscitate)     Medical Interventions (Patient has pulse or is breathing):    Full Support       Seferino Escobar MD  08/05/22

## 2022-08-05 NOTE — PROGRESS NOTES
"   LOS: 24 days    Patient Care Team:  Kwame Hines MD as PCP - General (Family Medicine)  Ryan Del Valle MD as Consulting Physician (Pulmonary Disease)  Néstor Kelly MD as Consulting Physician (Urology)    Chief Complaint: Acute kidney injury, nausea vomiting diarrhea.    72-year-old with some non-small cell CA treated with chemotherapy including immunotherapy with carboplatinum, paclitaxel and Opdivo, started last week complain of nausea vomiting diarrhea poor oral intake, on admission creatinine 3.8 slowly got worse.  CT scan showed aspiration pneumonia with ileus this.  Despite IV fluid since admission renal function continued to deteriorate and was started on dialysis on 7/15.      Subjective   Uptrend in serum cr and BUN. Clinically stable. Good UOP.       Objective     Vital Sign Min/Max for last 24 hours  Temp  Min: 97.5 °F (36.4 °C)  Max: 98 °F (36.7 °C)   BP  Min: 103/62  Max: 116/56   Pulse  Min: 92  Max: 107   Resp  Min: 17  Max: 18   SpO2  Min: 92 %  Max: 98 %   Flow (L/min)  Min: 3  Max: 3   No data recorded     Flowsheet Rows    Flowsheet Row First Filed Value   Admission Height 170.2 cm (67\") Documented at 07/12/2022 1123   Admission Weight 70.3 kg (155 lb) Documented at 07/12/2022 1123          I/O this shift:  In: 500 [P.O.:500]  Out: 400 [Urine:400]  I/O last 3 completed shifts:  In: -   Out: 1625 [Urine:1625]    Physical Exam:    Gen: Alert, NAD   HENT: NC, AT, EOMI   NECK: Supple, no JVD, Trachea midline   LUNGS: CTA bilaterally, non labored respirtation   CVS: S1/S2 audible, RRR, no murmur   Abd: Soft, NT, ND, BS+   Ext: No pedal edema, no cyanosis   CNS: Alert, No focal deficit noted grossly  Psy: Cooperative  Skin: Warm, dry and intact  Dialysis catheter noted.     WBC WBC   Date Value Ref Range Status   08/05/2022 9.57 3.40 - 10.80 10*3/mm3 Final   08/04/2022 14.53 (H) 3.40 - 10.80 10*3/mm3 Final   08/03/2022 20.09 (H) 3.40 - 10.80 10*3/mm3 Final      HGB Hemoglobin   Date " Value Ref Range Status   08/05/2022 7.0 (L) 13.0 - 17.7 g/dL Final   08/04/2022 7.7 (L) 13.0 - 17.7 g/dL Final   08/03/2022 9.0 (L) 13.0 - 17.7 g/dL Final      HCT Hematocrit   Date Value Ref Range Status   08/05/2022 21.1 (L) 37.5 - 51.0 % Final   08/04/2022 23.8 (L) 37.5 - 51.0 % Final   08/03/2022 26.7 (L) 37.5 - 51.0 % Final      Platlets No results found for: LABPLAT   MCV MCV   Date Value Ref Range Status   08/05/2022 85.4 79.0 - 97.0 fL Final   08/04/2022 88.5 79.0 - 97.0 fL Final   08/03/2022 87.0 79.0 - 97.0 fL Final          Sodium Sodium   Date Value Ref Range Status   08/05/2022 137 136 - 145 mmol/L Final   08/04/2022 134 (L) 136 - 145 mmol/L Final   08/03/2022 137 136 - 145 mmol/L Final      Potassium Potassium   Date Value Ref Range Status   08/05/2022 4.2 3.5 - 5.2 mmol/L Final   08/04/2022 4.2 3.5 - 5.2 mmol/L Final   08/03/2022 4.1 3.5 - 5.2 mmol/L Final      Chloride Chloride   Date Value Ref Range Status   08/05/2022 101 98 - 107 mmol/L Final   08/04/2022 96 (L) 98 - 107 mmol/L Final   08/03/2022 101 98 - 107 mmol/L Final      CO2 CO2   Date Value Ref Range Status   08/05/2022 21.0 (L) 22.0 - 29.0 mmol/L Final   08/04/2022 20.0 (L) 22.0 - 29.0 mmol/L Final   08/03/2022 25.0 22.0 - 29.0 mmol/L Final      BUN BUN   Date Value Ref Range Status   08/05/2022 76 (H) 8 - 23 mg/dL Final   08/04/2022 67 (H) 8 - 23 mg/dL Final   08/03/2022 63 (H) 8 - 23 mg/dL Final      Creatinine Creatinine   Date Value Ref Range Status   08/05/2022 4.59 (H) 0.76 - 1.27 mg/dL Final   08/04/2022 4.19 (H) 0.76 - 1.27 mg/dL Final   08/03/2022 3.79 (H) 0.76 - 1.27 mg/dL Final      Calcium Calcium   Date Value Ref Range Status   08/05/2022 7.7 (L) 8.6 - 10.5 mg/dL Final   08/04/2022 7.6 (L) 8.6 - 10.5 mg/dL Final   08/03/2022 8.0 (L) 8.6 - 10.5 mg/dL Final      PO4 No results found for: CAPO4   Albumin Albumin   Date Value Ref Range Status   08/05/2022 3.00 (L) 3.50 - 5.20 g/dL Final   08/04/2022 3.00 (L) 3.50 - 5.20 g/dL Final    08/03/2022 3.60 3.50 - 5.20 g/dL Final      Magnesium No results found for: MG   Uric Acid No results found for: URICACID        Results Review:     I reviewed the patient's new clinical results.  I reviewed the patient's new imaging results and agree with the interpretation.    amLODIPine, 10 mg, Oral, Q24H  castor oil-balsam peru, 1 application, Topical, Q12H  cloNIDine, 1 patch, Transdermal, Weekly  gabapentin, 100 mg, Oral, Q8H  heparin (porcine), 5,000 Units, Subcutaneous, Q8H  hydrALAZINE, 25 mg, Oral, Q8H  ipratropium-albuterol, 3 mL, Nebulization, 4x Daily - RT  metoprolol tartrate, 100 mg, Oral, Q12H  nicotine, 1 patch, Transdermal, Q24H  nystatin, , Topical, Q12H  pantoprazole, 40 mg, Oral, BID  piperacillin-tazobactam, 3.375 g, Intravenous, Q12H  saccharomyces boulardii, 250 mg, Oral, Daily  senna-docusate sodium, 2 tablet, Oral, BID  sodium chloride, 10 mL, Intravenous, Q12H  tamsulosin, 0.4 mg, Oral, Daily           Medication Review: Reviewed    Assessment & Plan       DAX (acute kidney injury) (HCC)    Essential hypertension    Mixed hyperlipidemia    Bladder cancer (HCC)    COPD (chronic obstructive pulmonary disease) (HCC)    Adenomatous polyp of ascending colon    Acute on chronic respiratory failure with hypoxia (HCC)    Mass of upper lobe of left lung    Cancer of upper lobe of left lung (HCC)    Ileus (HCC)     1.  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 Serologic workup-  C3 - 88,  C4 16, CK 43, UPCR 0.65, FeNA . HD started 7/15/22.   2.  Metabolic acidosis resolved with medications and dialysis  3.  Non-small cell CA treated with chemotherapy as noted above.  4.  Anemia: S/p chemo  5.  Pancytopenia secondary to chemotherapy.  6.  Ileus.  Now on TPN for nutrition  7.  Nonnephrotic range proteinuria.  8.  Shock: Resolved.  9.  Hypertension: Stable.   10 Hyponatremia: resolved.     Plan:  - Good UOP however not clearly demonstrating signs of  improvement. Discussed with primary service. Given high suspicion for AIN will up his pred to 40 mg daily for few days and monitor renal function. If no improvement in renal function will have to consider renal biopsy at some point by next week Hold off on TDC conversion of HD cath today will f/u with blood cx before placing TDC. Transfuse at Hb<7   Discussed with patient.     High risk patient with multiple medical problems.     Edward Abraham MD  08/05/22  18:58 EDT

## 2022-08-06 NOTE — CONSULTS
INFECTIOUS DISEASE CONSULT/INITIAL HOSPITAL VISIT    Reese Cage  1950  [id number]    Date of Consult: 8/6/2022    Admission Date: 7/12/2022      Requesting Provider: Seferino Escobar MD  Evaluating Physician: Stephanie Waggoner MD    Reason for Consultation: fevers, immunocompromised, suspected CAUTI, with yeast x 2 now    History of present illness:    Patient is a 72 y.o. male with h/o CAD/CABG, COPD, HTN, chronic diastolic CHF, Psoriasis, renal mass, bladder cancer with tumor debulking, and recent diagnosis of TRISTAN squamous cell lung cancer/Stage IIb/started on chemotherapy (nivolumab) 7/8/22 by Dr. Tabor who presented to [facility] ED on 7/12 with worsening shortness of breath, fevers, abdominal pain, nausea and vomiting for the past 2 to 3 days PTA.  He was noted to have ARF and LLL aspiration pneumonia.  An NGT was placed and on improvement, removed 7/14.  He developed refractory hypotension and was moved to ICU on 7/14.  His renal function continued to worsening.  He was started on steroids on 7/15 with concern for immune-mediated pneumonitis and nephritis related to nivolumab/platin based chemotherapy.  He developed an ileus and was placed on a short course of TPN.  An EGD on 7/23 was done given worsening anemia and it showed esophagitis with NGT trauma.   A Hemodialysis was started on 7/15.  He developed a fever up to 102.7 on 8/3 with suspected CAUTI and pinto cath was removed.  His fever curve has improved. His UA WBC was TNTC with large LE/negative nitrite with urine cx on 8/4 positive for yeast.  A urine culture from 8/3 was also positive for yeast. Blood cultures from 8/3 are negative to date. His creatinine continues to worsen, but he is producing urine.  A Hgb today is 7.0.  WBC was 20,000 on 8/3 and WNL on 8/6.  He is currently on steroids for possible AIN.  A CXR on 8/3 showed improving pneumonia. He is remains on prednisone for possible nephritis, AIN. He is currently on Zosyn and Diflucan.  He  was treated for pneumonia with Levaquin from 7/15-7/25.  ID was asked to evaluate and manage his antibiotic therapy.     Past Medical History:   Diagnosis Date   • Adenomatous polyp of ascending colon 05/25/2022    Multiple throughout colon. Dr. Obregon 5/2022. Rpt 1y   • Bladder cancer (HCC)    • Cancer of upper lobe of left lung (HCC) 6/27/2022   • COPD (chronic obstructive pulmonary disease) (HCC)    • Coronary artery disease involving coronary bypass graft of native heart without angina pectoris 04/26/2022   • Essential hypertension 04/26/2022   • Gastroesophageal reflux disease without esophagitis 04/26/2022   • Hard of hearing    • Mixed hyperlipidemia 04/26/2022   • PONV (postoperative nausea and vomiting)    • Psoriasis    • Renal mass    • Vitamin B12 deficiency 04/27/2022 4/2022 Vit B12 193.    • Wears dentures     full set   • Wears glasses        Past Surgical History:   Procedure Laterality Date   • BLADDER TUMOR EXCISION      Cancer   • BRONCHOSCOPY N/A 6/15/2022    Procedure: BRONCHOSCOPY WITH ENDOBRONCHIAL ULTRASOUND WITH FLUORO;  Surgeon: Ryan Del Valle MD;  Location:  [facility];  Service: Pulmonary;  Laterality: N/A;  EBUS scope removed with balloon intact   • CARDIAC CATHETERIZATION     • CARDIAC SURGERY     • COLONOSCOPY     • CORONARY ARTERY BYPASS GRAFT  2015    [facility]   • ENDOSCOPY N/A 7/23/2022    Procedure: ESOPHAGOGASTRODUODENOSCOPY WITH BIOPSY;  Surgeon: Reno Charlton MD;  Location:  [facility];  Service: Gastroenterology;  Laterality: N/A;       Family History   Problem Relation Age of Onset   • Hypertension Mother    • Hypertension Brother    • Heart attack Brother        Social History     Socioeconomic History   • Marital status: Single   Tobacco Use   • Smoking status: Current Every Day Smoker     Packs/day: 0.50     Years: 59.00     Pack years: 29.50     Types: Cigarettes     Start date: 1962   • Smokeless tobacco: Never Used   Vaping Use   • Vaping  Use: Never used   Substance and Sexual Activity   • Alcohol use: Yes     Comment: 1-2 per month   • Drug use: Never   • Sexual activity: Defer       No Known Allergies      Medication:    Current Facility-Administered Medications:   •  acetaminophen (TYLENOL) tablet 650 mg, 650 mg, Oral, Q4H PRN, 650 mg at 08/03/22 0721 **OR** [DISCONTINUED] acetaminophen (TYLENOL) 160 MG/5ML solution 650 mg, 650 mg, Nasogastric, Q4H PRN, 649.6 mg at 07/14/22 1913 **OR** [DISCONTINUED] acetaminophen (TYLENOL) suppository 650 mg, 650 mg, Rectal, Q4H PRN, Felix Lopez MD  •  albuterol (PROVENTIL) nebulizer solution 0.083% 2.5 mg/3mL, 2.5 mg, Nebulization, Q4H PRN, Alyssa Padron MD, 2.5 mg at 08/03/22 0554  •  amLODIPine (NORVASC) tablet 10 mg, 10 mg, Oral, Q24H, Alyssa Padron MD, 10 mg at 08/06/22 0818  •  sennosides-docusate (PERICOLACE) 8.6-50 MG per tablet 2 tablet, 2 tablet, Oral, BID, 2 tablet at 08/06/22 0818 **AND** polyethylene glycol (MIRALAX) packet 17 g, 17 g, Oral, Daily PRN **AND** bisacodyl (DULCOLAX) EC tablet 5 mg, 5 mg, Oral, Daily PRN **AND** bisacodyl (DULCOLAX) suppository 10 mg, 10 mg, Rectal, Daily PRN, Seferino Escobar MD  •  castor oil-balsam peru (VENELEX) ointment 1 application, 1 application, Topical, Q12H, Alyssa Padron MD, 1 application at 08/06/22 0819  •  cloNIDine (CATAPRES) tablet 0.1 mg, 0.1 mg, Oral, Q8H PRN, Alyssa Padron MD, 0.1 mg at 07/26/22 0210  •  cloNIDine (CATAPRES-TTS) 0.1 MG/24HR patch 1 patch, 1 patch, Transdermal, Weekly, Alyssa Padron MD, 1 patch at 07/31/22 0801  •  fluconazole (DIFLUCAN) tablet 150 mg, 150 mg, Oral, Q24H, Seferino Escobar MD, 150 mg at 08/06/22 1109  •  gabapentin (NEURONTIN) capsule 100 mg, 100 mg, Oral, Q8H, Alyssa Padron MD, 100 mg at 08/06/22 0547  •  heparin (porcine) 5000 UNIT/ML injection 5,000 Units, 5,000 Units, Subcutaneous, Q8H, Alyssa Padron MD, 5,000 Units at 08/06/22 0547  •  hydrALAZINE  (APRESOLINE) tablet 25 mg, 25 mg, Oral, Q8H, Seferino Escobar MD, 25 mg at 08/06/22 0547  •  ipratropium-albuterol (DUO-NEB) nebulizer solution 3 mL, 3 mL, Nebulization, 4x Daily - RT, Alyssa Padron MD, 3 mL at 08/06/22 1048  •  labetalol (NORMODYNE,TRANDATE) injection 20 mg, 20 mg, Intravenous, Q4H PRN, Alyssa Padron MD, 20 mg at 07/26/22 0437  •  metoprolol tartrate (LOPRESSOR) tablet 100 mg, 100 mg, Oral, Q12H, Alyssa Padron MD, 100 mg at 08/06/22 0818  •  nicotine (NICODERM CQ) 14 MG/24HR patch 1 patch, 1 patch, Transdermal, Q24H, Alyssa Padron MD, 1 patch at 08/05/22 2224  •  nystatin (MYCOSTATIN) powder, , Topical, Q12H, Alyssa Padron MD, Given at 08/06/22 0819  •  [DISCONTINUED] ondansetron (ZOFRAN) tablet 4 mg, 4 mg, Nasogastric, Q6H PRN **OR** ondansetron (ZOFRAN) injection 4 mg, 4 mg, Intravenous, Q6H PRN, Alyssa Padron MD, 4 mg at 07/20/22 0231  •  pantoprazole (PROTONIX) EC tablet 40 mg, 40 mg, Oral, BID, Alyssa Padron MD, 40 mg at 08/06/22 0818  •  piperacillin-tazobactam (ZOSYN) 3.375 g in iso-osmotic dextrose 50 ml (premix), 3.375 g, Intravenous, Q12H, Seferino Escobar MD, 3.375 g at 08/06/22 0546  •  predniSONE (DELTASONE) tablet 40 mg, 40 mg, Oral, Daily With Breakfast, Seferino Escobar MD, 40 mg at 08/06/22 1109  •  saccharomyces boulardii (FLORASTOR) capsule 250 mg, 250 mg, Oral, Daily, Alyssa Padron MD, 250 mg at 08/06/22 0818  •  sodium chloride 0.9 % flush 10 mL, 10 mL, Intravenous, Q12H, Alyssa Padron MD, 10 mL at 08/06/22 0819  •  sodium chloride 0.9 % flush 10 mL, 10 mL, Intravenous, PRN, Alyssa Padron MD  •  tamsulosin (FLOMAX) 24 hr capsule 0.4 mg, 0.4 mg, Oral, Daily, Seferino Escobar MD, 0.4 mg at 08/06/22 0818    Antibiotics:  Anti-Infectives (From admission, onward)    Ordered     Dose/Rate Route Frequency Start Stop    08/06/22 0956  fluconazole (DIFLUCAN) tablet 150 mg        Ordering Provider:  Seferino Escobar MD    150 mg Oral Every 24 Hours 08/06/22 1045 08/11/22 1044    08/03/22 0806  piperacillin-tazobactam (ZOSYN) 3.375 g in iso-osmotic dextrose 50 ml (premix)        Ordering Provider: Seferino Escobar MD    3.375 g  over 4 Hours Intravenous Every 12 Hours 08/03/22 1800 08/11/22 0559    08/03/22 0759  piperacillin-tazobactam (ZOSYN) 3.375 g in iso-osmotic dextrose 50 ml (premix)        Ordering Provider: Seferino Escobar MD    3.375 g  over 30 Minutes Intravenous Once 08/03/22 0845 08/03/22 0945    07/15/22 0022  levoFLOXacin (LEVAQUIN) 500 mg/100 mL D5W (premix) (LEVAQUIN) 500 mg        Ordering Provider: Reno Charlton MD    500 mg Intravenous Every 48 Hours 07/17/22 0600 07/25/22 0524    07/15/22 0022  levoFLOXacin (LEVAQUIN) 750 mg/150 mL D5W (premix) (LEVAQUIN) 750 mg        Ordering Provider: Joe Balderrama APRN    750 mg Intravenous Once 07/15/22 0115 07/15/22 0328    07/13/22 1342  piperacillin-tazobactam (ZOSYN) 3.375 g in iso-osmotic dextrose 50 ml (premix)        Ordering Provider: Justin Veras MD    3.375 g  over 4 Hours Intravenous Every 12 Hours Scheduled 07/13/22 1345 07/20/22 1354    07/12/22 1433  piperacillin-tazobactam (ZOSYN) 3.375 g in iso-osmotic dextrose 50 ml (premix)        Ordering Provider: Tuan Saunders MD    3.375 g Intravenous Once 07/12/22 1435 07/12/22 1815    07/12/22 1433  vancomycin 1500 mg/500 mL 0.9% NS IVPB ([facility])        Ordering Provider: Tuan Saunders MD    20 mg/kg × 70.3 kg Intravenous Once 07/12/22 1435 07/12/22 1955            Review of Systems:  Constitutional-- No Fever, chills or sweats.  Appetite good, and no malaise. No fatigue.  HEENT-- No new vision, hearing or throat complaints.  No epistaxis or oral sores.  Denies odynophagia or dysphagia. No headache, photophobia or neck stiffness.  CV-- No chest pain, palpitation or syncope  Resp-- + SOB/+ dry cough/no Hemoptysis  GI- No nausea, vomiting, + diarrhea.  No hematochezia,  melena, or hematemesis. Denies jaundice or chronic liver disease.  -- + dysuria, no hematuria, or flank pain.  Unable to urinate on his own, currently I and O cath.   Lymph- no swollen lymph nodes in neck/axilla or groin.   Heme- No active bruising or bleeding; no Hx of DVT or PE.  MS-- no swelling or pain in the bones or joints of arms/legs.  No new back pain.  Neuro-- No acute focal weakness or numbness in the arms or legs.  No seizures.  Skin--No rashes or lesions      Physical Exam:   Vital Signs  Temp (24hrs), Av.9 °F (36.6 °C), Min:97.5 °F (36.4 °C), Max:98.3 °F (36.8 °C)    Temp  Min: 97.5 °F (36.4 °C)  Max: 98.3 °F (36.8 °C)  BP  Min: 103/62  Max: 144/87  Pulse  Min: 94  Max: 110  Resp  Min: 16  Max: 18  SpO2  Min: 86 %  Max: 95 %    GENERAL: Awake and alert, in no acute distress.   HEENT: Normocephalic, atraumatic.  PERRL. EOMI. No conjunctival injection. No icterus. Oropharynx clear without evidence of thrush or exudate.  NECK: Supple without nuchal rigidity. No mass.  LYMPH: No cervical, axillary or inguinal lymphadenopathy.  HEART: tachycardia; No murmur, rubs, gallops.   LUNGS: Rhonchi bilaterally without wheezing. Normal respiratory effort. Nonlabored. On 2L O2 NC.   ABDOMEN: Soft, nontender, nondistended. Positive bowel sounds. No rebound or guarding. NO mass or HSM.  EXT:  No cyanosis, clubbing or edema. No cord.  :  Without Case catheter.  MSK:  FROM, no joint effusions  SKIN: Warm and dry without cutaneous eruptions on Inspection/palpation.    NEURO: Oriented to PPT. Normal speech and cognition.   PSYCHIATRIC: Normal insight and judgment. Cooperative with PE   dialysis cath site without redness and dry    Laboratory Data    Results from last 7 days   Lab Units 22  0533 22  0635 22  1417   WBC 10*3/mm3 9.57 14.53* 20.09*   HEMOGLOBIN g/dL 7.0* 7.7* 9.0*   HEMATOCRIT % 21.1* 23.8* 26.7*   PLATELETS 10*3/mm3 98* 102* 135*     Results from last 7 days   Lab Units  08/05/22  0533   SODIUM mmol/L 137   POTASSIUM mmol/L 4.2   CHLORIDE mmol/L 101   CO2 mmol/L 21.0*   BUN mg/dL 76*   CREATININE mg/dL 4.59*   GLUCOSE mg/dL 117*   CALCIUM mg/dL 7.7*     Results from last 7 days   Lab Units 08/05/22  0533   ALK PHOS U/L 132*   BILIRUBIN mg/dL 0.3   ALT (SGPT) U/L 15   AST (SGOT) U/L 9                         Estimated Creatinine Clearance: 14.1 mL/min (A) (by C-G formula based on SCr of 4.59 mg/dL (H)).      Microbiology:  Microbiology Results (last 10 days)     Procedure Component Value - Date/Time    Urine Culture - Urine, Urine, Catheter [[id number]]  (Abnormal) Collected: 08/04/22 1533    Lab Status: Final result Specimen: Urine, Catheter Updated: 08/05/22 1953     Urine Culture Yeast isolated    Narrative:      No additional tests pending.  Colonization of the urinary tract without infection is common. Treatment is discouraged unless the patient is symptomatic, pregnant, or undergoing an invasive urologic procedure.    Blood Culture - Blood, Arm, Left [[id number]]  (Normal) Collected: 08/03/22 2116    Lab Status: Preliminary result Specimen: Blood from Arm, Left Updated: 08/05/22 2146     Blood Culture No growth at 2 days    Blood Culture - Blood, Arm, Left [[id number]]  (Normal) Collected: 08/03/22 1415    Lab Status: Preliminary result Specimen: Blood from Arm, Left Updated: 08/05/22 1500     Blood Culture No growth at 2 days    Urine Culture - Urine, Urine, Catheter [[id number]]  (Abnormal) Collected: 08/03/22 0945    Lab Status: Final result Specimen: Urine, Catheter Updated: 08/04/22 1145     Urine Culture >100,000 CFU/mL Yeast isolated    Narrative:      No further workup  Colonization of the urinary tract without infection is common. Treatment is discouraged unless the patient is symptomatic, pregnant, or undergoing an invasive urologic procedure.    Respiratory Culture - Sputum, Cough [[id number]] Collected: 07/28/22 1815    Lab Status: Final result Specimen: Sputum from  Cough Updated: 07/28/22 2117     Respiratory Culture Rejected     Gram Stain No WBCs per low power field      Rare (1+) Epithelial cells per low power field      Few (2+) Gram negative bacilli    Narrative:      Specimen rejected due to oropharyngeal contamination. Please reorder and recollect specimen if clinically necessary.    Respiratory Culture - Sputum, Cough [[id number]] Collected: 07/27/22 2025    Lab Status: Final result Specimen: Sputum from Cough Updated: 07/27/22 2123     Respiratory Culture Rejected     Gram Stain Few (2+) Epithelial cells per low power field      No WBCs per low power field      No organisms seen    Narrative:      Specimen rejected due to oropharyngeal contamination. Please reorder and recollect specimen if clinically necessary.    Blood Culture - Blood, Blood, Venous Line [[id number]]  (Normal) Collected: 07/27/22 1500    Lab Status: Final result Specimen: Blood, Venous Line Updated: 08/01/22 1533     Blood Culture No growth at 5 days    Blood Culture - Blood, Blood, Venous Line [[id number]]  (Normal) Collected: 07/27/22 1500    Lab Status: Final result Specimen: Blood, Venous Line Updated: 08/01/22 1533     Blood Culture No growth at 5 days              Radiology:  XR Chest 1 View    Result Date: 8/3/2022   1. Ill-defined alveolar and interstitial disease in the perihilar right lung and right upper lobe, as well as in the suprahilar left upper lobe. These findings have significantly improved, when compared to 7/28/2022. FINDINGS are thought to reflect improving pneumonia. 2. Previously seen right pleural effusion is no longer visible.  This report was finalized on 8/3/2022 8:17 AM by Caryl Brian MD.            Impression:   - Pyuria/Candiduria, probable catheter-assoc UTI, Case removed, cultures with yeast  - Fever, questionable related to UTI, improved  - Leukocytosis/neutrophilia, improved  - Thrombocytopenia, improved  - Acute renal failure, ?AIN, on steroids and hemodialysis,  non-oliguric  - Urinary retention.  - LLL aspiration pneumonia/treated  - Afib/RVR  - ileus  - Stage IIb squamous cell lung cancer/chemo started 7/8  - Immunocompromised host  - H/o bladder cancer/tumor debulking  - Renal mass  - Coronary artery disease/CABG  - COPD  - Essential hypertension  - Chronic diastolic heart failure  - Psoriasis    PLAN/RECOMMENDATIONS:   Thank you for asking us to see [facility], I recommend the following:  - Follow blood and urine cultures  - Called and asked micro to work up yeast on both urine cultures 8/3 and 8/4 for ID and susceptibility.  - Discontinue Diflucan and change to Micafungin 100 mg IV daily.  - Continue Zosyn for now  - Nephrology considering renal bx next week.   - Consider EKG given some abnormalities by monitor.    Stephanie Waggoner MD saw and examined patient, verified hx and PE, read all radiographic studies, reviewed labs and micro data, and formulated dx, plan for treatment and all medical decision making.      BREN CurryC for MD Ced Angela PA  8/6/2022  13:58 EDT

## 2022-08-06 NOTE — PROGRESS NOTES
[facility] Medicine Services  PROGRESS NOTE    Patient Name: Reese Cage  : 1950  MRN: [id number]    Date of Admission: 2022  Primary Care Physician: Kwame Hines MD    Subjective   Subjective     CC:  Weakness    HPI:  Constipation resolved. No diarrhea. NO dyspnea. Requiring I&O cath for urine however    Review of Systems   Constitutional: Positive for activity change, appetite change and fatigue.   HENT: Positive for congestion.    Respiratory: Negative for cough and shortness of breath.    Cardiovascular: Negative.    Gastrointestinal: Negative for constipation and diarrhea.   Genitourinary: Positive for difficulty urinating and dysuria.   Musculoskeletal: Negative.    Neurological: Positive for weakness.     No change otherwise from     Objective   Objective     Vital Signs:   Temp:  [97.5 °F (36.4 °C)-98.3 °F (36.8 °C)] 97.9 °F (36.6 °C)  Heart Rate:  [] 99  Resp:  [16-18] 18  BP: (103-144)/(53-97) 131/96  Flow (L/min):  [1-2] 2     Physical Exam:  NAD, alert and oriented  OP clear, MMM  Neck supple  No LAD  RRR  CTAB  +BS, ND, NT, soft  KHAN  Normal affect  Case out  No family at bedside today  No change in examination otherwise from     Results Reviewed:  LAB RESULTS:      Lab 22  0533 22  0635 22  1417 22  0641 22  0412   WBC 9.57 14.53* 20.09* 11.56* 11.20*   HEMOGLOBIN 7.0* 7.7* 9.0* 8.7* 8.6*   HEMATOCRIT 21.1* 23.8* 26.7* 26.2* 26.5*   PLATELETS 98* 102* 135* 135* 140   NEUTROS ABS 7.38* 11.73* 17.78* 8.03* 7.30*   IMMATURE GRANS (ABS) 0.09* 0.10* 0.22* 0.11* 0.10*   LYMPHS ABS 0.92 1.20 0.98 1.84 2.12   MONOS ABS 0.92* 1.22* 1.05* 1.53* 1.66*   EOS ABS 0.24 0.25 0.03 0.03 0.00   MCV 85.4 88.5 87.0 85.6 87.5         Lab 22  0533 22  0635 22  1417 22  0642 22  0412   SODIUM 137 134* 137 137 135*   POTASSIUM 4.2 4.2 4.1 4.2 4.2   CHLORIDE 101 96* 101 100 100   CO2 21.0* 20.0* 25.0  24.0 25.0   ANION GAP 15.0 18.0* 11.0 13.0 10.0   BUN 76* 67* 63* 56* 43*   CREATININE 4.59* 4.19* 3.79* 4.08* 3.33*   EGFR 12.9* 14.4* 16.3* 14.9* 19.0*   GLUCOSE 117* 84 93 100* 130*   CALCIUM 7.7* 7.6* 8.0* 8.4* 8.4*         Lab 08/05/22  0533 08/04/22  0635 08/03/22  1417   TOTAL PROTEIN 5.4* 5.3* 6.0   ALBUMIN 3.00* 3.00* 3.60   GLOBULIN 2.4 2.3 2.4   ALT (SGPT) 15 19 22   AST (SGOT) 9 13 16   BILIRUBIN 0.3 0.4 0.5   ALK PHOS 132* 133* 144*                     Brief Urine Lab Results  (Last result in the past 365 days)      Color   Clarity   Blood   Leuk Est   Nitrite   Protein   CREAT   Urine HCG        08/04/22 1533 Yellow   Turbid   Trace   Large (3+)   Negative   100 mg/dL (2+)                 Microbiology Results Abnormal     Procedure Component Value - Date/Time    Blood Culture - Blood, Arm, Left [[id number]]  (Normal) Collected: 08/03/22 2116    Lab Status: Preliminary result Specimen: Blood from Arm, Left Updated: 08/05/22 2146     Blood Culture No growth at 2 days    Blood Culture - Blood, Arm, Left [[id number]]  (Normal) Collected: 08/03/22 1415    Lab Status: Preliminary result Specimen: Blood from Arm, Left Updated: 08/05/22 1500     Blood Culture No growth at 2 days    Blood Culture - Blood, Blood, Venous Line [[id number]]  (Normal) Collected: 07/27/22 1500    Lab Status: Final result Specimen: Blood, Venous Line Updated: 08/01/22 1533     Blood Culture No growth at 5 days    Blood Culture - Blood, Blood, Venous Line [[id number]]  (Normal) Collected: 07/27/22 1500    Lab Status: Final result Specimen: Blood, Venous Line Updated: 08/01/22 1533     Blood Culture No growth at 5 days    Respiratory Culture - Sputum, Cough [[id number]] Collected: 07/28/22 1815    Lab Status: Final result Specimen: Sputum from Cough Updated: 07/28/22 2117     Respiratory Culture Rejected     Gram Stain No WBCs per low power field      Rare (1+) Epithelial cells per low power field      Few (2+) Gram negative bacilli     Narrative:      Specimen rejected due to oropharyngeal contamination. Please reorder and recollect specimen if clinically necessary.    Respiratory Culture - Sputum, Cough [[id number]] Collected: 07/27/22 2025    Lab Status: Final result Specimen: Sputum from Cough Updated: 07/27/22 2123     Respiratory Culture Rejected     Gram Stain Few (2+) Epithelial cells per low power field      No WBCs per low power field      No organisms seen    Narrative:      Specimen rejected due to oropharyngeal contamination. Please reorder and recollect specimen if clinically necessary.    Blood Culture - Blood, Arm, Left [[id number]]  (Normal) Collected: 07/14/22 2325    Lab Status: Final result Specimen: Blood from Arm, Left Updated: 07/20/22 0502     Blood Culture No growth at 5 days    Narrative:      Aerobic bottle only      Blood Culture - Blood, Arm, Left [[id number]]  (Normal) Collected: 07/14/22 2350    Lab Status: Final result Specimen: Blood from Arm, Left Updated: 07/20/22 0502     Blood Culture No growth at 5 days    Blood Culture - Blood, Arm, Right [[id number]]  (Normal) Collected: 07/12/22 1615    Lab Status: Final result Specimen: Blood from Arm, Right Updated: 07/17/22 1647     Blood Culture No growth at 5 days    Blood Culture - Blood, Arm, Left [[id number]]  (Normal) Collected: 07/12/22 1620    Lab Status: Final result Specimen: Blood from Arm, Left Updated: 07/17/22 1646     Blood Culture No growth at 5 days    S. Pneumo Ag Urine or CSF - Urine, Urine, Clean Catch [[id number]]  (Normal) Collected: 07/15/22 0247    Lab Status: Final result Specimen: Urine, Clean Catch Updated: 07/15/22 1312     Strep Pneumo Ag Negative    Legionella Antigen, Urine - Urine, Urine, Catheter [[id number]]  (Normal) Collected: 07/15/22 0220    Lab Status: Final result Specimen: Urine, Catheter Updated: 07/15/22 1311     LEGIONELLA ANTIGEN, URINE Negative    MRSA Screen, PCR (Inpatient) - Swab, Nares [[id number]]  (Normal) Collected: 07/14/22  2343    Lab Status: Final result Specimen: Swab from Nares Updated: 07/15/22 0857     MRSA PCR Negative    Narrative:      The negative predictive value of this diagnostic test is high and should only be used to consider de-escalating anti-MRSA therapy. A positive result may indicate colonization with MRSA and must be correlated clinically.  MRSA Negative    Eosinophil Smear - Urine, Urine, Clean Catch [[id number]]  (Normal) Collected: 07/15/22 0220    Lab Status: Final result Specimen: Urine, Clean Catch Updated: 07/15/22 0528     Eosinophil Smear 0 % EOS/100 Cells     Narrative:      No eosinophil seen    COVID PRE-OP / PRE-PROCEDURE SCREENING ORDER (NO ISOLATION) - Swab, Nasopharynx [[id number]]  (Normal) Collected: 07/14/22 2343    Lab Status: Final result Specimen: Swab from Nasopharynx Updated: 07/15/22 0100    Narrative:      The following orders were created for panel order COVID PRE-OP / PRE-PROCEDURE SCREENING ORDER (NO ISOLATION) - Swab, Nasopharynx.  Procedure                               Abnormality         Status                     ---------                               -----------         ------                     Respiratory Panel PCR w/...[[id number]]  Normal              Final result                 Please view results for these tests on the individual orders.    Respiratory Panel PCR w/COVID-19(SARS-CoV-2) [facility]HT[facility]/VARINDER/DEMETRIUS/PAD/[facility]/MAD/FAB In-House, NP Swab in UTM/VTM, 3-4 HR TAT - Swab, Nasopharynx [[id number]]  (Normal) Collected: 07/14/22 2343    Lab Status: Final result Specimen: Swab from Nasopharynx Updated: 07/15/22 0100     ADENOVIRUS, PCR Not Detected     Coronavirus 229E Not Detected     Coronavirus HKU1 Not Detected     Coronavirus NL63 Not Detected     Coronavirus OC43 Not Detected     COVID19 Not Detected     Human Metapneumovirus Not Detected     Human Rhinovirus/Enterovirus Not Detected     Influenza A PCR Not Detected     Influenza B PCR Not Detected     Parainfluenza Virus 1 Not  Detected     Parainfluenza Virus 2 Not Detected     Parainfluenza Virus 3 Not Detected     Parainfluenza Virus 4 Not Detected     RSV, PCR Not Detected     Bordetella pertussis pcr Not Detected     Bordetella parapertussis PCR Not Detected     Chlamydophila pneumoniae PCR Not Detected     Mycoplasma pneumo by PCR Not Detected    Narrative:      In the setting of a positive respiratory panel with a viral infection PLUS a negative procalcitonin without other underlying concern for bacterial infection, consider observing off antibiotics or discontinuation of antibiotics and continue supportive care. If the respiratory panel is positive for atypical bacterial infection (Bordetella pertussis, Chlamydophila pneumoniae, or Mycoplasma pneumoniae), consider antibiotic de-escalation to target atypical bacterial infection.    Clostridium Difficile Toxin - Stool, Per Rectum [[id number]]  (Normal) Collected: 07/14/22 1058    Lab Status: Final result Specimen: Stool from Per Rectum Updated: 07/14/22 1248    Narrative:      The following orders were created for panel order Clostridium Difficile Toxin - Stool, Per Rectum.  Procedure                               Abnormality         Status                     ---------                               -----------         ------                     Clostridium Difficile To...[[id number]]  Normal              Final result                 Please view results for these tests on the individual orders.    Clostridium Difficile Toxin, PCR - Stool, Per Rectum [[id number]]  (Normal) Collected: 07/14/22 1058    Lab Status: Final result Specimen: Stool from Per Rectum Updated: 07/14/22 1248     C. Difficile Toxins by PCR Not Detected    Narrative:      Performance characteristics of test not established for patients <2 years of age.  Negative for Toxigenic C. Difficile    Urine Culture - Urine, Urine, Clean Catch [[id number]] Collected: 07/12/22 9109    Lab Status: Final result Specimen: Urine, Clean  Catch Updated: 07/14/22 0922     Urine Culture <25,000 CFU/mL Mixed Melanie Isolated    Narrative:      Specimen contains mixed organisms of questionable pathogenicity suggestive of contamination. If symptoms persist, suggest recollection.  Colonization of the urinary tract without infection is common. Treatment is discouraged unless the patient is symptomatic, pregnant, or undergoing an invasive urologic procedure.    MRSA Screen, PCR (Inpatient) - Swab, Nares [[id number]]  (Normal) Collected: 07/13/22 1727    Lab Status: Final result Specimen: Swab from Nares Updated: 07/13/22 1851     MRSA PCR Negative    Narrative:      The negative predictive value of this diagnostic test is high and should only be used to consider de-escalating anti-MRSA therapy. A positive result may indicate colonization with MRSA and must be correlated clinically.  MRSA Negative    COVID PRE-OP / PRE-PROCEDURE SCREENING ORDER (NO ISOLATION) - Swab, Nasopharynx [[id number]]  (Normal) Collected: 07/12/22 1629    Lab Status: Final result Specimen: Swab from Nasopharynx Updated: 07/12/22 1731    Narrative:      The following orders were created for panel order COVID PRE-OP / PRE-PROCEDURE SCREENING ORDER (NO ISOLATION) - Swab, Nasopharynx.  Procedure                               Abnormality         Status                     ---------                               -----------         ------                     COVID-19 and FLU A/B PCR...[[id number]]  Normal              Final result                 Please view results for these tests on the individual orders.    COVID-19 and FLU A/B PCR - Swab, Nasopharynx [[id number]]  (Normal) Collected: 07/12/22 1629    Lab Status: Final result Specimen: Swab from Nasopharynx Updated: 07/12/22 1731     COVID19 Not Detected     Influenza A PCR Not Detected     Influenza B PCR Not Detected    Narrative:      Fact sheet for providers: https://www.fda.gov/media/264255/download    Fact sheet for patients:  https://www.fda.gov/media/151300/download    Test performed by PCR.          No radiology results from the last 24 hrs    Results for orders placed during the hospital encounter of 07/12/22    Adult Transthoracic Echo Complete W/ Cont if Necessary Per Protocol    Interpretation Summary  · Left ventricular ejection fraction appears to be 56 - 60%. Left ventricular systolic function is normal.  · Left ventricular diastolic function was indeterminate.  · Left ventricular wall thickness is consistent with moderate concentric hypertrophy.  · Estimated right ventricular systolic pressure from tricuspid regurgitation is normal (<35 mmHg).      I have reviewed the medications:  Scheduled Meds:amLODIPine, 10 mg, Oral, Q24H  castor oil-balsam peru, 1 application, Topical, Q12H  cloNIDine, 1 patch, Transdermal, Weekly  fluconazole, 150 mg, Oral, Q24H  gabapentin, 100 mg, Oral, Q8H  heparin (porcine), 5,000 Units, Subcutaneous, Q8H  hydrALAZINE, 25 mg, Oral, Q8H  ipratropium-albuterol, 3 mL, Nebulization, 4x Daily - RT  metoprolol tartrate, 100 mg, Oral, Q12H  nicotine, 1 patch, Transdermal, Q24H  nystatin, , Topical, Q12H  pantoprazole, 40 mg, Oral, BID  piperacillin-tazobactam, 3.375 g, Intravenous, Q12H  saccharomyces boulardii, 250 mg, Oral, Daily  senna-docusate sodium, 2 tablet, Oral, BID  sodium chloride, 10 mL, Intravenous, Q12H  tamsulosin, 0.4 mg, Oral, Daily      Continuous Infusions:   PRN Meds:.•  acetaminophen **OR** [DISCONTINUED] acetaminophen **OR** [DISCONTINUED] acetaminophen  •  albuterol  •  senna-docusate sodium **AND** polyethylene glycol **AND** bisacodyl **AND** bisacodyl  •  cloNIDine  •  labetalol  •  [DISCONTINUED] ondansetron **OR** ondansetron  •  sodium chloride    Assessment & Plan   Assessment & Plan     Active Hospital Problems    Diagnosis  POA   • **DAX (acute kidney injury) (HCC) [N17.9]  Yes   • Ileus (HCC) [K56.7]  Yes   • Cancer of upper lobe of left lung (HCC) [C34.12]  Yes   • Mass of  upper lobe of left lung [R91.8]  Yes   • Acute on chronic respiratory failure with hypoxia (HCC) [J96.21]  Yes   • Adenomatous polyp of ascending colon [D12.2]  Yes   • Bladder cancer (HCC) [C67.9]  Yes   • COPD (chronic obstructive pulmonary disease) (HCC) [J44.9]  Yes   • Essential hypertension [I10]  Yes   • Mixed hyperlipidemia [E78.2]  Yes      Resolved Hospital Problems   No resolved problems to display.        Brief Hospital Course to date:  Mr. 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 Tabor on 7/8/22. On 7/9, he developed abdominal pain with nausea, vomiting and diarrhea. On 7/12/22, he developed shortness of breath and fevers and presented to the [facility] ED and was admitted to Hospital Medicine. At admission, he was noted to have DAX and a LLL pneumonia likely secondary to aspiration.      His nausea and vomiting improved and his NGT was removed on 7/14/22. His renal function continued to decline and Nephrology was consulted. He developed refractory hypotension on 7/14 and was transferred to the ICU. There was concern for immune mediated pneumonitis and nephritis and IV Solumedrol was started on 7/15.      He did require TPN for a period of time secondary to ileus. EGD on 7/23/22 showed esophagitis and NG trauma. This was performed secondary to anemia. Patient on PPI.     Hemodialysis was initiated on 7/15/22.      He has not had any signs of recurrent bleeding. Steroids are being tapered without significant change in respiratory status.   He continues to have a markedly abnormal chest exam and CT with effusions/pneumonia-pneumonitis noted.    DAX, possibly immune mediated s/p nivolumab for squamous cell lung cancer  -non-oliguric, HD per renal, but good UOP  -d/w renal, creatinine rising, considering tunneled catheter/HD  -may need PRBC  -increased prednisone  to 40 mg as per discussion with renal    Pneumonia s/p antibiotics    Fevers, to 102.7 on 8/3, suspected CAUTI  -pinto out  -urine culture with yeast x 2 now  -ask for ID input  -has temporary dialysis access, blood cultures NGTD  -s/p antibiotics for pneumonia previously    Urinary retention  -mobilize and added flomax  -treat UTI    Pneumonitis  -s/p nivolumab, on steroids    B effusions R>L  -monitor, HD, with UF per renal  -hx of HFpEF  -most recent CXR appears improved    Concerns for GIB  -s/p EGD with esophagitis, NG trauma  -PPI, no evidence of bleeding    Anemia  -monitoring/low, may warrant PRBC now, 8/6 labs pending    Hx of COPD  -nebs/steroids    HTN  -monitoring    Squamous cell lung cancer  -s/p chemotherapy    Expected Discharge Location and Transportation: Rehab  Expected Discharge Date: 8/8    DVT prophylaxis:  Medical DVT prophylaxis orders are present.     AM-PAC 6 Clicks Score (PT): 18 (08/05/22 0800)    CODE STATUS:   Code Status and Medical Interventions:   Ordered at: 07/12/22 1068     Code Status (Patient has no pulse and is not breathing):    CPR (Attempt to Resuscitate)     Medical Interventions (Patient has pulse or is breathing):    Full Support       Seferino Escobar MD  08/06/22

## 2022-08-06 NOTE — PROGRESS NOTES
"   LOS: 25 days    Patient Care Team:  Kwame Hines MD as PCP - General (Family Medicine)  Ryan Del Valle MD as Consulting Physician (Pulmonary Disease)  Néstor Kelly MD as Consulting Physician (Urology)    Chief Complaint: Acute kidney injury, nausea vomiting diarrhea.    72-year-old with some non-small cell CA treated with chemotherapy including immunotherapy with carboplatinum, paclitaxel and Opdivo, started last week complain of nausea vomiting diarrhea poor oral intake, on admission creatinine 3.8 slowly got worse.  CT scan showed aspiration pneumonia with ileus this.  Despite IV fluid since admission renal function continued to deteriorate and was started on dialysis on 7/15.      Subjective   Uptrend in serum cr and BUN in last 2 days. Developing urinary retention requiring straight cath. On abx for treatment of UTI      Objective     Vital Sign Min/Max for last 24 hours  Temp  Min: 97.5 °F (36.4 °C)  Max: 98.3 °F (36.8 °C)   BP  Min: 124/84  Max: 144/87   Pulse  Min: 91  Max: 110   Resp  Min: 16  Max: 18   SpO2  Min: 86 %  Max: 95 %   Flow (L/min)  Min: 1  Max: 2   Weight  Min: 67.6 kg (149 lb)  Max: 67.6 kg (149 lb)     Flowsheet Rows    Flowsheet Row First Filed Value   Admission Height 170.2 cm (67\") Documented at 07/12/2022 1123   Admission Weight 70.3 kg (155 lb) Documented at 07/12/2022 1123          I/O this shift:  In: -   Out: 1200 [Urine:1200]  I/O last 3 completed shifts:  In: 2600 [P.O.:2600]  Out: 1950 [Urine:1950]    Physical Exam:    Gen: Alert, NAD   HENT: NC, AT, EOMI   NECK: Supple, no JVD, Trachea midline   LUNGS: CTA bilaterally, non labored respirtation   CVS: S1/S2 audible, RRR, no murmur   Abd: Soft, NT, ND, BS+   Ext: No pedal edema, no cyanosis   CNS: Alert, No focal deficit noted grossly  Psy: Cooperative  Skin: Warm, dry and intact  Dialysis catheter noted.     WBC WBC   Date Value Ref Range Status   08/05/2022 9.57 3.40 - 10.80 10*3/mm3 Final   08/04/2022 14.53 (H) " 3.40 - 10.80 10*3/mm3 Final      HGB Hemoglobin   Date Value Ref Range Status   08/05/2022 7.0 (L) 13.0 - 17.7 g/dL Final   08/04/2022 7.7 (L) 13.0 - 17.7 g/dL Final      HCT Hematocrit   Date Value Ref Range Status   08/05/2022 21.1 (L) 37.5 - 51.0 % Final   08/04/2022 23.8 (L) 37.5 - 51.0 % Final      Platlets No results found for: LABPLAT   MCV MCV   Date Value Ref Range Status   08/05/2022 85.4 79.0 - 97.0 fL Final   08/04/2022 88.5 79.0 - 97.0 fL Final          Sodium Sodium   Date Value Ref Range Status   08/05/2022 137 136 - 145 mmol/L Final   08/04/2022 134 (L) 136 - 145 mmol/L Final      Potassium Potassium   Date Value Ref Range Status   08/05/2022 4.2 3.5 - 5.2 mmol/L Final   08/04/2022 4.2 3.5 - 5.2 mmol/L Final      Chloride Chloride   Date Value Ref Range Status   08/05/2022 101 98 - 107 mmol/L Final   08/04/2022 96 (L) 98 - 107 mmol/L Final      CO2 CO2   Date Value Ref Range Status   08/05/2022 21.0 (L) 22.0 - 29.0 mmol/L Final   08/04/2022 20.0 (L) 22.0 - 29.0 mmol/L Final      BUN BUN   Date Value Ref Range Status   08/05/2022 76 (H) 8 - 23 mg/dL Final   08/04/2022 67 (H) 8 - 23 mg/dL Final      Creatinine Creatinine   Date Value Ref Range Status   08/05/2022 4.59 (H) 0.76 - 1.27 mg/dL Final   08/04/2022 4.19 (H) 0.76 - 1.27 mg/dL Final      Calcium Calcium   Date Value Ref Range Status   08/05/2022 7.7 (L) 8.6 - 10.5 mg/dL Final   08/04/2022 7.6 (L) 8.6 - 10.5 mg/dL Final      PO4 No results found for: CAPO4   Albumin Albumin   Date Value Ref Range Status   08/05/2022 3.00 (L) 3.50 - 5.20 g/dL Final   08/04/2022 3.00 (L) 3.50 - 5.20 g/dL Final      Magnesium No results found for: MG   Uric Acid No results found for: URICACID        Results Review:     I reviewed the patient's new clinical results.  I reviewed the patient's new imaging results and agree with the interpretation.    amLODIPine, 10 mg, Oral, Q24H  castor oil-balsam peru, 1 application, Topical, Q12H  cloNIDine, 1 patch, Transdermal,  Weekly  fluconazole, 150 mg, Oral, Q24H  gabapentin, 100 mg, Oral, Q8H  heparin (porcine), 5,000 Units, Subcutaneous, Q8H  hydrALAZINE, 25 mg, Oral, Q8H  ipratropium-albuterol, 3 mL, Nebulization, 4x Daily - RT  metoprolol tartrate, 100 mg, Oral, Q12H  nicotine, 1 patch, Transdermal, Q24H  nystatin, , Topical, Q12H  pantoprazole, 40 mg, Oral, BID  piperacillin-tazobactam, 3.375 g, Intravenous, Q12H  predniSONE, 40 mg, Oral, Daily With Breakfast  saccharomyces boulardii, 250 mg, Oral, Daily  senna-docusate sodium, 2 tablet, Oral, BID  sodium chloride, 10 mL, Intravenous, Q12H  tamsulosin, 0.4 mg, Oral, Daily           Medication Review: Reviewed    Assessment & Plan       DAX (acute kidney injury) (HCC)    Essential hypertension    Mixed hyperlipidemia    Bladder cancer (HCC)    COPD (chronic obstructive pulmonary disease) (HCC)    Adenomatous polyp of ascending colon    Acute on chronic respiratory failure with hypoxia (HCC)    Mass of upper lobe of left lung    Cancer of upper lobe of left lung (HCC)    Ileus (HCC)     1.  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 Serologic workup-  C3 - 88,  C4 16, CK 43, UPCR 0.65, FeNA . HD started 7/15/22.   2.  Metabolic acidosis resolved with medications and dialysis  3.  Non-small cell CA treated with chemotherapy  4.  Anemia: S/p chemo  5.  Pancytopenia secondary to chemotherapy.  6.  Ileus.  Now on TPN for nutrition  7.  Nonnephrotic range proteinuria.  8.  Shock: Resolved.  9.  Hypertension: Stable.   10 Hyponatremia: resolved.     Plan:  Worsening of renal function in last 48hr 2/2 urinary retention? Vs UTI? Currently on prednisone 40 mg daily for possible AIN. Discussed with patient about potential renal biopsy if no improvement in renal function by next week. May need transition to TDC by next week. Needs serial bladder scan and straight cath during the day.. Transfuse at Hb<7. No indication of dialysis at this  stage.  Discussed with patient.     High risk patient with multiple medical problems.     Edward Abraham MD  08/06/22  14:44 EDT

## 2022-08-07 NOTE — PROGRESS NOTES
"   LOS: 26 days    Patient Care Team:  Kwame Hines MD as PCP - General (Family Medicine)  Ryan Del Valle MD as Consulting Physician (Pulmonary Disease)  Néstor Kelly MD as Consulting Physician (Urology)    Chief Complaint: Acute kidney injury, nausea vomiting diarrhea.    72-year-old with some non-small cell CA treated with chemotherapy including immunotherapy with carboplatinum, paclitaxel and Opdivo, started last week complain of nausea vomiting diarrhea poor oral intake, on admission creatinine 3.8 slowly got worse.  CT scan showed aspiration pneumonia with ileus this.  Despite IV fluid since admission renal function continued to deteriorate and was started on dialysis on 7/15.      Subjective   Improvement in renal function. Cr ~ 3.2 from 4.5. Req straight cath 3-4 x. Urology consulted getting pinto cath placed      Objective     Vital Sign Min/Max for last 24 hours  Temp  Min: 97.4 °F (36.3 °C)  Max: 98.1 °F (36.7 °C)   BP  Min: 117/92  Max: 159/84   Pulse  Min: 91  Max: 110   Resp  Min: 18  Max: 24   SpO2  Min: 94 %  Max: 96 %   Flow (L/min)  Min: 2  Max: 2   No data recorded     Flowsheet Rows    Flowsheet Row First Filed Value   Admission Height 170.2 cm (67\") Documented at 07/12/2022 1123   Admission Weight 70.3 kg (155 lb) Documented at 07/12/2022 1123          I/O this shift:  In: -   Out: 900 [Urine:900]  I/O last 3 completed shifts:  In: 3300 [P.O.:3150; IV Piggyback:150]  Out: 3150 [Urine:3150]    Physical Exam:    Gen: Alert, NAD   HENT: NC, AT, EOMI   NECK: Supple, no JVD, Trachea midline   LUNGS: CTA bilaterally, non labored respirtation   CVS: S1/S2 audible, RRR, no murmur   Abd: Soft, NT, ND, BS+   Ext: No pedal edema, no cyanosis   CNS: Alert, No focal deficit noted grossly  Psy: Cooperative  Skin: Warm, dry and intact  Dialysis catheter noted.     WBC WBC   Date Value Ref Range Status   08/06/2022 9.54 3.40 - 10.80 10*3/mm3 Final   08/05/2022 9.57 3.40 - 10.80 10*3/mm3 Final    "   HGB Hemoglobin   Date Value Ref Range Status   08/06/2022 7.1 (L) 13.0 - 17.7 g/dL Final   08/05/2022 7.0 (L) 13.0 - 17.7 g/dL Final      HCT Hematocrit   Date Value Ref Range Status   08/06/2022 20.9 (C) 37.5 - 51.0 % Final   08/05/2022 21.1 (L) 37.5 - 51.0 % Final      Platlets No results found for: LABPLAT   MCV MCV   Date Value Ref Range Status   08/06/2022 83.6 79.0 - 97.0 fL Final   08/05/2022 85.4 79.0 - 97.0 fL Final          Sodium Sodium   Date Value Ref Range Status   08/06/2022 141 136 - 145 mmol/L Final   08/05/2022 137 136 - 145 mmol/L Final      Potassium Potassium   Date Value Ref Range Status   08/06/2022 3.9 3.5 - 5.2 mmol/L Final   08/05/2022 4.2 3.5 - 5.2 mmol/L Final      Chloride Chloride   Date Value Ref Range Status   08/06/2022 104 98 - 107 mmol/L Final   08/05/2022 101 98 - 107 mmol/L Final      CO2 CO2   Date Value Ref Range Status   08/06/2022 24.0 22.0 - 29.0 mmol/L Final   08/05/2022 21.0 (L) 22.0 - 29.0 mmol/L Final      BUN BUN   Date Value Ref Range Status   08/06/2022 68 (H) 8 - 23 mg/dL Final   08/05/2022 76 (H) 8 - 23 mg/dL Final      Creatinine Creatinine   Date Value Ref Range Status   08/06/2022 3.23 (H) 0.76 - 1.27 mg/dL Final   08/05/2022 4.59 (H) 0.76 - 1.27 mg/dL Final      Calcium Calcium   Date Value Ref Range Status   08/06/2022 7.6 (L) 8.6 - 10.5 mg/dL Final   08/05/2022 7.7 (L) 8.6 - 10.5 mg/dL Final      PO4 No results found for: CAPO4   Albumin Albumin   Date Value Ref Range Status   08/05/2022 3.00 (L) 3.50 - 5.20 g/dL Final      Magnesium No results found for: MG   Uric Acid No results found for: URICACID        Results Review:     I reviewed the patient's new clinical results.  I reviewed the patient's new imaging results and agree with the interpretation.    amLODIPine, 10 mg, Oral, Q24H  castor oil-balsam peru, 1 application, Topical, Q12H  cloNIDine, 1 patch, Transdermal, Weekly  gabapentin, 100 mg, Oral, Q8H  heparin (porcine), 5,000 Units, Subcutaneous,  Q8H  hydrALAZINE, 25 mg, Oral, Q8H  ipratropium-albuterol, 3 mL, Nebulization, 4x Daily - RT  metoprolol tartrate, 100 mg, Oral, Q12H  micafungin (MYCAMINE) IV, 100 mg, Intravenous, Q24H  nicotine, 1 patch, Transdermal, Q24H  nystatin, , Topical, Q12H  pantoprazole, 40 mg, Oral, BID  piperacillin-tazobactam, 3.375 g, Intravenous, Q12H  predniSONE, 40 mg, Oral, Daily With Breakfast  saccharomyces boulardii, 250 mg, Oral, Daily  senna-docusate sodium, 2 tablet, Oral, BID  sodium chloride, 10 mL, Intravenous, Q12H  tamsulosin, 0.8 mg, Oral, Daily           Medication Review: Reviewed    Assessment & Plan       DAX (acute kidney injury) (HCC)    Essential hypertension    Mixed hyperlipidemia    Bladder cancer (HCC)    COPD (chronic obstructive pulmonary disease) (HCC)    Adenomatous polyp of ascending colon    Acute on chronic respiratory failure with hypoxia (HCC)    Mass of upper lobe of left lung    Cancer of upper lobe of left lung (HCC)    Ileus (HCC)     1.  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 Serologic workup-  C3 - 88,  C4 16, CK 43, UPCR 0.65, FeNA . HD started 7/15/22.   2.  Metabolic acidosis resolved with medications and dialysis  3.  Non-small cell CA treated with chemotherapy  4.  Anemia: S/p chemo  5.  Pancytopenia secondary to chemotherapy.  6.  Ileus.  Now on TPN for nutrition  7.  Nonnephrotic range proteinuria.  8.  Shock: Resolved.  9.  Hypertension: Stable.   10 Hyponatremia: resolved.     Plan:  Improvement in renal function. Likely recent decline in renal function due to urinary retention. Continue prednisone 40 mg daily for another week before starting taper.  Discussed with patient about potential renal biopsy if no improvement in renal function by next week. May need transition to TDC by next week if renal function worsen. If cr continue to improve will remove temp HD cath. Transfuse at Hb<7. No indication of dialysis at this  stage.  Discussed with patient.     High risk patient with multiple medical problems.     Edward Abraham MD  08/07/22  13:13 EDT

## 2022-08-07 NOTE — CONSULTS
[facility]   HISTORY AND PHYSICAL    Patient Name: Reese Cage  : 1950  MRN: [id number]  Primary Care Physician:  Kwame Hines MD  Date of admission: 2022    Subjective   Subjective     Chief Complaint: Urinary Retention     HPI:    Reese Cage is a 72 y.o. male with a past medical history significant for hypertension, hyperlipidemia, CAD status post CABG, heart failure, COPD.  Patient was initially seen by [facility] urology in 2022 due to prior history of urothelial cell carcinoma of the bladder.  He had been previously treated and followed by urologist in [facility].  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  He had recurrence with repeat resection in 2018, her report of completion of 6-week course/induction course of BCG.  The patient reports no history of recurrent since 2018.  He moved to [place] and presented to our office in  to establish care.  The patient subsequently underwent staging imaging.  On CT chest imaging he was found to have a concerning pulmonary nodule and underwent biopsy in 2021.  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  He has followed with medical oncology, Dr. Shine at [facility].  He underwent initiation of immunotherapy treatment with Dr. Tabor.  The patient developed abdominal pain, nausea, vomiting, diarrhea, shortness of breath and fevers and has been admitted to [facility] since 2022 with complicated history including ileus, DAX, nephritis, pneumonia, pneumonitis.    Most recently the patient developed fever in 2022, suspected possible catheter associated urinary tract infection.  Ultimately urine culture was no growth, was positive for yeast.  Blood cultures are no growth.  He has been on tapered steroids.  He is currently on Zosyn and Diflucan per infectious disease.    Urology is  consulted today due to history of urinary retention.  The patient has had indwelling catheter during ICU admission, at time of transfer to floor.  He has had recent removal of catheter, inability to void and has been undergoing bladder scan and in and out catheterization.    Review of Systems   The following systems were reviewed and negative;  constitution, respiratory, cardiovascular, gastrointestinal, genitourinary, musculoskeletal, neurological and behavioral/psych.    Personal History     Past Medical History:   Diagnosis Date   • Adenomatous polyp of ascending colon 05/25/2022    Multiple throughout colon. Dr. Obregon 5/2022. Rpt 1y   • Bladder cancer (HCC)    • Cancer of upper lobe of left lung (HCC) 6/27/2022   • COPD (chronic obstructive pulmonary disease) (HCC)    • Coronary artery disease involving coronary bypass graft of native heart without angina pectoris 04/26/2022   • Essential hypertension 04/26/2022   • Gastroesophageal reflux disease without esophagitis 04/26/2022   • Hard of hearing    • Mixed hyperlipidemia 04/26/2022   • PONV (postoperative nausea and vomiting)    • Psoriasis    • Renal mass    • Vitamin B12 deficiency 04/27/2022 4/2022 Vit B12 193.    • Wears dentures     full set   • Wears glasses        Past Surgical History:   Procedure Laterality Date   • BLADDER TUMOR EXCISION      Cancer   • BRONCHOSCOPY N/A 6/15/2022    Procedure: BRONCHOSCOPY WITH ENDOBRONCHIAL ULTRASOUND WITH FLUORO;  Surgeon: Ryan Del Valle MD;  Location:  [facility];  Service: Pulmonary;  Laterality: N/A;  EBUS scope removed with balloon intact   • CARDIAC CATHETERIZATION     • CARDIAC SURGERY     • COLONOSCOPY     • CORONARY ARTERY BYPASS GRAFT  2015    [facility]   • ENDOSCOPY N/A 7/23/2022    Procedure: ESOPHAGOGASTRODUODENOSCOPY WITH BIOPSY;  Surgeon: Reno Charlton MD;  Location:  [facility];  Service: Gastroenterology;  Laterality: N/A;       Family History: family history  includes Heart attack in his brother; Hypertension in his brother and mother. Otherwise pertinent FHx was reviewed and not pertinent to current issue.    Social History:  reports that he has been smoking cigarettes. He started smoking about 60 years ago. He has a 29.50 pack-year smoking history. He has never used smokeless tobacco. He reports current alcohol use. He reports that he does not use drugs.    Home Medications:  Budeson-Glycopyrrol-Formoterol, NIFEdipine XL, O2, OLANZapine, Zinc, albuterol, albuterol sulfate HFA, aspirin, atorvastatin, cloNIDine, clopidogrel, furosemide, gabapentin, losartan, metoprolol tartrate, montelukast, nitroglycerin, omeprazole, ondansetron, and vitamin B-12      Allergies:  No Known Allergies    Objective   Objective     Vitals:   Temp:  [97.4 °F (36.3 °C)-98.1 °F (36.7 °C)] 97.4 °F (36.3 °C)  Heart Rate:  [] 103  Resp:  [18-24] 23  BP: (120-159)/() 159/84  Flow (L/min):  [2] 2  Physical Exam    Constitutional: Awake in bed, alert    Eyes: PERRLA, sclerae anicteric, no conjunctival injection   HENT: Normocephalic, atraumatic, mucous membranes moist   Neck: Supple, trachea midline   Respiratory:Equal chest rise, non-labored respirations    Cardiovascular: Perfused, no edema   Gastrointestinal: Soft, nontender, non-distended,no flank pain to palpation    Musculoskeletal: No bilateral ankle edema, no clubbing or cyanosis to extremities   Psychiatric: Appropriate affect, cooperative   Neurologic: Oriented x 3, Cranial Nerves grossly intact, speech clear   Skin: No rashes     Result Review    Result Review:  I have personally reviewed the results from the time of this admission to 8/7/2022 12:12 EDT and agree with these findings:  [x]  Laboratory  [x]  Microbiology  [x]  Radiology  []  EKG/Telemetry   []  Cardiology/Vascular   []  Pathology  [x]  Old records  []  Other:    Most notable findings include:   WBC 9.5  Creatinine 3.2, down from 4.5  Yeast positive urine culture  obtained 8/4/2022, blood cultures preliminarily no growth.    Assessment & Plan   Assessment / Plan     Brief Patient Summary:  Reese Cage is a 72 y.o. 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  He has been admitted since 7/12/2022 after initiation of immunotherapy for his squamous cell carcinoma of the lung.  Urology consulted during admission for evaluation due to urinary retention.  The patient has had indwelling Case catheter during ICU course, transition to floor.  Most recently concern for possible catheter associated urinary tract infection, positive yeast urine culture on 8/4/2022.  He is currently being followed and managed by infectious disease.  Most recently the patient has been undergoing bladder scan with in and out catheterization.    At this time would recommend replacement of indwelling Case catheter due to multi day history of inability to urinate followed by [facility].  We have discussed that he has a significant number of risk factors for urinary retention including prior history of retention in the postprocedural setting, he reports 2 to 3-week course of difficulty with urination after his CABG procedure, prolonged hospitalization, decreased mobility and functional status.  Would recommend indwelling Case catheter as nephrology is evaluating his renal function.  Creatinine 4.5 on 8/6/2022, down to 3.2 today.  We have discussed that once renal function is evaluated, he meets appropriate status for discharge he may be discharged with Case catheter in place and follow-up in urology office for removal.  If he continues to remain admitted another trial of void could be performed once the patient continues to have improved mobility and functional status.  Flomax has been initiated which is appropriate.   If patient is ultimately discharged with Case catheter in place he may follow-up in office, coordination of appointment after discharge for catheter removal and voiding trial.    Active Hospital Problems:  Active Hospital Problems    Diagnosis    • **DAX (acute kidney injury) (HCC)    • Ileus (HCC)    • Cancer of upper lobe of left lung (HCC)    • Mass of upper lobe of left lung    • Acute on chronic respiratory failure with hypoxia (HCC)    • Adenomatous polyp of ascending colon      Multiple throughout colon. Dr. Obregon 5/2022. Rpt 1y     • Bladder cancer (HCC)    • COPD (chronic obstructive pulmonary disease) (HCC)    • Essential hypertension    • Mixed hyperlipidemia          DVT prophylaxis:  Medical DVT prophylaxis orders are present.    CODE STATUS:    Code Status (Patient has no pulse and is not breathing): CPR (Attempt to Resuscitate)  Medical Interventions (Patient has pulse or is breathing): Full Support    Admission Status: Per primary team    Electronically signed by Néstor Kelly MD, 08/07/22, 12:12 PM EDT.

## 2022-08-07 NOTE — PROGRESS NOTES
[facility] Medicine Services  PROGRESS NOTE    Patient Name: Reese Cage  : 1950  MRN: [id number]    Date of Admission: 2022  Primary Care Physician: Kwame Hines MD    Subjective   Subjective     CC:  Weakness    HPI:  Constipation resolved. No diarrhea. Cough/congestion better, continues with intermittent wheezes. Urinary retention remains frustrating.    Review of Systems   Constitutional: Positive for activity change, appetite change and fatigue.   HENT: Positive for congestion.    Respiratory: Positive for wheezing. Negative for cough and shortness of breath.    Cardiovascular: Negative.    Gastrointestinal: Negative for constipation and diarrhea.   Genitourinary: Positive for difficulty urinating, dysuria and urgency.   Musculoskeletal: Negative.    Neurological: Positive for weakness.     No change otherwise from     Objective   Objective     Vital Signs:   Temp:  [97.4 °F (36.3 °C)-98.1 °F (36.7 °C)] 97.4 °F (36.3 °C)  Heart Rate:  [] 109  Resp:  [18-24] 23  BP: (120-159)/() 159/84  Flow (L/min):  [2] 2     Physical Exam:  NAD, alert and oriented  OP clear, MMM  Neck supple  No LAD  RRR  Expiratory wheezing otherwise clear  +BS, ND, NT, soft  KHAN  Normal affect  Case out  Wife at bedside today  No change in examination otherwise from     Results Reviewed:  LAB RESULTS:      Lab 22  1450 22  0533 22  0635 22  1417 22  0641   WBC 9.54 9.57 14.53* 20.09* 11.56*   HEMOGLOBIN 7.1* 7.0* 7.7* 9.0* 8.7*   HEMATOCRIT 20.9* 21.1* 23.8* 26.7* 26.2*   PLATELETS 148 98* 102* 135* 135*   NEUTROS ABS 8.46* 7.38* 11.73* 17.78* 8.03*   IMMATURE GRANS (ABS) 0.08* 0.09* 0.10* 0.22* 0.11*   LYMPHS ABS 0.44* 0.92 1.20 0.98 1.84   MONOS ABS 0.51 0.92* 1.22* 1.05* 1.53*   EOS ABS 0.04 0.24 0.25 0.03 0.03   MCV 83.6 85.4 88.5 87.0 85.6         Lab 22  1450 22  0533 22  0635 22  1417 22  0642   SODIUM  141 137 134* 137 137   POTASSIUM 3.9 4.2 4.2 4.1 4.2   CHLORIDE 104 101 96* 101 100   CO2 24.0 21.0* 20.0* 25.0 24.0   ANION GAP 13.0 15.0 18.0* 11.0 13.0   BUN 68* 76* 67* 63* 56*   CREATININE 3.23* 4.59* 4.19* 3.79* 4.08*   EGFR 19.7* 12.9* 14.4* 16.3* 14.9*   GLUCOSE 123* 117* 84 93 100*   CALCIUM 7.6* 7.7* 7.6* 8.0* 8.4*         Lab 08/05/22  0533 08/04/22  0635 08/03/22  1417   TOTAL PROTEIN 5.4* 5.3* 6.0   ALBUMIN 3.00* 3.00* 3.60   GLOBULIN 2.4 2.3 2.4   ALT (SGPT) 15 19 22   AST (SGOT) 9 13 16   BILIRUBIN 0.3 0.4 0.5   ALK PHOS 132* 133* 144*                     Brief Urine Lab Results  (Last result in the past 365 days)      Color   Clarity   Blood   Leuk Est   Nitrite   Protein   CREAT   Urine HCG        08/04/22 1533 Yellow   Turbid   Trace   Large (3+)   Negative   100 mg/dL (2+)                 Microbiology Results Abnormal     Procedure Component Value - Date/Time    Blood Culture - Blood, Arm, Left [[id number]]  (Normal) Collected: 08/03/22 2116    Lab Status: Preliminary result Specimen: Blood from Arm, Left Updated: 08/06/22 2148     Blood Culture No growth at 3 days    Blood Culture - Blood, Arm, Left [[id number]]  (Normal) Collected: 08/03/22 1415    Lab Status: Preliminary result Specimen: Blood from Arm, Left Updated: 08/06/22 1502     Blood Culture No growth at 3 days    Blood Culture - Blood, Blood, Venous Line [[id number]]  (Normal) Collected: 07/27/22 1500    Lab Status: Final result Specimen: Blood, Venous Line Updated: 08/01/22 1533     Blood Culture No growth at 5 days    Blood Culture - Blood, Blood, Venous Line [[id number]]  (Normal) Collected: 07/27/22 1500    Lab Status: Final result Specimen: Blood, Venous Line Updated: 08/01/22 1533     Blood Culture No growth at 5 days    Respiratory Culture - Sputum, Cough [[id number]] Collected: 07/28/22 1815    Lab Status: Final result Specimen: Sputum from Cough Updated: 07/28/22 2117     Respiratory Culture Rejected     Gram Stain No WBCs per low  power field      Rare (1+) Epithelial cells per low power field      Few (2+) Gram negative bacilli    Narrative:      Specimen rejected due to oropharyngeal contamination. Please reorder and recollect specimen if clinically necessary.    Respiratory Culture - Sputum, Cough [[id number]] Collected: 07/27/22 2025    Lab Status: Final result Specimen: Sputum from Cough Updated: 07/27/22 2123     Respiratory Culture Rejected     Gram Stain Few (2+) Epithelial cells per low power field      No WBCs per low power field      No organisms seen    Narrative:      Specimen rejected due to oropharyngeal contamination. Please reorder and recollect specimen if clinically necessary.    Blood Culture - Blood, Arm, Left [[id number]]  (Normal) Collected: 07/14/22 2325    Lab Status: Final result Specimen: Blood from Arm, Left Updated: 07/20/22 0502     Blood Culture No growth at 5 days    Narrative:      Aerobic bottle only      Blood Culture - Blood, Arm, Left [[id number]]  (Normal) Collected: 07/14/22 2350    Lab Status: Final result Specimen: Blood from Arm, Left Updated: 07/20/22 0502     Blood Culture No growth at 5 days    Blood Culture - Blood, Arm, Right [[id number]]  (Normal) Collected: 07/12/22 1615    Lab Status: Final result Specimen: Blood from Arm, Right Updated: 07/17/22 1647     Blood Culture No growth at 5 days    Blood Culture - Blood, Arm, Left [[id number]]  (Normal) Collected: 07/12/22 1620    Lab Status: Final result Specimen: Blood from Arm, Left Updated: 07/17/22 1646     Blood Culture No growth at 5 days    S. Pneumo Ag Urine or CSF - Urine, Urine, Clean Catch [[id number]]  (Normal) Collected: 07/15/22 0247    Lab Status: Final result Specimen: Urine, Clean Catch Updated: 07/15/22 1312     Strep Pneumo Ag Negative    Legionella Antigen, Urine - Urine, Urine, Catheter [[id number]]  (Normal) Collected: 07/15/22 0220    Lab Status: Final result Specimen: Urine, Catheter Updated: 07/15/22 1311     LEGIONELLA ANTIGEN,  URINE Negative    MRSA Screen, PCR (Inpatient) - Swab, Nares [[id number]]  (Normal) Collected: 07/14/22 2343    Lab Status: Final result Specimen: Swab from Nares Updated: 07/15/22 0857     MRSA PCR Negative    Narrative:      The negative predictive value of this diagnostic test is high and should only be used to consider de-escalating anti-MRSA therapy. A positive result may indicate colonization with MRSA and must be correlated clinically.  MRSA Negative    Eosinophil Smear - Urine, Urine, Clean Catch [[id number]]  (Normal) Collected: 07/15/22 0220    Lab Status: Final result Specimen: Urine, Clean Catch Updated: 07/15/22 0528     Eosinophil Smear 0 % EOS/100 Cells     Narrative:      No eosinophil seen    COVID PRE-OP / PRE-PROCEDURE SCREENING ORDER (NO ISOLATION) - Swab, Nasopharynx [[id number]]  (Normal) Collected: 07/14/22 2343    Lab Status: Final result Specimen: Swab from Nasopharynx Updated: 07/15/22 0100    Narrative:      The following orders were created for panel order COVID PRE-OP / PRE-PROCEDURE SCREENING ORDER (NO ISOLATION) - Swab, Nasopharynx.  Procedure                               Abnormality         Status                     ---------                               -----------         ------                     Respiratory Panel PCR w/...[[id number]]  Normal              Final result                 Please view results for these tests on the individual orders.    Respiratory Panel PCR w/COVID-19(SARS-CoV-2) [facility]HT[facility]/VARINDER/DEMETRIUS/PAD/[facility]/MAD/FAB In-House, NP Swab in UTM/VTM, 3-4 HR TAT - Swab, Nasopharynx [[id number]]  (Normal) Collected: 07/14/22 2343    Lab Status: Final result Specimen: Swab from Nasopharynx Updated: 07/15/22 0100     ADENOVIRUS, PCR Not Detected     Coronavirus 229E Not Detected     Coronavirus HKU1 Not Detected     Coronavirus NL63 Not Detected     Coronavirus OC43 Not Detected     COVID19 Not Detected     Human Metapneumovirus Not Detected     Human Rhinovirus/Enterovirus Not Detected      Influenza A PCR Not Detected     Influenza B PCR Not Detected     Parainfluenza Virus 1 Not Detected     Parainfluenza Virus 2 Not Detected     Parainfluenza Virus 3 Not Detected     Parainfluenza Virus 4 Not Detected     RSV, PCR Not Detected     Bordetella pertussis pcr Not Detected     Bordetella parapertussis PCR Not Detected     Chlamydophila pneumoniae PCR Not Detected     Mycoplasma pneumo by PCR Not Detected    Narrative:      In the setting of a positive respiratory panel with a viral infection PLUS a negative procalcitonin without other underlying concern for bacterial infection, consider observing off antibiotics or discontinuation of antibiotics and continue supportive care. If the respiratory panel is positive for atypical bacterial infection (Bordetella pertussis, Chlamydophila pneumoniae, or Mycoplasma pneumoniae), consider antibiotic de-escalation to target atypical bacterial infection.    Clostridium Difficile Toxin - Stool, Per Rectum [[id number]]  (Normal) Collected: 07/14/22 1058    Lab Status: Final result Specimen: Stool from Per Rectum Updated: 07/14/22 1248    Narrative:      The following orders were created for panel order Clostridium Difficile Toxin - Stool, Per Rectum.  Procedure                               Abnormality         Status                     ---------                               -----------         ------                     Clostridium Difficile To...[[id number]]  Normal              Final result                 Please view results for these tests on the individual orders.    Clostridium Difficile Toxin, PCR - Stool, Per Rectum [[id number]]  (Normal) Collected: 07/14/22 1058    Lab Status: Final result Specimen: Stool from Per Rectum Updated: 07/14/22 1248     C. Difficile Toxins by PCR Not Detected    Narrative:      Performance characteristics of test not established for patients <2 years of age.  Negative for Toxigenic C. Difficile    Urine Culture - Urine, Urine,  Clean Catch [[id number]] Collected: 07/12/22 1549    Lab Status: Final result Specimen: Urine, Clean Catch Updated: 07/14/22 0922     Urine Culture <25,000 CFU/mL Mixed Melanie Isolated    Narrative:      Specimen contains mixed organisms of questionable pathogenicity suggestive of contamination. If symptoms persist, suggest recollection.  Colonization of the urinary tract without infection is common. Treatment is discouraged unless the patient is symptomatic, pregnant, or undergoing an invasive urologic procedure.    MRSA Screen, PCR (Inpatient) - Swab, Nares [[id number]]  (Normal) Collected: 07/13/22 1727    Lab Status: Final result Specimen: Swab from Nares Updated: 07/13/22 1851     MRSA PCR Negative    Narrative:      The negative predictive value of this diagnostic test is high and should only be used to consider de-escalating anti-MRSA therapy. A positive result may indicate colonization with MRSA and must be correlated clinically.  MRSA Negative    COVID PRE-OP / PRE-PROCEDURE SCREENING ORDER (NO ISOLATION) - Swab, Nasopharynx [[id number]]  (Normal) Collected: 07/12/22 1629    Lab Status: Final result Specimen: Swab from Nasopharynx Updated: 07/12/22 1731    Narrative:      The following orders were created for panel order COVID PRE-OP / PRE-PROCEDURE SCREENING ORDER (NO ISOLATION) - Swab, Nasopharynx.  Procedure                               Abnormality         Status                     ---------                               -----------         ------                     COVID-19 and FLU A/B PCR...[[id number]]  Normal              Final result                 Please view results for these tests on the individual orders.    COVID-19 and FLU A/B PCR - Swab, Nasopharynx [[id number]]  (Normal) Collected: 07/12/22 1629    Lab Status: Final result Specimen: Swab from Nasopharynx Updated: 07/12/22 1731     COVID19 Not Detected     Influenza A PCR Not Detected     Influenza B PCR Not Detected    Narrative:      Fact  sheet for providers: https://www.fda.gov/media/855459/download    Fact sheet for patients: https://www.fda.gov/media/567870/download    Test performed by PCR.          No radiology results from the last 24 hrs    Results for orders placed during the hospital encounter of 07/12/22    Adult Transthoracic Echo Complete W/ Cont if Necessary Per Protocol    Interpretation Summary  · Left ventricular ejection fraction appears to be 56 - 60%. Left ventricular systolic function is normal.  · Left ventricular diastolic function was indeterminate.  · Left ventricular wall thickness is consistent with moderate concentric hypertrophy.  · Estimated right ventricular systolic pressure from tricuspid regurgitation is normal (<35 mmHg).      I have reviewed the medications:  Scheduled Meds:amLODIPine, 10 mg, Oral, Q24H  castor oil-balsam peru, 1 application, Topical, Q12H  cloNIDine, 1 patch, Transdermal, Weekly  gabapentin, 100 mg, Oral, Q8H  heparin (porcine), 5,000 Units, Subcutaneous, Q8H  hydrALAZINE, 25 mg, Oral, Q8H  ipratropium-albuterol, 3 mL, Nebulization, 4x Daily - RT  metoprolol tartrate, 100 mg, Oral, Q12H  micafungin (MYCAMINE) IV, 100 mg, Intravenous, Q24H  nicotine, 1 patch, Transdermal, Q24H  nystatin, , Topical, Q12H  pantoprazole, 40 mg, Oral, BID  piperacillin-tazobactam, 3.375 g, Intravenous, Q12H  predniSONE, 40 mg, Oral, Daily With Breakfast  saccharomyces boulardii, 250 mg, Oral, Daily  senna-docusate sodium, 2 tablet, Oral, BID  sodium chloride, 10 mL, Intravenous, Q12H  tamsulosin, 0.8 mg, Oral, Daily      Continuous Infusions:   PRN Meds:.•  acetaminophen **OR** [DISCONTINUED] acetaminophen **OR** [DISCONTINUED] acetaminophen  •  albuterol  •  senna-docusate sodium **AND** polyethylene glycol **AND** bisacodyl **AND** bisacodyl  •  cloNIDine  •  labetalol  •  [DISCONTINUED] ondansetron **OR** ondansetron  •  sodium chloride    Assessment & Plan   Assessment & Plan     Active Hospital Problems     Diagnosis  POA   • **DAX (acute kidney injury) (HCC) [N17.9]  Yes   • Ileus (HCC) [K56.7]  Yes   • Cancer of upper lobe of left lung (HCC) [C34.12]  Yes   • Mass of upper lobe of left lung [R91.8]  Yes   • Acute on chronic respiratory failure with hypoxia (HCC) [J96.21]  Yes   • Adenomatous polyp of ascending colon [D12.2]  Yes   • Bladder cancer (HCC) [C67.9]  Yes   • COPD (chronic obstructive pulmonary disease) (HCC) [J44.9]  Yes   • Essential hypertension [I10]  Yes   • Mixed hyperlipidemia [E78.2]  Yes      Resolved Hospital Problems   No resolved problems to display.        Brief Hospital Course to date:  Mr. 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 Tabor on 7/8/22. On 7/9, he developed abdominal pain with nausea, vomiting and diarrhea. On 7/12/22, he developed shortness of breath and fevers and presented to the [facility] ED and was admitted to Hospital Medicine. At admission, he was noted to have DAX and a LLL pneumonia likely secondary to aspiration.      His nausea and vomiting improved and his NGT was removed on 7/14/22. His renal function continued to decline and Nephrology was consulted. He developed refractory hypotension on 7/14 and was transferred to the ICU. There was concern for immune mediated pneumonitis and nephritis and IV Solumedrol was started on 7/15.      He did require TPN for a period of time secondary to ileus. EGD on 7/23/22 showed esophagitis and NG trauma. This was performed secondary to anemia. Patient on PPI.     Hemodialysis was initiated on 7/15/22.      He has not had any signs of recurrent bleeding. Steroids are being tapered without significant change in respiratory status.   He continues to have a markedly abnormal chest exam and CT with effusions/pneumonia-pneumonitis noted.    DAX, possibly immune mediated s/p nivolumab for  squamous cell lung cancer  -non-oliguric, HD per renal, but good UOP  -d/w renal, creatinine rising, considering tunneled catheter/HD  -may need PRBC  -increased prednisone to 40 mg as per discussion with renal  -creatinine better    Pneumonia s/p antibiotics    Fevers, to 102.7 on 8/3, suspected CAUTI  -pinto out  -urine culture with yeast x 2 now  -asked for ID input  -has temporary dialysis access, blood cultures NGTD  -continue IV antibiotics as per ID    Urinary retention  -mobilize and added flomax, increase dose  -ask for urology input 8/7  -treat UTI    Pneumonitis  -s/p nivolumab, on steroids    B effusions R>L  -monitor, HD, with UF per renal  -hx of HFpEF  -most recent CXR appears improved    Concerns for GIB  -s/p EGD with esophagitis, NG trauma  -PPI, no evidence of bleeding    Anemia  -transfuse 1 Unit PRBC 8/7    Hx of COPD  -nebs/steroids    HTN  -monitoring    Squamous cell lung cancer  -s/p chemotherapy    Expected Discharge Location and Transportation: Rehab  Expected Discharge Date: 8/10    DVT prophylaxis:  Medical DVT prophylaxis orders are present.     AM-PAC 6 Clicks Score (PT): 18 (08/05/22 0800)    CODE STATUS:   Code Status and Medical Interventions:   Ordered at: 07/12/22 0850     Code Status (Patient has no pulse and is not breathing):    CPR (Attempt to Resuscitate)     Medical Interventions (Patient has pulse or is breathing):    Full Support       Seferino Escobar MD  08/07/22

## 2022-08-07 NOTE — PROGRESS NOTES
[facility] Progress Note    Admission Date: 7/12/2022    Reese Cage  1950  [id number]    Date: 8/7/2022    Antibiotics:  Anti-Infectives (From admission, onward)    Ordered     Dose/Rate Route Frequency Start Stop    08/06/22 1549  micafungin 100 mg/100 mL 0.9% NS IVPB (mbp)        Ordering Provider: Ced Teresa PA    100 mg  over 60 Minutes Intravenous Every 24 Hours 08/06/22 1700 08/13/22 1659    08/03/22 0806  piperacillin-tazobactam (ZOSYN) 3.375 g in iso-osmotic dextrose 50 ml (premix)        Ordering Provider: Seferino Escobar MD    3.375 g  over 4 Hours Intravenous Every 12 Hours 08/03/22 1800 08/11/22 0559    08/03/22 0759  piperacillin-tazobactam (ZOSYN) 3.375 g in iso-osmotic dextrose 50 ml (premix)        Ordering Provider: Seferino Escobar MD    3.375 g  over 30 Minutes Intravenous Once 08/03/22 0845 08/03/22 0945    07/15/22 0022  levoFLOXacin (LEVAQUIN) 500 mg/100 mL D5W (premix) (LEVAQUIN) 500 mg        Ordering Provider: Reno Charlton MD    500 mg Intravenous Every 48 Hours 07/17/22 0600 07/25/22 0524    07/15/22 0022  levoFLOXacin (LEVAQUIN) 750 mg/150 mL D5W (premix) (LEVAQUIN) 750 mg        Ordering Provider: Joe Balderrama APRN    750 mg Intravenous Once 07/15/22 0115 07/15/22 0328    07/13/22 1342  piperacillin-tazobactam (ZOSYN) 3.375 g in iso-osmotic dextrose 50 ml (premix)        Ordering Provider: Justin Veras MD    3.375 g  over 4 Hours Intravenous Every 12 Hours Scheduled 07/13/22 1345 07/20/22 1354    07/12/22 1433  piperacillin-tazobactam (ZOSYN) 3.375 g in iso-osmotic dextrose 50 ml (premix)        Ordering Provider: Tuan Saunders MD    3.375 g Intravenous Once 07/12/22 1435 07/12/22 1815    07/12/22 1433  vancomycin 1500 mg/500 mL 0.9% NS IVPB ([facility])        Ordering Provider: Tuan Saunders MD    20 mg/kg × 70.3 kg Intravenous Once 07/12/22 1435 07/12/22 1955            Reason for Consultation: fevers, immunocompromised, suspected CAUTI, with yeast  x 2 now     History of present illness:    Patient is a 72 y.o. male with h/o CAD/CABG, COPD, HTN, chronic diastolic CHF, Psoriasis, renal mass, bladder cancer with tumor debulking, and recent diagnosis of TRISTAN squamous cell lung cancer/Stage IIb/started on chemotherapy (nivolumab) 7/8/22 by Dr. Tabor who presented to [facility] ED on 7/12 with worsening shortness of breath, fevers, abdominal pain, nausea and vomiting for the past 2 to 3 days PTA.  He was noted to have ARF and LLL aspiration pneumonia.  An NGT was placed and on improvement, removed 7/14.  He developed refractory hypotension and was moved to ICU on 7/14.  His renal function continued to worsening.  He was started on steroids on 7/15 with concern for immune-mediated pneumonitis and nephritis related to nivolumab/platin based chemotherapy.  He developed an ileus and was placed on a short course of TPN.  An EGD on 7/23 was done given worsening anemia and it showed esophagitis with NGT trauma.   A Hemodialysis was started on 7/15.  He developed a fever up to 102.7 on 8/3 with suspected CAUTI and pinto cath was removed.  His fever curve has improved. His UA WBC was TNTC with large LE/negative nitrite with urine cx on 8/4 positive for yeast.  A urine culture from 8/3 was also positive for yeast. Blood cultures from 8/3 are negative to date. His creatinine continues to worsen, but he is producing urine.  A Hgb today is 7.0.  WBC was 20,000 on 8/3 and WNL on 8/6.  He is currently on steroids for possible AIN.  A CXR on 8/3 showed improving pneumonia. He is remains on prednisone for possible nephritis, AIN. He is currently on Zosyn and Diflucan.  He was treated for pneumonia with Levaquin from 7/15-7/25.  ID was asked to evaluate and manage his antibiotic therapy.   8/7/22 Afebrile, persistent severe urinary retention and urologist has recommended pinto catheter placement         PE:  Vital Signs  Temp  Min: 97.4 °F (36.3 °C)  Max: 98.1 °F (36.7 °C)  BP  Min: 117/92   Max: 159/84  Pulse  Min: 91  Max: 110  Resp  Min: 18  Max: 24  SpO2  Min: 94 %  Max: 96 %      GENERAL: Awake and alert, in no acute distress.   HEENT: Normocephalic, atraumatic.  PERRL. EOMI. No conjunctival injection. No icterus. Oropharynx clear without evidence of thrush or exudate.  NECK: Supple without nuchal rigidity. No mass.  LYMPH: No cervical, axillary or inguinal lymphadenopathy.  HEART: tachycardia; No murmur, rubs, gallops.   LUNGS: Rhonchi bilaterally without wheezing. Normal respiratory effort. Nonlabored. On 2L O2 NC.   ABDOMEN: Soft, nontender, nondistended. Positive bowel sounds. No rebound or guarding. NO mass or HSM.  EXT:  No cyanosis, clubbing or edema. No cord.  :  Without Case catheter.  MSK:  FROM, no joint effusions  SKIN: Warm and dry without cutaneous eruptions on Inspection/palpation.    NEURO: Oriented to PPT. Normal speech and cognition.   PSYCHIATRIC: Normal insight and judgment. Cooperative with PE   dialysis cath site without redness or drainage     Laboratory Data    Results from last 7 days   Lab Units 08/06/22  1450 08/05/22  0533 08/04/22  0635   WBC 10*3/mm3 9.54 9.57 14.53*   HEMOGLOBIN g/dL 7.1* 7.0* 7.7*   HEMATOCRIT % 20.9* 21.1* 23.8*   PLATELETS 10*3/mm3 148 98* 102*     Results from last 7 days   Lab Units 08/06/22  1450   SODIUM mmol/L 141   POTASSIUM mmol/L 3.9   CHLORIDE mmol/L 104   CO2 mmol/L 24.0   BUN mg/dL 68*   CREATININE mg/dL 3.23*   GLUCOSE mg/dL 123*   CALCIUM mg/dL 7.6*     Results from last 7 days   Lab Units 08/05/22  0533   ALK PHOS U/L 132*   BILIRUBIN mg/dL 0.3   ALT (SGPT) U/L 15   AST (SGOT) U/L 9               Estimated Creatinine Clearance: 20.1 mL/min (A) (by C-G formula based on SCr of 3.23 mg/dL (H)).      Microbiology:  Id of yeast pending    Radiology:  Imaging Results (Last 72 Hours)     ** No results found for the last 72 hours. **          I personally reviewed the radiographic studies          Impression:   - Pyuria/Candiduria,  probable catheter-assoc UTI, Case removed, cultures with yeast  - Fever, questionable related to UTI, improved  - Leukocytosis/neutrophilia, improved  - Thrombocytopenia, improved  - Acute renal failure, ?AIN, on steroids and hemodialysis, non-oliguric  - Urinary retention.  - LLL aspiration pneumonia/treated  - Afib/RVR  - ileus  - Stage IIb squamous cell lung cancer/chemo started 7/8  - Immunocompromised host  - H/o bladder cancer/tumor debulking  - Renal mass  - Coronary artery disease/CABG  - COPD  - Essential hypertension  - Chronic diastolic heart failure  - Psoriasis     PLAN/RECOMMENDATIONS:   Thank you for asking us to see Reese Cage, I recommend the following:  - Follow blood and urine cultures  - Called and asked micro to work up yeast on both urine cultures 8/3 and 8/4 for ID and susceptibility.  - Discontinue Diflucan and change to Micafungin 100 mg IV daily.  - Continue Zosyn for now  - Nephrology considering renal bx next week.       Stephanie Waggoner MD  8/7/2022

## 2022-08-08 PROBLEM — T83.511A CATHETER-ASSOCIATED URINARY TRACT INFECTION: Status: ACTIVE | Noted: 2022-01-01

## 2022-08-08 PROBLEM — N39.0 CATHETER-ASSOCIATED URINARY TRACT INFECTION: Status: ACTIVE | Noted: 2022-01-01

## 2022-08-08 NOTE — PROGRESS NOTES
NN spoke with pt at BS.  Pt alert and able to answer questions appropriately. Pt O2 sat >90% on  2.5 L currently, home O2 use  2L.  Deep breathing exercises encouraged. No new concerns or questions voiced at this time.  NN will continue to follow as needed.       Per current GOLD Standards, please consider:  LAMA/LABA/ICS in place (Breztri), Outpatient PFT, Rehab as appropriate, Palliative Care consult, NRT at [place], Annual LDCT per current screening guidelines (age 50-80 years old, smoking history of 20 pack years or more or has quit within past 15 years)         Patient has been scheduled for a hospital follow up with [facility] for 8/18/2022 @ 4pm with JAVAN Padron MD.

## 2022-08-08 NOTE — CASE MANAGEMENT/SOCIAL WORK
Continued Stay Note  [facility]     Patient Name: Reese Cage  MRN: [id number]  Today's Date: 8/8/2022    Admit Date: 7/12/2022     Discharge Plan     Row Name 08/08/22 1509       Plan    Plan Home    Plan Comments Patient discussed in MDR this am, anticipate discharge in a day or so. He plans home with spouse, has declined any home health service.   He has needed equipment for home as his wife has purchased a rolling walker for him.    Final Discharge Disposition Code 01 - home or self-care               Discharge Codes    No documentation.               Expected Discharge Date and Time     Expected Discharge Date Expected Discharge Time    Aug 10, 2022             Miroslava Grajeda RN

## 2022-08-08 NOTE — PROGRESS NOTES
DATE OF VISIT: 8/8/2022    Chief Complaint: Followup for non-small cell lung cancer     SUBJECTIVE: The patient is sitting comfortably in the bed.  His wife is at the bedside.  He is feeling significantly better.  He would like to go home.    REVIEW OF SYSTEMS: All the other 9 systems are reviewed by me and negative  except what is mentioned in HPI.     Past History:  Medical History: has a past medical history of Adenomatous polyp of ascending colon (05/25/2022), Bladder cancer (HCC), Cancer of upper lobe of left lung (HCC) (6/27/2022), COPD (chronic obstructive pulmonary disease) ([facility]), Coronary artery disease involving coronary bypass graft of native heart without angina pectoris (04/26/2022), Essential hypertension (04/26/2022), Gastroesophageal reflux disease without esophagitis (04/26/2022), Hard of hearing, Mixed hyperlipidemia (04/26/2022), PONV (postoperative nausea and vomiting), Psoriasis, Renal mass, Vitamin B12 deficiency (04/27/2022), Wears dentures, and Wears glasses.   Surgical History: has a past surgical history that includes Coronary artery bypass graft (2015); Bladder tumor excision; Colonoscopy; Cardiac catheterization; Cardiac surgery; Bronchoscopy (N/A, 6/15/2022); and Esophagogastroduodenoscopy (N/A, 7/23/2022).   Family History: family history includes Heart attack in his brother; Hypertension in his brother and mother.   Social History: reports that he has been smoking cigarettes. He started smoking about 60 years ago. He has a 29.50 pack-year smoking history. He has never used smokeless tobacco. He reports current alcohol use. He reports that he does not use drugs.    Medications Prior to Admission   Medication Sig Dispense Refill Last Dose   • albuterol (PROVENTIL) (2.5 MG/3ML) 0.083% nebulizer solution Take 2.5 mg by nebulization Every 4 (Four) Hours As Needed for Wheezing. 300 mL 1 7/11/2022 at Unknown time   • albuterol sulfate  (90 Base) MCG/ACT inhaler Inhale 2 puffs Every 4  (Four) Hours As Needed for Wheezing. 54 g 1 7/11/2022 at Unknown time   • aspirin 81 MG EC tablet Take 1 tablet by mouth Daily. (Patient taking differently: Take 81 mg by mouth Every Night.) 90 tablet 1 7/11/2022 at Unknown time   • Budeson-Glycopyrrol-Formoterol (Breztri Aerosphere) 160-9-4.8 MCG/ACT aerosol inhaler Inhale 2 puffs 2 (Two) Times a Day. 1 each 1 7/11/2022 at Unknown time   • cloNIDine (CATAPRES) 0.2 MG tablet Take 0.2 mg by mouth 2 (Two) Times a Day.   7/11/2022 at Unknown time   • furosemide (LASIX) 20 MG tablet Take 20 mg by mouth Daily.   7/11/2022 at Unknown time   • gabapentin (NEURONTIN) 300 MG capsule Take 1 capsule by mouth 3 (Three) Times a Day. 90 capsule 0 7/12/2022 at Unknown time   • losartan (COZAAR) 100 MG tablet Take 1 tablet by mouth Daily. 90 tablet 1 7/11/2022 at Unknown time   • metoprolol tartrate (LOPRESSOR) 100 MG tablet Take 1 tablet by mouth Every Morning AND 0.5 tablets Every Evening. 135 tablet 1 7/11/2022 at Unknown time   • NIFEdipine XL (PROCARDIA XL) 90 MG 24 hr tablet Take 1 tablet by mouth Daily. 90 tablet 1 7/11/2022 at Unknown time   • OLANZapine (zyPREXA) 5 MG tablet TAKE 1 TABLET BY MOUTH EVERY NIGHT TAKE ON DAYS 2, 3, AND 4 AFTER CHEMOTHERAPY 3 tablet 2 7/11/2022 at Unknown time   • omeprazole (priLOSEC) 40 MG capsule Take 1 capsule by mouth Daily. 30 capsule 5 7/12/2022 at Unknown time   • ondansetron (ZOFRAN) 8 MG tablet Take 1 tablet by mouth 3 (Three) Times a Day As Needed for Nausea or Vomiting. 30 tablet 2 7/11/2022 at Unknown time   • vitamin B-12 (CYANOCOBALAMIN) 1000 MCG tablet Take 1 tablet by mouth Daily. 90 tablet 3 7/11/2022 at Unknown time   • Zinc 50 MG tablet Take 1 tablet by mouth Daily.   7/11/2022 at Unknown time   • atorvastatin (LIPITOR) 80 MG tablet Take 1 tablet by mouth Daily. 90 tablet 1 7/9/2022   • clopidogrel (PLAVIX) 75 MG tablet Take 1 tablet by mouth Daily. (Patient taking differently: Take 75 mg by mouth Daily. No cardiac stents)  90 tablet 1 7/9/2022   • montelukast (SINGULAIR) 10 MG tablet Take 1 tablet by mouth Every Night. 90 tablet 1 7/9/2022   • nitroglycerin (NITROSTAT) 0.4 MG SL tablet Place 1 tablet under the tongue Every 5 (Five) Minutes As Needed for Chest Pain. Take no more than 3 doses in 15 minutes. 30 tablet 1 Unknown at Unknown time   • O2 (OXYGEN) Inhale 2 L/min As Needed.         Allergies: Patient has no known allergies.     Current Facility-Administered Medications:   •  acetaminophen (TYLENOL) tablet 650 mg, 650 mg, Oral, Q4H PRN, 650 mg at 08/03/22 0721 **OR** [DISCONTINUED] acetaminophen (TYLENOL) 160 MG/5ML solution 650 mg, 650 mg, Nasogastric, Q4H PRN, 649.6 mg at 07/14/22 1913 **OR** [DISCONTINUED] acetaminophen (TYLENOL) suppository 650 mg, 650 mg, Rectal, Q4H PRN, Felix Lopez MD  •  albuterol (PROVENTIL) nebulizer solution 0.083% 2.5 mg/3mL, 2.5 mg, Nebulization, Q4H PRN, Alyssa Padron MD, 2.5 mg at 08/03/22 0554  •  amLODIPine (NORVASC) tablet 10 mg, 10 mg, Oral, Q24H, Alyssa Padron MD, 10 mg at 08/08/22 0842  •  sennosides-docusate (PERICOLACE) 8.6-50 MG per tablet 2 tablet, 2 tablet, Oral, BID, 2 tablet at 08/07/22 0856 **AND** polyethylene glycol (MIRALAX) packet 17 g, 17 g, Oral, Daily PRN **AND** bisacodyl (DULCOLAX) EC tablet 5 mg, 5 mg, Oral, Daily PRN **AND** bisacodyl (DULCOLAX) suppository 10 mg, 10 mg, Rectal, Daily PRN, Seferino Escobar MD  •  castor oil-balsam peru (VENELEX) ointment 1 application, 1 application, Topical, Q12H, Alyssa Padron MD, 1 application at 08/08/22 0843  •  cloNIDine (CATAPRES) tablet 0.1 mg, 0.1 mg, Oral, Q8H PRN, Alyssa Padron MD, 0.1 mg at 07/26/22 0210  •  cloNIDine (CATAPRES-TTS) 0.1 MG/24HR patch 1 patch, 1 patch, Transdermal, Weekly, Alyssa Padron MD, 1 patch at 08/07/22 0856  •  gabapentin (NEURONTIN) capsule 100 mg, 100 mg, Oral, Q8H, Alyssa Padron MD, 100 mg at 08/08/22 0556  •  heparin (porcine) 5000 UNIT/ML  injection 5,000 Units, 5,000 Units, Subcutaneous, Q8H, Alyssa Padron MD, 5,000 Units at 08/08/22 0556  •  hydrALAZINE (APRESOLINE) tablet 25 mg, 25 mg, Oral, Q8H, Seferino Escobar MD, 25 mg at 08/08/22 0556  •  ipratropium-albuterol (DUO-NEB) nebulizer solution 3 mL, 3 mL, Nebulization, 4x Daily - RT, Alyssa Padron MD, 3 mL at 08/08/22 0732  •  labetalol (NORMODYNE,TRANDATE) injection 20 mg, 20 mg, Intravenous, Q4H PRN, Alyssa Padron MD, 20 mg at 07/26/22 0437  •  metoprolol tartrate (LOPRESSOR) tablet 100 mg, 100 mg, Oral, Q12H, Alyssa Padron MD, 100 mg at 08/08/22 0842  •  micafungin 100 mg/100 mL 0.9% NS IVPB (mbp), 100 mg, Intravenous, Q24H, Ced Teresa PA, 100 mg at 08/07/22 1727  •  nicotine (NICODERM CQ) 14 MG/24HR patch 1 patch, 1 patch, Transdermal, Q24H, Alyssa Padron MD, 1 patch at 08/07/22 2137  •  nystatin (MYCOSTATIN) powder, , Topical, Q12H, Alyssa Padron MD, Given at 08/08/22 0843  •  [DISCONTINUED] ondansetron (ZOFRAN) tablet 4 mg, 4 mg, Nasogastric, Q6H PRN **OR** ondansetron (ZOFRAN) injection 4 mg, 4 mg, Intravenous, Q6H PRN, Alyssa Padron MD, 4 mg at 07/20/22 0231  •  pantoprazole (PROTONIX) EC tablet 40 mg, 40 mg, Oral, BID, Alyssa Padron MD, 40 mg at 08/08/22 0842  •  piperacillin-tazobactam (ZOSYN) 3.375 g in iso-osmotic dextrose 50 ml (premix), 3.375 g, Intravenous, Q12H, Seferino Escobar MD, 3.375 g at 08/08/22 0556  •  predniSONE (DELTASONE) tablet 40 mg, 40 mg, Oral, Daily With Breakfast, Seferino Escobar MD, 40 mg at 08/08/22 0842  •  saccharomyces boulardii (FLORASTOR) capsule 250 mg, 250 mg, Oral, Daily, Alyssa Padron MD, 250 mg at 08/08/22 0842  •  sodium chloride 0.9 % flush 10 mL, 10 mL, Intravenous, Q12H, Alyssa Padron MD, 10 mL at 08/08/22 0843  •  sodium chloride 0.9 % flush 10 mL, 10 mL, Intravenous, PRN, Alyssa Padron MD  •  tamsulosin (FLOMAX) 24 hr capsule 0.8 mg, 0.8 mg,  "Oral, Daily, Seferino Escobar MD, 0.8 mg at 08/08/22 0842    PHYSICAL EXAMINATION:   /98 (BP Location: Left arm, Patient Position: Lying)   Pulse 98   Temp 98.1 °F (36.7 °C) (Oral)   Resp 20   Ht 170.2 cm (67.01\")   Wt 69.3 kg (152 lb 11.2 oz)   SpO2 93%   BMI 23.91 kg/m²                ECOG Performance Status: 2 - Symptomatic, <50% confined to bed  GENERAL: Age appropriate. No acute distress.   NEURO/PSYCH: A&O x 3, strength 5/5 in all muscle groups  HEENT: Head atraumatic, normocephalic.   NECK: Supple. No JVD. No lymphadenopathy.   LUNGS: Clear to auscultation bilaterally. No wheezing. No rhonchi.   HEART: Regular rate and rhythm. S1, S2, no murmurs.   ABDOMEN: Soft, nontender, nondistended. Bowel sounds positive. No  hepatosplenomegaly.   EXTREMITIES: No clubbing, cyanosis, or edema.   SKIN: No rashes. No purpura.       No results displayed because visit has over 200 results.          No results found.    ASSESSMENT: The patient is a very pleasant 72 y.o. male  with non-small cell lung cancer      PLAN:  1.  Non-small cell lung cancer:  A.  The patient could not tolerate neoadjuvant chemotherapy with immunotherapy and hence we will stop treatment at this point.  B.  He will follow-up with me in 4 weeks with repeat CT chest.  If there is no evidence of progression we will proceed with surgical resection.    2.  Acute kidney injury:  A.  Multifactorial most likely induced by hypotension dehydration with prerenal component although the possibility of immune mediated reaction could not be ruled out.  B.  Continue to wean him off prednisone.  Recommend discharging home on prednisone 30 mg daily with 10 mg taper every 5 days.    Discussed with Dr. Lopez to coordinate patient's care.    Rich Tabor MD  8/8/2022    "

## 2022-08-08 NOTE — PROGRESS NOTES
[facility] Medicine Services  PROGRESS NOTE    Patient Name: Reese Cage  : 1950  MRN: [id number]    Date of Admission: 2022  Primary Care Physician: Kwame Hines MD    Subjective   Subjective     CC:  F/U weakness    HPI:  Seen this morning, ambulated to bathroom with his walker, says he is walking well and urinating really well today. Eager to go home.     ROS:  Gen-no fevers, no chills  CV-no chest pain, no palpitations  Resp-no cough, stable dyspnea  GI-no N/V/D, no abd pain    All other systems reviewed and negative except any additional pertinent positives and negatives as discussed in HPI.       Objective   Objective     Vital Signs:   Temp:  [97.9 °F (36.6 °C)-98.4 °F (36.9 °C)] 97.9 °F (36.6 °C)  Heart Rate:  [] 90  Resp:  [19-24] 20  BP: (124-161)/(47-98) 158/47  Flow (L/min):  [2-3] 2.5     Physical Exam:  Gen-no acute distress  HENT-NCAT, mucous membranes moist  CV-RRR, S1 S2 normal, no m/r/g  Resp-diminished bilaterally, no wheezes or rales  Abd-soft, NT, ND, +BS  Ext-no edema  Neuro-A&Ox3, no focal deficits  Skin-no rashes  Psych-appropriate mood      Results Reviewed:  LAB RESULTS:      Lab 22  0604 22  1544 22  1450 22  0533 22  0635   WBC 8.07 6.33 9.54 9.57 14.53*   HEMOGLOBIN 8.6* 7.6* 7.1* 7.0* 7.7*   HEMATOCRIT 25.4* 22.8* 20.9* 21.1* 23.8*   PLATELETS 216 206 148 98* 102*   NEUTROS ABS 5.32 5.32 8.46* 7.38* 11.73*   IMMATURE GRANS (ABS) 0.33* 0.16* 0.08* 0.09* 0.10*   LYMPHS ABS 1.40 0.63* 0.44* 0.92 1.20   MONOS ABS 0.99* 0.21 0.51 0.92* 1.22*   EOS ABS 0.01 0.00 0.04 0.24 0.25   MCV 86.1 87.4 83.6 85.4 88.5         Lab 22  0604 22  1544 22  1450 22  0533 22  0635   SODIUM 142 135* 141 137 134*   POTASSIUM 3.9 4.1 3.9 4.2 4.2   CHLORIDE 104 97* 104 101 96*   CO2 27.0 21.0* 24.0 21.0* 20.0*   ANION GAP 11.0 17.0* 13.0 15.0 18.0*   BUN 46* 53* 68* 76* 67*   CREATININE 2.26*  2.91* 3.23* 4.59* 4.19*   EGFR 30.2* 22.3* 19.7* 12.9* 14.4*   GLUCOSE 83 185* 123* 117* 84   CALCIUM 7.6* 7.8* 7.6* 7.7* 7.6*         Lab 08/05/22  0533 08/04/22  0635 08/03/22  1417   TOTAL PROTEIN 5.4* 5.3* 6.0   ALBUMIN 3.00* 3.00* 3.60   GLOBULIN 2.4 2.3 2.4   ALT (SGPT) 15 19 22   AST (SGOT) 9 13 16   BILIRUBIN 0.3 0.4 0.5   ALK PHOS 132* 133* 144*                 Lab 08/07/22  1544   ABO TYPING O   RH TYPING Positive   ANTIBODY SCREEN Negative         Brief Urine Lab Results  (Last result in the past 365 days)      Color   Clarity   Blood   Leuk Est   Nitrite   Protein   CREAT   Urine HCG        08/04/22 1533 Yellow   Turbid   Trace   Large (3+)   Negative   100 mg/dL (2+)                 Microbiology Results Abnormal     Procedure Component Value - Date/Time    Blood Culture - Blood, Arm, Left [[id number]]  (Normal) Collected: 08/03/22 1415    Lab Status: Final result Specimen: Blood from Arm, Left Updated: 08/08/22 1503     Blood Culture No growth at 5 days    Blood Culture - Blood, Arm, Left [[id number]]  (Normal) Collected: 08/03/22 2116    Lab Status: Preliminary result Specimen: Blood from Arm, Left Updated: 08/07/22 2147     Blood Culture No growth at 4 days    Blood Culture - Blood, Blood, Venous Line [[id number]]  (Normal) Collected: 07/27/22 1500    Lab Status: Final result Specimen: Blood, Venous Line Updated: 08/01/22 1533     Blood Culture No growth at 5 days    Blood Culture - Blood, Blood, Venous Line [[id number]]  (Normal) Collected: 07/27/22 1500    Lab Status: Final result Specimen: Blood, Venous Line Updated: 08/01/22 1533     Blood Culture No growth at 5 days    Respiratory Culture - Sputum, Cough [[id number]] Collected: 07/28/22 1815    Lab Status: Final result Specimen: Sputum from Cough Updated: 07/28/22 2117     Respiratory Culture Rejected     Gram Stain No WBCs per low power field      Rare (1+) Epithelial cells per low power field      Few (2+) Gram negative bacilli    Narrative:       Specimen rejected due to oropharyngeal contamination. Please reorder and recollect specimen if clinically necessary.    Respiratory Culture - Sputum, Cough [[id number]] Collected: 07/27/22 2025    Lab Status: Final result Specimen: Sputum from Cough Updated: 07/27/22 2123     Respiratory Culture Rejected     Gram Stain Few (2+) Epithelial cells per low power field      No WBCs per low power field      No organisms seen    Narrative:      Specimen rejected due to oropharyngeal contamination. Please reorder and recollect specimen if clinically necessary.    Blood Culture - Blood, Arm, Left [[id number]]  (Normal) Collected: 07/14/22 2325    Lab Status: Final result Specimen: Blood from Arm, Left Updated: 07/20/22 0502     Blood Culture No growth at 5 days    Narrative:      Aerobic bottle only      Blood Culture - Blood, Arm, Left [[id number]]  (Normal) Collected: 07/14/22 2350    Lab Status: Final result Specimen: Blood from Arm, Left Updated: 07/20/22 0502     Blood Culture No growth at 5 days    Blood Culture - Blood, Arm, Right [[id number]]  (Normal) Collected: 07/12/22 1615    Lab Status: Final result Specimen: Blood from Arm, Right Updated: 07/17/22 1647     Blood Culture No growth at 5 days    Blood Culture - Blood, Arm, Left [[id number]]  (Normal) Collected: 07/12/22 1620    Lab Status: Final result Specimen: Blood from Arm, Left Updated: 07/17/22 1646     Blood Culture No growth at 5 days    S. Pneumo Ag Urine or CSF - Urine, Urine, Clean Catch [[id number]]  (Normal) Collected: 07/15/22 0247    Lab Status: Final result Specimen: Urine, Clean Catch Updated: 07/15/22 1312     Strep Pneumo Ag Negative    Legionella Antigen, Urine - Urine, Urine, Catheter [[id number]]  (Normal) Collected: 07/15/22 0220    Lab Status: Final result Specimen: Urine, Catheter Updated: 07/15/22 1311     LEGIONELLA ANTIGEN, URINE Negative    MRSA Screen, PCR (Inpatient) - Swab, Nares [[id number]]  (Normal) Collected: 07/14/22 2343    Lab  Status: Final result Specimen: Swab from Nares Updated: 07/15/22 0857     MRSA PCR Negative    Narrative:      The negative predictive value of this diagnostic test is high and should only be used to consider de-escalating anti-MRSA therapy. A positive result may indicate colonization with MRSA and must be correlated clinically.  MRSA Negative    Eosinophil Smear - Urine, Urine, Clean Catch [[id number]]  (Normal) Collected: 07/15/22 0220    Lab Status: Final result Specimen: Urine, Clean Catch Updated: 07/15/22 0528     Eosinophil Smear 0 % EOS/100 Cells     Narrative:      No eosinophil seen    COVID PRE-OP / PRE-PROCEDURE SCREENING ORDER (NO ISOLATION) - Swab, Nasopharynx [[id number]]  (Normal) Collected: 07/14/22 2343    Lab Status: Final result Specimen: Swab from Nasopharynx Updated: 07/15/22 0100    Narrative:      The following orders were created for panel order COVID PRE-OP / PRE-PROCEDURE SCREENING ORDER (NO ISOLATION) - Swab, Nasopharynx.  Procedure                               Abnormality         Status                     ---------                               -----------         ------                     Respiratory Panel PCR w/...[[id number]]  Normal              Final result                 Please view results for these tests on the individual orders.    Respiratory Panel PCR w/COVID-19(SARS-CoV-2) ASHTYN/VARINDER/DEMETRIUS/PAD/[facility]/MAD/FAB In-House, NP Swab in UTM/VTM, 3-4 HR TAT - Swab, Nasopharynx [[id number]]  (Normal) Collected: 07/14/22 2343    Lab Status: Final result Specimen: Swab from Nasopharynx Updated: 07/15/22 0100     ADENOVIRUS, PCR Not Detected     Coronavirus 229E Not Detected     Coronavirus HKU1 Not Detected     Coronavirus NL63 Not Detected     Coronavirus OC43 Not Detected     COVID19 Not Detected     Human Metapneumovirus Not Detected     Human Rhinovirus/Enterovirus Not Detected     Influenza A PCR Not Detected     Influenza B PCR Not Detected     Parainfluenza Virus 1 Not Detected      Parainfluenza Virus 2 Not Detected     Parainfluenza Virus 3 Not Detected     Parainfluenza Virus 4 Not Detected     RSV, PCR Not Detected     Bordetella pertussis pcr Not Detected     Bordetella parapertussis PCR Not Detected     Chlamydophila pneumoniae PCR Not Detected     Mycoplasma pneumo by PCR Not Detected    Narrative:      In the setting of a positive respiratory panel with a viral infection PLUS a negative procalcitonin without other underlying concern for bacterial infection, consider observing off antibiotics or discontinuation of antibiotics and continue supportive care. If the respiratory panel is positive for atypical bacterial infection (Bordetella pertussis, Chlamydophila pneumoniae, or Mycoplasma pneumoniae), consider antibiotic de-escalation to target atypical bacterial infection.    Clostridium Difficile Toxin - Stool, Per Rectum [[id number]]  (Normal) Collected: 07/14/22 1058    Lab Status: Final result Specimen: Stool from Per Rectum Updated: 07/14/22 1248    Narrative:      The following orders were created for panel order Clostridium Difficile Toxin - Stool, Per Rectum.  Procedure                               Abnormality         Status                     ---------                               -----------         ------                     Clostridium Difficile To...[[id number]]  Normal              Final result                 Please view results for these tests on the individual orders.    Clostridium Difficile Toxin, PCR - Stool, Per Rectum [[id number]]  (Normal) Collected: 07/14/22 1058    Lab Status: Final result Specimen: Stool from Per Rectum Updated: 07/14/22 1248     C. Difficile Toxins by PCR Not Detected    Narrative:      Performance characteristics of test not established for patients <2 years of age.  Negative for Toxigenic C. Difficile    Urine Culture - Urine, Urine, Clean Catch [[id number]] Collected: 07/12/22 4561    Lab Status: Final result Specimen: Urine, Clean Catch  Updated: 07/14/22 0922     Urine Culture <25,000 CFU/mL Mixed Melanie Isolated    Narrative:      Specimen contains mixed organisms of questionable pathogenicity suggestive of contamination. If symptoms persist, suggest recollection.  Colonization of the urinary tract without infection is common. Treatment is discouraged unless the patient is symptomatic, pregnant, or undergoing an invasive urologic procedure.    MRSA Screen, PCR (Inpatient) - Swab, Nares [[id number]]  (Normal) Collected: 07/13/22 1727    Lab Status: Final result Specimen: Swab from Nares Updated: 07/13/22 1851     MRSA PCR Negative    Narrative:      The negative predictive value of this diagnostic test is high and should only be used to consider de-escalating anti-MRSA therapy. A positive result may indicate colonization with MRSA and must be correlated clinically.  MRSA Negative    COVID PRE-OP / PRE-PROCEDURE SCREENING ORDER (NO ISOLATION) - Swab, Nasopharynx [[id number]]  (Normal) Collected: 07/12/22 1629    Lab Status: Final result Specimen: Swab from Nasopharynx Updated: 07/12/22 1731    Narrative:      The following orders were created for panel order COVID PRE-OP / PRE-PROCEDURE SCREENING ORDER (NO ISOLATION) - Swab, Nasopharynx.  Procedure                               Abnormality         Status                     ---------                               -----------         ------                     COVID-19 and FLU A/B PCR...[[id number]]  Normal              Final result                 Please view results for these tests on the individual orders.    COVID-19 and FLU A/B PCR - Swab, Nasopharynx [[id number]]  (Normal) Collected: 07/12/22 1629    Lab Status: Final result Specimen: Swab from Nasopharynx Updated: 07/12/22 1731     COVID19 Not Detected     Influenza A PCR Not Detected     Influenza B PCR Not Detected    Narrative:      Fact sheet for providers: https://www.fda.gov/media/625502/download    Fact sheet for patients:  https://www.fda.gov/media/809183/download    Test performed by PCR.          No radiology results from the last 24 hrs    Results for orders placed during the hospital encounter of 07/12/22    Adult Transthoracic Echo Complete W/ Cont if Necessary Per Protocol    Interpretation Summary  · Left ventricular ejection fraction appears to be 56 - 60%. Left ventricular systolic function is normal.  · Left ventricular diastolic function was indeterminate.  · Left ventricular wall thickness is consistent with moderate concentric hypertrophy.  · Estimated right ventricular systolic pressure from tricuspid regurgitation is normal (<35 mmHg).      I have reviewed the medications:  Scheduled Meds:amLODIPine, 10 mg, Oral, Q24H  castor oil-balsam peru, 1 application, Topical, Q12H  cloNIDine, 1 patch, Transdermal, Weekly  fluconazole, 100 mg, Oral, Q24H  gabapentin, 100 mg, Oral, Q8H  heparin (porcine), 5,000 Units, Subcutaneous, Q8H  hydrALAZINE, 25 mg, Oral, Q8H  ipratropium-albuterol, 3 mL, Nebulization, 4x Daily - RT  metoprolol tartrate, 100 mg, Oral, Q12H  nicotine, 1 patch, Transdermal, Q24H  nystatin, , Topical, Q12H  pantoprazole, 40 mg, Oral, BID  piperacillin-tazobactam, 3.375 g, Intravenous, Q8H  predniSONE, 40 mg, Oral, Daily With Breakfast  saccharomyces boulardii, 250 mg, Oral, Daily  senna-docusate sodium, 2 tablet, Oral, BID  sodium chloride, 10 mL, Intravenous, Q12H  tamsulosin, 0.8 mg, Oral, Daily      Continuous Infusions:   PRN Meds:.•  acetaminophen **OR** [DISCONTINUED] acetaminophen **OR** [DISCONTINUED] acetaminophen  •  albuterol  •  senna-docusate sodium **AND** polyethylene glycol **AND** bisacodyl **AND** bisacodyl  •  cloNIDine  •  labetalol  •  [DISCONTINUED] ondansetron **OR** ondansetron  •  sodium chloride    Assessment & Plan   Assessment & Plan     Active Hospital Problems    Diagnosis  POA   • **DAX (acute kidney injury) (HCC) [N17.9]  Yes   • Catheter-associated urinary tract infection (HCC)  [T83.511A, N39.0]  Unknown   • Ileus (HCC) [K56.7]  Yes   • Cancer of upper lobe of left lung (HCC) [C34.12]  Yes   • Mass of upper lobe of left lung [R91.8]  Yes   • Acute on chronic respiratory failure with hypoxia (HCC) [J96.21]  Yes   • Adenomatous polyp of ascending colon [D12.2]  Yes   • Bladder cancer (HCC) [C67.9]  Yes   • COPD (chronic obstructive pulmonary disease) (HCC) [J44.9]  Yes   • Essential hypertension [I10]  Yes   • Mixed hyperlipidemia [E78.2]  Yes      Resolved Hospital Problems   No resolved problems to display.        Brief Hospital Course to date:  Reese Cage is a 72 y.o. 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 Tabor on 7/8/22. On 7/9/22, he developed abdominal pain with nausea, vomiting, and diarrhea. On 7/12/22, he developed shortness of breath and fevers and presented to the [facility] ED and was admitted to Hospital Medicine. At admission, he was noted to have DAX and a LLL pneumonia likely secondary to aspiration.      His nausea and vomiting improved and his NG tube was removed on 7/14/22. His renal function continued to decline and Nephrology was consulted. He developed refractory hypotension on 7/14/22 and was transferred to the ICU. There was concern for immune mediated pneumonitis and nephritis and IV Solumedrol was started on 7/15/22.      He did require TPN for a period of time secondary to ileus. Due to worsening epigastric pain and anemia, an EGD was performed on 7/23/22 which showed esophagitis and NG trauma. He has not had any signs of recurrent bleeding.      Hemodialysis was initiated on 7/15/22 via a temporary dialysis catheter. He is now making more urine and renal function is markedly improved. Nephrology will hold off on renal biopsy and likely pull dialysis catheter soon.    This patient's problems and plans were partially  entered by my partner and updated as appropriate by me 08/08/22.    *All problems are new to me today. Greater than 25 minutes spent reviewing chart.      DAX, possibly immune mediated s/p nivolumab for squamous cell lung cancer  --s/p intermittent dialysis per Nephrology. Now with improved UOP and signs of renal recovery. Cr trending down to 2.26 today. If continues to improve tomorrow, will likely pull dialysis catheter.  --Currently on prednisone 40 mg daily, likely will decrease to 30 mg daily at discharge, taper by 10 mg every 5 days.   --F/U with Nephrology in 2 weeks.    Acute on chronic hypoxic respiratory failure  LLL pneumonia s/p antibiotics, suspected aspiration     Fevers to 102.7 on 8/3/22  CAUTI, Candida albicans  --Case catheter has been removed.   --Urine culture with yeast, Candida albicans.  --Blood cultures NGTD.  --ID following, has been on Micafungin and Zosyn. Now plan to switch to PO Fluconazole 100 mg daily x 10 days.  --Fevers resolved.      Urinary retention, resolved  --Seen by Urology/Dr. Kelly, agrees with Flomax and increase mobility.  --Case removed and now urinating well.     Pneumonitis  -s/p nivolumab, on steroids as above.     Bilateral effusions R>L  --Hx of HFpEF.  --s/p HD with UF.  --Most recent CXR appears improved.     Concerns for GIB  --s/p EGD 7/23/22 with esophagitis, NG trauma.  --Continue PPI, no further evidence of bleeding.     Anemia  --Transfused 1 Unit PRBC 8/7/22 for Hb 7.1.  --Improved to 8.6 today. Monitor closely.      Hx of COPD  --Continue nebs/steroids.     HTN  --Continue Norvasc, Clonidine, Hydralazine, Metoprolol.     Squamous cell lung cancer  --s/p chemotherapy, follows with Dr. Taobr.  --F/U in 1 month with repeat CT chest, if no evidence of progression will likely proceed with surgical resection.    Expected Discharge Location and Transportation: home  Expected Discharge Date: 08/09/22    DVT prophylaxis:  Medical DVT prophylaxis orders are  present.     AM-PAC 6 Clicks Score (PT): 19 (08/08/22 0800)    CODE STATUS:   Code Status and Medical Interventions:   Ordered at: 07/12/22 2765     Code Status (Patient has no pulse and is not breathing):    CPR (Attempt to Resuscitate)     Medical Interventions (Patient has pulse or is breathing):    Full Support       Mary Lou Lopez MD  08/08/22

## 2022-08-08 NOTE — PROGRESS NOTES
"   LOS: 27 days    Patient Care Team:  Kwame Hines MD as PCP - General (Family Medicine)  Ryan Del Valle MD as Consulting Physician (Pulmonary Disease)  Néstor Kelly MD as Consulting Physician (Urology)    Chief Complaint: Acute kidney injury, nausea vomiting diarrhea.    72-year-old with some non-small cell CA treated with chemotherapy including immunotherapy with carboplatinum, paclitaxel and Opdivo, started last week complain of nausea vomiting diarrhea poor oral intake, on admission creatinine 3.8 slowly got worse.  CT scan showed aspiration pneumonia with ileus this.  Despite IV fluid since admission renal function continued to deteriorate and was started on dialysis on 7/15.      Subjective   Renal function improving. Cr 2.26 mg/dl BUN 46. Doing well otherwise. Spontaneous improvement in urine output      Objective     Vital Sign Min/Max for last 24 hours  Temp  Min: 97.9 °F (36.6 °C)  Max: 98.4 °F (36.9 °C)   BP  Min: 124/59  Max: 161/86   Pulse  Min: 92  Max: 111   Resp  Min: 19  Max: 24   SpO2  Min: 90 %  Max: 97 %   Flow (L/min)  Min: 2  Max: 3   Weight  Min: 69.3 kg (152 lb 11.2 oz)  Max: 69.3 kg (152 lb 11.2 oz)     Flowsheet Rows    Flowsheet Row First Filed Value   Admission Height 170.2 cm (67\") Documented at 07/12/2022 1123   Admission Weight 70.3 kg (155 lb) Documented at 07/12/2022 1123          I/O this shift:  In: 300 [P.O.:300]  Out: 800 [Urine:800]  I/O last 3 completed shifts:  In: 5053.8 [P.O.:4620; Blood:433.8]  Out: 3675 [Urine:3675]    Physical Exam:    Gen: Alert, NAD   HENT: NC, AT, EOMI   NECK: Supple, no JVD, Trachea midline   LUNGS: CTA bilaterally, non labored respirtation   CVS: S1/S2 audible, RRR, no murmur   Abd: Soft, NT, ND, BS+   Ext: No pedal edema, no cyanosis   CNS: Alert, No focal deficit noted grossly  Psy: Cooperative  Skin: Warm, dry and intact  Dialysis catheter noted.     WBC WBC   Date Value Ref Range Status   08/08/2022 8.07 3.40 - 10.80 10*3/mm3 " Final   08/07/2022 6.33 3.40 - 10.80 10*3/mm3 Final   08/06/2022 9.54 3.40 - 10.80 10*3/mm3 Final      HGB Hemoglobin   Date Value Ref Range Status   08/08/2022 8.6 (L) 13.0 - 17.7 g/dL Final   08/07/2022 7.6 (L) 13.0 - 17.7 g/dL Final   08/06/2022 7.1 (L) 13.0 - 17.7 g/dL Final      HCT Hematocrit   Date Value Ref Range Status   08/08/2022 25.4 (L) 37.5 - 51.0 % Final   08/07/2022 22.8 (L) 37.5 - 51.0 % Final   08/06/2022 20.9 (C) 37.5 - 51.0 % Final      Platlets No results found for: LABPLAT   MCV MCV   Date Value Ref Range Status   08/08/2022 86.1 79.0 - 97.0 fL Final   08/07/2022 87.4 79.0 - 97.0 fL Final   08/06/2022 83.6 79.0 - 97.0 fL Final          Sodium Sodium   Date Value Ref Range Status   08/08/2022 142 136 - 145 mmol/L Final   08/07/2022 135 (L) 136 - 145 mmol/L Final   08/06/2022 141 136 - 145 mmol/L Final      Potassium Potassium   Date Value Ref Range Status   08/08/2022 3.9 3.5 - 5.2 mmol/L Final   08/07/2022 4.1 3.5 - 5.2 mmol/L Final   08/06/2022 3.9 3.5 - 5.2 mmol/L Final      Chloride Chloride   Date Value Ref Range Status   08/08/2022 104 98 - 107 mmol/L Final   08/07/2022 97 (L) 98 - 107 mmol/L Final   08/06/2022 104 98 - 107 mmol/L Final      CO2 CO2   Date Value Ref Range Status   08/08/2022 27.0 22.0 - 29.0 mmol/L Final   08/07/2022 21.0 (L) 22.0 - 29.0 mmol/L Final   08/06/2022 24.0 22.0 - 29.0 mmol/L Final      BUN BUN   Date Value Ref Range Status   08/08/2022 46 (H) 8 - 23 mg/dL Final   08/07/2022 53 (H) 8 - 23 mg/dL Final   08/06/2022 68 (H) 8 - 23 mg/dL Final      Creatinine Creatinine   Date Value Ref Range Status   08/08/2022 2.26 (H) 0.76 - 1.27 mg/dL Final   08/07/2022 2.91 (H) 0.76 - 1.27 mg/dL Final   08/06/2022 3.23 (H) 0.76 - 1.27 mg/dL Final      Calcium Calcium   Date Value Ref Range Status   08/08/2022 7.6 (L) 8.6 - 10.5 mg/dL Final   08/07/2022 7.8 (L) 8.6 - 10.5 mg/dL Final   08/06/2022 7.6 (L) 8.6 - 10.5 mg/dL Final      PO4 No results found for: CAPO4   Albumin No  results found for: ALBUMIN   Magnesium No results found for: MG   Uric Acid No results found for: URICACID        Results Review:     I reviewed the patient's new clinical results.  I reviewed the patient's new imaging results and agree with the interpretation.    amLODIPine, 10 mg, Oral, Q24H  castor oil-balsam peru, 1 application, Topical, Q12H  cloNIDine, 1 patch, Transdermal, Weekly  gabapentin, 100 mg, Oral, Q8H  heparin (porcine), 5,000 Units, Subcutaneous, Q8H  hydrALAZINE, 25 mg, Oral, Q8H  ipratropium-albuterol, 3 mL, Nebulization, 4x Daily - RT  metoprolol tartrate, 100 mg, Oral, Q12H  micafungin (MYCAMINE) IV, 100 mg, Intravenous, Q24H  nicotine, 1 patch, Transdermal, Q24H  nystatin, , Topical, Q12H  pantoprazole, 40 mg, Oral, BID  piperacillin-tazobactam, 3.375 g, Intravenous, Q8H  predniSONE, 40 mg, Oral, Daily With Breakfast  saccharomyces boulardii, 250 mg, Oral, Daily  senna-docusate sodium, 2 tablet, Oral, BID  sodium chloride, 10 mL, Intravenous, Q12H  tamsulosin, 0.8 mg, Oral, Daily           Medication Review: Reviewed    Assessment & Plan       DAX (acute kidney injury) (HCC)    Essential hypertension    Mixed hyperlipidemia    Bladder cancer (HCC)    COPD (chronic obstructive pulmonary disease) (HCC)    Adenomatous polyp of ascending colon    Acute on chronic respiratory failure with hypoxia (HCC)    Mass of upper lobe of left lung    Cancer of upper lobe of left lung (HCC)    Ileus (HCC)     1.  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 Serologic workup-  C3 - 88,  C4 16, CK 43, UPCR 0.65, FeNA . HD started 7/15/22.   2.  Metabolic acidosis resolved with medications and dialysis  3.  Non-small cell CA treated with chemotherapy  4.  Anemia: S/p chemo  5.  Pancytopenia secondary to chemotherapy.  6.  Ileus. Improved.  7.  Nonnephrotic range proteinuria.  8.  Shock: Resolved.  9.  Hypertension: Stable.   10 Hyponatremia: resolved.      Plan:  Improvement in renal function. IF cr continue to improve tomorrow will be ok to discharge from renal standpoint  Agree with lowering prednisone to 30 mg daily on discharge. Taper 10 mg daily every 5-7 days. Would like to see him in the office in 2 weeks. Ok to remove HD cath by tomorrow.    High risk patient with multiple medical problems.     Edward Abraham MD  08/08/22  14:14 EDT

## 2022-08-09 PROBLEM — N17.9 AKI (ACUTE KIDNEY INJURY): Status: RESOLVED | Noted: 2022-01-01 | Resolved: 2022-01-01

## 2022-08-09 PROBLEM — J96.21 ACUTE ON CHRONIC RESPIRATORY FAILURE WITH HYPOXIA: Status: RESOLVED | Noted: 2022-01-01 | Resolved: 2022-01-01

## 2022-08-09 PROBLEM — K56.7 ILEUS: Status: RESOLVED | Noted: 2022-01-01 | Resolved: 2022-01-01

## 2022-08-09 PROBLEM — N39.0 CATHETER-ASSOCIATED URINARY TRACT INFECTION: Status: RESOLVED | Noted: 2022-01-01 | Resolved: 2022-01-01

## 2022-08-09 PROBLEM — T83.511A CATHETER-ASSOCIATED URINARY TRACT INFECTION: Status: RESOLVED | Noted: 2022-01-01 | Resolved: 2022-01-01

## 2022-08-09 NOTE — PROGRESS NOTES
NN spoke with pt at .  Pt alert and able to answer questions appropriately. Pt O2 sat 92 % on  2 L currently, home O2 use2 L. COPD action plan reviewed. Deep breathing exercises encouraged. Patient hopeful to discharge today.  No new concerns or questions voiced at this time.  NN will continue to follow as needed.       Per current GOLD Standards, please consider:  LAMA/LABA/ICS in place (Breztri), Outpatient PFT, Rehab as appropriate, Palliative Care consult, NRT at [place], Annual LDCT per current screening guidelines (age 50-80 years old, smoking history of 20 pack years or more or has quit within past 15 years)         Patient has been scheduled for a hospital follow up with [facility] for 8/18/2022 @ 4pm with JAVAN Padron MD.

## 2022-08-09 NOTE — TELEPHONE ENCOUNTER
Attempted to call. No answer and no vm.  Per Dr. Tabor he will send in ativan 1mg bid prn anxiety.

## 2022-08-09 NOTE — OUTREACH NOTE
Prep Survey    Flowsheet Row Responses   [facility] patient discharged from? [place]   Is LACE score < 7 ? No   Emergency Room discharge w/ pulse ox? No   Eligibility [facility]   Date of Admission 07/12/22   Date of Discharge 08/09/22   Discharge Disposition Home or Self Care   Discharge diagnosis pneumonia,   Ileus    Does the patient have one of the following disease processes/diagnoses(primary or secondary)? COPD/Pneumonia   Does the patient have Home health ordered? No   Is there a DME ordered? No   Prep survey completed? Yes          JOSH MATHEW - Registered Nurse

## 2022-08-09 NOTE — CASE MANAGEMENT/SOCIAL WORK
Continued Stay Note  [facility]     Patient Name: Reese Cage  MRN: [id number]  Today's Date: 8/9/2022    Admit Date: 7/12/2022     Discharge Plan     Row Name 08/09/22 0814       Plan    Plan Home    Plan Comments Patient had requested hospital bed. Referral sent to his [facility].    Final Discharge Disposition Code 01 - home or self-care               Discharge Codes    No documentation.               Expected Discharge Date and Time     Expected Discharge Date Expected Discharge Time    Aug 10, 2022             Miroslava Grajeda RN

## 2022-08-09 NOTE — TELEPHONE ENCOUNTER
Called and reached iVcky.  I advised her Dr. Tabor does not do xanax but will do ativan.  I explained to her he can't do both.  She advised they will return the xanax to the pharmacy if they will take it, but otherwise he can't do both.  She advised she will put it away if they can't return it.  I gave her appointments for his pet scan as well as his f/u with Dr. Tabor.

## 2022-08-09 NOTE — TELEPHONE ENCOUNTER
Caller: DERRICK FONTENOT    Relationship: Emergency Contact     NO [facility] VERBAL FOR [facility] ON FILE     Best call back number: [phone]     CAN LEAVE VOICEMAIL IF UNABLE TO REACH     What is the best time to reach you: ANYTIME    Who are you requesting to speak with (clinical staff, provider,  specific staff member): DR RIGGINS       What was the call regarding:     AG IS GETTING RELEASED FROM THE HOSPITAL TODAY AFTER BEING IN ABOUT A MONTH  AND HE IS NEEDING SOMETHING FOR HIS ANXIETY. THE DOCTOR AT THE HOSPITAL CAN ONLY PRESCRIBE HIM SOMETHING FOR 3 DAYS, AND SUGGESTED TO ASK DR RIGGINS IF HE CAN PRESCRIBE SOMETHING FOR AG LONG TERM      CALLING TO SEE IF CAN PRESCRIBE SOMETHING FOR HIS ANXIETY LONG TERM     WOULD ALSO LIKE TO CHECK WITH DR RIGGINS TO CHECK TO SEE WHEN HE WOULD LIKE TO FOLLOW UP WITH AG, CAN SCHEDULE ANYTHING EXCEPT 8/18 HE HAS ANOTHER APPT THAT DAY     Do you require a callback: YES TO FOLLOW UP

## 2022-08-09 NOTE — PROGRESS NOTES
DATE OF VISIT: 8/9/2022    Chief Complaint: Followup for non-small cell lung cancer     SUBJECTIVE: The patient is laying comfortably in bed.  His wife is at the bedside.  He is feeling much better.  He would like to go home today.    REVIEW OF SYSTEMS: All the other 9 systems are reviewed by me and negative  except what is mentioned in HPI.     Past History:  Medical History: has a past medical history of Adenomatous polyp of ascending colon (05/25/2022), Bladder cancer (HCC), Cancer of upper lobe of left lung (HCC) (6/27/2022), COPD (chronic obstructive pulmonary disease) ([facility]), Coronary artery disease involving coronary bypass graft of native heart without angina pectoris (04/26/2022), Essential hypertension (04/26/2022), Gastroesophageal reflux disease without esophagitis (04/26/2022), Hard of hearing, Mixed hyperlipidemia (04/26/2022), PONV (postoperative nausea and vomiting), Psoriasis, Renal mass, Vitamin B12 deficiency (04/27/2022), Wears dentures, and Wears glasses.   Surgical History: has a past surgical history that includes Coronary artery bypass graft (2015); Bladder tumor excision; Colonoscopy; Cardiac catheterization; Cardiac surgery; Bronchoscopy (N/A, 6/15/2022); and Esophagogastroduodenoscopy (N/A, 7/23/2022).   Family History: family history includes Heart attack in his brother; Hypertension in his brother and mother.   Social History: reports that he has been smoking cigarettes. He started smoking about 60 years ago. He has a 29.50 pack-year smoking history. He has never used smokeless tobacco. He reports current alcohol use. He reports that he does not use drugs.    Medications Prior to Admission   Medication Sig Dispense Refill Last Dose   • albuterol (PROVENTIL) (2.5 MG/3ML) 0.083% nebulizer solution Take 2.5 mg by nebulization Every 4 (Four) Hours As Needed for Wheezing. 300 mL 1 7/11/2022 at Unknown time   • albuterol sulfate  (90 Base) MCG/ACT inhaler Inhale 2 puffs Every 4 (Four)  Hours As Needed for Wheezing. 54 g 1 7/11/2022 at Unknown time   • aspirin 81 MG EC tablet Take 1 tablet by mouth Daily. (Patient taking differently: Take 81 mg by mouth Every Night.) 90 tablet 1 7/11/2022 at Unknown time   • Budeson-Glycopyrrol-Formoterol (Breztri Aerosphere) 160-9-4.8 MCG/ACT aerosol inhaler Inhale 2 puffs 2 (Two) Times a Day. 1 each 1 7/11/2022 at Unknown time   • cloNIDine (CATAPRES) 0.2 MG tablet Take 0.2 mg by mouth 2 (Two) Times a Day.   7/11/2022 at Unknown time   • furosemide (LASIX) 20 MG tablet Take 20 mg by mouth Daily.   7/11/2022 at Unknown time   • gabapentin (NEURONTIN) 300 MG capsule Take 1 capsule by mouth 3 (Three) Times a Day. 90 capsule 0 7/12/2022 at Unknown time   • losartan (COZAAR) 100 MG tablet Take 1 tablet by mouth Daily. 90 tablet 1 7/11/2022 at Unknown time   • metoprolol tartrate (LOPRESSOR) 100 MG tablet Take 1 tablet by mouth Every Morning AND 0.5 tablets Every Evening. 135 tablet 1 7/11/2022 at Unknown time   • NIFEdipine XL (PROCARDIA XL) 90 MG 24 hr tablet Take 1 tablet by mouth Daily. 90 tablet 1 7/11/2022 at Unknown time   • OLANZapine (zyPREXA) 5 MG tablet TAKE 1 TABLET BY MOUTH EVERY NIGHT TAKE ON DAYS 2, 3, AND 4 AFTER CHEMOTHERAPY 3 tablet 2 7/11/2022 at Unknown time   • omeprazole (priLOSEC) 40 MG capsule Take 1 capsule by mouth Daily. 30 capsule 5 7/12/2022 at Unknown time   • ondansetron (ZOFRAN) 8 MG tablet Take 1 tablet by mouth 3 (Three) Times a Day As Needed for Nausea or Vomiting. 30 tablet 2 7/11/2022 at Unknown time   • vitamin B-12 (CYANOCOBALAMIN) 1000 MCG tablet Take 1 tablet by mouth Daily. 90 tablet 3 7/11/2022 at Unknown time   • Zinc 50 MG tablet Take 1 tablet by mouth Daily.   7/11/2022 at Unknown time   • atorvastatin (LIPITOR) 80 MG tablet Take 1 tablet by mouth Daily. 90 tablet 1 7/9/2022   • clopidogrel (PLAVIX) 75 MG tablet Take 1 tablet by mouth Daily. (Patient taking differently: Take 75 mg by mouth Daily. No cardiac stents) 90  tablet 1 7/9/2022   • montelukast (SINGULAIR) 10 MG tablet Take 1 tablet by mouth Every Night. 90 tablet 1 7/9/2022   • nitroglycerin (NITROSTAT) 0.4 MG SL tablet Place 1 tablet under the tongue Every 5 (Five) Minutes As Needed for Chest Pain. Take no more than 3 doses in 15 minutes. 30 tablet 1 Unknown at Unknown time   • O2 (OXYGEN) Inhale 2 L/min As Needed.         Allergies: Patient has no known allergies.     Current Facility-Administered Medications:   •  acetaminophen (TYLENOL) tablet 650 mg, 650 mg, Oral, Q4H PRN, 650 mg at 08/03/22 0721 **OR** [DISCONTINUED] acetaminophen (TYLENOL) 160 MG/5ML solution 650 mg, 650 mg, Nasogastric, Q4H PRN, 649.6 mg at 07/14/22 1913 **OR** [DISCONTINUED] acetaminophen (TYLENOL) suppository 650 mg, 650 mg, Rectal, Q4H PRN, Felix Lopez MD  •  albuterol (PROVENTIL) nebulizer solution 0.083% 2.5 mg/3mL, 2.5 mg, Nebulization, Q4H PRN, Alyssa Padron MD, 2.5 mg at 08/03/22 0554  •  amLODIPine (NORVASC) tablet 10 mg, 10 mg, Oral, Q24H, Alyssa Padron MD, 10 mg at 08/08/22 0842  •  sennosides-docusate (PERICOLACE) 8.6-50 MG per tablet 2 tablet, 2 tablet, Oral, BID, 2 tablet at 08/07/22 0856 **AND** polyethylene glycol (MIRALAX) packet 17 g, 17 g, Oral, Daily PRN **AND** bisacodyl (DULCOLAX) EC tablet 5 mg, 5 mg, Oral, Daily PRN **AND** bisacodyl (DULCOLAX) suppository 10 mg, 10 mg, Rectal, Daily PRN, Seferino Escobar MD  •  castor oil-balsam peru (VENELEX) ointment 1 application, 1 application, Topical, Q12H, Alyssa Padron MD, 1 application at 08/08/22 2030  •  cloNIDine (CATAPRES) tablet 0.1 mg, 0.1 mg, Oral, Q8H PRN, Alyssa Padron MD, 0.1 mg at 07/26/22 0210  •  cloNIDine (CATAPRES-TTS) 0.1 MG/24HR patch 1 patch, 1 patch, Transdermal, Weekly, Alyssa Padron MD, 1 patch at 08/07/22 0856  •  fluconazole (DIFLUCAN) tablet 100 mg, 100 mg, Oral, Q24H, Stephanie Waggoner MD, 100 mg at 08/08/22 1502  •  gabapentin (NEURONTIN) capsule 100  mg, 100 mg, Oral, Q8H, Alyssa Padron MD, 100 mg at 08/09/22 0638  •  heparin (porcine) 5000 UNIT/ML injection 5,000 Units, 5,000 Units, Subcutaneous, Q8H, Alyssa Padron MD, 5,000 Units at 08/09/22 0639  •  hydrALAZINE (APRESOLINE) tablet 25 mg, 25 mg, Oral, Q8H, Seferino Escobar MD, 25 mg at 08/09/22 0638  •  ipratropium-albuterol (DUO-NEB) nebulizer solution 3 mL, 3 mL, Nebulization, 4x Daily - RT, Alyssa Padron MD, 3 mL at 08/09/22 0707  •  labetalol (NORMODYNE,TRANDATE) injection 20 mg, 20 mg, Intravenous, Q4H PRN, Alyssa Padron MD, 20 mg at 07/26/22 0437  •  metoprolol tartrate (LOPRESSOR) tablet 100 mg, 100 mg, Oral, Q12H, Alyssa Padron MD, 100 mg at 08/08/22 2026  •  nicotine (NICODERM CQ) 14 MG/24HR patch 1 patch, 1 patch, Transdermal, Q24H, Alyssa Padron MD, 1 patch at 08/08/22 2025  •  nystatin (MYCOSTATIN) powder, , Topical, Q12H, Alyssa Padron MD, Given at 08/08/22 2030  •  [DISCONTINUED] ondansetron (ZOFRAN) tablet 4 mg, 4 mg, Nasogastric, Q6H PRN **OR** ondansetron (ZOFRAN) injection 4 mg, 4 mg, Intravenous, Q6H PRN, Alyssa Padron MD, 4 mg at 07/20/22 0231  •  pantoprazole (PROTONIX) EC tablet 40 mg, 40 mg, Oral, BID, Alyssa Padron MD, 40 mg at 08/08/22 2026  •  piperacillin-tazobactam (ZOSYN) 3.375 g in iso-osmotic dextrose 50 ml (premix), 3.375 g, Intravenous, Q8H, Alaina Yañez, PharmD, 3.375 g at 08/09/22 0639  •  potassium chloride (MICRO-K) CR capsule 40 mEq, 40 mEq, Oral, Once, Mary Lou Lopez MD  •  predniSONE (DELTASONE) tablet 40 mg, 40 mg, Oral, Daily With Breakfast, Seferino Escobar MD, 40 mg at 08/08/22 0842  •  saccharomyces boulardii (FLORASTOR) capsule 250 mg, 250 mg, Oral, Daily, Alyssa Padron MD, 250 mg at 08/08/22 0842  •  sodium chloride 0.9 % flush 10 mL, 10 mL, Intravenous, Q12H, Alyssa Padron MD, 10 mL at 08/08/22 2030  •  sodium chloride 0.9 % flush 10 mL, 10 mL, Intravenous,  "Nereida GONZALEZ Donna C, MD, 10 mL at 08/08/22 1441  •  tamsulosin (FLOMAX) 24 hr capsule 0.8 mg, 0.8 mg, Oral, Daily, Seferino Escobar MD, 0.8 mg at 08/08/22 0842    PHYSICAL EXAMINATION:   /83 (BP Location: Left arm, Patient Position: Lying)   Pulse 86   Temp 98.3 °F (36.8 °C) (Oral)   Resp 18   Ht 170.2 cm (67.01\")   Wt 69.3 kg (152 lb 11.2 oz)   SpO2 91%   BMI 23.91 kg/m²                ECOG Performance Status: 2 - Symptomatic, <50% confined to bed  GENERAL: Age appropriate. No acute distress.   NEURO/PSYCH: A&O x 3, strength 5/5 in all muscle groups  HEENT: Head atraumatic, normocephalic.   NECK: Supple. No JVD. No lymphadenopathy.   LUNGS: Clear to auscultation bilaterally. No wheezing. No rhonchi.   HEART: Regular rate and rhythm. S1, S2, no murmurs.   ABDOMEN: Soft, nontender, nondistended. Bowel sounds positive. No  hepatosplenomegaly.   EXTREMITIES: No clubbing, cyanosis, or edema.   SKIN: No rashes. No purpura.       No results displayed because visit has over 200 results.          No results found.    ASSESSMENT: The patient is a very pleasant 72 y.o. male  with non-small cell lung cancer      PLAN:  1.  Non-small cell lung cancer:  A.  I am not planning any more neoadjuvant chemotherapy or immunotherapy.  The patient tolerated 1 cycle poorly.  B.  I will set him up to see me in 4 weeks with repeated CT scans.  If there is no evidence of progression I will refer him back to CT surgery for resection.    2.  Acute kidney injury:  A.  Creatinine is gradually improving.  B.  Continue to wean of prednisone gradually 10 mg down every 5 days.    Rich Tabor MD  8/9/2022    "

## 2022-08-09 NOTE — DISCHARGE PLACEMENT REQUEST
"Ag Estevez (72 y.o. Male)     Please reach out to patient in regards to this order. He requested a hospital bed. He will be discharged today or tomorrow.         Date of Birth   1950    Social Security Number       Address   [address] [place], [place] [address]    Home Phone   [phone]    MRN   [id number]       Oriental orthodox   [employer]ian    Marital Status   Single                            Admission Date   7/12/22    Admission Type   Emergency    Admitting Provider   Mary Lou Lopez MD    Attending Provider   Mary Lou Lopez MD    Department, Room/Bed   [facility] 3F, S314/1       Discharge Date       Discharge Disposition       Discharge Destination                               Attending Provider: Mary Lou Lopez MD    Allergies: No Known Allergies    Isolation: None   Infection: None   Code Status: CPR   Advance Care Planning Activity    Ht: 170.2 cm (67.01\")   Wt: 69.3 kg (152 lb 11.2 oz)    Admission Cmt: None   Principal Problem: DAX (acute kidney injury) (HCC) [N17.9]                 Active Insurance as of 7/12/2022     Primary Coverage     Payor Plan Insurance Group Employer/Plan Group    [employer] MEDICARE REPLACEMENT [employer] MEDICARE REPLACEMENT [id number]     Payor Plan Address Payor Plan Phone Number Payor Plan Fax Number Effective Dates    [address] [address]   6/1/2022 - None Entered    [facility] [id number]       Subscriber Name Subscriber Birth Date Member ID       AG ESTEVEZ 1950 [id number]                 Emergency Contacts      (Rel.) Home Phone Work Phone Mobile Phone    DERRICK FONTENOT (Significant Other) [phone] -- [phone]    Simone Solomon (Daughter) -- -- [phone]            Insurance Information                [employer] HEALTH[employer]ARE MEDICARE REPLACEMENT/[employer] MEDICARE REPLACEMENT Phone: --    Subscriber: Ag Estevez Poornima Subscriber#: [id number]    Group#: [id number] Precert#: [id number] " "         [address]  [address] [place] [id number]  Dept. Phone:  [phone]  Dept. Fax:  [phone] Date Ordered: Aug 9, 2022         Patient:  Reese Cage MRN:  [id number]   [address] [place] [id number] :  1950  SSN:    Phone: [phone] Sex:  M     Weight: 69.3 kg (152 lb 11.2 oz)         Ht Readings from Last 1 Encounters:   22 170.2 cm (67.01\")         Hospital Bed  (Order ID: [id number])    Diagnosis:  DAX (acute kidney injury) (HCC) (N17.9 [ICD-10-CM] 584.9 [ICD-9-CM])  Nausea, vomiting, and diarrhea (R11.2,R19.7 [ICD-10-CM] 787.91,787.01 [ICD-9-CM])  Ileus (HCC) (K56.7 [ICD-10-CM] 560.1 [ICD-9-CM])  Left lower lobe pulmonary infiltrate (R91.8 [ICD-10-CM] 793.19 [ICD-9-CM])  Malignant neoplasm of upper lobe of left lung (HCC) (C34.12 [ICD-10-CM] 162.3 [ICD-9-CM])  Esophagitis (K20.90 [ICD-10-CM] 530.10 [ICD-9-CM])  Encounter for therapeutic drug monitoring (Z51.81 [ICD-10-CM] V58.83 [ICD-9-CM])  Cancer of upper lobe of left lung (HCC) (C34.12 [ICD-10-CM] 162.3 [ICD-9-CM])  Paratracheal lymphadenopathy (R59.0 [ICD-10-CM] 785.6 [ICD-9-CM])  Mass of upper lobe of left lung (R91.8 [ICD-10-CM] 786.6 [ICD-9-CM])  Acute on chronic respiratory failure with hypoxia (HCC) (J96.21 [ICD-10-CM] 518.84,799.02 [ICD-9-CM])  Adenomatous polyp of ascending colon (D12.2 [ICD-10-CM] 211.3 [ICD-9-CM])  Vitamin B12 deficiency (E53.8 [ICD-10-CM] 266.2 [ICD-9-CM])  Coronary artery disease involving coronary bypass graft of native heart without angina pectoris (I25.810 [ICD-10-CM] 414.05 [ICD-9-CM])  Right kidney mass (N28.89 [ICD-10-CM] 593.9 [ICD-9-CM])  Chronic obstructive pulmonary disease, unspecified COPD type (HCC) (J44.9 [ICD-10-CM] 496 [ICD-9-CM])  Lower urinary tract symptoms (LUTS) (R39.9 [ICD-10-CM] 788.99 [ICD-9-CM])  Malignant neoplasm of urinary bladder, unspecified site (HCC) (C67.9 [ICD-10-CM] 188.9 [ICD-9-CM])  Cigarette smoker " (F17.210 [ICD-10-CM] 305.1 [ICD-9-CM])  Psoriasis (L40.9 [ICD-10-CM] 696.1 [ICD-9-CM])  Gastroesophageal reflux disease without esophagitis (K21.9 [ICD-10-CM] 530.81 [ICD-9-CM])  Mixed hyperlipidemia (E78.2 [ICD-10-CM] 272.2 [ICD-9-CM])  Essential hypertension (I10 [ICD-10-CM] 401.9 [ICD-9-CM])   Quantity:  1  Comments: To assist patient in the ability to get out of bed, use of elevated head, rails. Is on home oxygen at home to assist with breathing in upright position     Equipment:  Hospital Bed, Semi-Electirc w/ Mattress & w/ Rails  Accessories:  Foam Mattress-Dry Pressure (Group 1 Support)  Length of Need (99 Months = Lifetime): 3 Months        Authorizing Provider's Phone: [phone]  Authorizing Provider:Mary Lou Lopez MD  Authorizing Provider's NPI: [id number]  Order Entered By: Miroslava Grajeda RN 2022  7:54 AM     Electronically signed by: Mary Lou Lopez MD 2022  7:54 AM            History & Physical      Felix Lopez MD at 22 1809              [facility]  HISTORY AND PHYSICAL    Patient Name: Reese Cage  : 1950  MRN: [id number]  Primary Care Physician: Kwame Hines MD  Date of admission: 2022      Subjective   Subjective     Chief Complaint:  N/V/D and abdominal distention.     HPI:  Reese Cage is a 72 y.o. 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  This diagnosis was made recently while patient was at [facility] and had a bronchoscopy and pathology confirmed the diagnosis.  Dr. Tabor with oncology has seen the patient and actually he was started on chemotherapy this past Friday and this was the first dose.  Patient tells me that starting the next day he started feeling bad.  He had  abdominal problem including diarrhea, bloating and distention.  Then he started having nausea and vomiting.  Patient also has had severe distention of his abdomen.  Tells me that this has made his breathing very difficult.  Patient's wife is present at the bedside and tells me that patient has had episodes of fever and chills also.  She reports that the highest temperature they got was 102.  Patient was brought to the emergency room because symptoms got worse.  Here at the emergency room initial evaluation revealed acute renal failure with creatinine of 3.48.  CT scan of the abdomen and pelvis also was done which shows fluid-filled stomach, fluid and air-filled proximal small bowel and also fluid and air distended colon.  Also evidence of extensive left lower lobe pneumonia versus aspiration.  Patient also gives history of melena for the past few days.  At the emergency room an NG tube was placed and dark coffee-ground liquid was removed    Review of Systems   No weight loss or weight gain, no lumps or bumps, no unusual headaches, has had sweating.  Also has had melena and diarrhea.  No chest pain or palpitation.  No rashes or hives.  All other systems reviewed and are negative.     Personal History     Past Medical History:   Diagnosis Date   • Adenomatous polyp of ascending colon 05/25/2022    Multiple throughout colon. Dr. Obregon 5/2022. Rpt 1y   • Bladder cancer (HCC)    • COPD (chronic obstructive pulmonary disease) (HCC)    • Coronary artery disease involving coronary bypass graft of native heart without angina pectoris 04/26/2022   • Essential hypertension 04/26/2022   • Gastroesophageal reflux disease without esophagitis 04/26/2022   • Hard of hearing    • Mixed hyperlipidemia 04/26/2022   • PONV (postoperative nausea and vomiting)    • Psoriasis    • Renal mass    • Vitamin B12 deficiency 04/27/2022 4/2022 Vit B12 193.    • Wears dentures     full set   • Wears glasses          Oncology Problem  List:  Cancer of upper lobe of left lung (HCC) (06/27/2022; Status: Active)  Bladder cancer (HCC) (04/26/2022; Status: Active)    Oncology/Hematology History   Cancer of upper lobe of left lung (HCC)   6/27/2022 Initial Diagnosis    Cancer of upper lobe of left lung (HCC)     6/27/2022 Cancer Staged    Staging form: Lung, AJCC 8th Edition  - Clinical stage from 6/27/2022: Stage IIB (cT3, cN0, cM0) - Signed by Rich Tabor MD on 6/27/2022 7/7/2022 -  Chemotherapy    OP LUNG  Nivolumab 360mg /  PACLitaxel / CARBOplatin AUC=5          Past Surgical History:   Procedure Laterality Date   • BLADDER TUMOR EXCISION      Cancer   • BRONCHOSCOPY N/A 6/15/2022    Procedure: BRONCHOSCOPY WITH ENDOBRONCHIAL ULTRASOUND WITH FLUORO;  Surgeon: Ryan Del Valle MD;  Location: [facility] ENDOSCOPY;  Service: Pulmonary;  Laterality: N/A;  EBUS scope removed with balloon intact   • CARDIAC CATHETERIZATION     • CARDIAC SURGERY     • COLONOSCOPY     • CORONARY ARTERY BYPASS GRAFT  2015    [place], [place]       Family History:  family history includes Heart attack in his brother; Hypertension in his brother and mother. Otherwise pertinent FHx was reviewed and unremarkable.     Social History:  reports that he has been smoking cigarettes. He started smoking about 60 years ago. He has a 29.50 pack-year smoking history. He has never used smokeless tobacco. He reports current alcohol use. He reports that he does not use drugs.  Social History     Social History Narrative    2022: From [place], [place]. Lived 25 years in [place]. Relocated to [place] April 2022 to live with his fiancee (been together since 2012), plan to get . Had 2 children, lost son from leukemia and has daughter in [place]. Lost  hist 2 brothers in January 2020 and April 2022, also lost sister in 2021. 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        Medications:  Available home medication information reviewed.  (Not in a  hospital admission)      No Known Allergies    Objective   Objective     Vital Signs:   Temp:  [98.2 °F (36.8 °C)] 98.2 °F (36.8 °C)  Heart Rate:  [108-135] 135  Resp:  [16-18] 16  BP: ()/(60-81) 110/81  Flow (L/min):  [2] 2       Physical Exam   Constitutional: Awake, alert,   Looks very uncomfortable.    Eyes: PERRLA, sclerae anicteric, no conjunctival injection  HENT: NCAT, mucous membranes moist  Neck: Supple, no thyromegaly, no lymphadenopathy, trachea midline  Respiratory: Coarse breath sounds bilaterally, has difficulty taking deep breath , shallow breathing and slightly labored.  Cardiovascular: RRR, no murmurs, rubs, or gallops  Gastrointestinal: Positive bowel sounds, soft, diffusely tender, very distended.  Musculoskeletal: No bilateral ankle edema, no clubbing or cyanosis to extremities  Psychiatric: Looks very anxious, cooperative  Neurologic: Oriented x 3, strength symmetric in all extremities, Cranial Nerves grossly intact to confrontation, speech clear  Skin: No rashes    Result Review:  I have personally reviewed the results from the time of this admission to 7/12/2022 18:09 EDT and agree with these findings:  [x]  Laboratory list / accordion  []  Microbiology  [x]  Radiology  []  EKG/Telemetry   []  Cardiology/Vascular   []  Pathology  [x]  Old records  []  Other:  Most notable findings include: Acute renal failure, leukopenia, elevated phosphorus, very elevated procalcitonin.  Procalcitonin is at 13.56, elevated lactate.        LAB RESULTS:      Lab 07/12/22  1618 07/12/22  1131 07/06/22  1546   WBC  --  0.76* 9.35   HEMOGLOBIN  --  11.6* 12.9*   HEMATOCRIT  --  34.6* 38.8   PLATELETS  --  124* 339   NEUTROS ABS  --  0.21* 4.98   IMMATURE GRANS (ABS)  --   --  0.05   LYMPHS ABS  --   --  2.76   MONOS ABS  --   --  1.28*   EOS ABS  --  0.02 0.20   MCV  --  83.8 84.2   LACTATE 2.1* 2.2*  --          Lab 07/12/22  1131 07/06/22  1546   SODIUM 130* 131*   POTASSIUM 3.8 4.4   CHLORIDE 97* 96*    CO2 19.0* 24.0   ANION GAP 14.0 11.0   BUN 65* 15   CREATININE 3.48* 0.93   EGFR 18.0* 87.8   GLUCOSE 169* 93   CALCIUM 8.0* 8.4*   MAGNESIUM 2.1  --    PHOSPHORUS 6.2*  --    TSH  --  1.730         Lab 07/12/22  1131 07/06/22  1546   TOTAL PROTEIN 6.4 7.0   ALBUMIN 2.80* 3.50   GLOBULIN 3.6 3.5   ALT (SGPT) 12 13   AST (SGOT) 10 19   BILIRUBIN 0.3 0.2   ALK PHOS 76 140*   LIPASE 6*  --                      UA    Urinalysis 7/12/22 7/12/22    1549 1549   Squamous Epithelial Cells, UA  13-20 (A)   Specific Gravity, UA 1.026    Ketones, UA Trace (A)    Blood, UA Negative    Leukocytes, UA Negative    Nitrite, UA Negative    RBC, UA  0-2   WBC, UA  13-20 (A)   Bacteria, UA  4+ (A)   (A) Abnormal value              Microbiology Results (last 10 days)     Procedure Component Value - Date/Time    COVID PRE-OP / PRE-PROCEDURE SCREENING ORDER (NO ISOLATION) - Swab, Nasopharynx [[id number]]  (Normal) Collected: 07/12/22 1629    Lab Status: Final result Specimen: Swab from Nasopharynx Updated: 07/12/22 1731    Narrative:      The following orders were created for panel order COVID PRE-OP / PRE-PROCEDURE SCREENING ORDER (NO ISOLATION) - Swab, Nasopharynx.  Procedure                               Abnormality         Status                     ---------                               -----------         ------                     COVID-19 and FLU A/B PCR...[[id number]]  Normal              Final result                 Please view results for these tests on the individual orders.    COVID-19 and FLU A/B PCR - Swab, Nasopharynx [[id number]]  (Normal) Collected: 07/12/22 1629    Lab Status: Final result Specimen: Swab from Nasopharynx Updated: 07/12/22 1731     COVID19 Not Detected     Influenza A PCR Not Detected     Influenza B PCR Not Detected    Narrative:      Fact sheet for providers: https://www.fda.gov/media/546963/download    Fact sheet for patients: https://www.fda.gov/media/927598/download    Test performed by PCR.           CT Abdomen Pelvis Without Contrast    Result Date: 7/12/2022  DATE OF EXAM: 7/12/2022 1:47 PM  PROCEDURE: CT ABDOMEN PELVIS WO CONTRAST-  INDICATIONS: chemo w/ lung ca and N/V/D and ARF  COMPARISON: 6/23/2022 abdomen pelvis CT scan  TECHNIQUE: Routine transaxial slices were obtained through the abdomen and pelvis without the administration of intravenous contrast. Reconstructed coronal and sagittal images were also obtained. Automated exposure control and iterative construction methods were used.  The radiation dose reduction device was turned on for each scan per the ALARA (As Low as Reasonably Achievable) protocol.  FINDINGS: The right lung bases clear. There is left lower lung airspace disease typical of pneumonia. With history of nausea and vomiting, findings could represent aspiration as well but no similar changes are present in the right base. There is no pleural effusion.  The stomach is rather markedly distended with fluid. There is diffuse distention of the duodenum and jejunum, and normally distended appearance of the ileum in general, which appears fluid-filled as well. The colon is fluid-filled, with the transverse colon distended by air. There is at least no obvious transition point from dilated small bowel to normal caliber small bowel and no evidence of mesenteric edema, inflammation, or unusual mesenteric vascular pattern to suggest partial bowel obstruction. There is no evidence of bowel wall inflammation or pneumatosis. Terminal ileum and cecum lie in the right mid abdomen and appear grossly normal.  Elsewhere, no significant abnormalities are seen of the liver, spleen, pancreas, or adrenal glands for non-IV contrast enhanced exam. Left kidney is again noted to be markedly atrophic but nonobstructed. Right kidney is appropriately hyperplastic. Gallbladder is distended typical of fasting state and shows no evidence of inflammation. 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  There is no evidence of intra-abdominal free air, adenopathy, or acute inflammatory focus. Bladder is normally distended and normal in appearance. No intrapelvic mass or adenopathy is seen. Bony structures appear to be intact.      Impression:  1. Extensive left lower lobe pneumonia versus aspiration. No evidence of right lower lung disease. 2. Markedly fluid distended stomach, fluid and air distended proximal small bowel, relatively smaller caliber distal small bowel, but also fluid and air distended colon. Accordingly, these features favor a type of ileus, possibly related to viral syndrome, over incomplete bowel obstruction. Consider follow-up imaging if the patient's symptoms persist or worsen. 3. No evidence of an acute inflammatory focus or other clearly acute disease. 4. Atrophic left kidney again noted.  This report was finalized on 7/12/2022 2:05 PM by Dr. Seferino Sorenson MD.            Assessment & Plan   Assessment & Plan     Active Hospital Problems    Diagnosis  POA   • DAX (acute kidney injury) (HCC) [N17.9]  Yes     Patient is a 72-year-old  male with past medical history significant for hypertension, hyperlipidemia, history of bladder cancer, recent history of squamous cell carcinoma of the lung.  Patient was started on chemotherapy on Friday by Dr. Tabor.  Since then patient has had nausea, vomiting, diarrhea, abdominal distention, fever and chills, melena.  Labs in the ER shows acute renal failure with creatinine of 3.48 and BUN of 65.  Patient's creatinine was 0.93 on 7/6/2022.  Patient also has elevated lactate and very elevated procalcitonin.  An NG tube was placed at the emergency room and copious amount of dark fluid was taken out which looks bloody.  Patient will be admitted to telemetry for sepsis, leukopenia, acute renal failure, nausea vomiting and diarrhea most likely secondary to  chemotherapy.    PLAN:  -Admit to telemetry  -IV fluids  -Antibiotic including Zosyn and doxycycline.  Patient received 1 dose of vancomycin at the emergency room but because of renal failure we will hold vancomycin for now.  -Check hemoglobin and hematocrit every 6 hours  -Labs in a.m.  -Continue NG tube and keep n.p.o.  -Transfuse if hemoglobin drops below 7  -Consider consulting Dr. Tabor in a.m.      DVT prophylaxis: SCD      CODE STATUS:    Code Status and Medical Interventions:   Ordered at: 07/12/22 5906     Code Status (Patient has no pulse and is not breathing):    CPR (Attempt to Resuscitate)     Medical Interventions (Patient has pulse or is breathing):    Full Support         Felix Lopez MD  07/12/22    Electronically signed by Felix Lopez MD at 07/12/22 2635

## 2022-08-09 NOTE — DISCHARGE PLACEMENT REQUEST
"Ag Estevez (72 y.o. Male)             Date of Birth   1950    Social Security Number       Address   [address] [address]    Home Phone   [phone]    MRN   [id number]       Bahai   Anglican    Marital Status   Single                            Admission Date   7/12/22    Admission Type   Emergency    Admitting Provider   Mary Lou Lopez MD    Attending Provider   Mary Lou Lopez MD    Department, Room/Bed   [facility] 3F, S314/1       Discharge Date       Discharge Disposition       Discharge Destination                               Attending Provider: Mary Lou Lopez MD    Allergies: No Known Allergies    Isolation: None   Infection: None   Code Status: CPR   Advance Care Planning Activity    Ht: 170.2 cm (67.01\")   Wt: 69.3 kg (152 lb 11.2 oz)    Admission Cmt: None   Principal Problem: DAX (acute kidney injury) (HCC) [N17.9]                 Active Insurance as of 7/12/2022     Primary Coverage     Payor Plan Insurance Group Employer/Plan Group    [facility] MEDICARE REPLACEMENT [facility] MEDICARE REPLACEMENT [id number]     Payor Plan Address Payor Plan Phone Number Payor Plan Fax Number Effective Dates    [address] [address]   6/1/2022 - None Entered    [facility] [id number]       Subscriber Name Subscriber Birth Date Member ID       AG ESTEVEZ 1950 [id number]                 Emergency Contacts      (Rel.) Home Phone Work Phone Mobile Phone    DERRICK FONTENOT (Significant Other) [phone] -- [phone]    Simone Solomon (Daughter) -- -- [phone]          Emily More OT    Occupational Therapist   Occupational Therapy   Therapy Treatment Note      Signed   Date of Service:  08/01/22 0905   Creation Time:  08/01/22 1129              Signed        Expand All Collapse All          Show:Clear all  []Manual[x]Template[]Copied    Added by:  [x]Emily More OT      []Hover for " details    Patient Name: Reese Cage                    : 1950                        MRN: [id number]                              Today's Date: 2022                                     Admit Date: 2022                        Visit Dx:   Visit Diagnosis       ICD-10-CM ICD-9-CM   1. DAX (acute kidney injury) (HCC)  N17.9 584.9   2. Nausea, vomiting, and diarrhea  R11.2 787.91     R19.7 787.01   3. Ileus (HCC)  K56.7 560.1   4. Left lower lobe pulmonary infiltrate  R91.8 793.19   5. Malignant neoplasm of upper lobe of left lung (HCC)  C34.12 162.3   6. Esophagitis  K20.90 530.10         Problem List       Patient Active Problem List   Diagnosis   • Essential hypertension   • Mixed hyperlipidemia   • Gastroesophageal reflux disease without esophagitis   • Psoriasis   • Cigarette smoker   • Bladder cancer (HCC)   • Lower urinary tract symptoms (LUTS)   • COPD (chronic obstructive pulmonary disease) (HCC)   • Right kidney mass   • Coronary artery disease involving coronary bypass graft of native heart without angina pectoris   • Vitamin B12 deficiency   • Adenomatous polyp of ascending colon   • Acute on chronic respiratory failure with hypoxia (HCC)   • Mass of upper lobe of left lung   • Paratracheal lymphadenopathy   • Cancer of upper lobe of left lung (HCC)   • Encounter for therapeutic drug monitoring   • DAX (acute kidney injury) (HCC)   • Ileus (HCC)         Medical History        Past Medical History:   Diagnosis Date   • Adenomatous polyp of ascending colon 2022     Multiple throughout colon. Dr. Obregon 2022. Rpt 1y   • Bladder cancer (HCC)     • Cancer of upper lobe of left lung (HCC) 2022   • COPD (chronic obstructive pulmonary disease) (HCC)     • Coronary artery disease involving coronary bypass graft of native heart without angina pectoris 2022   • Essential hypertension 2022   • Gastroesophageal reflux disease without esophagitis 2022   • Hard of  hearing     • Mixed hyperlipidemia 04/26/2022   • PONV (postoperative nausea and vomiting)     • Psoriasis     • Renal mass     • Vitamin B12 deficiency 04/27/2022 4/2022 Vit B12 193.    • Wears dentures       full set   • Wears glasses           Surgical History         Past Surgical History:   Procedure Laterality Date   • BLADDER TUMOR EXCISION         Cancer   • BRONCHOSCOPY N/A 6/15/2022     Procedure: BRONCHOSCOPY WITH ENDOBRONCHIAL ULTRASOUND WITH FLUORO;  Surgeon: Ryan Del Valle MD;  Location:  [facility];  Service: Pulmonary;  Laterality: N/A;  EBUS scope removed with balloon intact   • CARDIAC CATHETERIZATION       • CARDIAC SURGERY       • COLONOSCOPY       • CORONARY ARTERY BYPASS GRAFT   2015     [facility] Anil, ELLIS   • ENDOSCOPY N/A 7/23/2022     Procedure: ESOPHAGOGASTRODUODENOSCOPY WITH BIOPSY;  Surgeon: Reno Charlton MD;  Location:  [facility];  Service: Gastroenterology;  Laterality: N/A;                  General Information             Row Name 08/01/22 1056                   OT Time and Intention      Document Type therapy note (daily note)  -AN        Mode of Treatment occupational therapy  -AN               Row Name 08/01/22 1056                   General Information      Patient Profile Reviewed yes  -AN        Existing Precautions/Restrictions fall;oxygen therapy device and L/min  -AN        Barriers to Rehab medically complex  -AN               Row Name 08/01/22 1056                   Cognition      Orientation Status (Cognition) oriented x 3  -AN               Row Name 08/01/22 1056                   Safety Issues, Functional Mobility      Safety Issues Affecting Function (Mobility) safety precaution awareness;insight into deficits/self-awareness;positioning of assistive device;awareness of need for assistance;safety precautions follow-through/compliance  -AN        Impairments Affecting Function (Mobility) balance;coordination;endurance/activity  tolerance;postural/trunk control;shortness of breath;strength  -AN                           User Key  (r) = Recorded By, (t) = Taken By, (c) = Cosigned By             Initials Name Provider Type      Emily Alvarez OT Occupational Therapist                                       Mobility/ADL's             Row Name 08/01/22 1056                   Bed Mobility      Bed Mobility supine-sit  -AN        Supine-Sit [place] (Bed Mobility) contact guard  -AN        Bed Mobility, Safety Issues decreased use of arms for pushing/pulling;decreased use of legs for bridging/pushing;impaired trunk control for bed mobility  -AN        Assistive Device (Bed Mobility) bed rails;head of bed elevated  -AN               Row Name 08/01/22 1056                   Transfers      Transfers sit-stand transfer;stand-sit transfer;bed-chair transfer  -AN        Comment, (Transfers) cues for hand placement and transfer technique using RW  -AN        Bed-Chair [place] (Transfers) verbal cues;contact guard  -AN        Assistive Device (Bed-Chair Transfers) walker, front-wheeled  -AN        Sit-Stand [place] (Transfers) contact guard;verbal cues  -AN        Stand-Sit [place] (Transfers) contact guard;verbal cues  -AN               Row Name 08/01/22 1056                   Sit-Stand Transfer      Assistive Device (Sit-Stand Transfers) walker, front-wheeled  -AN               Row Name 08/01/22 1056                   Stand-Sit Transfer      Assistive Device (Stand-Sit Transfers) walker, front-wheeled  -AN               Row Name 08/01/22 1056                   Functional Mobility      Functional Mobility- Ind. Level contact guard assist  -AN        Functional Mobility- Device walker, front-wheeled  -AN        Functional Mobility-Distance (Feet) --  <household distance  -AN        Functional Mobility- Safety Issues sequencing ability decreased;step length decreased;supplemental O2  -AN               Row Name 08/01/22 1056                    Activities of Daily Living      BADL Assessment/Intervention lower body dressing  -AN               Row Name 08/01/22 1056                   Lower Body Dressing Assessment/Training      Caroline Level (Lower Body Dressing) don;doff;shoes/slippers;maximum assist (25% patient effort)  -AN        Position (Lower Body Dressing) supine  -AN                           User Key  (r) = Recorded By, (t) = Taken By, (c) = Cosigned By             Initials Name Provider Type      Emily Alvarez OT Occupational Therapist                              Obj/Interventions             Row Name 08/01/22 1058                   Motor Skills      Motor Skills functional endurance  -AN        Functional Endurance desat to 84% on 4L once sitting EOB requiring cues for pursed lip breathing  -AN               Row Name 08/01/22 1058                   Balance      Balance Assessment sitting static balance;sitting dynamic balance;sit to stand dynamic balance;standing static balance;standing dynamic balance  -AN        Static Sitting Balance standby assist  -AN        Dynamic Sitting Balance standby assist  -AN        Position, Sitting Balance sitting in chair;sitting edge of bed  -AN        Sit to Stand Dynamic Balance verbal cues;minimal assist;1-person assist  -AN        Static Standing Balance contact guard  -AN        Dynamic Standing Balance contact guard  -AN        Position/Device Used, Standing Balance walker, rolling  -AN        Balance Interventions standing;sit to stand;supported;static;dynamic;minimal challenge;occupation based/functional task  -AN                           User Key  (r) = Recorded By, (t) = Taken By, (c) = Cosigned By             Initials Name Provider Type      Emily Alvarez OT Occupational Therapist                       Goals/Plan    No documentation.                            Clinical Impression             Row Name 08/01/22 1059                   Pain Assessment      Pretreatment  Pain Rating 0/10 - no pain  -AN        Posttreatment Pain Rating 0/10 - no pain  -AN        Pre/Posttreatment Pain Comment tolerated  -AN        Pain Intervention(s) Repositioned;Ambulation/increased activity  -AN               Row Name 08/01/22 1059                   Plan of Care Review      Plan of Care Reviewed With patient;spouse  -AN        Progress improving  -AN        Outcome Evaluation Pt continues to progress with all mobility and ADLs. Pt performed bed mobility with CGA, STS using RW with CGA, and ambulated short distance using the RW with CGA. No LOB throughout. OT changes rec to home with assist at CA.  -AN               Row Name 08/01/22 1059                   Therapy Assessment/Plan (OT)      Therapy Frequency (OT) daily  -AN               Row Name 08/01/22 1059                   Therapy Plan Review/Discharge Plan (OT)      Anticipated Discharge Disposition (OT) home with assist  -AN               Row Name 08/01/22 1059                   Vital Signs      Pre Systolic BP Rehab --  VSS  -AN        Pre SpO2 (%) 96  -AN        O2 Delivery Pre Treatment nasal cannula  -AN        Intra SpO2 (%) 84  -AN        O2 Delivery Intra Treatment nasal cannula  -AN        Post SpO2 (%) 94  -AN        O2 Delivery Post Treatment nasal cannula  -AN        Pre Patient Position Supine  -AN        Intra Patient Position Standing  -AN        Post Patient Position Sitting  -AN               Row Name 08/01/22 1059                   Positioning and Restraints      Pre-Treatment Position in bed  -AN        Post Treatment Position chair  -AN        In Chair notified nsg;reclined;call light within reach;encouraged to call for assist;exit alarm on;with family/caregiver;heels elevated;legs elevated  -AN                           User Key  (r) = Recorded By, (t) = Taken By, (c) = Cosigned By             Initials Name Provider Type      Emily Alvarez, OT Occupational Therapist                              Outcome Measures              Row Name 08/01/22 1103                   How much help from another is currently needed...      Putting on and taking off regular lower body clothing? 3  -AN        Bathing (including washing, rinsing, and drying) 3  -AN        Toileting (which includes using toilet bed pan or urinal) 3  -AN        Putting on and taking off regular upper body clothing 3  -AN        Taking care of personal grooming (such as brushing teeth) 3  -AN        Eating meals 3  -AN        AM-PAC 6 Clicks Score (OT) 18  -AN               Row Name 08/01/22 1056 08/01/22 0800              How much help from another person do you currently need...      Turning from your back to your side while in flat bed without using bedrails? 4  -AY 4  -ZS      Moving from lying on back to sitting on the side of a flat bed without bedrails? 3  -AY 3  -ZS      Moving to and from a bed to a chair (including a wheelchair)? 3  -AY 3  -ZS      Standing up from a chair using your arms (e.g., wheelchair, bedside chair)? 3  -AY 3  -ZS      Climbing 3-5 steps with a railing? 3  -AY 2  -ZS      To walk in hospital room? 3  -AY 3  -ZS      AM-PAC 6 Clicks Score (PT) 19  -AY 18  -ZS      Highest level of mobility 6 --> Walked 10 steps or more  -AY 6 --> Walked 10 steps or more  -ZS             Row Name 08/01/22 1103 08/01/22 1056              Functional Assessment      Outcome Measure Options AM-PAC 6 Clicks Daily Activity (OT)  -AN AM-PAC 6 Clicks Basic Mobility (PT)  -AY                         User Key  (r) = Recorded By, (t) = Taken By, (c) = Cosigned By             Initials Name Provider Type      Sudheer Herrera, RN Registered Nurse      Nena Olivera, PT Physical Therapist      Emily Alvarez OT Occupational Therapist                             Occupational Therapy Education                              Title: PT OT SLP Therapies (Done)                Topic: Occupational Therapy (Done)           Point: ADL training (Done)           Description:    Instruct learner(s) on proper safety adaptation and remediation techniques during self care or transfers.   Instruct in proper use of assistive devices.                                  Learning Progress Summary            Patient Acceptance, E, VU,NR by AN at 8/1/2022 1128   Family Acceptance, E, VU,NR by AN at 8/1/2022 1128      Show all documentation for this point (5)                                 Point: Home exercise program (Done)           Description:   Instruct learner(s) on appropriate technique for monitoring, assisting and/or progressing therapeutic exercises/activities.                                  Learning Progress Summary            Patient Acceptance, E, VU by AN at 7/29/2022 0937   Family Acceptance, E, VU,NR by AN at 7/27/2022 1108      Show all documentation for this point (2)                                 Point: Precautions (Done)           Description:   Instruct learner(s) on prescribed precautions during self-care and functional transfers.                                  Learning Progress Summary            Patient Acceptance, E, VU,NR by AN at 8/1/2022 1128   Family Acceptance, E, VU,NR by AN at 8/1/2022 1128      Show all documentation for this point (5)                                 Point: Body mechanics (Done)           Description:   Instruct learner(s) on proper positioning and spine alignment during self-care, functional mobility activities and/or exercises.                                  Learning Progress Summary            Patient Acceptance, E, VU,NR by AN at 8/1/2022 1128   Family Acceptance, E, VU,NR by AN at 8/1/2022 1128      Show all documentation for this point (5)                                               User Key               Initials Effective Dates Name Provider Type Discipline       09/21/21 -  Emily More OT Occupational Therapist OT                     OT Recommendation and Plan  Planned Therapy Interventions (OT): activity tolerance  training, adaptive equipment training, BADL retraining, functional balance retraining, occupation/activity based interventions, patient/caregiver education/training, transfer/mobility retraining, strengthening exercise  Therapy Frequency (OT): daily  Plan of Care Review  Plan of Care Reviewed With: patient, spouse  Progress: improving  Outcome Evaluation: Pt continues to progress with all mobility and ADLs. Pt performed bed mobility with CGA, STS using RW with CGA, and ambulated short distance using the RW with CGA. No LOB throughout. OT changes rec to home with assist at dc.      Time Calculation:             Time Calculation- OT              Row Name 08/01/22 1129                         Time Calculation- OT      OT Start Time 0905  -AN          OT Received On 08/01/22  -AN          OT Goal Re-Cert Due Date 08/08/22  -AN                              Timed Charges      03740 - OT Therapeutic Activity Minutes 25  -AN                              Total Minutes      Timed Charges Total Minutes 25  -AN           Total Minutes 25  -AN                          User Key  (r) = Recorded By, (t) = Taken By, (c) = Cosigned By             Initials Name Provider Type      AN Emily More OT Occupational Therapist                            Therapy Charges for Today      Code Description Service Date Service Provider Modifiers Qty     [id number]  OT THERAPEUTIC ACT EA 15 MIN 8/1/2022 Emily More, OT GO 2                Emilyrodolfo More OT               8/1/2022                      Insurance Information                [facility] MEDICARE REPLACEMENT/[facility] MEDICARE REPLACEMENT Phone: --    Subscriber: Reese Cage Subscriber#: [id number]    Group#: [id number] Precert#: [id number]

## 2022-08-09 NOTE — PROGRESS NOTES
"   LOS: 28 days    Patient Care Team:  Kwame Hines MD as PCP - General (Family Medicine)  Ryan Del Valle MD as Consulting Physician (Pulmonary Disease)  Néstor Kelly MD as Consulting Physician (Urology)    Chief Complaint: Acute kidney injury, nausea vomiting diarrhea.    72-year-old with some non-small cell CA treated with chemotherapy including immunotherapy with carboplatinum, paclitaxel and Opdivo, started last week complain of nausea vomiting diarrhea poor oral intake, on admission creatinine 3.8 slowly got worse.  CT scan showed aspiration pneumonia with ileus this.  Despite IV fluid since admission renal function continued to deteriorate and was started on dialysis on 7/15.      Subjective   Renal function improving. Cr 1.7 mg/dl BUN 46. Doing well otherwise. Spontaneous improvement in urine output. HD cath removed.      Objective     Vital Sign Min/Max for last 24 hours  Temp  Min: 97.9 °F (36.6 °C)  Max: 98.4 °F (36.9 °C)   BP  Min: 145/70  Max: 158/47   Pulse  Min: 86  Max: 110   Resp  Min: 16  Max: 22   SpO2  Min: 91 %  Max: 96 %   Flow (L/min)  Min: 2  Max: 2.5   No data recorded     Flowsheet Rows    Flowsheet Row First Filed Value   Admission Height 170.2 cm (67\") Documented at 07/12/2022 1123   Admission Weight 70.3 kg (155 lb) Documented at 07/12/2022 1123          I/O this shift:  In: 525 [P.O.:525]  Out: -   I/O last 3 completed shifts:  In: 2313.8 [P.O.:1880; Blood:433.8]  Out: 3475 [Urine:3475]    Physical Exam:    Gen: Alert, NAD   HENT: NC, AT, EOMI   NECK: Supple, no JVD, Trachea midline   LUNGS: CTA bilaterally, non labored respirtation   CVS: S1/S2 audible, RRR, no murmur   Abd: Soft, NT, ND, BS+   Ext: No pedal edema, no cyanosis   CNS: Alert, No focal deficit noted grossly  Psy: Cooperative  Skin: Warm, dry and intact  Dialysis catheter noted.     WBC WBC   Date Value Ref Range Status   08/09/2022 8.43 3.40 - 10.80 10*3/mm3 Final   08/08/2022 8.07 3.40 - 10.80 10*3/mm3 " Final   08/07/2022 6.33 3.40 - 10.80 10*3/mm3 Final   08/06/2022 9.54 3.40 - 10.80 10*3/mm3 Final      HGB Hemoglobin   Date Value Ref Range Status   08/09/2022 8.5 (L) 13.0 - 17.7 g/dL Final   08/08/2022 8.6 (L) 13.0 - 17.7 g/dL Final   08/07/2022 7.6 (L) 13.0 - 17.7 g/dL Final   08/06/2022 7.1 (L) 13.0 - 17.7 g/dL Final      HCT Hematocrit   Date Value Ref Range Status   08/09/2022 25.2 (L) 37.5 - 51.0 % Final   08/08/2022 25.4 (L) 37.5 - 51.0 % Final   08/07/2022 22.8 (L) 37.5 - 51.0 % Final   08/06/2022 20.9 (C) 37.5 - 51.0 % Final      Platlets No results found for: LABPLAT   MCV MCV   Date Value Ref Range Status   08/09/2022 84.6 79.0 - 97.0 fL Final   08/08/2022 86.1 79.0 - 97.0 fL Final   08/07/2022 87.4 79.0 - 97.0 fL Final   08/06/2022 83.6 79.0 - 97.0 fL Final          Sodium Sodium   Date Value Ref Range Status   08/09/2022 142 136 - 145 mmol/L Final   08/08/2022 142 136 - 145 mmol/L Final   08/07/2022 135 (L) 136 - 145 mmol/L Final   08/06/2022 141 136 - 145 mmol/L Final      Potassium Potassium   Date Value Ref Range Status   08/09/2022 3.2 (L) 3.5 - 5.2 mmol/L Final   08/08/2022 3.9 3.5 - 5.2 mmol/L Final   08/07/2022 4.1 3.5 - 5.2 mmol/L Final   08/06/2022 3.9 3.5 - 5.2 mmol/L Final      Chloride Chloride   Date Value Ref Range Status   08/09/2022 102 98 - 107 mmol/L Final   08/08/2022 104 98 - 107 mmol/L Final   08/07/2022 97 (L) 98 - 107 mmol/L Final   08/06/2022 104 98 - 107 mmol/L Final      CO2 CO2   Date Value Ref Range Status   08/09/2022 27.0 22.0 - 29.0 mmol/L Final   08/08/2022 27.0 22.0 - 29.0 mmol/L Final   08/07/2022 21.0 (L) 22.0 - 29.0 mmol/L Final   08/06/2022 24.0 22.0 - 29.0 mmol/L Final      BUN BUN   Date Value Ref Range Status   08/09/2022 37 (H) 8 - 23 mg/dL Final   08/08/2022 46 (H) 8 - 23 mg/dL Final   08/07/2022 53 (H) 8 - 23 mg/dL Final   08/06/2022 68 (H) 8 - 23 mg/dL Final      Creatinine Creatinine   Date Value Ref Range Status   08/09/2022 1.76 (H) 0.76 - 1.27 mg/dL  Final   08/08/2022 2.26 (H) 0.76 - 1.27 mg/dL Final   08/07/2022 2.91 (H) 0.76 - 1.27 mg/dL Final   08/06/2022 3.23 (H) 0.76 - 1.27 mg/dL Final      Calcium Calcium   Date Value Ref Range Status   08/09/2022 7.3 (L) 8.6 - 10.5 mg/dL Final   08/08/2022 7.6 (L) 8.6 - 10.5 mg/dL Final   08/07/2022 7.8 (L) 8.6 - 10.5 mg/dL Final   08/06/2022 7.6 (L) 8.6 - 10.5 mg/dL Final      PO4 No results found for: CAPO4   Albumin No results found for: ALBUMIN   Magnesium No results found for: MG   Uric Acid No results found for: URICACID        Results Review:     I reviewed the patient's new clinical results.  I reviewed the patient's new imaging results and agree with the interpretation.    amLODIPine, 10 mg, Oral, Q24H  castor oil-balsam peru, 1 application, Topical, Q12H  cloNIDine, 1 patch, Transdermal, Weekly  fluconazole, 100 mg, Oral, Q24H  gabapentin, 100 mg, Oral, Q8H  heparin (porcine), 5,000 Units, Subcutaneous, Q8H  hydrALAZINE, 25 mg, Oral, Q8H  ipratropium-albuterol, 3 mL, Nebulization, 4x Daily - RT  metoprolol tartrate, 100 mg, Oral, Q12H  nicotine, 1 patch, Transdermal, Q24H  nystatin, , Topical, Q12H  pantoprazole, 40 mg, Oral, BID  piperacillin-tazobactam, 3.375 g, Intravenous, Q8H  predniSONE, 40 mg, Oral, Daily With Breakfast  saccharomyces boulardii, 250 mg, Oral, Daily  senna-docusate sodium, 2 tablet, Oral, BID  sodium chloride, 10 mL, Intravenous, Q12H  tamsulosin, 0.8 mg, Oral, Daily           Medication Review: Reviewed    Assessment & Plan       Essential hypertension    Mixed hyperlipidemia    Bladder cancer (HCC)    COPD (chronic obstructive pulmonary disease) (HCC)    Adenomatous polyp of ascending colon    Mass of upper lobe of left lung    Cancer of upper lobe of left lung (HCC)     1.  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 Serologic workup-  C3 - 88,  C4 16, CK 43, UPCR 0.65, FeNA . HD started 7/15/22.   2.  Metabolic acidosis resolved with  medications and dialysis  3.  Non-small cell CA treated with chemotherapy  4.  Anemia: S/p chemo  5.  Pancytopenia secondary to chemotherapy.  6.  Ileus. Improved.  7.  Nonnephrotic range proteinuria.  8.  Shock: Resolved.  9.  Hypertension: Stable.   10 Hyponatremia: resolved.     Plan:  Stable for discharge from renal standpoint lower prednisone to 30 mg daily on discharge. Taper 10 mg daily every 5-7 days. Would recommend maintaining prednisone 10 mg daily until he is seen in Nephrology office on Aug 30 th @ 8:15 PM.     High risk patient with multiple medical problems.     Edward Abraham MD  08/09/22  13:35 EDT

## 2022-08-10 NOTE — OUTREACH NOTE
Call Center TCM Note    Flowsheet Row Responses   [facility] patient discharged from? [facility]   Does the patient have one of the following disease processes/diagnoses(primary or secondary)? COPD/Pneumonia   Was the primary reason for admission: Pneumonia  [DAX and LLL pneumonia]   TCM attempt successful? No   Unsuccessful attempts Attempt 1  [Only one phone # available for patient and sig other, Vicky, listed on PCP verbal release. ]   Call Status Voice mail issues  [Mailbox full. ]          Roxy Urbano RN    8/10/2022, 12:10 EDT

## 2022-08-10 NOTE — OUTREACH NOTE
Call Center TCM Note    Flowsheet Row Responses   [facility] patient discharged from? [place]   Does the patient have one of the following disease processes/diagnoses(primary or secondary)? COPD/Pneumonia   Was the primary reason for admission: Pneumonia  [DAX and LLL pneumonia]   TCM attempt successful? Yes   Call start time 1307   Call end time 1320   Discharge diagnosis pneumonia, ileus, UTI,DAX, cancer of upper lobe of left lung, bladder cancer   Is patient permission given to speak with other caregiver? Yes   List who call center can speak with DERRICK FONTENOT Significant Other    Person spoke with today (if not patient) and relationship DERRICK FONTENOT Significant Other    Meds reviewed with patient/caregiver? Yes  [Multiple new and DC'd meds. Reports has good understanding of med regimine. ]   Does the patient have all medications ordered at discharge? Yes  [Reports will contact [facility] and submit request for refills needed. ]   Is the patient taking all medications as directed (includes completed medication regime)? Yes   Comments regarding appointments PULMONARY Thursday Aug 18, 2022 4:00 PM   Does the patient have a primary care provider?  Yes   Does the patient have an appointment with their PCP or specialist within 7 days of discharge? Yes   Comments regarding PCP Hospital Follow Up with PCP Kwame Hines MD Tuesday Aug 16, 2022 12:45 PM   Has the patient kept scheduled appointments due by today? N/A   What is the Home health agency?  Declined  service.    What DME was ordered? Hospital bed   Has all DME been delivered? Yes   DME comments Reports needs new nebulizer.    Pulse Ox monitoring Intermittent   Pulse Ox device source Patient   Psychosocial issues? No   Did the patient receive a copy of their discharge instructions? Yes   Nursing interventions Reviewed instructions with patient   What is the patient's perception of their health status since discharge? Improving   Is the  patient/caregiver able to teach back the hierarchy of who to call/visit for symptoms/problems? PCP, Specialist, Home health nurse, Urgent Care, ED, 911 Yes   Is the patient/caregiver able to teach back signs and symptoms of worsening condition: Fever/chills, Chest pain, Shortness of breath   Is the patient/caregiver able to teach back importance of completing antibiotic course of treatment? --  [did not go home on further antibiotics. ]   TCM call completed? Yes   Wrap up additional comments Denies further questions today. Only need identified: reports that they need new nebulizer machine. They think the one he has is about 7 years old.           Roxy Urbano RN    8/10/2022, 13:20 EDT

## 2022-08-10 NOTE — PROGRESS NOTES
Enter Query Response Below      Query Response:     Hyponatremia not clinically significant         If applicable, please update the problem list.     Patient: Reese Cage        : 1950  Account: [id number]           Admit Date:         Options to Respond to Query:    1. Access the Encounter     a. From the To-Do Side bar, click Respond With Note.     b. Click New Note     c. Answer query within the yellow box.                d. Update the Problem List if applicable.     Dr. Abraham,     Patient with PMH HTN, recent history of squamous cell carcinoma of the lung, admitted for treatment of sepsis, DAX, and nausea, vomiting, diarrhea likely secondary to recent chemotherapy.   - Nephrology progress notes include, “Hyponatremia: Managed with dialysis.”  Serum sodium range this admission: 130-142    Please clarify the following:    -hyponatremia- clinically significant (please provide additional clinical supporting indicators and treatment)  -hyponatremia- not clinically significant  -other (please specify) ______  -unable to determine        By submitting this query, we are merely seeking further clarification of documentation to accurately reflect all conditions that you are monitoring, evaluating, treating or that extend the hospitalization or utilize additional resources of care. Please utilize your independent clinical judgment when addressing the question(s) above.     This query and your response, once completed, will be entered into the legal medical record.    Sincerely,  Shahnaz Cobb  Clinical Documentation Integrity Program   [email or web address]

## 2022-08-12 NOTE — TELEPHONE ENCOUNTER
"Blood in stools today he says. Can take tylenol, and if sob go to ER.     Reason for Disposition  • Patient sounds very sick or weak to the triager    Additional Information  • Negative: Shock suspected (e.g., cold/pale/clammy skin, too weak to stand, low BP, rapid pulse)  • Negative: Difficult to awaken or acting confused (e.g., disoriented, slurred speech)  • Negative: Passed out (i.e., lost consciousness, collapsed and was not responding)  • Negative: [1] Vomiting AND [2] contains red blood or black (\"coffee ground\") material  (Exception: few red streaks in vomit that only happened once)  • Negative: Sounds like a life-threatening emergency to the triager  • Negative: Diarrhea is main symptom  • Negative: Stool color other than brown or tan is main concern  (no bleeding and no melena)  • Negative: SEVERE rectal bleeding (large blood clots; on and off, or constant bleeding)  • Negative: SEVERE dizziness (e.g., unable to stand, requires support to walk, feels like passing out now)  • Negative: [1] MODERATE rectal bleeding (small blood clots, passing blood without stool, or toilet water turns red) AND [2] more than once a day  • Negative: Pale skin (pallor) of new-onset or worsening  • Negative: Black or tarry bowel movements (Exception: chronic-unchanged black-grey bowel movements AND is taking iron pills or Pepto-bismol)  • Negative: [1] Constant abdominal pain AND [2] present > 2 hours  • Negative: Rectal foreign body (i.e., now or within past week;  inserted or swallowed)  • Negative: High-risk adult (e.g., prior surgery on aorta, abdominal aortic aneurysm)  • Negative: Taking Coumadin (warfarin) or other strong blood thinner, or known bleeding disorder (e.g., thrombocytopenia)  • Negative: Known cirrhosis of the liver (or history of liver failure or ascites)  • Negative: [1] Colonoscopy AND [2] in past 72 hours    Answer Assessment - Initial Assessment Questions  1. APPEARANCE of BLOOD: \"What color is it?\" \"Is " "it passed separately, on the surface of the stool, or mixed in with the stool?\"       Red in stool  2. AMOUNT: \"How much blood was passed?\"       moderate  3. FREQUENCY: \"How many times has blood been passed with the stools?\"       Once today  4. ONSET: \"When was the blood first seen in the stools?\" (Days or weeks)       today  5. DIARRHEA: \"Is there also some diarrhea?\" If Yes, ask: \"How many diarrhea stools were passed in past 24 hours?\"       diarrhea  6. CONSTIPATION: \"Do you have constipation?\" If Yes, ask: \"How bad is it?\"      no  7. RECURRENT SYMPTOMS: \"Have you had blood in your stools before?\" If Yes, ask: \"When was the last time?\" and \"What happened that time?\"       no  8. BLOOD THINNERS: \"Do you take any blood thinners?\" (e.g., Coumadin/warfarin, Pradaxa/dabigatran, aspirin)      yes  9. OTHER SYMPTOMS: \"Do you have any other symptoms?\"  (e.g., abdominal pain, vomiting, dizziness, fever)      dizziness  10. PREGNANCY: \"Is there any chance you are pregnant?\" \"When was your last menstrual period?\"        no    Protocols used: RECTAL BLEEDING-ADULT-AH      "

## 2022-08-17 NOTE — PROGRESS NOTES
Transitional Care Follow Up Visit    From [place]. Lived 25 years in [place]. Relocated to [place] April 2022 to live with his fiancee (been together since 2012), plan to get . Had 2 children, lost son from leukemia and has daughter in [place]. Lost  hist 2 brothers in January 2020 and April 2022, also lost sister in 2021. 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      Patient has a past medical history of TRISTAN squamous cell lung cancer (Stage IIb) on chemo, DAX, hypertension, hyperlipidemia, CAD s/p CABG (2015), PAD, bladder cancer s/p surgery and radiation (2015), colon adenomatous polyps (5/2022), COPD on nightly O2 (2L/min), GERD, colon polyps and psoriasis.    Subjective     Reese Cage is a 72 y.o. male who presents for a transitional care management visit.    Within 48 business hours after discharge our office contacted him via telephone to coordinate his care and needs.      I reviewed and discussed the details of that call along with the discharge summary, hospital problems, inpatient lab results, inpatient diagnostic studies, and consultation reports with Reese.     Current outpatient and discharge medications have been reconciled for the patient.  Reviewed by: Kwame Hines MD      Date of TCM Phone Call 8/9/2022   [facility]   Date of Admission 7/12/2022   Date of Discharge 8/9/2022   Discharge Disposition Home or Self Care     Risk for Readmission (LACE) Score: 13 (8/9/2022  6:01 AM)      History of Present Illness   Course During Hospital Stay: Patient was recently diagnosed with left upper lung adenocarcinoma, started on chemotherapy.  He was admitted due to abdominal pain, nausea, vomiting and diarrhea.  Also presented with shortness of breath and fevers, found to have DAX and left lower lung pneumonia, suspected possibly secondary to aspiration.  He had a NG tube that was removed on 7/14/2022.  Due to worsening kidney  function nephrology was consulted.  He developed hypotension and was transferred to the ICU.  There was concern for immune mediated pneumonitis and nephritis, he was started on IV Solu-Medrol.  He subsequently required TPN due to secondary ileus.  He also underwent EGD that showed esophagitis, but no bleeding was found.  He also underwent hemodialysis for period of time, with improvement of kidney function.  Plan to follow-up with nephrology in 2 weeks.  Continue tapering prednisone.    Since discharge he is gradually improving.  He is feeling his strength is coming back, but he is feeling weak after spending so long in the hospital.  He does have an appointment tomorrow with pulmonology.  He is also planned to see nephrology and has follow-up with Dr. Tabor in September.  He is not very keen on starting any chemotherapy again given his severe reaction, but he does understand that this is a serious illness that will need to be treated moving forward.  Patient has not yet gotten a new nebulizer, he is requesting a new prescription, but pharmacy told him the old one had .  His old nebulizer broke down yesterday.     The following portions of the patient's history were reviewed and updated as appropriate: allergies, current medications, past family history, past medical history, past social history, past surgical history and problem list.    Review of Systems    Objective   Physical Exam  Vitals reviewed.   Constitutional:       General: He is not in acute distress.     Appearance: Normal appearance. He is ill-appearing. He is not toxic-appearing or diaphoretic.   HENT:      Head: Normocephalic and atraumatic.      Nose: No congestion.   Eyes:      Extraocular Movements: Extraocular movements intact.      Conjunctiva/sclera: Conjunctivae normal.   Cardiovascular:      Rate and Rhythm: Normal rate and regular rhythm.      Heart sounds: Normal heart sounds. No murmur heard.  Pulmonary:      Effort: Pulmonary  effort is normal. No respiratory distress.      Breath sounds: Wheezing present.   Musculoskeletal:      Cervical back: Neck supple.      Right lower leg: No edema.      Left lower leg: No edema.   Skin:     General: Skin is warm and dry.   Neurological:      Mental Status: He is alert and oriented to person, place, and time. Mental status is at baseline.   Psychiatric:         Behavior: Behavior normal.         Thought Content: Thought content normal.         Assessment & Plan     1. Cancer of upper lobe of left lung (HCC)  2. Chronic obstructive pulmonary disease, unspecified COPD type (HCC)  Patient is gradually recovering after his complicated hospital stay.  He will follow-up with pulmonology tomorrow.  I have ordered him for a new nebulizer.  He will return to see me in 6 weeks, sooner if needed.  He will also follow-up with oncology and urology.  - CBC & Differential; Future  - Comprehensive Metabolic Panel; Future  - albuterol sulfate  (90 Base) MCG/ACT inhaler; Inhale 2 puffs Every 4 (Four) Hours As Needed for Wheezing.  Dispense: 54 g; Refill: 1  - Home Nebulizer    3. Coronary artery disease involving coronary bypass graft of native heart without angina pectoris  - nitroglycerin (NITROSTAT) 0.4 MG SL tablet; Place 1 tablet under the tongue Every 5 (Five) Minutes As Needed for Chest Pain. Take no more than 3 doses in 15 minutes.  Dispense: 30 tablet; Refill: 1    4. Neuropathy  - gabapentin (NEURONTIN) 300 MG capsule; Take 1 capsule by mouth 3 (Three) Times a Day.  Dispense: 90 capsule; Refill: 0    Future Appointments       Provider Department Center    8/18/2022 4:00 PM Alyssa Padron MD [facility] PULMONARY & CRITICAL CARE MEDICINE [facility]    8/29/2022 9:15 AM Kwame Hines MD [facility] INTERNAL MEDICINE [facility]    8/30/2022 4:00 PM Néstor Kelly MD [facility] UROLOGY [facility]    9/6/2022 10:00 AM [facility] PET ADMIN [address]  [place] PET [facility]    9/6/2022 11:00 AM [facility] PETCT [facility] PET [facility]    9/9/2022 3:00 PM Rich Tabor MD [facility] HEMATOLOGY & ONCOLOGY [facility]    9/13/2022 11:00 AM Jassi Orozco MD [facility] CARDIOTHORACIC SURGERY [facility]    9/28/2022 1:15 PM Kwame Hines MD [facility] INTERNAL MEDICINE [facility]                 Kwame Hines MD

## 2022-08-18 PROBLEM — J84.114 ACUTE INTERSTITIAL PNEUMONITIS: Status: ACTIVE | Noted: 2022-01-01

## 2022-08-18 PROBLEM — J96.01 ACUTE RESPIRATORY FAILURE WITH HYPOXIA: Status: ACTIVE | Noted: 2022-01-01

## 2022-08-18 NOTE — PROGRESS NOTES
Reese Cage is a 72 y.o. male here for evaluation of   Chief Complaint   Patient presents with   • Pulmonary Infiltrate     F/u       Problem list:  1. 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 Immune mediated pneumonitis  3. COPD  4. 59-pack-year history of tobacco  5. Hypertension  6. Dyslipidemia  7. GERD with esophagitis  8. Coronary artery disease  9. Psoriasis  10. Colon polyps  11. Hearing loss  12. Bladder cancer  13. Transurethral section of bladder tumor  14. Bronchoscopy with biopsy Adore 15, 2022  15. CABG, 2015  16. Heart catheterization  17. EGD July 23, 2022    History of Present Illness    72-year-old gentleman with left upper lobe squamous cell carcinoma stage IIb, T3 N0 M0, COPD, diastolic heart failure, history of CABG, hypertension, dyslipidemia, placed on carboplatin,paclitaxel, Opdivo July 8, 2022.  On the next day he had severe abdominal pain nausea vomiting and diarrhea and was hospitalized with acute kidney injury and extensive left lower lobe infiltrate suspected to be aspiration pneumonia.  Hospital course was also complicated by ileus and new onset atrial fibrillation.  There was concern for immune mediated pneumonitis and he was placed on steroids.  He also received antibiotic therapy empirically.  He was placed on TPN for nutrition support.  EGD was performed July 23 and showed esophagitis.  Renal function worsened and he started hemodialysis on July 15.  He started making urine and renal function improved.  Temporary dialysis catheter was removed.  Prednisone taper initiated.  He did have Candida albicans in his urine and was treated with micafungin and Zosyn and then transition to Diflucan.  Hemoglobin drifted down to 7.1 and he received 3 unit of blood.  There was no active bleeding on EGD.  Echo revealed an EF of 56 to 60% with some diastolic dysfunction.   Currently on 15mg daily.   Using nebulizer 4x daily and Bretzi .  He  was discharged home on August 9.  He has started smoking for 5 cigarettes/day since getting home.  He has chronic daily wheezing.  He is walking with a walker currently.  He wears his oxygen most of the time but does take it off at rest.  His wife notes that he will drift down to 89% at rest.  He denies fever or chills.  His appetite is good.  He denies any lower extremity edema.      Review of Systems   Constitutional: Positive for appetite change.   HENT: Negative for congestion.    Respiratory: Positive for cough, shortness of breath and wheezing.    Cardiovascular: Negative for chest pain and leg swelling.   Musculoskeletal: Positive for myalgias.   Neurological: Positive for weakness.         Current Outpatient Medications:   •  albuterol (PROVENTIL) (2.5 MG/3ML) 0.083% nebulizer solution, Take 2.5 mg by nebulization Every 4 (Four) Hours As Needed for Wheezing., Disp: 300 mL, Rfl: 1  •  albuterol sulfate  (90 Base) MCG/ACT inhaler, Inhale 2 puffs Every 4 (Four) Hours As Needed for Wheezing., Disp: 54 g, Rfl: 1  •  amLODIPine (NORVASC) 10 MG tablet, Take 1 tablet by mouth Daily for 30 days., Disp: 30 tablet, Rfl: 0  •  aspirin 81 MG EC tablet, Take 1 tablet by mouth Daily. (Patient taking differently: Take 81 mg by mouth Every Night.), Disp: 90 tablet, Rfl: 1  •  atorvastatin (LIPITOR) 80 MG tablet, Take 1 tablet by mouth Daily., Disp: 90 tablet, Rfl: 1  •  Budeson-Glycopyrrol-Formoterol (Breztri Aerosphere) 160-9-4.8 MCG/ACT aerosol inhaler, Inhale 2 puffs 2 (Two) Times a Day., Disp: 1 each, Rfl: 1  •  cloNIDine (CATAPRES-TTS) 0.1 MG/24HR patch, Place 1 patch on the skin as directed by provider 1 (One) Time Per Week for 30 days., Disp: 4 patch, Rfl: 0  •  Docusate Sodium (COLACE PO), Take 1 capsule by mouth Every Night., Disp: , Rfl:   •  gabapentin (NEURONTIN) 300 MG capsule, Take 1 capsule by mouth 3 (Three) Times a Day., Disp: 90 capsule, Rfl: 0  •  hydrALAZINE (APRESOLINE) 25 MG tablet, Take 1 tablet  by mouth Every 8 (Eight) Hours for 30 days., Disp: 90 tablet, Rfl: 0  •  LORazepam (Ativan) 1 MG tablet, Take 1 tablet by mouth 2 (Two) Times a Day As Needed for Anxiety., Disp: 60 tablet, Rfl: 0  •  metoprolol tartrate (LOPRESSOR) 100 MG tablet, Take 1 tablet by mouth 2 (Two) Times a Day., Disp: 135 tablet, Rfl: 1  •  montelukast (SINGULAIR) 10 MG tablet, Take 1 tablet by mouth Every Night., Disp: 90 tablet, Rfl: 1  •  nitroglycerin (NITROSTAT) 0.4 MG SL tablet, Place 1 tablet under the tongue Every 5 (Five) Minutes As Needed for Chest Pain. Take no more than 3 doses in 15 minutes., Disp: 30 tablet, Rfl: 1  •  O2 (OXYGEN), Inhale 2 L/min As Needed., Disp: , Rfl:   •  omeprazole (priLOSEC) 40 MG capsule, Take 1 capsule by mouth Daily., Disp: 30 capsule, Rfl: 5  •  ondansetron (ZOFRAN) 8 MG tablet, Take 1 tablet by mouth 3 (Three) Times a Day As Needed for Nausea or Vomiting., Disp: 30 tablet, Rfl: 2  •  predniSONE (DELTASONE) 10 MG tablet, Take 3 tablets by mouth Daily for 5 days, THEN 2 tablets Daily for 5 days, THEN 1 tablet Daily for 5 days., Disp: 30 tablet, Rfl: 0  •  tamsulosin (FLOMAX) 0.4 MG capsule 24 hr capsule, Take 2 capsules by mouth Daily., Disp: 30 capsule, Rfl: 0  •  vitamin B-12 (CYANOCOBALAMIN) 1000 MCG tablet, Take 1 tablet by mouth Daily., Disp: 90 tablet, Rfl: 3  •  Zinc 50 MG tablet, Take 1 tablet by mouth Daily., Disp: , Rfl:     Past Medical History:   Diagnosis Date   • Adenomatous polyp of ascending colon 05/25/2022    Multiple throughout colon. Dr. Obregon 5/2022. Rpt 1y   • Bladder cancer (HCC)    • Cancer of upper lobe of left lung (HCC) 6/27/2022   • COPD (chronic obstructive pulmonary disease) (HCC)    • Coronary artery disease involving coronary bypass graft of native heart without angina pectoris 04/26/2022   • Essential hypertension 04/26/2022   • Gastroesophageal reflux disease without esophagitis 04/26/2022   • Hard of hearing    • Mixed hyperlipidemia 04/26/2022   • PONV  "(postoperative nausea and vomiting)    • Psoriasis    • Renal mass    • Vitamin B12 deficiency 04/27/2022 4/2022 Vit B12 193.    • Wears dentures     full set   • Wears glasses      Past Surgical History:   Procedure Laterality Date   • BLADDER TUMOR EXCISION      Cancer   • BRONCHOSCOPY N/A 6/15/2022    Procedure: BRONCHOSCOPY WITH ENDOBRONCHIAL ULTRASOUND WITH FLUORO;  Surgeon: Ryan Del Valle MD;  Location:  [facility];  Service: Pulmonary;  Laterality: N/A;  EBUS scope removed with balloon intact   • CARDIAC CATHETERIZATION     • CARDIAC SURGERY     • COLONOSCOPY     • CORONARY ARTERY BYPASS GRAFT  2015    [facility] JANNY Ma   • ENDOSCOPY N/A 7/23/2022    Procedure: ESOPHAGOGASTRODUODENOSCOPY WITH BIOPSY;  Surgeon: Reno Charlton MD;  Location:  [facility];  Service: Gastroenterology;  Laterality: N/A;     Social History     Socioeconomic History   • Marital status: Single   Tobacco Use   • Smoking status: Current Every Day Smoker     Packs/day: 0.25     Years: 59.00     Pack years: 14.75     Types: Cigarettes     Start date: 1962   • Smokeless tobacco: Never Used   Vaping Use   • Vaping Use: Never used   Substance and Sexual Activity   • Alcohol use: Not Currently     Comment: 1-2 per month   • Drug use: Never   • Sexual activity: Defer     Family History   Problem Relation Age of Onset   • Hypertension Mother    • Hypertension Brother    • Heart attack Brother      Blood pressure 148/70, pulse 105, temperature 98 °F (36.7 °C), resp. rate 22, height 170.2 cm (67.01\"), weight 69.5 kg (153 lb 4 oz), SpO2 95 %, peak flow (!) 2 L/min.    Physical Exam  Pulmonary:      Breath sounds: Wheezing and rhonchi present.   Musculoskeletal:      Right lower leg: No edema.      Left lower leg: No edema.   Lymphadenopathy:      Cervical: No cervical adenopathy.   Neurological:      Mental Status: He is alert and oriented to person, place, and time.           PFTs:    No pulmonary function " test    Radiology:    FINDINGS:  Ill-defined airspace and interstitial disease is seen in the perihilar  right lung and right upper lobe. The right lung aeration has markedly  improved since 7/28/2022.     Ill-defined left suprahilar interstitial and alveolar disease has  improved, as well.     Previously seen right pleural effusion is no longer visible. Heart size  is mildly enlarged but stable with CABG. Right internal jugular approach  central line tip terminates at the lower SVC level. There is no visible  pneumothorax. No acute osseous abnormalities.     IMPRESSION:     1. Ill-defined alveolar and interstitial disease in the perihilar right  lung and right upper lobe, as well as in the suprahilar left upper lobe.  These findings have significantly improved, when compared to 7/28/2022.  FINDINGS are thought to reflect improving pneumonia.  2. Previously seen right pleural effusion is no longer visible.     This report was finalized on 8/3/2022 8:17 AM by Caryl Brian MD.    CT scan of the chest July 28, 2022    FINDINGS:  Previous exam report from 07/15/2022 indicated mildly decreased left  upper lobe mass, interval development of multinodular interstitial  disease of the lower lungs. Small pleural effusions.     Today's study shows now shows dense airspace disease of the posterior  right upper lobe, consistent with pneumonia, improved appearance left  hilum, previously somewhat masslike, now less dense, smaller with  multiple air bronchograms and diminished area of adjacent pneumonia.  Left lower lobe pneumonia has largely resolved. Lung bases to appear  clear, except for trace discoid atelectasis, and the patient's pleural  effusions which appear to have increased, relatively small on the left  at least moderate in size on the right. There is no evidence of  pneumothorax or edema. There are shotty mediastinal lymph nodes which  appear stable. There is no pericardial effusion. Included images of the  upper  abdomen show left renal atrophy and appropriately hypertrophic  right kidney. Included spleen, liver, pancreas and gallbladder appear  unremarkable. Adrenal glands are normal in size. Bony structures appear  intact.           IMPRESSION:     1. Enlarging pleural effusions, at least moderate in size on the right,  small on the left.  2. Interval development of extensive right upper lobe pneumonia.  3. Interval improvement of left upper lobe pneumonia and near-resolution  of left lower lobe pneumonia since 07/15/2022 exam.  4. Atrophic left kidney again noted as on prior studies.     This report was finalized on 7/28/2022 10:11 PM by Dr. Seferino Sorenson MD.         PET scan June 24, 2022  FINDINGS:  Head/neck:     No abnormally enlarged or FDG avid lymph nodes are identified. No  concerning soft tissue mass is identified.     Chest:     A 5.3 cm mass within the medial left upper lobe abutting the left hilum  is FDG avid with SUV max 23.6. No abnormally enlarged or FDG lymph nodes  are identified.     Abdomen/pelvis:     No abnormally enlarged or FDG avid lymph nodes are identified. No  concerning soft tissue mass is identified. Contrast from a recent  procedure is present within the gallbladder and urinary bladder.     Bones:     No aggressive appearing or FDG avid osseous lesions are identified.     IMPRESSION:  1.  5.3 cm mass within medial left upper lobe is FDG avid, most  compatible with primary lung malignancy.  2.  No convincing evidence of thoracic lymphadenopathy.  3.  No evidence of more distant metastasis.     This report was finalized on 6/24/2022 10:19 AM by Rell Anton MD.         Lab:    August 17, 2022, glucose 137, BUN 23, creatinine 1.20, sodium 143, potassium 4.4, chloride 103, bicarbonate 26, serum calcium 7.2, total protein 6.2, albumin 3.6, ALT 59, AST 37, alk phos 157, bilirubin 0.3, white count 12.8, hemoglobin 10.1, platelet count 226, 80% polys, and 11% lymphocytes    Final Diagnosis;  bronchoscopy, [place] 15, 2022  1. LYMPH NODE, 4R, TBNA:  Lymphoid tissue identified.  Negative for carcinoma.  2. LYMPH NODE, 4L, TBNA:  Lymphoid tissue identified.  Negative for carcinoma.  3. LYMPH NODE, STATION 7, TBNA:  Lymphoid tissue identified.  Negative for carcinoma.  4. LUNG, RIGHT HILAR MASS, TBNA:  Lymphoid and endobronchial tissue identified.  Negative for carcinoma.  5. LUNG, LEFT UPPER LOBE, ENDOBRONCHIAL BIOPSY:  Squamous cell carcinoma.  [facility]  Electronically      Diagnoses and all orders for this visit:    1. Mass of upper lobe of left lung (Primary)    2. Cancer of upper lobe of left lung (HCC)    3. Chronic obstructive pulmonary disease, unspecified COPD type (HCC)    4. Gastroesophageal reflux disease without esophagitis        Discussion:     72-year-old gentleman with obstructive lung disease, severity unknown, left lung mass with biopsy positive for squamous cell who received 1 round of chemotherapy and developed severe allergic reaction with pneumonitis, extreme nausea vomiting, acute kidney injury requiring brief dialysis.  He was hospitalized for almost a month and discharged on August 9.  He is currently on 2 L of oxygen with a saturation at rest of 95%.  I did not do a 6-minute walk test today because his wife indicates that on room air he will drift down to 89%.  He does have active wheezing on exam.  His chest x-ray at discharge did reveal improved infiltrates.  His renal function recovered and dialysis was stopped.  His tumor size was 3.5 cm initially.  Unfortunately with the immune mediated pneumonitis, suspected aspiration pneumonia, underlying COPD he continues to require some supplemental oxygen.  He also started smoking a few cigarettes a day upon discharge.  He has some bronchospasm on examination and this is despite 15 mg of prednisone a day.  Clinically I do not feel he is in shape to consider lobectomy.    -Discussed smoking cessation    -Continue Breztri, 2 puffs twice  daily  -Albuterol nebulizer 4 times daily and an extra 1 if needed    -Continue prednisone 10 mg daily, 30-day supply given    -He has a scheduled follow-up with Dr. Ma September 9 with a PET scan    -I would like to see him, or have the APRN see him in 3 to 4 weeks with spirometry and review his imaging    -At this time he is not sure he wants any treatment of his cancer.  He might do well with a simple CyberKnife because it is a squamous cell.      Alyssa Padron MD

## 2022-08-18 NOTE — OUTREACH NOTE
COPD/PN Week 2 Survey    Flowsheet Row Responses   [facility] patient discharged from? [place]   Does the patient have one of the following disease processes/diagnoses(primary or secondary)? COPD/Pneumonia   Was the primary reason for admission: Pneumonia   Week 2 attempt successful? Yes   Call start time 1412   Rescheduled Rescheduled-pt requested   Call end time 1413   Person spoke with today (if not patient) and relationship Vicky-s/oConstance TESFAYE - Registered Nurse

## 2022-08-23 NOTE — OUTREACH NOTE
COPD/PN Week 2 Survey    Flowsheet Row Responses   [facility] facility patient discharged from? [place]   Does the patient have one of the following disease processes/diagnoses(primary or secondary)? COPD/Pneumonia   Was the primary reason for admission: Pneumonia   Week 2 attempt successful? No   Rescheduled Revoked  [No answer after asking to reschedule.]          MONALISA CORRAL - Registered Nurse

## 2022-09-01 NOTE — TELEPHONE ENCOUNTER
"I spoke with patient's spouse.  She states patient is having bilateral calf pain.  They forgot to mention this at the 8/17/22 TCM visit.  Evidently, he was on the below mentioned medication when he was living in [place] but he's been off of it over a month.  Wife states patient has trouble walking secondary to pain.  Using tylenol but not much help.  Some swelling in the right ankle \"but not much\".    Spouse aware Dr. Hines out of the office this afternoon and agrees this should be addressed by him when he returns tomorrow.    "

## 2022-09-01 NOTE — TELEPHONE ENCOUNTER
Caller: DERRICK FONTENOT    Relationship: Emergency Contact    Best call back number:  [phone]    Requested Prescriptions:   PENTOXISYLLI 400MG     Pharm[facility] where request should be sent[id number] [address] & [facility] [phone] [facility] [phone] FX     Additional details provided by patient: PATIENT IS STILL HAVING A SEVERE PAIN IN CALF AND WAS TAKING THIS BEFORE BEING HOSPITALIZED.     Does the patient have less than a 3 day supply:  [x] Yes  [] No    Frida Zhou Rep   09/01/22 16:21 EDT

## 2022-09-02 NOTE — TELEPHONE ENCOUNTER
I have placed an order for the medication, starting with 1 tablet daily.  It is important that he follows up with the vascular surgeon, it looks like he has an appointment there on 9/13/2022.  Because his kidney function has been decreased we have to watch the dosing of this medication, so he will need to discuss this further with the vascular surgeon.    1. PAD (peripheral artery disease) (HCC)  - pentoxifylline (TRENtal) 400 MG CR tablet; Take 1 tablet by mouth Daily.  Dispense: 30 tablet; Refill: 0    Kwame Hines MD

## 2022-09-09 NOTE — PROGRESS NOTES
DATE OF CONSULTATION: 9/9/2022    REFERRING PHYSICIAN: Kwame Hines MD    Dear Kwame Gauthier MD  Thank you for asking for my medical advice on this patient. I saw him in the  [place] office on 9/9/2022    REASON FOR CONSULTATION: Left upper lobe lung cancer    PROBLEM LIST:   1.  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  Presented with abnormal screening CT scan done May 31, 2022 with 5.3 cm left upper lobe lung mass  B.  Whole-body PET scan done on June 23, 2022 revealed isolated hypermetabolic activity in the lung mass  C.  Diagnosed after bronchoscopy with biopsy done by Dr. Nguyen in [place], 2022  D.  Status post 1 dose of neoadjuvant carboplatin paclitaxel with Opdivo given July 2022.  Treatment stopped secondary to multiple severe side effects required admission to the hospital as well as hemodialysis.  2.  Coronary artery disease:  A.  Status post CABG done in 2015  B.  Patient is on chronic aspirin with Plavix  3.  COPD:  A.  The patient is on nocturnal oxygen replacement  4.  Peripheral neuropathy  5.  Hypertension    HISTORY OF PRESENT ILLNESS: The patient is a very pleasant 72 y.o.  male   who was in his usual state of health until May 2022.  Patient relocated to [place].  He establish care with a new primary care provider.  Given his chronic history of smoking he had a screening CT scan done May 31 with endobronchial ultrasound [place] 15, 2022 biopsy from the lung mass confirmed squamous cell carcinoma biopsy from multiple hilar and mediastinal nodes all came back negative.  The patient had whole-body PET scan done June 23, 2022 confirmed isolated hypermetabolic activity in the lung mass.  The patient was referred to me for further recommendations.    SUBJECTIVE: Sharan is here today by himself.  He is feeling significantly better since he left the hospital.  His energy is coming back as well as his breathing.  He is anxious about the PET scan  result.    Review of Systems   Constitutional: Positive for fatigue.   Respiratory: Positive for cough and shortness of breath.    Gastrointestinal: Negative.    Genitourinary: Negative.        Past Medical History:   Diagnosis Date   • Adenomatous polyp of ascending colon 05/25/2022    Multiple throughout colon. Dr. Obregon 5/2022. Rpt 1y   • Bladder cancer (HCC)    • Cancer of upper lobe of left lung (HCC) 6/27/2022   • COPD (chronic obstructive pulmonary disease) (HCC)    • Coronary artery disease involving coronary bypass graft of native heart without angina pectoris 04/26/2022   • Essential hypertension 04/26/2022   • Gastroesophageal reflux disease without esophagitis 04/26/2022   • Hard of hearing    • Mixed hyperlipidemia 04/26/2022   • PONV (postoperative nausea and vomiting)    • Psoriasis    • Renal mass    • Vitamin B12 deficiency 04/27/2022 4/2022 Vit B12 193.    • Wears dentures     full set   • Wears glasses        Social History     Socioeconomic History   • Marital status: Single   Tobacco Use   • Smoking status: Current Every Day Smoker     Packs/day: 0.25     Years: 59.00     Pack years: 14.75     Types: Cigarettes     Start date: 1962   • Smokeless tobacco: Never Used   Vaping Use   • Vaping Use: Never used   Substance and Sexual Activity   • Alcohol use: Not Currently     Comment: 1-2 per month   • Drug use: Never   • Sexual activity: Defer       Family History   Problem Relation Age of Onset   • Hypertension Mother    • Hypertension Brother    • Heart attack Brother        Past Surgical History:   Procedure Laterality Date   • BLADDER TUMOR EXCISION      Cancer   • BRONCHOSCOPY N/A 6/15/2022    Procedure: BRONCHOSCOPY WITH ENDOBRONCHIAL ULTRASOUND WITH FLUORO;  Surgeon: Ryan Del Valle MD;  [facility];  Service: Pulmonary;  Laterality: N/A;  EBUS scope removed with balloon intact   • CARDIAC CATHETERIZATION     • CARDIAC SURGERY     • COLONOSCOPY     • CORONARY ARTERY  BYPASS GRAFT  2015    [facility]   • ENDOSCOPY N/A 7/23/2022    Procedure: ESOPHAGOGASTRODUODENOSCOPY WITH BIOPSY;  Surgeon: Reno Charlton MD;  Location: [facility] ENDOSCOPY;  Service: Gastroenterology;  Laterality: N/A;       No Known Allergies       Current Outpatient Medications:   •  albuterol (PROVENTIL) (2.5 MG/3ML) 0.083% nebulizer solution, Take 2.5 mg by nebulization Every 4 (Four) Hours As Needed for Wheezing., Disp: 300 mL, Rfl: 1  •  albuterol sulfate  (90 Base) MCG/ACT inhaler, Inhale 2 puffs Every 4 (Four) Hours As Needed for Wheezing., Disp: 54 g, Rfl: 1  •  amLODIPine (NORVASC) 10 MG tablet, Take 1 tablet by mouth Daily for 30 days., Disp: 30 tablet, Rfl: 0  •  aspirin 81 MG EC tablet, Take 1 tablet by mouth Daily. (Patient taking differently: Take 81 mg by mouth Every Night.), Disp: 90 tablet, Rfl: 1  •  atorvastatin (LIPITOR) 80 MG tablet, Take 1 tablet by mouth Daily., Disp: 90 tablet, Rfl: 1  •  Budeson-Glycopyrrol-Formoterol (Breztri Aerosphere) 160-9-4.8 MCG/ACT aerosol inhaler, Inhale 2 puffs 2 (Two) Times a Day., Disp: 1 each, Rfl: 1  •  cloNIDine (CATAPRES-TTS) 0.1 MG/24HR patch, Place 1 patch on the skin as directed by provider 1 (One) Time Per Week for 30 days., Disp: 4 patch, Rfl: 0  •  Docusate Sodium (COLACE PO), Take 1 capsule by mouth Every Night., Disp: , Rfl:   •  gabapentin (NEURONTIN) 300 MG capsule, Take 1 capsule by mouth 3 (Three) Times a Day., Disp: 90 capsule, Rfl: 0  •  hydrALAZINE (APRESOLINE) 25 MG tablet, Take 1 tablet by mouth Every 8 (Eight) Hours for 30 days., Disp: 90 tablet, Rfl: 0  •  LORazepam (Ativan) 1 MG tablet, Take 1 tablet by mouth 2 (Two) Times a Day As Needed for Anxiety., Disp: 60 tablet, Rfl: 0  •  metoprolol tartrate (LOPRESSOR) 100 MG tablet, Take 1 tablet by mouth 2 (Two) Times a Day., Disp: 135 tablet, Rfl: 1  •  montelukast (SINGULAIR) 10 MG tablet, Take 1 tablet by mouth Every Night., Disp: 90 tablet, Rfl: 1  •  NIFEdipine XL  "(PROCARDIA XL) 90 MG 24 hr tablet, Take 90 mg by mouth Daily., Disp: , Rfl:   •  nitroglycerin (NITROSTAT) 0.4 MG SL tablet, Place 1 tablet under the tongue Every 5 (Five) Minutes As Needed for Chest Pain. Take no more than 3 doses in 15 minutes., Disp: 30 tablet, Rfl: 1  •  O2 (OXYGEN), Inhale 2 L/min As Needed., Disp: , Rfl:   •  omeprazole (priLOSEC) 40 MG capsule, Take 1 capsule by mouth Daily., Disp: 30 capsule, Rfl: 5  •  ondansetron (ZOFRAN) 8 MG tablet, Take 1 tablet by mouth 3 (Three) Times a Day As Needed for Nausea or Vomiting., Disp: 30 tablet, Rfl: 2  •  pentoxifylline (TRENtal) 400 MG CR tablet, Take 1 tablet by mouth Daily., Disp: 30 tablet, Rfl: 0  •  predniSONE (DELTASONE) 10 MG tablet, Take 1 tablet by mouth Daily., Disp: 30 tablet, Rfl: 0  •  tamsulosin (FLOMAX) 0.4 MG capsule 24 hr capsule, Take 2 capsules by mouth Daily., Disp: 30 capsule, Rfl: 0  •  vitamin B-12 (CYANOCOBALAMIN) 1000 MCG tablet, Take 1 tablet by mouth Daily., Disp: 90 tablet, Rfl: 3  •  Zinc 50 MG tablet, Take 1 tablet by mouth Daily., Disp: , Rfl:     PHYSICAL EXAMINATION:   /73   Pulse 104   Temp 97.7 °F (36.5 °C) (Temporal)   Resp 20   Ht 170.2 cm (67.01\")   Wt 70.3 kg (155 lb)   SpO2 92%   BMI 24.27 kg/m²   Pain Score    09/09/22 1454   PainSc: 10-Worst pain ever  Comment: legs      ECOG score: 1            ECOG Performance Status: 1 - Symptomatic but completely ambulatory  General Appearance:  alert, cooperative, no apparent distress and appears stated age   Neurologic/Psychiatric: A&O x 3, gait steady, appropriate affect, strength 5/5 in all muscle groups   HEENT:  Normocephalic, without obvious abnormality, mucous membranes moist   Neck: Supple, symmetrical, trachea midline, no adenopathy;  No thyromegaly, masses, or tenderness   Lungs:   Clear to auscultation bilaterally; respirations regular, even, and unlabored bilaterally   Heart:  Regular rate and rhythm, no murmurs appreciated   Abdomen:   Soft, " non-tender, non-distended and no organomegaly   Lymph nodes: No cervical, supraclavicular, inguinal or axillary adenopathy noted   Extremities: Normal, atraumatic; no clubbing, cyanosis, or edema    Skin: No rashes, ulcers, or suspicious lesions noted       Hospital Outpatient Visit on 09/06/2022   Component Date Value Ref Range Status   • Glucose 09/06/2022 104  70 - 130 mg/dL Final    Meter: [id number] : [id number] Sanjay Hutson              DIAGNOSTIC DATA:   1. Radiology: Whole-body PET scan done on June 23, 2022 confirmed hypermetabolic left upper lobe lung mass  2. Dr. Nguyen's note from June 2022 reviewed by me and documented in the  chart.   3. Pathology report: Squamous cell carcinoma left upper lobe  4. Laboratory data: As above    ASSESSMENT: The patient is a very pleasant 72 y.o.  male  with left upper lobe lung cancer    PLAN:     1.  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  I did go over the PET scan results with the patient from September 6, 2022.  I reviewed the films myself.  I went over pictures with him.  I have compared the current PET scan to previous study done June 24, 2022.  I explained to the patient that he had excellent response to treatment after 1 dose of chemotherapy plus Opdivo with significant decrease in size of his left upper lobe lung mass.  B.  Given the fact that patient tolerated treatment poorly I will abort further chemotherapy or immunotherapy at this point.  C.  I will discuss the patient case at tumor board on Tuesday.  D.  The patient is not interested in surgery at this point.  Given the excellent response to treatment I think he will be a good candidate for CyberKnife radiotherapy..  E.  I will refer him to see Dr. Tate for stereotactic radiotherapy.  F.  He will follow-up with me in 4 months with restaging PET scan.    2.  Coronary artery disease, status post CABG:  A.  I asked the patient to hold his Plavix for now given his  intermittent hemoptysis.  He can resume it after this improves.  B.  The patient would be at high risk of bleeding while he is on chemotherapy given the fact that he is on chronic aspirin with Plavix.    3.  Peripheral neuropathy:  A.  The patient will continue gabapentin.  B.  We have to watch his neuropathy carefully since Taxol and carboplatin both can cause chemotherapy-induced neuropathy.    4.  Hypertension:  A.  The patient will continue his antihypertensive agents.  B.  We will monitor his blood pressure closely.  He will be at risk for hypotension while on chemotherapy and we may have to adjust his antihypertensive agents.      FOLLOW UP: 4 months with a PET scan.    Rich Tabor MD  9/9/2022

## 2022-09-13 NOTE — TELEPHONE ENCOUNTER
Yes I would continue, I have refilled it.  He should follow-up with urology Dr. Kelly later this month.

## 2022-09-13 NOTE — PROGRESS NOTES
09/13/2022  Patient Information  Reese Cage                                                                                          [address] [facility] [address]   1950  'PCP/Referring Physician'  Kwame Hines MD  [phone]  Rich Tabor MD  [phone]  Chief Complaint   Patient presents with   • Consult     NP per Dr. Tabor for Left Lung Cancer       History of Present Illness: 72-year-old  male with history of hypertension, hyperlipidemia, coronary artery disease status post CABG, COPD, active tobacco abuse and renal failure who presents with a left upper lobe lung cancer.  The patient had a screening CT scan that demonstrated a left upper lobe lung mass and subsequent biopsies that were consistent with squamous cell carcinoma.  The patient underwent neoadjuvant chemotherapy and had subsequent prolonged hospitalization for acute renal failure.  The patient is currently on nasal cannula oxygen and is present in wheelchair.      Patient Active Problem List   Diagnosis   • Essential hypertension   • Mixed hyperlipidemia   • Gastroesophageal reflux disease without esophagitis   • Psoriasis   • Cigarette smoker   • Bladder cancer (HCC)   • Lower urinary tract symptoms (LUTS)   • COPD (chronic obstructive pulmonary disease) (HCC)   • Right kidney mass   • Coronary artery disease involving coronary bypass graft of native heart without angina pectoris   • Vitamin B12 deficiency   • Adenomatous polyp of ascending colon   • Acute respiratory failure with hypoxia (HCC)   • Mass of upper lobe of left lung   • Paratracheal lymphadenopathy   • Cancer of upper lobe of left lung (HCC)   • Encounter for therapeutic drug monitoring   • Acute interstitial pneumonitis (HCC), secondary to chemotherapy     Past Medical History:   Diagnosis Date   • Adenomatous polyp of ascending colon 05/25/2022    Multiple throughout colon. Dr. Obregon 5/2022. Rpt 1y   • Anxiety    • Bladder cancer (HCC)     • Bladder cancer (HCC)    • Cancer of upper lobe of left lung (HCC) 06/27/2022   • CHF (congestive heart failure) (HCC)    • COPD (chronic obstructive pulmonary disease) (HCC)    • Coronary artery disease involving coronary bypass graft of native heart without angina pectoris 04/26/2022   • Essential hypertension 04/26/2022   • Gastroesophageal reflux disease without esophagitis 04/26/2022   • Hard of hearing    • Mixed hyperlipidemia 04/26/2022   • PONV (postoperative nausea and vomiting)    • Psoriasis    • Renal failure    • Renal mass    • Seizure (HCC)     ?   • Vitamin B12 deficiency 04/27/2022 4/2022 Vit B12 193.    • Wears dentures     full set   • Wears glasses      Past Surgical History:   Procedure Laterality Date   • BLADDER TUMOR EXCISION      Cancer   • BRONCHOSCOPY N/A 06/15/2022    Procedure: BRONCHOSCOPY WITH ENDOBRONCHIAL ULTRASOUND WITH FLUORO;  Surgeon: Ryan Del Valle MD;  Location:  [facility] ENDOSCOPY;  Service: Pulmonary;  Laterality: N/A;  EBUS scope removed with balloon intact   • CARDIAC CATHETERIZATION     • CARDIAC SURGERY     • COLONOSCOPY     • CORONARY ARTERY BYPASS GRAFT  2015    [facility] [place]   • ENDOSCOPY N/A 07/23/2022    Procedure: ESOPHAGOGASTRODUODENOSCOPY WITH BIOPSY;  Surgeon: Reno Charlton MD;  Location:  [facility];  Service: Gastroenterology;  Laterality: N/A;   • ILIAC ARTERY STENT Left        Current Outpatient Medications:   •  albuterol (PROVENTIL) (2.5 MG/3ML) 0.083% nebulizer solution, Take 2.5 mg by nebulization Every 4 (Four) Hours As Needed for Wheezing., Disp: 300 mL, Rfl: 1  •  albuterol sulfate  (90 Base) MCG/ACT inhaler, Inhale 2 puffs Every 4 (Four) Hours As Needed for Wheezing., Disp: 54 g, Rfl: 1  •  amLODIPine (NORVASC) 10 MG tablet, Take 10 mg by mouth Daily., Disp: , Rfl:   •  aspirin 81 MG EC tablet, Take 1 tablet by mouth Daily. (Patient taking differently: Take 81 mg by mouth Every Night.), Disp: 90 tablet, Rfl: 1  •   atorvastatin (LIPITOR) 80 MG tablet, Take 1 tablet by mouth Daily., Disp: 90 tablet, Rfl: 1  •  Budeson-Glycopyrrol-Formoterol (Breztri Aerosphere) 160-9-4.8 MCG/ACT aerosol inhaler, Inhale 2 puffs 2 (Two) Times a Day., Disp: 1 each, Rfl: 1  •  cloNIDine (CATAPRES-TTS) 0.1 MG/24HR patch, Place 1 patch on the skin as directed by provider 1 (One) Time Per Week for 30 days., Disp: 4 patch, Rfl: 0  •  Docusate Sodium (COLACE PO), Take 1 capsule by mouth Every Night., Disp: , Rfl:   •  gabapentin (NEURONTIN) 300 MG capsule, Take 1 capsule by mouth 3 (Three) Times a Day., Disp: 90 capsule, Rfl: 0  •  hydrALAZINE (APRESOLINE) 25 MG tablet, Take 25 mg by mouth 3 (Three) Times a Day., Disp: , Rfl:   •  LORazepam (ATIVAN) 1 MG tablet, TAKE 1 TABLET BY MOUTH TWICE DAILY AS NEEDED FOR ANXIETY, Disp: 60 tablet, Rfl: 0  •  metoprolol tartrate (LOPRESSOR) 100 MG tablet, Take 1 tablet by mouth 2 (Two) Times a Day., Disp: 135 tablet, Rfl: 1  •  montelukast (SINGULAIR) 10 MG tablet, Take 1 tablet by mouth Every Night., Disp: 90 tablet, Rfl: 1  •  NIFEdipine XL (PROCARDIA XL) 90 MG 24 hr tablet, Take 90 mg by mouth Daily., Disp: , Rfl:   •  nitroglycerin (NITROSTAT) 0.4 MG SL tablet, Place 1 tablet under the tongue Every 5 (Five) Minutes As Needed for Chest Pain. Take no more than 3 doses in 15 minutes., Disp: 30 tablet, Rfl: 1  •  O2 (OXYGEN), Inhale 2 L/min As Needed., Disp: , Rfl:   •  omeprazole (priLOSEC) 40 MG capsule, Take 1 capsule by mouth Daily., Disp: 30 capsule, Rfl: 5  •  ondansetron (ZOFRAN) 8 MG tablet, Take 1 tablet by mouth 3 (Three) Times a Day As Needed for Nausea or Vomiting., Disp: 30 tablet, Rfl: 2  •  pentoxifylline (TRENtal) 400 MG CR tablet, Take 1 tablet by mouth Daily., Disp: 30 tablet, Rfl: 0  •  predniSONE (DELTASONE) 10 MG tablet, Take 1 tablet by mouth Daily., Disp: 30 tablet, Rfl: 0  •  tamsulosin (FLOMAX) 0.4 MG capsule 24 hr capsule, Take 2 capsules by mouth Daily., Disp: 30 capsule, Rfl: 0  •   vitamin B-12 (CYANOCOBALAMIN) 1000 MCG tablet, Take 1 tablet by mouth Daily., Disp: 90 tablet, Rfl: 3  •  Zinc 50 MG tablet, Take 1 tablet by mouth Daily., Disp: , Rfl:   •  hydrALAZINE (APRESOLINE) 25 MG tablet, Take 1 tablet by mouth Every 8 (Eight) Hours for 30 days., Disp: 90 tablet, Rfl: 0  No Known Allergies  Social History     Socioeconomic History   • Marital status: Single   • Number of children: 1   Tobacco Use   • Smoking status: Current Every Day Smoker     Packs/day: 0.25     Years: 59.00     Pack years: 14.75     Types: Cigarettes     Start date: 1962   • Smokeless tobacco: Never Used   Vaping Use   • Vaping Use: Never used   Substance and Sexual Activity   • Alcohol use: Not Currently     Comment: 1-2 per month   • Drug use: Never   • Sexual activity: Defer     Family History   Problem Relation Age of Onset   • Hypertension Mother    • Coronary artery disease Father    • Hypertension Brother    • Heart attack Brother      Review of Systems   Constitutional: Positive for malaise/fatigue and weight loss. Negative for chills, fever and night sweats.   HENT: Negative for hearing loss, odynophagia and sore throat.    Cardiovascular: Positive for claudication, dyspnea on exertion and leg swelling. Negative for chest pain, orthopnea and palpitations.   Respiratory: Positive for cough and shortness of breath. Negative for hemoptysis.    Endocrine: Negative for cold intolerance, heat intolerance, polydipsia, polyphagia and polyuria.   Hematologic/Lymphatic: Bruises/bleeds easily.   Skin: Negative for itching and rash.   Musculoskeletal: Positive for falls. Negative for joint pain, joint swelling and myalgias.   Gastrointestinal: Positive for constipation and nausea. Negative for abdominal pain, diarrhea, hematemesis, hematochezia, melena and vomiting.   Genitourinary: Negative for dysuria, frequency and hematuria.   Neurological: Positive for dizziness and light-headedness. Negative for focal weakness,  "headaches, numbness and seizures.   Psychiatric/Behavioral: Negative for suicidal ideas. The patient is nervous/anxious.    All other systems reviewed and are negative.    Vitals:    09/13/22 1205   BP: 140/80   BP Location: Left arm   Patient Position: Sitting   Pulse: 90   Temp: 97.7 °F (36.5 °C)   SpO2: 96%   Weight: 68.5 kg (151 lb)   Height: 162.6 cm (64\")      Physical Exam  Vitals reviewed.   Constitutional:       General: He is not in acute distress.     Appearance: Normal appearance.      Comments:  male who appears stated age and is present with his fiancé.  The patient is in a wheelchair and is on nasal cannula oxygen.   HENT:      Head: Normocephalic and atraumatic.      Nose: Nose normal.   Eyes:      General: No scleral icterus.  Cardiovascular:      Rate and Rhythm: Normal rate and regular rhythm.      Heart sounds: No murmur heard.    No friction rub. No gallop.   Pulmonary:      Effort: Pulmonary effort is normal. No respiratory distress.      Breath sounds: Normal breath sounds. No rhonchi.   Chest:   Breasts:      Right: No axillary adenopathy or supraclavicular adenopathy.      Left: No axillary adenopathy or supraclavicular adenopathy.       Abdominal:      General: There is no distension.      Palpations: Abdomen is soft. There is no mass.      Tenderness: There is no abdominal tenderness. There is no guarding or rebound.   Musculoskeletal:         General: No deformity.      Cervical back: Neck supple.      Right lower leg: No edema.      Left lower leg: No edema.   Lymphadenopathy:      Cervical: No cervical adenopathy.      Upper Body:      Right upper body: No supraclavicular or axillary adenopathy.      Left upper body: No supraclavicular or axillary adenopathy.   Skin:     General: Skin is warm and dry.      Coloration: Skin is not jaundiced.   Neurological:      Mental Status: He is alert and oriented to person, place, and time.      Gait: Gait normal.   Psychiatric:         " Mood and Affect: Mood normal.         Behavior: Behavior normal.         Thought Content: Thought content normal.         Judgment: Judgment normal.         The ROS, past medical history, surgical history, family history, social history and vitals were reviewed by myself and corrected as needed.      Labs/Imaging:  -PET/CT performed 9/6/2022, personally reviewed, demonstrates a significant decrease in size in the left upper lobe lung mass currently measuring 1.3 cm down from 4 to 5 cm.  The current SUV is 7.2 as compared to previous 24.  -PET/CT performed 6/24/2022, personally reviewed, demonstrates a 5.3 cm left upper lobe lung mass with an SUV of 23.6.  No hypermetabolic lymphadenopathy is present.    Assessment/Plan:  72-year-old  male with history of hypertension, hyperlipidemia, coronary artery disease status post CABG, COPD, active tobacco abuse and renal failure who presents with a left upper lobe lung cancer.  This tentatively represents a T3N0M0 stage IIb squamous cell carcinoma the left upper lobe.  The patient had an excellent response to a single regimen of chemotherapy and we discussed options including surgical resection versus radiation.  Given the patient's protracted hospitalization after chemotherapy and functional status, he did not want to entertain surgical resection, which is a reasonable choice.  An appointment will be arranged for radiation oncology evaluation to discuss radiation treatment.  The patient will continue to follow-up with his medical oncologist, Dr. Rich Tabor.  He may follow-up in surgical clinic as needed.    Patient Active Problem List   Diagnosis   • Essential hypertension   • Mixed hyperlipidemia   • Gastroesophageal reflux disease without esophagitis   • Psoriasis   • Cigarette smoker   • Bladder cancer (HCC)   • Lower urinary tract symptoms (LUTS)   • COPD (chronic obstructive pulmonary disease) (HCC)   • Right kidney mass   • Coronary artery disease involving  coronary bypass graft of native heart without angina pectoris   • Vitamin B12 deficiency   • Adenomatous polyp of ascending colon   • Acute respiratory failure with hypoxia (HCC)   • Mass of upper lobe of left lung   • Paratracheal lymphadenopathy   • Cancer of upper lobe of left lung (HCC)   • Encounter for therapeutic drug monitoring   • Acute interstitial pneumonitis (HCC), secondary to chemotherapy

## 2022-09-13 NOTE — TELEPHONE ENCOUNTER
Caller: DERRICK FONTENOT    Relationship: Emergency Contact    Best call back number:[phone]    Requested Prescriptions:   Requested Prescriptions     Pending Prescriptions Disp Refills   • tamsulosin (FLOMAX) 0.4 MG capsule 24 hr capsule 30 capsule 0     Sig: Take 2 capsules by mouth Daily.        Pharmacy where request should be sent: [id number] - [place], [place] - [address] AT [facility] [address] [facility] [address] & [facility] [facility] [phone] [facility] [phone] FX     Additional details provided by patient: DERRICK WANTS TO KNOW IF HE IS SUPPOSED TO CONTINUE THIS MEDICATION. THIS WAS PRESCRIBED IN THE ER. IF SO, PLEASE REFILL.    Does the patient have less than a 3 day supply:  [x] Yes  [] No    Frida Lainez Rep   09/13/22 15:26 EDT

## 2022-09-19 NOTE — TELEPHONE ENCOUNTER
Rx Refill Note  Requested Prescriptions     Pending Prescriptions Disp Refills   • Breztri Aerosphere 160-9-4.8 MCG/ACT aerosol inhaler [Pharmacy Med Name: BREZTRI AERO SPHERE INHALER 120 INH] 10.7 g      Sig: INHALE 2 PUFFS BY MOUTH TWICE DAILY      Last office visit with prescribing clinician:  8/17/22  Next office visit with prescribing clinician: 9/20/22           Alyssa Marie RN  09/19/22, 10:40 EDT

## 2022-09-19 NOTE — TELEPHONE ENCOUNTER
Rx Refill Note  Requested Prescriptions     Pending Prescriptions Disp Refills   • Breztri Aerosphere 160-9-4.8 MCG/ACT aerosol inhaler [Pharmacy Med Name: BREZTRI AERO SPHERE INHALER 120 INH] 10.7 g      Sig: INHALE 2 PUFFS BY MOUTH TWICE DAILY      Last office visit with prescribing clinician: 8/17/22  Next office visit with prescribing clinician: 9/20/22           Alyssa Marie RN  09/19/22, 11:59 EDT

## 2022-10-02 NOTE — ED PROVIDER NOTES
"Subjective   History of Present Illness  This is a 72-year-old male that presents the ER with progressive worsening shortness of breath, wheezing, and hypoxia.  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 Tabor, oncology, and Dr. Del Valle, pulmonology.  Patient had chemotherapy in July, 2022 but it caused significant kidney injury so patient was held on chemotherapy.  They are discussing radiation treatments but this has not been set up yet.  Patient utilizes 2 L of O2 per nasal cannula routinely secondary to COPD.  He has really tried to cut back on smoking and is smoking 3 to 4 cigarettes/day.  Over the last 2 weeks with weather changes and some mild nasal congestion and rhinorrhea, patient has developed increased shortness of breath and chest tightness as well as wheezing.  He denies any pleuritic type chest pain.  O2 sat has dropped into the low 80s and even 70s at times.  Patient has had increased O2 to 3 and 4 L continuously.  Patient reports chills but denies any fever.  Cough is nonproductive and patient describes \"smothering\" sensation.  Patient denies any lower extremity pedal edema.  He denies any abdominal pain or nausea, vomiting, diarrhea.  Patient had COVID-19 in February, 2022.  He is up-to-date on 2 COVID-19 vaccinations but has not had his booster.  Past medical history is also significant for hypertension, hyperlipidemia, CAD with previous CABG in 2015, bladder cancer, psoriasis, renal mass, squamous cell carcinoma of the left upper lung, anxiety, and continued tobacco dependence, as well as oxygen dependent COPD.  Patient denies any known sick contacts.  No other concerns at this time.  Patient is adamant that he will not be staying in the hospital during the history.  He says that he will leave regardless of what we find out today.  Patient has been utilizing albuterol nebs 4 times daily as well as increasing oxygen supplementation.  No other " treatments tried.    History provided by:  Patient and spouse  Shortness of Breath  Severity:  Moderate  Onset quality:  Gradual  Duration:  2 weeks  Timing:  Constant  Progression:  Worsening  Chronicity:  Recurrent  Context: URI and weather changes    Context comment:  History of O2-dependent COPD.  Recent URI sxs that have progressed to lungs.  Increased chest tightness, wheezing, SOA, and hypoxia.  Pt folllwing with Dr. Tabor for TRISTAN lung cancer.  Has only had 1 chemo tx in 7/22, but it shut his kidneys down.  Relieved by:  Nothing  Worsened by:  Deep breathing  Ineffective treatments:  Oxygen (Pt using Albuterol nebs QID and usually 2L O2 but increased to 3L and 4L with increased SOA.)  Associated symptoms: cough (Mainly non-productive.) and wheezing    Associated symptoms: no abdominal pain, no chest pain, no claudication, no diaphoresis, no fever, no headaches, no hemoptysis, no neck pain, no PND, no sore throat, no sputum production, no syncope, no swollen glands and no vomiting    Risk factors comment:  Pt follows with Dr. Del Valle, pulmonology, for O2-dependent COPD. History of squamous cell lung CA TRISTAN.  Following with Dr. Tabor.  Pt has only had 1 round of chemo and had to stop due to kidney injury.  Discussing radiation.      Review of Systems   Constitutional: Negative for diaphoresis and fever.   HENT: Positive for congestion and postnasal drip. Negative for rhinorrhea, sinus pressure, sinus pain, sneezing and sore throat.    Respiratory: Positive for cough (Mainly non-productive.), chest tightness, shortness of breath and wheezing. Negative for hemoptysis and sputum production.         Continued tobacco dependence.  Down to 3-4 cigarettes/day.  H/o squamous cell TRISTAN lung cancer.  Increased wheezing, hypoxia, SOA despite oxygen supplementation.   Cardiovascular: Negative.  Negative for chest pain, palpitations, claudication, leg swelling, syncope and PND.   Gastrointestinal: Negative.  Negative for  abdominal pain, diarrhea, nausea and vomiting.   Genitourinary: Negative.    Musculoskeletal: Negative.  Negative for back pain and neck pain.   Neurological: Negative.  Negative for dizziness, speech difficulty, weakness and headaches.   All other systems reviewed and are negative.      Past Medical History:   Diagnosis Date   • Adenomatous polyp of ascending colon 05/25/2022    Multiple throughout colon. Dr. Obregon 5/2022. Rpt 1y   • Anxiety    • Bladder cancer (HCC)    • Cancer of upper lobe of left lung (HCC) 06/27/2022   • CHF (congestive heart failure) (HCC)    • COPD (chronic obstructive pulmonary disease) (HCC)    • Coronary artery disease involving coronary bypass graft of native heart without angina pectoris 04/26/2022   • Essential hypertension 04/26/2022   • Gastroesophageal reflux disease without esophagitis 04/26/2022   • Hard of hearing    • Mixed hyperlipidemia 04/26/2022   • PONV (postoperative nausea and vomiting)    • Psoriasis    • Renal failure    • Renal mass    • Vitamin B12 deficiency 04/27/2022 4/2022 Vit B12 193.    • Wears dentures     full set   • Wears glasses        No Known Allergies    Past Surgical History:   Procedure Laterality Date   • BLADDER TUMOR EXCISION      Cancer   • BRONCHOSCOPY N/A 06/15/2022    Procedure: BRONCHOSCOPY WITH ENDOBRONCHIAL ULTRASOUND WITH FLUORO;  Surgeon: Ryan Del Valle MD;  Location:  [facility];  Service: Pulmonary;  Laterality: N/A;  EBUS scope removed with balloon intact   • CARDIAC CATHETERIZATION     • CARDIAC SURGERY     • COLONOSCOPY     • CORONARY ARTERY BYPASS GRAFT  2015    [facility]   • ENDOSCOPY N/A 07/23/2022    Procedure: ESOPHAGOGASTRODUODENOSCOPY WITH BIOPSY;  Surgeon: Reno Charlton MD;  Location:  [facility];  Service: Gastroenterology;  Laterality: N/A;   • ILIAC ARTERY STENT Left        Family History   Problem Relation Age of Onset   • Hypertension Mother    • Coronary artery disease Father    •  Hypertension Brother    • Heart attack Brother        Social History     Socioeconomic History   • Marital status: Single   • Number of children: 1   Tobacco Use   • Smoking status: Current Every Day Smoker     Packs/day: 0.25     Years: 59.00     Pack years: 14.75     Types: Cigarettes     Start date: 1962   • Smokeless tobacco: Never Used   Vaping Use   • Vaping Use: Never used   Substance and Sexual Activity   • Alcohol use: Not Currently     Comment: 1-2 per month   • Drug use: Never   • Sexual activity: Defer           Objective   Physical Exam  Vitals and nursing note reviewed.   Constitutional:       General: He is not in acute distress.     Appearance: Normal appearance. He is ill-appearing. He is not diaphoretic.      Comments: Patient appears ill.  Increased respiratory effort.  Nontoxic.   HENT:      Head: Normocephalic and atraumatic.      Right Ear: Tympanic membrane normal.      Left Ear: Tympanic membrane normal.      Nose: Nose normal. No congestion or rhinorrhea.      Right Sinus: No maxillary sinus tenderness or frontal sinus tenderness.      Left Sinus: No maxillary sinus tenderness or frontal sinus tenderness.      Mouth/Throat:      Mouth: Mucous membranes are moist.      Pharynx: Oropharynx is clear. No oropharyngeal exudate or posterior oropharyngeal erythema.      Comments: Oral mucous membranes are moist.  Posterior pharynx is nonerythematous.  Eyes:      Extraocular Movements: Extraocular movements intact.      Conjunctiva/sclera: Conjunctivae normal.      Pupils: Pupils are equal, round, and reactive to light.   Cardiovascular:      Rate and Rhythm: Regular rhythm. Tachycardia present.  No extrasystoles are present.     Pulses: Normal pulses.      Heart sounds: Normal heart sounds.      Comments: Mild tachycardia.  No ectopy.  Trace edema at the sock line to bilateral ankles and feet.  No erythema or warmth.  Pulmonary:      Effort: Pulmonary effort is normal. Tachypnea present. No  accessory muscle usage.      Breath sounds: Decreased breath sounds and wheezing present. No rhonchi or rales.      Comments: Tachypnea with conversation.  O2 in place per nasal cannula.  O2 sats in the low 90s with oxygen supplementation.  Globally diminished breath sounds.  Inspiratory and expiratory wheezes bilateral lung fields.  No respiratory distress.  Abdominal:      General: Bowel sounds are normal.      Palpations: Abdomen is soft.   Musculoskeletal:         General: Normal range of motion.      Cervical back: Normal range of motion and neck supple.      Right lower leg: Edema present.      Left lower leg: Edema present.   Skin:     General: Skin is warm and dry.   Neurological:      General: No focal deficit present.      Mental Status: He is alert.      Cranial Nerves: Cranial nerves are intact.      Sensory: Sensation is intact.      Motor: Weakness present.      Coordination: Coordination is intact.      Comments: Generalized weakness without focal deficits.   Psychiatric:      Comments: Irritable at discussion of recommendations for hospital admission.         Procedures           ED Course  ED Course as of 10/02/22 2007   Sun Oct 02, 2022   1551 ABG reveals pH 7.37, PCO2 63, PO2 81, bicarb is 36 and carboxyhemoglobin was 3.2.  Last ABG 2 months ago revealed PCO2 of 51.  CBC was within normal limits with normal white blood cell count at 9.16 with normal differential.  Chemistries were also essentially normal except for bicarb of 34.  BNP is 1964.  Lactic acid 1.9.  , CRP 0.60 and procalcitonin is normal at 0.08.  Troponin is less than 0.010.  BNP is 1964.  Respiratory PCR panel is in process.  Chest x-ray reveals mild chronic interstitial changes in the lungs without focal airspace opacity.  Patient's known left upper lobe mass is somewhat poorly visualized on radiographs.  No significant pleural effusion and normal heart size.  I ordered Proventil inhaler 2 puffs, Solu-Medrol 80 mg IV and  magnesium sulfate 2 g IV.  Awaiting respiratory PCR results as well as CT angiogram of the chest with contrast. [FC]   1748 CT angiogram of the chest with contrast reveals no acute PE.  New from most recent comparison PET/CT there is evidence of moderate pulmonary edema with bilateral pleural effusions.  There is adjacent bilateral lower lobe volume loss otherwise without distinct focal consolidation concerning for pneumonia.  Known left suprahilar lung mass appears grossly similar to most recent PET/CT comparison.  Discussed the case in detail with Dr. Gunter, ER attending physician, and patient's refusal to be admitted.  We will give Lasix 40 mg IV.  Last echocardiogram was 7/14/2022 and EF was 60%.  Patient may increase his Lasix to 40 mg by mouth twice daily x3 days and then closely follow-up with his pulmonologist, Dr. Del Valle.  We also will treat patient with doxycycline 100 mg by mouth twice daily for COPD exacerbation.  I will update the patient.  He will need to sign out AGAINST MEDICAL ADVICE due to seriousness of his medical condition and increased risk of deteriorating condition. [FC]   1955 I updated patient and spouse on all results and recommendations for admission.  Patient is still adamant about being discharged home.  I advised that he will need to sign out AGAINST MEDICAL ADVICE due to concern for respiratory failure, cardiac arrest, and even death.  Patient verbalized understanding and still wants to proceed with leaving AMA.  I will prescribe doxycycline 100 mg by mouth twice daily x10 days.  Recommend albuterol nebs every 4 hours as needed for wheezing or chest tightness.  Patient needs to increase Lasix to 40 mg by mouth twice daily x3 days and then return back to Lasix 20 mg by mouth daily.  Recommend first available recheck with Dr. Del Valle,  patient's routine pulmonologist as well as Dr. Tabor, oncology.  Patient and wife verbalized understanding.  Patient will be leaving AMA. [FC]      ED  Course User Index  [FC] Arlene Patel PA-C                 Recent Results (from the past 24 hour(s))   ECG 12 Lead    Collection Time: 10/02/22 12:59 PM   Result Value Ref Range    QT Interval 350 ms    QTC Interval 421 ms   Comprehensive Metabolic Panel    Collection Time: 10/02/22  1:04 PM    Specimen: Blood   Result Value Ref Range    Glucose 97 65 - 99 mg/dL    BUN 13 8 - 23 mg/dL    Creatinine 0.98 0.76 - 1.27 mg/dL    Sodium 138 136 - 145 mmol/L    Potassium 4.2 3.5 - 5.2 mmol/L    Chloride 94 (L) 98 - 107 mmol/L    CO2 34.0 (H) 22.0 - 29.0 mmol/L    Calcium 8.9 8.6 - 10.5 mg/dL    Total Protein 6.6 6.0 - 8.5 g/dL    Albumin 3.40 (L) 3.50 - 5.20 g/dL    ALT (SGPT) 23 1 - 41 U/L    AST (SGOT) 17 1 - 40 U/L    Alkaline Phosphatase 132 (H) 39 - 117 U/L    Total Bilirubin 0.3 0.0 - 1.2 mg/dL    Globulin 3.2 gm/dL    A/G Ratio 1.1 g/dL    BUN/Creatinine Ratio 13.3 7.0 - 25.0    Anion Gap 10.0 5.0 - 15.0 mmol/L    eGFR 82.4 >60.0 mL/min/1.73   BNP    Collection Time: 10/02/22  1:04 PM    Specimen: Blood   Result Value Ref Range    proBNP 1,964.0 (H) 0.0 - 900.0 pg/mL   Troponin    Collection Time: 10/02/22  1:04 PM    Specimen: Blood   Result Value Ref Range    Troponin T <0.010 0.000 - 0.030 ng/mL   Green Top (Gel)    Collection Time: 10/02/22  1:04 PM   Result Value Ref Range    Extra Tube Hold for add-ons.    Lavender Top    Collection Time: 10/02/22  1:04 PM   Result Value Ref Range    Extra Tube hold for add-on    Gold Top - SST    Collection Time: 10/02/22  1:04 PM   Result Value Ref Range    Extra Tube Hold for add-ons.    Gray Top    Collection Time: 10/02/22  1:04 PM   Result Value Ref Range    Extra Tube Hold for add-ons.    Light Blue Top    Collection Time: 10/02/22  1:04 PM   Result Value Ref Range    Extra Tube Hold for add-ons.    CBC Auto Differential    Collection Time: 10/02/22  1:04 PM    Specimen: Blood   Result Value Ref Range    WBC 9.16 3.40 - 10.80 10*3/mm3    RBC 4.15 4.14 - 5.80 10*6/mm3     Hemoglobin 11.1 (L) 13.0 - 17.7 g/dL    Hematocrit 36.9 (L) 37.5 - 51.0 %    MCV 88.9 79.0 - 97.0 fL    MCH 26.7 26.6 - 33.0 pg    MCHC 30.1 (L) 31.5 - 35.7 g/dL    RDW 14.8 12.3 - 15.4 %    RDW-SD 48.7 37.0 - 54.0 fl    MPV 9.1 6.0 - 12.0 fL    Platelets 296 140 - 450 10*3/mm3    Neutrophil % 71.5 42.7 - 76.0 %    Lymphocyte % 19.3 (L) 19.6 - 45.3 %    Monocyte % 7.9 5.0 - 12.0 %    Eosinophil % 0.3 0.3 - 6.2 %    Basophil % 0.3 0.0 - 1.5 %    Immature Grans % 0.7 (H) 0.0 - 0.5 %    Neutrophils, Absolute 6.55 1.70 - 7.00 10*3/mm3    Lymphocytes, Absolute 1.77 0.70 - 3.10 10*3/mm3    Monocytes, Absolute 0.72 0.10 - 0.90 10*3/mm3    Eosinophils, Absolute 0.03 0.00 - 0.40 10*3/mm3    Basophils, Absolute 0.03 0.00 - 0.20 10*3/mm3    Immature Grans, Absolute 0.06 (H) 0.00 - 0.05 10*3/mm3    nRBC 0.0 0.0 - 0.2 /100 WBC   Procalcitonin    Collection Time: 10/02/22  1:04 PM    Specimen: Blood   Result Value Ref Range    Procalcitonin 0.08 0.00 - 0.25 ng/mL   Lactate Dehydrogenase    Collection Time: 10/02/22  1:04 PM    Specimen: Blood   Result Value Ref Range     135 - 225 U/L   Lactic Acid, Plasma    Collection Time: 10/02/22  1:04 PM    Specimen: Blood   Result Value Ref Range    Lactate 1.9 0.5 - 2.0 mmol/L   C-reactive Protein    Collection Time: 10/02/22  1:04 PM    Specimen: Blood   Result Value Ref Range    C-Reactive Protein 0.60 (H) 0.00 - 0.50 mg/dL   ECG 12 Lead    Collection Time: 10/02/22  3:05 PM   Result Value Ref Range    QT Interval 300 ms    QTC Interval 391 ms   Troponin    Collection Time: 10/02/22  3:09 PM    Specimen: Blood   Result Value Ref Range    Troponin T <0.010 0.000 - 0.030 ng/mL   Respiratory Panel PCR w/COVID-19(SARS-CoV-2) [facility] In-House, NP Swab in [place], 3-4 HR TAT - Swab, Nasopharynx    Collection Time: 10/02/22  3:11 PM    Specimen: Nasopharynx; Swab   Result Value Ref Range    ADENOVIRUS, PCR Not Detected Not Detected    Coronavirus 229E Not Detected  Not Detected    Coronavirus HKU1 Not Detected Not Detected    Coronavirus NL63 Not Detected Not Detected    Coronavirus OC43 Not Detected Not Detected    COVID19 Not Detected Not Detected - Ref. Range    Human Metapneumovirus Not Detected Not Detected    Human Rhinovirus/Enterovirus Not Detected Not Detected    Influenza A PCR Not Detected Not Detected    Influenza B PCR Not Detected Not Detected    Parainfluenza Virus 1 Not Detected Not Detected    Parainfluenza Virus 2 Not Detected Not Detected    Parainfluenza Virus 3 Not Detected Not Detected    Parainfluenza Virus 4 Not Detected Not Detected    RSV, PCR Not Detected Not Detected    Bordetella pertussis pcr Not Detected Not Detected    Bordetella parapertussis PCR Not Detected Not Detected    Chlamydophila pneumoniae PCR Not Detected Not Detected    Mycoplasma pneumo by PCR Not Detected Not Detected   Blood Gas, Arterial With Co-Ox    Collection Time: 10/02/22  3:36 PM    Specimen: Arterial Blood   Result Value Ref Range    Site Right Brachial     Gregory's Test N/A     pH, Arterial 7.373 7.350 - 7.450 pH units    pCO2, Arterial 63.5 (H) 35.0 - 45.0 mm Hg    pO2, Arterial 81.5 (L) 83.0 - 108.0 mm Hg    HCO3, Arterial 36.9 (H) 20.0 - 26.0 mmol/L    Base Excess, Arterial 9.8 (H) 0.0 - 2.0 mmol/L    Hemoglobin, Blood Gas 10.6 (L) 13.5 - 17.5 g/dL    Hematocrit, Blood Gas 32.6 (L) 38.0 - 51.0 %    Oxyhemoglobin 92.8 (L) 94 - 99 %    Methemoglobin 0.50 0.00 - 1.50 %    Carboxyhemoglobin 3.2 (H) 0 - 2 %    CO2 Content 38.9 (H) 22 - 33 mmol/L    Temperature 37.0 C    Barometric Pressure for Blood Gas      Modality Nasal Cannula     FIO2 38 %    Rate 0 Breaths/minute    PIP 0 cmH2O    IPAP 0     EPAP 0     Note      pH, Temp Corrected 7.373 pH Units    pCO2, Temperature Corrected 63.5 (H) 35 - 48 mm Hg    pO2, Temperature Corrected 81.5 (L) 83 - 108 mm Hg     Note: In addition to lab results from this visit, the labs listed above may include labs taken at another facility  or during a different encounter within the last 24 hours. Please correlate lab times with ED admission and discharge times for further clarification of the services performed during this visit.    CT Angiogram Chest   Final Result   No acute pulmonary embolus.       New from most recent comparison PET/CT, there is evidence of moderate   pulmonary edema with bilateral pleural effusions. There is adjacent   bilateral lower lobe volume loss, otherwise without distinct focal   consolidation concerning for pneumonia. Known left suprahilar lung mass   appears grossly similar to most recent PET/CT comparison.       This report was finalized on 10/2/2022 4:59 PM by Elliott Metcalf.          XR Chest 1 View   Final Result   Mild chronic interstitial changes of the lung fields are   present without focal airspace opacity. Patient's known left upper lobe   mass is somewhat poorly visualized on radiographs. There is no   significant pleural effusion or distinct pneumothorax. Normal heart and   mediastinal contours.        This report was finalized on 10/2/2022 2:17 PM by Elliott Metcalf.            Vitals:    10/02/22 1730 10/02/22 1800 10/02/22 1801 10/02/22 1900   BP: 161/80   154/82   BP Location:       Patient Position:       Pulse: 98 105 96 98   Resp:   24    Temp:       TempSrc:       SpO2: 93%  (!) 82% 93%   Weight:       Height:         Medications   sodium chloride 0.9 % flush 10 mL (has no administration in time range)   methylPREDNISolone sodium succinate (SOLU-Medrol) injection 80 mg (80 mg Intravenous Given 10/2/22 1517)   albuterol sulfate HFA (PROVENTIL HFA;VENTOLIN HFA;PROAIR HFA) inhaler 2 puff (2 puffs Inhalation Given 10/2/22 1540)   magnesium sulfate 2g/50 mL (PREMIX) infusion (0 g Intravenous Stopped 10/2/22 1838)   iopamidol (ISOVUE-370) 76 % injection 75 mL (75 mL Intravenous Given 10/2/22 1614)   furosemide (LASIX) injection 40 mg (40 mg Intravenous Given 10/2/22 1840)   doxycycline (VIBRAMYCIN) 100  mg in sodium chloride 0.9 % 250 mL IVPB-VTB (100 mg Intravenous Given 10/2/22 1818)   ipratropium-albuterol (DUO-NEB) nebulizer solution 3 mL (3 mL Nebulization Given 10/2/22 1801)     ECG/EMG Results (last 24 hours)     Procedure Component Value Units Date/Time    ECG 12 Lead [[id number]] Collected: 10/02/22 1259     Updated: 10/02/22 1300     QT Interval 350 ms      QTC Interval 421 ms     Narrative:      Test Reason : SOB  Blood Pressure :   */*   mmHG  Vent. Rate :  87 BPM     Atrial Rate :  96 BPM     P-R Int :   * ms          QRS Dur :  92 ms      QT Int : 350 ms       P-R-T Axes :   *  58 135 degrees     QTc Int : 421 ms    Atrial fibrillation with premature ventricular or aberrantly conducted complexes  Nonspecific ST and T wave abnormality  Abnormal ECG  When compared with ECG of 08-AUG-2022 13:06,  No significant change was found    Referred By: TWYLA           Confirmed By:         ECG 12 Lead   Preliminary Result   Test Reason : Repeat 2-hour SOA   Blood Pressure :   */*   mmHG   Vent. Rate : 102 BPM     Atrial Rate : 107 BPM      P-R Int :   * ms          QRS Dur :  90 ms       QT Int : 300 ms       P-R-T Axes :   *  61 192 degrees      QTc Int : 391 ms      Undetermined rhythm   ST & T wave abnormality, consider inferior ischemia   Abnormal ECG   When compared with ECG of 02-OCT-2022 12:59, (Unconfirmed)   Current undetermined rhythm precludes rhythm comparison, needs review   Nonspecific T wave abnormality, worse in Inferior leads      Referred By: ED MD           Confirmed By:       ECG 12 Lead   Preliminary Result   Test Reason : SOB   Blood Pressure :   */*   mmHG   Vent. Rate :  87 BPM     Atrial Rate :  96 BPM      P-R Int :   * ms          QRS Dur :  92 ms       QT Int : 350 ms       P-R-T Axes :   *  58 135 degrees      QTc Int : 421 ms      Atrial fibrillation with premature ventricular or aberrantly conducted    complexes   Nonspecific ST and T wave abnormality   Abnormal ECG   When compared  with ECG of 08-AUG-2022 13:06,   No significant change was found      Referred By: EDMD           Confirmed By:                                        JON    Final diagnoses:   Acute respiratory failure with hypoxia and hypercapnia (HCC)   Bilateral pleural effusion   History of lung cancer   COPD with acute exacerbation (HCC)   Wheezing   Tobacco dependence   Acute on chronic congestive heart failure, unspecified heart failure type (HCC)   History of hyperlipidemia   History of hypertension   History of coronary artery disease   History of psoriasis       ED Disposition  ED Disposition     ED Disposition   AMA    Condition   --    Comment   --             Ryan Del Valle MD  [address]  [address]  [place] [address]  [phone]    Call in 1 day  Call in the morning for first available recheck    Rich Tabor MD  [address]  [address]  [place] [address]  [phone]    Call in 1 day  Call in the morning [facility]    [facility] Emergency Department  [address] [place] [id number] [phone]    If symptoms worsen         Medication List      New Prescriptions    doxycycline 100 MG capsule  Commonly known as: MONODOX  Take 1 capsule by mouth 2 (Two) Times a Day.        Changed    albuterol (2.5 MG/3ML) 0.083% nebulizer solution  Commonly known as: PROVENTIL  Take 2.5 mg by nebulization Every 4 (Four) Hours As Needed for Wheezing.  What changed: Another medication with the same name was removed. Continue taking this medication, and follow the directions you see here.     aspirin 81 MG EC tablet  Take 1 tablet by mouth Daily.  What changed: when to take this        Stop    ondansetron 8 MG tablet  Commonly known as: ZOFRAN           Where to Get Your Medications      These medications were sent to [id number] - [place], [place] - [address]  AT [facility] [address] [facility] [address] & [facility] - [phone] [facility] [phone] FX   [address] [facility] [address]    Phone: [phone]   · doxycycline 100 MG capsule          Arlene Patel PA-C  10/02/22 2007

## 2022-10-02 NOTE — PROGRESS NOTES
Follow Up Office Visit      Patient Name: Reese Cage  : 1950   MRN: [id number]     Chief Complaint:    Chief Complaint   Patient presents with   • Hospital Follow Up Visit       History of Present Illness: Reese Cage is a 72 y.o. male who presents today for follow up due to history of urothelial cell carcinoma the bladder.  He has previously been managed by urologist in [place].  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  He is undergone BCG therapy.  He has undergone follow-up with no evidence of recurrence with prior urologist.  Recently moved to [facility], first seen in 2022.  He underwent cystoscopy demonstrating no evidence of recurrence.  He underwent CT urography with no concerning renal lesions or collecting system defects.    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  He has undergone neoadjuvant chemotherapy, recently was evaluated by CT surgery and radiation oncology for further management.  He did have prolonged hospital course after his chemotherapeutic urology was consulted and patient for urinary retention.  The patient is now voiding on his own, voiding volitionally without significant lower urinary tract symptoms.        Subjective      Review of System: Review of Systems   Constitutional: Negative for chills, fatigue, fever and unexpected weight change.   HENT: Negative for sore throat.    Eyes: Negative for visual disturbance.   Respiratory: Negative for cough, chest tightness and shortness of breath.    Cardiovascular: Negative for chest pain and leg swelling.   Gastrointestinal: Negative for blood in stool, constipation, diarrhea, nausea, rectal pain and vomiting.   Genitourinary: Negative for decreased urine volume, difficulty urinating, dysuria, enuresis, flank pain, frequency, genital sores, hematuria and  urgency.   Musculoskeletal: Negative for back pain and joint swelling.   Skin: Negative for rash and wound.   Neurological: Negative for seizures, speech difficulty, weakness and headaches.   Psychiatric/Behavioral: Negative for confusion, sleep disturbance and suicidal ideas. The patient is not nervous/anxious.       I have reviewed the ROS documented by my clinical staff, updated as appropriate and I agree. Néstor Kelly MD    I have reviewed and the following portions of the patient's history were updated as appropriate: past family history, past medical history, past social history, past surgical history and problem list.    Medications:     Current Outpatient Medications:   •  albuterol (PROVENTIL) (2.5 MG/3ML) 0.083% nebulizer solution, Take 2.5 mg by nebulization Every 4 (Four) Hours As Needed for Wheezing., Disp: 300 mL, Rfl: 1  •  albuterol sulfate  (90 Base) MCG/ACT inhaler, Inhale 2 puffs Every 4 (Four) Hours As Needed for Wheezing., Disp: 54 g, Rfl: 1  •  amLODIPine (NORVASC) 10 MG tablet, Take 10 mg by mouth Daily., Disp: , Rfl:   •  aspirin 81 MG EC tablet, Take 1 tablet by mouth Daily. (Patient taking differently: Take 81 mg by mouth Every Night.), Disp: 90 tablet, Rfl: 1  •  atorvastatin (LIPITOR) 80 MG tablet, Take 1 tablet by mouth Daily., Disp: 90 tablet, Rfl: 1  •  Breztri Aerosphere 160-9-4.8 MCG/ACT aerosol inhaler, INHALE 2 PUFFS BY MOUTH TWICE DAILY, Disp: 10.7 g, Rfl: 2  •  Docusate Sodium (COLACE PO), Take 1 capsule by mouth Every Night., Disp: , Rfl:   •  furosemide (LASIX) 20 MG tablet, Take 20 mg by mouth 2 (Two) Times a Day., Disp: , Rfl:   •  gabapentin (NEURONTIN) 300 MG capsule, Take 1 capsule by mouth 3 (Three) Times a Day., Disp: 90 capsule, Rfl: 0  •  hydrALAZINE (APRESOLINE) 25 MG tablet, Take 25 mg by mouth 3 (Three) Times a Day., Disp: , Rfl:   •  LORazepam (ATIVAN) 1 MG tablet, TAKE 1 TABLET BY MOUTH TWICE DAILY AS NEEDED FOR ANXIETY, Disp: 60 tablet, Rfl: 0  •   "metoprolol tartrate (LOPRESSOR) 100 MG tablet, Take 1 tablet by mouth 2 (Two) Times a Day., Disp: 135 tablet, Rfl: 1  •  montelukast (SINGULAIR) 10 MG tablet, Take 1 tablet by mouth Every Night., Disp: 90 tablet, Rfl: 1  •  NIFEdipine XL (PROCARDIA XL) 90 MG 24 hr tablet, Take 90 mg by mouth Daily., Disp: , Rfl:   •  nitroglycerin (NITROSTAT) 0.4 MG SL tablet, Place 1 tablet under the tongue Every 5 (Five) Minutes As Needed for Chest Pain. Take no more than 3 doses in 15 minutes., Disp: 30 tablet, Rfl: 1  •  O2 (OXYGEN), Inhale 2 L/min As Needed., Disp: , Rfl:   •  omeprazole (priLOSEC) 40 MG capsule, Take 1 capsule by mouth Daily., Disp: 30 capsule, Rfl: 5  •  ondansetron (ZOFRAN) 8 MG tablet, Take 1 tablet by mouth 3 (Three) Times a Day As Needed for Nausea or Vomiting., Disp: 30 tablet, Rfl: 2  •  pentoxifylline (TRENtal) 400 MG CR tablet, Take 1 tablet by mouth Daily., Disp: 30 tablet, Rfl: 0  •  tamsulosin (FLOMAX) 0.4 MG capsule 24 hr capsule, TAKE 2 CAPSULES BY MOUTH DAILY, Disp: 180 capsule, Rfl: 1  •  vitamin B-12 (CYANOCOBALAMIN) 1000 MCG tablet, Take 1 tablet by mouth Daily., Disp: 90 tablet, Rfl: 3  •  Zinc 50 MG tablet, Take 1 tablet by mouth Daily., Disp: , Rfl:   •  clopidogrel (PLAVIX) 75 MG tablet, Take 75 mg by mouth Daily., Disp: , Rfl:     Allergies:   No Known Allergies      Objective     Physical Exam:   Vital Signs:   Vitals:    09/29/22 0952   Weight: 72.1 kg (159 lb)   Height: 162.6 cm (64.02\")   PainSc:   2     Body mass index is 27.28 kg/m².     Physical Exam  Vitals and nursing note reviewed.   Constitutional:       Appearance: Normal appearance.   HENT:      Head: Normocephalic and atraumatic.   Cardiovascular:      Comments: Well perfused  Pulmonary:      Effort: Pulmonary effort is normal.   Abdominal:      General: Abdomen is flat.      Palpations: Abdomen is soft.   Musculoskeletal:         General: Normal range of motion.   Skin:     General: Skin is warm and dry.   Neurological:    "   General: No focal deficit present.      Mental Status: He is alert and oriented to person, place, and time. Mental status is at baseline.   Psychiatric:         Mood and Affect: Mood normal.         Behavior: Behavior normal.         Thought Content: Thought content normal.         Judgment: Judgment normal.             Labs:   Brief Urine Lab Results  (Last result in the past 365 days)      Color   Clarity   Blood   Leuk Est   Nitrite   Protein   CREAT   Urine HCG        08/04/22 1533 Yellow   Turbid   Trace   Large (3+)   Negative   100 mg/dL (2+)                 Urine Culture    Urine Culture 7/12/22 8/3/22 8/4/22   Urine Culture <25,000 CFU/mL Mixed Melanie Isolated >100,000 CFU/mL Yeast isolated (A) Candida albicans (A) (C)   (A) Abnormal value (C) Corrected value               Lab Results   Component Value Date    GLUCOSE 137 (H) 08/17/2022    CALCIUM 7.2 (L) 08/17/2022     08/17/2022    K 4.4 08/17/2022    CO2 26.6 08/17/2022     08/17/2022    BUN 23 08/17/2022    CREATININE 1.20 08/17/2022    BCR 19.2 08/17/2022    ANIONGAP 13.4 08/17/2022       Lab Results   Component Value Date    WBC 12.83 (H) 08/17/2022    HGB 10.1 (L) 08/17/2022    HCT 31.2 (L) 08/17/2022    MCV 90.2 08/17/2022     08/17/2022       Images:   CT Abdomen Pelvis With & Without Contrast    Result Date: 6/24/2022   1. Hyperplastic right kidney, and atrophic left kidney. Very heavy left renal artery calcification, with likely renal artery stenosis causing left renal atrophy. 2. No visible renal mass or evidence of obstructive uropathy. 3. Moderately distended but otherwise normal-appearing bladder. No evidence of neoplasm, active inflammation or other clearly acute intra-abdominal or intrapelvic disease elsewhere. 4. Mild aneurysmal dilatation of the distal descending thoracic aorta to approximately 3.7 cm.  This report was finalized on 6/24/2022 12:27 PM by Dr. Seferino Sorenson MD.      CT Abdomen Pelvis Without  Contrast    Result Date: 7/15/2022  CT CHEST: 1.  Mild interval decrease in size of the pulmonary mass lesion in the medial left upper lobe. Mildly enlarged mediastinal lymph nodes are unchanged. 2.  Interval development of multinodular and interstitial opacities in the lung bases bilaterally, likely infectious or inflammatory in nature. 3.  Small bilateral pleural effusions with associated atelectasis. 4.  Additional chronic and unchanged findings, as above. CT ABDOMEN/PELVIS: 1.  Distended small bowel loops in the left upper quadrant with wall thickening, which could be on the basis of an infectious/inflammatory enteritis or partial small bowel obstruction. Questionable transition point in the left mid abdomen. 2.  Small volume abdominopelvic ascites. 3.  Distended gallbladder, which can be seen physiologically in the fasting state. If there is clinical concern for acute gallbladder pathology, consider right upper quadrant ultrasound. 4.  Additional chronic and unchanged findings, as above.  Electronically signed by:  Que Santamaria  7/15/2022 1:10 AM Mountain Time    CT Abdomen Pelvis Without Contrast    Result Date: 7/12/2022   1. Extensive left lower lobe pneumonia versus aspiration. No evidence of right lower lung disease. 2. Markedly fluid distended stomach, fluid and air distended proximal small bowel, relatively smaller caliber distal small bowel, but also fluid and air distended colon. Accordingly, these features favor a type of ileus, possibly related to viral syndrome, over incomplete bowel obstruction. Consider follow-up imaging if the patient's symptoms persist or worsen. 3. No evidence of an acute inflammatory focus or other clearly acute disease. 4. Atrophic left kidney again noted.  This report was finalized on 7/12/2022 2:05 PM by Dr. Seferino Sorenson MD.      XR Abdomen 1 View    Result Date: 7/20/2022   1.  No change in nasogastric tube. 2.  Interval improvement in bowel gas pattern with resolution of  air-filled distended loops of bowel within the left midabdomen.  This report was finalized on 7/20/2022 7:24 AM by Gerardo Benoit MD.      XR Abdomen 1 View    Result Date: 7/13/2022  NG tube tip in the distal esophagus.  This report was finalized on 7/13/2022 8:37 AM by Dr. Seferino Sorenson MD.      XR Abdomen 1 View    Result Date: 7/12/2022  Enteric tube within the stomach with the side-port seen at the gastroesophageal junction. Advancement of 6 to 10 cm is suggested as clinically indicated.  This report was finalized on 7/12/2022 6:32 PM by Natalia Clemens MD.      CT Chest Without Contrast Diagnostic    Result Date: 7/28/2022   1. Enlarging pleural effusions, at least moderate in size on the right, small on the left. 2. Interval development of extensive right upper lobe pneumonia. 3. Interval improvement of left upper lobe pneumonia and near-resolution of left lower lobe pneumonia since 07/15/2022 exam. 4. Atrophic left kidney again noted as on prior studies.  This report was finalized on 7/28/2022 10:11 PM by Dr. Seferino Sorenson MD.      CT Chest Without Contrast Diagnostic    Result Date: 7/15/2022  CT CHEST: 1.  Mild interval decrease in size of the pulmonary mass lesion in the medial left upper lobe. Mildly enlarged mediastinal lymph nodes are unchanged. 2.  Interval development of multinodular and interstitial opacities in the lung bases bilaterally, likely infectious or inflammatory in nature. 3.  Small bilateral pleural effusions with associated atelectasis. 4.  Additional chronic and unchanged findings, as above. CT ABDOMEN/PELVIS: 1.  Distended small bowel loops in the left upper quadrant with wall thickening, which could be on the basis of an infectious/inflammatory enteritis or partial small bowel obstruction. Questionable transition point in the left mid abdomen. 2.  Small volume abdominopelvic ascites. 3.  Distended gallbladder, which can be seen physiologically in the fasting state. If there is clinical  concern for acute gallbladder pathology, consider right upper quadrant ultrasound. 4.  Additional chronic and unchanged findings, as above.  Electronically signed by:  Que Santamaria  7/15/2022 1:10 AM Mountain Time    MRI Brain With & Without Contrast    Result Date: 6/28/2022  No evidence of metastatic disease.  Age-related changes are present as above. No acute findings otherwise.  This report was finalized on 6/28/2022 3:34 PM by Elliott Metcalf.      XR Chest 1 View    Result Date: 8/3/2022   1. Ill-defined alveolar and interstitial disease in the perihilar right lung and right upper lobe, as well as in the suprahilar left upper lobe. These findings have significantly improved, when compared to 7/28/2022. FINDINGS are thought to reflect improving pneumonia. 2. Previously seen right pleural effusion is no longer visible.  This report was finalized on 8/3/2022 8:17 AM by Caryl Brian MD.      XR Chest 1 View    Result Date: 7/28/2022   1. Small but increased right pleural effusion with increasing airspace consolidation in the right upper lung and base suggesting worsening pneumonia or edema. Increasing airspace opacity in the left suprahilar region.  This report was finalized on 7/28/2022 1:35 PM by Hernandez Isaac MD.      XR Chest 1 View    Result Date: 7/27/2022   1. Asymmetric infiltrates right greater than left. This may be infectious in etiology or may represent an increase in superimposed pulmonary edema. 2. Postop changes of prior CABG. 3. Bilateral central lines appear in appropriate position.  This report was finalized on 7/27/2022 7:40 AM by Jassi Bess MD.      XR Chest 1 View    Result Date: 7/20/2022    1.  No interval tube or line change. 2.  No change in patchy bilateral airspace disease.   This report was finalized on 7/20/2022 7:35 AM by Gerardo Benoit MD.      XR Chest 1 View    Result Date: 7/19/2022  1. Stable lines and support tubes. 2. No pneumothorax. 3. No change in bilateral perihilar  airspace disease.  This report was finalized on 7/19/2022 7:19 AM by Thad Deleon MD.      XR Chest 1 View    Result Date: 7/17/2022    1.  No tube or line change. 2.  Minimal worsening in right perihilar airspace disease with opacities becoming slightly more confluent.  No change in patchy left airspace disease.   This report was finalized on 7/17/2022 5:28 PM by Gerardo Benoit MD.      XR Chest 1 View    Result Date: 7/17/2022    1.  Increasing right-sided airspace disease which may be due to worsening pneumonia or atelectasis. 2.  No change in left hilar mass. 3.  No interval tube or line change.   This report was finalized on 7/17/2022 8:14 AM by Gerardo Benoit MD.      XR Chest 1 View    Result Date: 7/15/2022  New right IJ catheter placement into the distal SVC. No evidence of pneumothorax or other new chest disease. Stable left suprahilar lung mass and diffuse pulmonary interstitial disease pattern.  This report was finalized on 7/15/2022 1:18 PM by Dr. Seferino Sorenson MD.      XR Chest 1 View    Result Date: 7/15/2022  There has been placement of a left internal jugular central venous catheter with the catheter tip overlying the superior vena cava. NG tube projects below the diaphragm with the tip not included on the evaluation. Evaluation of the heart and lungs is otherwise unchanged. Electronically signed by:  Gerardo Comer D.O.  7/15/2022 1:36 AM Mountain Time    XR Chest 1 View    Result Date: 7/14/2022  Patchy ill-defined infiltrates bilaterally with associated interstitial changes. The findings suggest developing atypical/viral infection or multifocal pneumonia and may indicate potential changes of Covid 19 infection. Changes of pulmonary edema could also be considered. Recommend correlation for signs or symptoms of acute infection and follow-up to ensure improvement/resolution.  This report was finalized on 7/14/2022 12:14 PM by Rell Saeed MD.      NM PET/CT Skull Base to Mid Thigh    Result Date:  9/6/2022  Evidence of significant interval treatment response with decreased size and FDG hypermetabolism noted associated with the previously described left upper lobe mass. This finding demonstrates persistent FDG hypermetabolism, maximum SUV 7.2. There is otherwise no evidence of new metastatic involvement in the chest or distant metastatic involvement elsewhere.  This report was finalized on 9/6/2022 3:21 PM by Elliott Metcalf.      NM PET/CT Skull Base to Mid Thigh    Result Date: 6/24/2022  1.  5.3 cm mass within medial left upper lobe is FDG avid, most compatible with primary lung malignancy. 2.  No convincing evidence of thoracic lymphadenopathy. 3.  No evidence of more distant metastasis.  This report was finalized on 6/24/2022 10:19 AM by Rell Anton MD.      XR Abdomen KUB    Result Date: 7/19/2022  1.  Nasogastric tube tip is within the area of the stomach. 2.  Nonspecific gaseous distention of both large and small bowel.  This report was finalized on 7/19/2022 9:58 AM by Jayesh Suazo MD.      XR Abdomen KUB    Result Date: 7/17/2022   1.  Interval improvement in bowel gas pattern with air-filled but nondistended loops of small bowel and colon.  This report was finalized on 7/17/2022 8:13 AM by Gerardo Benoit MD.      XR Abdomen KUB    Result Date: 7/16/2022   1. NG tube in the mid stomach. 2. Persistent ileus, with no apparent interval improvement.  This report was finalized on 7/16/2022 8:21 AM by Dr. Seferino Sorenson MD.        Measures:   Tobacco:   Reese Cage  reports that he has been smoking cigarettes. He started smoking about 60 years ago. He has a 14.75 pack-year smoking history. He has never used smokeless tobacco.. I have educated him on the risk of diseases from using tobacco products.  Assessment / Plan      Assessment/Plan:   72 y.o. male is seen today for follow up due to history of high-grade T1 urothelial cell carcinoma diagnosed in 2017, recurrence in 2018.  He has previously  been managed by urologist in [place], has recently moved to [place].  Establish care with [facility] urology in 6/2022.  He underwent surveillance cystoscopy and CT urography without evidence of recurrence.  Of note he has since been diagnosed with squamous cell carcinoma of the lung, has been managed by medical oncology and been evaluated by CT surgery as well as radiation oncology.  He presents today for follow-up after hospital admission, urinary retention during hospital admission has resolved.  We have discussed that he will continue to require surveillance cystoscopy.  Based upon his new diagnosis of lung malignancy, timing of surveillance we will plan to perform cystoscopy in 6 months.  He is understanding agreeable plan of care.  He will follow-up at that time for continued surveillance.  He will be getting cross-sectional imaging per medical oncology that we will continue to follow..     Diagnoses and all orders for this visit:    1. Malignant neoplasm of urinary bladder, unspecified site (HCC) (Primary)         Follow Up:   Return in about 5 months (around 2/28/2023) for Recheck, Follow up for Cystoscopy.     I spent approximately 30 minutes providing clinical care for this patient; including review of patient's chart and provider documentation, face to face time spent with patient in examination room (obtaining history, performing physical exam, discussing diagnosis and management options), placing orders, and completing patient documentation.     Néstor Kelly MD  [facility] [place] [facility]

## 2022-10-03 NOTE — DISCHARGE INSTRUCTIONS
ER evaluation revealed stable cardiac work-up with 2 stable EKGs and 2 normal troponins.  CT angiogram of the chest with contrast revealed no pulmonary embolism but patient did have bilateral pleural effusions.  With history of heart failure, we gave IV Lasix and recommend patient to take Lasix 40 mg by mouth twice daily x3 days and then resume regular 20 mg by mouth twice daily.  Patient also has COPD exacerbation and we will prescribe doxycycline 100 mg by mouth twice daily x10 days.  Recommend albuterol nebs every 4 hours as needed for wheezing or chest tightness.  Patient needs to continue with oxygen supplementation.  Call his oncologist and pulmonologist, Dr. Del Valle and Dr. Tabor, first thing in the morning for first available recheck.  Continue with all other current medical management.  Return to the ER if worsening symptoms.

## 2022-10-03 NOTE — TELEPHONE ENCOUNTER
Worked in today at 2:00pm with Adelina Hutchinson for a post hospital followup per ER recommendation.

## 2022-10-04 NOTE — PROGRESS NOTES
[facility] Pulmonary Follow up    CHIEF COMPLAINT    Dyspnea/edema    HISTORY OF PRESENT ILLNESS    Reese Cage is a 72 y.o.male here today for a ED follow-up.  He was last seen in the office in August by Dr. Padron.  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  He was admitted to [facility] and developed an ileus.  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  He was then started on hemodialysis on 7/15 and his dialysis catheter was removed.  His symptoms improved and he was discharged from the hospital.  He had follow-ups with nephrology.      He states he was doing well but on 10/2 had presented to the emergency room at [facility] for worsening shortness of breath.  His oxygen level was dropping into the 70s at times on 3 to 4 L continuously.  He was recommended to be admitted but he declined admission.  He was sent home on doxycycline twice a day and treated with a steroid injection.  He was also advised to double up on his Lasix for 3 days due to swelling.  His CT scan results are below.  He was found to have bilateral pleural effusions as well.  He is yet to start his extra Lasix dose and wanted to follow-up in our office before proceeding.    He states that he is short of breath with any exertion.  He is staying on 3 L continuously at home.  He has a moist cough but is unable to produce any sputum.  He denies any hemoptysis.  He denies fever or chills.    He is using Breztri 2 puffs twice a day.  He is also using his albuterol rescue inhaler couple times per day.  He also has nebulizers to use at home.    He denies any chest pain.  He does have some mild lower extremity edema.  He denies any calf tenderness.  He is  ambulating with a walker at all times.  He has had some leg weakness with activity as well.    He denies any sleeping difficulties.  He denies any current reflux symptoms.  He does take omeprazole daily.    He had an echocardiogram performed earlier this year while hospitalized and followed up with cardiology.  He has not had a follow-up with them since the summer.    He is up-to-date on his current vaccinations.  Is completed by his wife    Patient Active Problem List   Diagnosis   • Essential hypertension   • Mixed hyperlipidemia   • Gastroesophageal reflux disease without esophagitis   • Psoriasis   • Cigarette smoker   • Bladder cancer (HCC)   • Lower urinary tract symptoms (LUTS)   • COPD (chronic obstructive pulmonary disease) (HCC)   • Right kidney mass   • Coronary artery disease involving coronary bypass graft of native heart without angina pectoris   • Vitamin B12 deficiency   • Adenomatous polyp of ascending colon   • Acute respiratory failure with hypoxia (HCC)   • Mass of upper lobe of left lung   • Paratracheal lymphadenopathy   • Cancer of upper lobe of left lung (HCC)   • Encounter for therapeutic drug monitoring   • Acute interstitial pneumonitis (HCC), secondary to chemotherapy       No Known Allergies    Current Outpatient Medications:   •  albuterol (PROVENTIL) (2.5 MG/3ML) 0.083% nebulizer solution, Take 2.5 mg by nebulization Every 4 (Four) Hours As Needed for Wheezing., Disp: 300 mL, Rfl: 1  •  amLODIPine (NORVASC) 10 MG tablet, Take 10 mg by mouth Daily., Disp: , Rfl:   •  aspirin 81 MG EC tablet, Take 1 tablet by mouth Daily. (Patient taking differently: Take 81 mg by mouth Every Night.), Disp: 90 tablet, Rfl: 1  •  atorvastatin (LIPITOR) 80 MG tablet, Take 1 tablet by mouth Daily., Disp: 90 tablet, Rfl: 1  •  Breztri Aerosphere 160-9-4.8 MCG/ACT aerosol inhaler, INHALE 2 PUFFS BY MOUTH TWICE DAILY, Disp: 10.7 g, Rfl: 2  •  clopidogrel (PLAVIX) 75 MG tablet, Take 75 mg by mouth Daily., Disp:  , Rfl:   •  Docusate Sodium (COLACE PO), Take 1 capsule by mouth Every Night., Disp: , Rfl:   •  doxycycline (MONODOX) 100 MG capsule, Take 1 capsule by mouth 2 (Two) Times a Day., Disp: 20 capsule, Rfl: 0  •  furosemide (LASIX) 20 MG tablet, Take 20 mg by mouth 2 (Two) Times a Day., Disp: , Rfl:   •  gabapentin (NEURONTIN) 300 MG capsule, Take 1 capsule by mouth 3 (Three) Times a Day., Disp: 90 capsule, Rfl: 0  •  hydrALAZINE (APRESOLINE) 25 MG tablet, Take 25 mg by mouth 3 (Three) Times a Day., Disp: , Rfl:   •  LORazepam (ATIVAN) 1 MG tablet, TAKE 1 TABLET BY MOUTH TWICE DAILY AS NEEDED FOR ANXIETY, Disp: 60 tablet, Rfl: 0  •  NIFEdipine XL (PROCARDIA XL) 90 MG 24 hr tablet, Take 90 mg by mouth Daily., Disp: , Rfl:   •  nitroglycerin (NITROSTAT) 0.4 MG SL tablet, Place 1 tablet under the tongue Every 5 (Five) Minutes As Needed for Chest Pain. Take no more than 3 doses in 15 minutes., Disp: 30 tablet, Rfl: 1  •  O2 (OXYGEN), Inhale 2 L/min As Needed., Disp: , Rfl:   •  omeprazole (priLOSEC) 40 MG capsule, Take 1 capsule by mouth Daily., Disp: 30 capsule, Rfl: 5  •  pentoxifylline (TRENtal) 400 MG CR tablet, Take 1 tablet by mouth Daily., Disp: 30 tablet, Rfl: 0  •  tamsulosin (FLOMAX) 0.4 MG capsule 24 hr capsule, TAKE 2 CAPSULES BY MOUTH DAILY, Disp: 180 capsule, Rfl: 1  •  vitamin B-12 (CYANOCOBALAMIN) 1000 MCG tablet, Take 1 tablet by mouth Daily., Disp: 90 tablet, Rfl: 3  •  Zinc 50 MG tablet, Take 1 tablet by mouth Daily., Disp: , Rfl:   •  ipratropium-albuterol (DUO-NEB) 0.5-2.5 mg/3 ml nebulizer, Take 3 mL by nebulization 4 (Four) Times a Day As Needed for Wheezing., Disp: 360 mL, Rfl: 3  •  metoprolol tartrate (LOPRESSOR) 100 MG tablet, TAKE 1 TABLET BY MOUTH EVERY MORNING AND 1/2 TABLET BY MOUTH EVERY EVENING, Disp: 135 tablet, Rfl: 1  •  montelukast (SINGULAIR) 10 MG tablet, TAKE 1 TABLET BY MOUTH EVERY NIGHT, Disp: 90 tablet, Rfl: 1  •  predniSONE (DELTASONE) 10 MG tablet, Take 4 tabs daily x 3 days,  "then take 3 tabs daily x 3 days, then take 2 tabs daily x 3 days, then take 1 tab daily x 3 days, Disp: 31 tablet, Rfl: 0  MEDICATION LIST AND ALLERGIES REVIEWED.    Social History     Tobacco Use   • Smoking status: Current Every Day Smoker     Packs/day: 0.25     Years: 59.00     Pack years: 14.75     Types: Cigarettes     Start date: 1962   • Smokeless tobacco: Never Used   Vaping Use   • Vaping Use: Never used   Substance Use Topics   • Alcohol use: Not Currently     Comment: 1-2 per month   • Drug use: Never       FAMILY AND SOCIAL HISTORY REVIEWED.    Review of Systems   Constitutional: Positive for fatigue. Negative for activity change, appetite change, fever and unexpected weight change.   HENT: Positive for congestion. Negative for postnasal drip, rhinorrhea, sinus pressure, sore throat and voice change.    Eyes: Negative for visual disturbance.   Respiratory: Positive for cough and shortness of breath. Negative for chest tightness and wheezing.    Cardiovascular: Negative for chest pain, palpitations and leg swelling.   Gastrointestinal: Negative for abdominal distention, abdominal pain, nausea and vomiting.   Endocrine: Negative for cold intolerance and heat intolerance.   Genitourinary: Negative for difficulty urinating and urgency.   Musculoskeletal: Negative for arthralgias, back pain and neck pain.   Skin: Negative for color change and pallor.   Allergic/Immunologic: Negative for environmental allergies and food allergies.   Neurological: Negative for dizziness, syncope, weakness and light-headedness.   Hematological: Negative for adenopathy. Does not bruise/bleed easily.   Psychiatric/Behavioral: Negative for agitation and behavioral problems.   .    /70 (BP Location: Left arm, Patient Position: Sitting, Cuff Size: Adult)   Pulse 97   Temp 99.5 °F (37.5 °C) (Infrared)   Resp 18   Ht 170.2 cm (67\")   Wt 70.3 kg (155 lb)   SpO2 93%   BMI 24.28 kg/m²     Immunization History   Administered " Date(s) Administered   • COVID-19 (MODERNA) 1st, 2nd, 3rd Dose Only 01/04/2021, 02/02/2021, 10/26/2021   • FLUAD TRI 65YR+ 09/08/2021       Physical Exam  Vitals and nursing note reviewed.   Constitutional:       Appearance: He is well-developed. He is not diaphoretic.   HENT:      Head: Normocephalic and atraumatic.   Eyes:      Pupils: Pupils are equal, round, and reactive to light.   Neck:      Thyroid: No thyromegaly.   Cardiovascular:      Rate and Rhythm: Normal rate and regular rhythm.      Heart sounds: Normal heart sounds. No murmur heard.    No friction rub. No gallop.   Pulmonary:      Effort: Pulmonary effort is normal. No respiratory distress.      Breath sounds: Normal breath sounds. No wheezing or rales.   Chest:      Chest wall: No tenderness.   Abdominal:      General: Bowel sounds are normal.      Palpations: Abdomen is soft.      Tenderness: There is no abdominal tenderness.   Musculoskeletal:         General: No swelling. Normal range of motion.      Cervical back: Normal range of motion and neck supple.   Lymphadenopathy:      Cervical: No cervical adenopathy.   Skin:     General: Skin is warm and dry.      Capillary Refill: Capillary refill takes less than 2 seconds.   Neurological:      Mental Status: He is alert and oriented to person, place, and time.   Psychiatric:         Mood and Affect: Mood normal.         Behavior: Behavior normal.           RESULTS    XR Chest 1 View    Result Date: 10/2/2022  Mild chronic interstitial changes of the lung fields are present without focal airspace opacity. Patient's known left upper lobe mass is somewhat poorly visualized on radiographs. There is no significant pleural effusion or distinct pneumothorax. Normal heart and mediastinal contours.  This report was finalized on 10/2/2022 2:17 PM by Elliott Metcalf.      CT Angiogram Chest    Result Date: 10/2/2022  No acute pulmonary embolus.  New from most recent comparison PET/CT, there is evidence of  moderate pulmonary edema with bilateral pleural effusions. There is adjacent bilateral lower lobe volume loss, otherwise without distinct focal consolidation concerning for pneumonia. Known left suprahilar lung mass appears grossly similar to most recent PET/CT comparison.  This report was finalized on 10/2/2022 4:59 PM by Elliott Metcalf.         PROBLEM LIST    Problem List Items Addressed This Visit        Gastrointestinal Abdominal     Gastroesophageal reflux disease without esophagitis (Chronic)       Pulmonary and Pneumonias    COPD (chronic obstructive pulmonary disease) (HCC) - Primary (Chronic)    Relevant Medications    predniSONE (DELTASONE) 10 MG tablet    ipratropium-albuterol (DUO-NEB) 0.5-2.5 mg/3 ml nebulizer    Acute respiratory failure with hypoxia (HCC)    Mass of upper lobe of left lung      Other Visit Diagnoses     Shortness of breath        Relevant Orders    Adult Transthoracic Echo Complete w/ Color, Spectral and Contrast if necessary per protocol            DISCUSSION    Mr. Cage is here for follow-up.  He has yet to start the Lasix that was recommended by the ED.  I did encourage him to do this for 3 days.  Then he will resume his normal dose.  I did advise him to get a BMP when he sees his PCP later this week.  I also advised him to follow-up with his nephrologist as well.    Going to order another echocardiogram to make sure that his heart function is normal with his edema and worsening shortness of breath.  I will call him once I receive the results.    I have also called in some DuoNeb's for him to use as needed during the day up to 4 times per day.  He will continue his Breztri 2 puffs twice a day for now.    He will continue to wear 3 L continuously and I did advise him to keep his oxygenation above 88% at all times.    Home BiPAP/CPAP considered and ruled out due to the need for adjustable pressure support provided by noninvasive ventilator (NIV).  Ordering trilogy for nocturnal  and daytime usage.  Did encourage him to wear this every night with 3 L bled into the machine.    I'm going to call in a prednisone taper to see if this helps with his breathing.  We did review his CT scan results do show small pleural effusions.    I did advise him if his breathing were to worsen he needs to present back to the ED and consider hospitalization.    He will continue close follow-up with Dr. Tabor, he is set up for a PET scan in January.  He wants to hold off on radiation therapy at this time.  He wants to get his breathing better before he proceeds with any other treatment plans.    He will continue omeprazole daily for GERD.  We also discussed reflux precautions in the office today.    We did discuss smoking cessation for 3 minutes today in the office.  He has cut back and is trying to quit.    He will follow-up in 2 weeks.  I did advise him to call with any additional concerns or questions.    I personally spent a total of 42 minutes on patient visit today including chart review, face to face with the patient obtaining the history and physical exam, review of pertinent images and tests, counseling and discussion and/or coordination of care as described above, and documentation.  Total time excludes time spent on other separate services such as performing procedures or test interpretation, if applicable.        Adelina Hutchinson, APRN  10/03/[id number] EDT  Electronically signed     Please note that portions of this note were completed with a voice recognition program.        CC: Kwame Hines MD

## 2022-10-05 NOTE — OUTREACH NOTE
AMBULATORY CASE MANAGEMENT NOTE    Name and Relationship of Patient/Support Person: DERRICK FONTENOT - Emergency Contact    Patient Outreach    ED visit  [facility] on 10/2/2022: Acute respiratory failure with hypoxia and hypercapnia.  Admission recommended but patient left AMA.  Discharge medication: doxycycline 100 mg BID and lasix   Patient seen by pulmonary 10/3/2022  And instructions to take lasix 40 mg BID X 3 days then resume regular dose. Patient's significant other reports that patient wears oxygen/ 3 l at all times taking Duo-nebs and inhalers as prescribed.  Reports patient takes oxygen saturations through out the day with levels ranging between 52 - 92%. Signs and symptoms and perimeters for retuning to ED discussed.  Reports eating and drinking adequately, denies problems with bowel or balder.  [facility] questionnaire sent. After hours call numbers reviewed.        JULIETA BARRAGAN  Ambulatory Case Management    10/5/2022, 13:07 EDT

## 2022-10-14 NOTE — PROGRESS NOTES
"Chief Complaint  Reese Cage is a 72 y.o. male presenting for Fall (Today coming out of the house /) and Edema (Both feet and legs    ).     From [place], [place]. Lived 25 years in [place]. Relocated to [place] April 2022 to live with his fivicki (been together since 2012), plan to get . Had 2 children, lost son from leukemia and has daughter in [place]. Lost  hist 2 brothers in January 2020 and April 2022, also lost sister in 2021. 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      Patient has a past medical history of TRISTAN squamous cell lung cancer (Stage IIb) on chemo, DAX, hypertension, hyperlipidemia, CAD s/p CABG (2015), PAD, bladder cancer s/p surgery and radiation (2015), colon adenomatous polyps (5/2022), COPD on nightly O2 (2L/min), GERD, colon polyps and psoriasis.    History of Present Illness  Patient is here accompanied by his fiancée for follow-up.    He comes in late today because he fell outside the house.  He has felt unsteady recently and felt weak.  Typically he feels lightheaded when standing up.  I have not checked his blood pressure.  He continues to experience lower extremity edema.  He is waiting for TTE at the end of the month, they found him to have elevated BNP.  Patient states he just bruised himself, he was able to get on his feet again quickly and is just reporting some bruising of his left hand.      The following portions of the patient's history were reviewed and updated as appropriate: allergies, current medications, past family history, past medical history, past social history, past surgical history and problem list.    Objective  /70 (BP Location: Left arm, Patient Position: Sitting, Cuff Size: Adult)   Pulse 88   Temp 98.2 °F (36.8 °C) (Temporal)   Ht 170.2 cm (67.01\")   Wt 73.6 kg (162 lb 3.2 oz)   BMI 25.40 kg/m²     Physical Exam  Vitals reviewed.   Constitutional:       Appearance: Normal appearance.   HENT:      Head: " Normocephalic and atraumatic.      Nose: No congestion.   Eyes:      Extraocular Movements: Extraocular movements intact.      Conjunctiva/sclera: Conjunctivae normal.   Cardiovascular:      Rate and Rhythm: Normal rate and regular rhythm.      Heart sounds: Normal heart sounds. No murmur heard.  Pulmonary:      Effort: Pulmonary effort is normal. No respiratory distress.      Breath sounds: Wheezing present.      Comments: Bilateral expiratory wheezes throughout.  Good air entry.  No focal findings.  Abdominal:      General: There is no distension.      Palpations: Abdomen is soft. There is no mass.      Tenderness: There is no abdominal tenderness.   Musculoskeletal:      Cervical back: Neck supple.      Right lower leg: Edema present.      Left lower leg: Edema present.      Comments: Bilateral ankle pitting edema/lower leg edema grade 1/3.   Skin:     General: Skin is warm and dry.   Neurological:      Mental Status: He is alert and oriented to person, place, and time. Mental status is at baseline.   Psychiatric:         Behavior: Behavior normal.         Thought Content: Thought content normal.         Assessment/Plan   1. Cancer of upper lobe of left lung (HCC)  2. Lower extremity edema  3. Essential hypertension  I suspect his lower extremity edema is multifactorial, including potentially CHF, malnutrition with borderline low albumin, cancer/cancer treatment, but also possibly related to nifedipine.  He is having borderline low blood pressure, so we will reduce the dose of nifedipine from 90 to 60 mg.  He is having orthostatic symptoms, which likely is the cause of his falls.  He does have 2 small abrasions of his left hand, no concern for fracture.  They will continue to monitor his blood pressure closely.  I have also recommended follow-up compression stockings and elevating legs.  Encouraged activity as tolerated.  - furosemide (LASIX) 20 MG tablet; Take 1 tablet by mouth 2 (Two) Times a Day.  Dispense: 60  tablet; Refill: 3  - hydrALAZINE (APRESOLINE) 25 MG tablet; Take 1 tablet by mouth 2 (Two) Times a Day. Hold for SBP <110  Dispense: 60 tablet; Refill: 3  - NIFEdipine XL (PROCARDIA XL) 60 MG 24 hr tablet; Take 1 tablet by mouth Daily.  Dispense: 30 tablet; Refill: 3    4. Conjunctivitis of left eye, unspecified conjunctivitis type  He has been experiencing some irritation of the left eye since yesterday.  We will prescribe eyedrops  - trimethoprim-polymyxin b (POLYTRIM) 54555-7.1 UNIT/ML-% ophthalmic solution; Administer 1 drop into the left eye Every 4 (Four) Hours for 5 days.  Dispense: 10 mL; Refill: 0    5. Need for influenza vaccination  Administered today  - Fluzone High-Dose 65+yrs      Return in about 4 weeks (around 11/11/2022) for Recheck.    Future Appointments       Provider Department Center    10/20/2022 10:15 AM [employer] TECH PULMO CRITC[facility] PULMONARY & CRITICAL CARE MEDICINE [facility]    10/20/2022 10:30 AM Adelina Hutchinson APRN [facility] PULMONARY & CRITICAL CARE MEDICINE [facility]    10/31/2022 4:00 PM [facility] OP ECHO CART RM 1 [facility] NONINVASIVE LAB [facility]    11/11/2022 3:45 PM Kwame Hines MD [facility] INTERNAL MEDICINE [facility]    12/27/2022 11:15 AM [facility] PET ADMIN RM1 [facility] PET [facility]    12/27/2022 12:15 PM [facility] PETCT 1 [facility] PET [facility]    1/6/2023 1:00 PM Rich Tabor MD [facility] HEMATOLOGY & ONCOLOGY [place] [facility]    3/9/2023 1:00 PM Néstor Kelly MD [facility] UROLOGY [facility]            Kwame Hines MD  Family Medicine  10/14/2022

## 2022-10-20 PROBLEM — J44.1 COPD EXACERBATION: Status: ACTIVE | Noted: 2022-01-01

## 2022-10-20 PROBLEM — J81.1 PULMONARY EDEMA: Status: ACTIVE | Noted: 2022-01-01

## 2022-10-27 NOTE — OUTREACH NOTE
Prep Survey    Flowsheet Row Responses   Cong[facility] facility patient discharged from? [place]   Is LACE score < 7 ? No   Emergency Room discharge w/ pulse ox? No   Eligibility Not Eligible   What are the reasons patient is not eligible? Hospice/Pallative Care   Does the patient have one of the following disease processes/diagnoses(primary or secondary)? Other   Prep survey completed? Yes          LELO MARTINEZ - Registered Nurse

## 2022-10-31 ENCOUNTER — TELEPHONE (OUTPATIENT)
Dept: INTERNAL MEDICINE | Facility: CLINIC | Age: 72
End: 2022-10-31

## 2022-10-31 ENCOUNTER — APPOINTMENT (OUTPATIENT)
Dept: CARDIOLOGY | Facility: HOSPITAL | Age: 72
End: 2022-10-31

## 2022-10-31 NOTE — TELEPHONE ENCOUNTER
Please be informed that patient has passed. Patient has been marked  in the system. The date of death is: 10/29/22.    Caller: ANTONIA    Relationship: Other - [employer]    Best call back number: [phone]    TIME OF DEATH: 04:40 A.M.

## 2022-12-27 ENCOUNTER — APPOINTMENT (OUTPATIENT)
Dept: PET IMAGING | Facility: HOSPITAL | Age: 72
End: 2022-12-27

## 2024-12-08 NOTE — DISCHARGE SUMMARY
[facility] Medicine Services  DISCHARGE SUMMARY    Patient Name: Reese Cage  : 1950  MRN: [id number]    Date of Admission: 2022 11:19 AM  Date of Discharge:  22  Primary Care Physician: Kwame Hines MD    Consults     Date and Time Order Name Status Description    2022 12:30 AM Inpatient Urology Consult      2022  9:56 AM Inpatient Infectious Diseases Consult Completed     2022  3:48 PM Inpatient Gastroenterology Consult Completed     2022  8:51 AM Inpatient Nephrology Consult      2022 12:34 AM Inpatient Hematology & Oncology Consult Completed           Hospital Course     Presenting Problem:   DAX (acute kidney injury) (HCC) [N17.9]    Active Hospital Problems    Diagnosis  POA   • Cancer of upper lobe of left lung (HCC) [C34.12]  Yes   • Mass of upper lobe of left lung [R91.8]  Yes   • Adenomatous polyp of ascending colon [D12.2]  Yes   • Bladder cancer (HCC) [C67.9]  Yes   • COPD (chronic obstructive pulmonary disease) (HCC) [J44.9]  Yes   • Essential hypertension [I10]  Yes   • Mixed hyperlipidemia [E78.2]  Yes      Resolved Hospital Problems    Diagnosis Date Resolved POA   • **DAX (acute kidney injury) (HCC) [N17.9] 2022 Yes   • Catheter-associated urinary tract infection (HCC) [T83.511A, N39.0] 2022 Unknown   • Ileus (HCC) [K56.7] 2022 Yes   • Acute on chronic respiratory failure with hypoxia (HCC) [J96.21] 2022 Yes          Hospital Course:  Reese Cage is a 72 y.o. 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 Tabor on 22. On 22, he developed abdominal pain with nausea, vomiting, and diarrhea. On 22, he developed shortness of breath and fevers and presented to the [facility] ED and was admitted to Hospital Medicine. At admission, he was noted to have  DAX and a LLL pneumonia likely secondary to aspiration.      His nausea and vomiting improved and his NG tube was removed on 7/14/22. His renal function continued to decline and Nephrology was consulted. He developed refractory hypotension on 7/14/22 and was transferred to the ICU. There was concern for immune mediated pneumonitis and nephritis and IV Solumedrol was started on 7/15/22.      He did require TPN for a period of time secondary to ileus. Due to worsening epigastric pain and anemia, an EGD was performed on 7/23/22 which showed esophagitis and NG trauma. He has not had any signs of recurrent bleeding.      Hemodialysis was initiated on 7/15/22 via a temporary dialysis catheter. He is now making more urine and renal function is markedly improved.      DAX, possibly immune mediated s/p nivolumab for squamous cell lung cancer  --s/p intermittent dialysis per Nephrology. Now with improved UOP and signs of renal recovery. Cr trending down to 1.76 today. Will pull temporary dialysis catheter today.  --Currently on prednisone 40 mg daily, will decrease to 30 mg daily at discharge, taper by 10 mg every 5 days.   --F/U with Nephrology in 2 weeks.     Sepsis, POA (tachycardia, leukopenia, hypoxia, elevated lactate and procal)  Acute on chronic hypoxic respiratory failure  LLL pneumonia s/p antibiotics, suspected aspiration     Fevers to 102.7 on 8/3/22  CAUTI, Candida albicans  --Case catheter has been removed.   --Urine culture with yeast, Candida albicans.  --Blood cultures NGTD.  --ID (Dr. Waggoner) following, has been on Micafungin and Zosyn. Now switched to PO Fluconazole 100 mg daily x 10 days.  --Fevers resolved.      Urinary retention, resolved  --Seen by Urology/Dr. Kelly, agrees with Flomax and increase mobility.  --Case removed and now urinating well.     Pneumonitis  -s/p nivolumab, on steroids as above.     Bilateral effusions R>L  --Hx of HFpEF.  --s/p HD with UF.  --Most recent CXR appears  improved.     Concerns for GIB  --s/p EGD 7/23/22 with esophagitis, NG trauma.  --Continue PPI, no further evidence of bleeding.     Anemia  --Transfused 1 Unit PRBC 8/7/22 for Hb 7.1.  --Improved and stable at 8.5 today. Monitor closely.      Hx of COPD  --Continue nebs.     HTN  --Continue Norvasc, Clonidine, Hydralazine, Metoprolol.    CAD  --Resume ASA at discharge, stopped Plavix.      Squamous cell lung cancer  --s/p chemotherapy, follows with Dr. Tabor.  --F/U in 1 month with repeat CT chest, if no evidence of progression will likely proceed with surgical resection.    Wife requesting medicine for anxiety, will prescribe low dose Xanax 0.25 mg BID PRN anxiety x 3 days. Advised her to follow up with her PCP for further management/refills.     Discharge Follow Up Recommendations for outpatient labs/diagnostics:  F/U with PCP in 1 week  F/U with [facility] in 2 weeks  F/U with Dr. Tabor in 1 month    Day of Discharge     HPI:   Seen this morning, no issues overnight. Eager to go home today.    Review of Systems  Gen-no fevers, no chills  CV-no chest pain, no palpitations  Resp-no cough, no dyspnea  GI-no N/V/D, no abd pain    All other systems reviewed and negative except any additional pertinent positives and negatives as discussed in HPI.      Vital Signs:   Temp:  [97.9 °F (36.6 °C)-98.4 °F (36.9 °C)] 98.3 °F (36.8 °C)  Heart Rate:  [] 102  Resp:  [16-22] 18  BP: (145-158)/(47-91) 149/83  Flow (L/min):  [2-2.5] 2      Physical Exam:  Gen-no acute distress  HENT-NCAT, mucous membranes moist  CV-RRR, S1 S2 normal, no m/r/g  Resp-decreased bilaterally, no wheezes or rales  Abd-soft, NT, ND, +BS  Ext-no edema  Neuro-A&Ox3, no focal deficits  Skin-no rashes  Psych-appropriate mood      Pertinent  and/or Most Recent Results     LAB RESULTS:      Lab 08/09/22  0558 08/08/22  0604 08/07/22  1544 08/06/22  1450 08/05/22  0533   WBC 8.43 8.07 6.33 9.54 9.57   HEMOGLOBIN 8.5* 8.6* 7.6* 7.1* 7.0*   HEMATOCRIT 25.2* 25.4*  22.8* 20.9* 21.1*   PLATELETS 248 216 206 148 98*   NEUTROS ABS 5.73 5.32 5.32 8.46* 7.38*   IMMATURE GRANS (ABS) 0.45* 0.33* 0.16* 0.08* 0.09*   LYMPHS ABS 1.35 1.40 0.63* 0.44* 0.92   MONOS ABS 0.84 0.99* 0.21 0.51 0.92*   EOS ABS 0.01 0.01 0.00 0.04 0.24   MCV 84.6 86.1 87.4 83.6 85.4         Lab 08/09/22  0558 08/08/22  0604 08/07/22  1544 08/06/22  1450 08/05/22  0533   SODIUM 142 142 135* 141 137   POTASSIUM 3.2* 3.9 4.1 3.9 4.2   CHLORIDE 102 104 97* 104 101   CO2 27.0 27.0 21.0* 24.0 21.0*   ANION GAP 13.0 11.0 17.0* 13.0 15.0   BUN 37* 46* 53* 68* 76*   CREATININE 1.76* 2.26* 2.91* 3.23* 4.59*   EGFR 40.8* 30.2* 22.3* 19.7* 12.9*   GLUCOSE 141* 83 185* 123* 117*   CALCIUM 7.3* 7.6* 7.8* 7.6* 7.7*         Lab 08/05/22  0533 08/04/22  0635 08/03/22  1417   TOTAL PROTEIN 5.4* 5.3* 6.0   ALBUMIN 3.00* 3.00* 3.60   GLOBULIN 2.4 2.3 2.4   ALT (SGPT) 15 19 22   AST (SGOT) 9 13 16   BILIRUBIN 0.3 0.4 0.5   ALK PHOS 132* 133* 144*                 Lab 08/07/22  1544   ABO TYPING O   RH TYPING Positive   ANTIBODY SCREEN Negative         Brief Urine Lab Results  (Last result in the past 365 days)      Color   Clarity   Blood   Leuk Est   Nitrite   Protein   CREAT   Urine HCG        08/04/22 1533 Yellow   Turbid   Trace   Large (3+)   Negative   100 mg/dL (2+)               Microbiology Results (last 10 days)     Procedure Component Value - Date/Time    Urine Culture - Urine, Urine, Catheter [[id number]]  (Abnormal)  (Susceptibility) Collected: 08/04/22 1533    Lab Status: Edited Result - FINAL Specimen: Urine, Catheter Updated: 08/08/22 1231     Urine Culture Candida albicans    Narrative:      ID/SENSI to follow per Ced Pascal.  Colonization of the urinary tract without infection is common. Treatment is discouraged unless the patient is symptomatic, pregnant, or undergoing an invasive urologic procedure.    Susceptibility      Candida albicans      RYDER      Fluconazole Susceptible                            Blood Culture - Blood, Arm, Left [[id number]]  (Normal) Collected: 08/03/22 2116    Lab Status: Final result Specimen: Blood from Arm, Left Updated: 08/08/22 2148     Blood Culture No growth at 5 days    Blood Culture - Blood, Arm, Left [[id number]]  (Normal) Collected: 08/03/22 1415    Lab Status: Final result Specimen: Blood from Arm, Left Updated: 08/08/22 1503     Blood Culture No growth at 5 days    Urine Culture - Urine, Urine, Catheter [[id number]]  (Abnormal) Collected: 08/03/22 0945    Lab Status: Final result Specimen: Urine, Catheter Updated: 08/04/22 1145     Urine Culture >100,000 CFU/mL Yeast isolated    Narrative:      No further workup  Colonization of the urinary tract without infection is common. Treatment is discouraged unless the patient is symptomatic, pregnant, or undergoing an invasive urologic procedure.          XR Chest 1 View    Result Date: 8/3/2022  DATE OF EXAM: 8/3/2022 4:38 AM  PROCEDURE: XR CHEST 1 VW-  INDICATIONS: DYSPNEA, ON EXERTION   COMPARISON: AP portable chest 7/20/2022. CT chest 7/28/2022.  TECHNIQUE: Single radiographic view of the chest was obtained.  FINDINGS: Ill-defined airspace and interstitial disease is seen in the perihilar right lung and right upper lobe. The right lung aeration has markedly improved since 7/28/2022.  Ill-defined left suprahilar interstitial and alveolar disease has improved, as well.  Previously seen right pleural effusion is no longer visible. Heart size is mildly enlarged but stable with CABG. Right internal jugular approach central line tip terminates at the lower SVC level. There is no visible pneumothorax. No acute osseous abnormalities.       1. Ill-defined alveolar and interstitial disease in the perihilar right lung and right upper lobe, as well as in the suprahilar left upper lobe. These findings have significantly improved, when compared to 7/28/2022. FINDINGS are thought to reflect improving pneumonia. 2. Previously seen right pleural  effusion is no longer visible.  This report was finalized on 8/3/2022 8:17 AM by Caryl Brian MD.                Results for orders placed during the hospital encounter of 07/12/22    Adult Transthoracic Echo Complete W/ Cont if Necessary Per Protocol    Interpretation Summary  · Left ventricular ejection fraction appears to be 56 - 60%. Left ventricular systolic function is normal.  · Left ventricular diastolic function was indeterminate.  · Left ventricular wall thickness is consistent with moderate concentric hypertrophy.  · Estimated right ventricular systolic pressure from tricuspid regurgitation is normal (<35 mmHg).        Discharge Details        Discharge Medications      New Medications      Instructions Start Date   ALPRAZolam 0.25 MG tablet  Commonly known as: Xanax   0.25 mg, Oral, 2 Times Daily PRN      amLODIPine 10 MG tablet  Commonly known as: NORVASC   10 mg, Oral, Every 24 Hours Scheduled   Start Date: August 10, 2022     cloNIDine 0.1 MG/24HR patch  Commonly known as: CATAPRES-TTS   1 patch, Transdermal, Weekly   Start Date: August 14, 2022     fluconazole 100 MG tablet  Commonly known as: DIFLUCAN   100 mg, Oral, Every 24 Hours      hydrALAZINE 25 MG tablet  Commonly known as: APRESOLINE   25 mg, Oral, Every 8 Hours Scheduled      predniSONE 10 MG tablet  Commonly known as: DELTASONE   Take 3 tablets by mouth Daily for 5 days, THEN 2 tablets Daily for 5 days, THEN 1 tablet Daily for 5 days.   Start Date: August 9, 2022     saccharomyces boulardii 250 MG capsule  Commonly known as: FLORASTOR   250 mg, Oral, Daily   Start Date: August 10, 2022     tamsulosin 0.4 MG capsule 24 hr capsule  Commonly known as: FLOMAX   0.8 mg, Oral, Daily   Start Date: August 10, 2022        Changes to Medications      Instructions Start Date   aspirin 81 MG EC tablet  What changed: when to take this   81 mg, Oral, Daily      metoprolol tartrate 100 MG tablet  Commonly known as: LOPRESSOR  What changed: See the new  instructions.   100 mg, Oral, 2 Times Daily         Continue These Medications      Instructions Start Date   albuterol sulfate  (90 Base) MCG/ACT inhaler  Commonly known as: PROVENTIL HFA;VENTOLIN HFA;PROAIR HFA   2 puffs, Inhalation, Every 4 Hours PRN      albuterol (2.5 MG/3ML) 0.083% nebulizer solution  Commonly known as: PROVENTIL   2.5 mg, Nebulization, Every 4 Hours PRN      atorvastatin 80 MG tablet  Commonly known as: LIPITOR   80 mg, Oral, Daily      Breztri Aerosphere 160-9-4.8 MCG/ACT aerosol inhaler  Generic drug: Budeson-Glycopyrrol-Formoterol   2 puffs, Inhalation, 2 Times Daily      gabapentin 300 MG capsule  Commonly known as: NEURONTIN   300 mg, Oral, 3 Times Daily      montelukast 10 MG tablet  Commonly known as: SINGULAIR   10 mg, Oral, Nightly      nitroglycerin 0.4 MG SL tablet  Commonly known as: NITROSTAT   0.4 mg, Sublingual, Every 5 Minutes PRN, Take no more than 3 doses in 15 minutes.      O2  Commonly known as: OXYGEN   2 L/min, Inhalation, As Needed      OLANZapine 5 MG tablet  Commonly known as: zyPREXA   5 mg, Oral, Nightly, Take on days 2, 3 and 4 after chemotherapy.      omeprazole 40 MG capsule  Commonly known as: priLOSEC   40 mg, Oral, Daily      ondansetron 8 MG tablet  Commonly known as: ZOFRAN   8 mg, Oral, 3 Times Daily PRN      vitamin B-12 1000 MCG tablet  Commonly known as: CYANOCOBALAMIN   1,000 mcg, Oral, Daily      Zinc 50 MG tablet   1 tablet, Oral, Daily         Stop These Medications    cloNIDine 0.2 MG tablet  Commonly known as: CATAPRES     clopidogrel 75 MG tablet  Commonly known as: PLAVIX     furosemide 20 MG tablet  Commonly known as: LASIX     losartan 100 MG tablet  Commonly known as: COZAAR     NIFEdipine XL 90 MG 24 hr tablet  Commonly known as: PROCARDIA XL            No Known Allergies      Discharge Disposition:  Home or Self Care    Diet:  Hospital:  Diet Order   Procedures   • Diet Regular; Renal       Activity:  Activity Instructions      Activity as Tolerated                 CODE STATUS:    Code Status and Medical Interventions:   Ordered at: 07/12/22 1759     Code Status (Patient has no pulse and is not breathing):    CPR (Attempt to Resuscitate)     Medical Interventions (Patient has pulse or is breathing):    Full Support       Future Appointments   Date Time Provider Department Center   8/18/2022  4:00 PM Alyssa Padron MD [facility]   8/29/2022  9:15 AM Kwame Hines MD [facility] IM [facility]   9/6/2022 10:00 AM [facility] PET ADMIN RM1  [facility] PETCT [facility]   9/6/2022 11:00 AM [facility] [place] PET[facility] 1 [facility] PETCT [facility]   9/9/2022  3:00 PM Rich Taobr MD [facility] ONC [facility]       Additional Instructions for the Follow-ups that You Need to Schedule     Discharge Follow-up with PCP   As directed       Currently Documented PCP:    Kwame Hines MD    PCP Phone Number:    [phone]     Follow Up Details: 1 week         Discharge Follow-up with Specified Provider: [facility] in 2 weeks   As directed      To: [facility] in 2 weeks                     Mary Lou Lopez MD  08/09/22      Time Spent on Discharge:  I spent  35  minutes on this discharge activity which included: face-to-face encounter with the patient, reviewing the data in the system, coordination of the care with the nursing staff as well as consultants, documentation, and entering orders.           16

## (undated) DEVICE — ST EXT MICROBORE FIX M LL 38IN

## (undated) DEVICE — THE BITE BLOCK MAXI, LATEX FREE STRAP IS USED TO PROTECT THE ENDOSCOPE INSERTION TUBE FROM BEING BITTEN BY THE PATIENT.

## (undated) DEVICE — LUBE GEL ENDOGLIDE 1.1OZ

## (undated) DEVICE — SINGLE USE BIOPSY VALVE MAJ-210: Brand: SINGLE USE BIOPSY VALVE (STERILE)

## (undated) DEVICE — Device: Brand: BALLOON

## (undated) DEVICE — TUBING, SUCTION, 1/4" X 10', STRAIGHT: Brand: MEDLINE

## (undated) DEVICE — SINGLE-USE BIOPSY FORCEPS: Brand: RADIAL JAW 4

## (undated) DEVICE — ST NDL BRONCH ASP VIZISHOT 2 FLX 19GA

## (undated) DEVICE — FRCP BX RADJAW4 PULM WO NDL STD1.8X2 100

## (undated) DEVICE — SINGLE USE SUCTION VALVE MAJ-209: Brand: SINGLE USE SUCTION VALVE (STERILE)

## (undated) DEVICE — SOL IRR H2O BTL 1000ML STRL

## (undated) DEVICE — KT ORCA ORCAPOD DISP STRL

## (undated) DEVICE — SPNG VERSALON 4X4 4PLY NONSTRL LF BG/200

## (undated) DEVICE — SOL IRR NACL 0.9PCT BT 1000ML

## (undated) DEVICE — INTRO ACCSR BLNT TP

## (undated) DEVICE — BOWL UTIL STRL 32OZ

## (undated) DEVICE — LUER-LOK 360°: Brand: CONNECTA, LUER-LOK

## (undated) DEVICE — SYR LUERLOK 50ML

## (undated) DEVICE — Device: Brand: SINGLE USE ASPIRATION NEEDLE NA-U401SX

## (undated) DEVICE — HYBRID CO2 TUBING/CAP SET FOR OLYMPUS® SCOPES & CO2 SOURCE: Brand: ERBE

## (undated) DEVICE — BRUSH CYTO BRONCOSCOPE

## (undated) DEVICE — SAFELINER SUCTION CANISTER 1000CC: Brand: DEROYAL

## (undated) DEVICE — CONTN GRAD MEAS TRIANG 32OZ BLK

## (undated) DEVICE — TRAP,MUCUS SPECIMEN,40CC: Brand: MEDLINE